# Patient Record
Sex: FEMALE | Race: WHITE | NOT HISPANIC OR LATINO | Employment: FULL TIME | URBAN - METROPOLITAN AREA
[De-identification: names, ages, dates, MRNs, and addresses within clinical notes are randomized per-mention and may not be internally consistent; named-entity substitution may affect disease eponyms.]

---

## 2018-06-29 ENCOUNTER — OFFICE VISIT (OUTPATIENT)
Dept: OBGYN CLINIC | Facility: MEDICAL CENTER | Age: 43
End: 2018-06-29
Payer: COMMERCIAL

## 2018-06-29 VITALS
WEIGHT: 193.4 LBS | SYSTOLIC BLOOD PRESSURE: 104 MMHG | DIASTOLIC BLOOD PRESSURE: 76 MMHG | BODY MASS INDEX: 33.02 KG/M2 | HEIGHT: 64 IN

## 2018-06-29 DIAGNOSIS — Z01.419 ENCNTR FOR GYN EXAM (GENERAL) (ROUTINE) W/O ABN FINDINGS: Primary | ICD-10-CM

## 2018-06-29 DIAGNOSIS — N93.9 ABNORMAL UTERINE BLEEDING (AUB): ICD-10-CM

## 2018-06-29 DIAGNOSIS — Z12.31 ENCOUNTER FOR SCREENING MAMMOGRAM FOR MALIGNANT NEOPLASM OF BREAST: ICD-10-CM

## 2018-06-29 DIAGNOSIS — Z12.4 ENCOUNTER FOR PAP SMEAR OF CERVIX WITH HPV DNA COTESTING: ICD-10-CM

## 2018-06-29 PROBLEM — M06.4 INFLAMMATORY POLYARTHROPATHY (HCC): Status: ACTIVE | Noted: 2018-06-29

## 2018-06-29 PROBLEM — I83.90 VARICOSE VEINS: Status: ACTIVE | Noted: 2018-06-29

## 2018-06-29 PROBLEM — Z79.899 ENCOUNTER FOR MONITORING OF METHOTREXATE THERAPY: Status: ACTIVE | Noted: 2017-05-04

## 2018-06-29 PROBLEM — R68.84 JAW PAIN: Status: ACTIVE | Noted: 2018-06-29

## 2018-06-29 PROBLEM — Z51.81 ENCOUNTER FOR MONITORING OF METHOTREXATE THERAPY: Status: ACTIVE | Noted: 2017-05-04

## 2018-06-29 PROBLEM — R53.81 MALAISE: Status: ACTIVE | Noted: 2018-06-29

## 2018-06-29 PROBLEM — R45.89 DEPRESSED MOOD: Status: ACTIVE | Noted: 2017-02-14

## 2018-06-29 PROBLEM — E27.49 SECONDARY ADRENAL INSUFFICIENCY (HCC): Status: ACTIVE | Noted: 2017-02-14

## 2018-06-29 PROBLEM — Z79.631 ENCOUNTER FOR MONITORING OF METHOTREXATE THERAPY: Status: ACTIVE | Noted: 2017-05-04

## 2018-06-29 PROBLEM — F41.9 ANXIETY: Status: ACTIVE | Noted: 2018-06-29

## 2018-06-29 PROBLEM — J30.9 ALLERGIC RHINITIS: Status: ACTIVE | Noted: 2018-06-29

## 2018-06-29 PROBLEM — M06.9 RA (RHEUMATOID ARTHRITIS) (HCC): Status: ACTIVE | Noted: 2018-06-29

## 2018-06-29 PROBLEM — N39.0 URINARY TRACT INFECTIOUS DISEASE: Status: ACTIVE | Noted: 2018-06-29

## 2018-06-29 PROBLEM — E04.1 THYROID NODULE, UNINODULAR: Status: ACTIVE | Noted: 2017-02-14

## 2018-06-29 PROCEDURE — 87624 HPV HI-RISK TYP POOLED RSLT: CPT | Performed by: OBSTETRICS & GYNECOLOGY

## 2018-06-29 PROCEDURE — G0145 SCR C/V CYTO,THINLAYER,RESCR: HCPCS | Performed by: OBSTETRICS & GYNECOLOGY

## 2018-06-29 PROCEDURE — S0610 ANNUAL GYNECOLOGICAL EXAMINA: HCPCS | Performed by: OBSTETRICS & GYNECOLOGY

## 2018-06-29 RX ORDER — CITALOPRAM 40 MG/1
TABLET ORAL
COMMUNITY
End: 2018-10-17

## 2018-06-29 NOTE — PROGRESS NOTES
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 [employer]; states menses occurs monthly, but very heavy for the first 4 days; she is interested in obtaining a Mirena IUD since she has difficulty remembering to take any pills  She has not had a mammogram and no recent pelvic u s  She mentioned that her Mother has history of uterine fibroids  Matt  , her history includes arthritic conditions  I reviewed potential gyn emergencies Rx given for pelvic u s  And our office to precert for the IUD, advising her to have it inserted during her menses  Will heed results of pap and HPV, mammogram, and pelvic u s    Routine well woman exam done today  2  Pap and HPV:  The patient's pap is not up to date  Pap and cotesting was done today  Current ASCCP Guidelines reviewed  3   Mammogram was ordered  4  The following were reviewed in today's visit: breast self exam   5  F/u 1 year      CC:  Annual Gynecologic Examination    HPI: Laura Ronquillo is a 41 y o  [id number] who presents for annual gynecologic examination  She has the following concerns:  Heavy menses    Health Maintenance:    Patient describes her health as good  Patient does not have weight concerns  She exercises 7 days per week with work  She does wear her seatbelt routinely  She does not perform regular monthly self breast exams  She does feel safe at home  Patients does not follow a  diet        Her pap: many years ago  Last mammogram: never    Past Medical History:   Diagnosis Date    Rheumatoid arthritis ([facility] [address] )     Sjoegren syndrome ([facility] [address] )        Past Surgical History:   Procedure Laterality Date    CHOLECYSTECTOMY      VENOUS ABLATION         Past OB/Gyn History:  Pt has menstrual issues, see above  History of sexually transmitted infection: No   History of abnormal pap smears: No    Patient is currently sexually active    The current method of family planning is none  Family History   Problem Relation Age of Onset    No Known Problems Mother     Esophageal cancer Father        Social History:  Social History     Social History    Marital status: /Civil Union     Spouse name: N/A    Number of children: N/A    Years of education: N/A     Occupational History    Not on file  Social History Main Topics    Smoking status: Former Smoker    Smokeless tobacco: Never Used    Alcohol use No    Drug use: No    Sexual activity: Yes     Partners: Male     Birth control/ protection: None     Other Topics Concern    Not on file     Social History Narrative    No narrative on file     Presently lives with family  Patient is currently employed     Allergies   Allergen Reactions    Shellfish-Derived Products          Current Outpatient Prescriptions:     citalopram (CeleXA) 40 mg tablet, TAKE ONE TABLET BY MOUTH EVERY DAY, Disp: , Rfl:       Review of Systems  Constitutional :no fever, feels well, no tiredness, no recent weight gain or loss  ENT: no ear ache, no loss of hearing, no nosebleeds or nasal discharge, no sore throat or hoarseness  Cardiovascular: no complaints of slow or fast heart beat, no chest pain, no palpitations, no leg claudication or lower extremity edema    Respiratory: no complaints of shortness of shortness of breath, no IZAGUIRRE  Breasts:no complaints of breast pain, breast lump, or nipple discharge  Gastrointestinal: no complaints of abdominal pain, constipation, nausea, vomiting, or diarrhea or bloody stools  Genitourinary : no complaints of dysuria, incontinence, pelvic pain, no dysmenorrhea, vaginal discharge or abnormal vaginal bleeding and as noted in HPI  Musculoskeletal: no complaints of arthralgia, no myalgia, no joint swelling or stiffness, no limb pain or swelling  Integumentary: no complaints of skin rash or lesion, itching or dry skin  Neurological: no complaints of headache, no confusion, no numbness or tingling, no dizziness or fainting    Physical Exam:   /76   Ht 5' 4" (1 626 m)   Wt 87 7 kg (193 lb 6 4 oz)   LMP 06/19/2018 (Exact Date)   Breastfeeding? No   BMI 33 20 kg/m²     General:   appears stated age, cooperative, alert normal mood and affect   Psychiatric oriented to person, place and time  Mood and affect normal   Neck: normal, supple,trachea midline, no masses  Thyroid: normal, no thyromegaly   Heart: regular rate and rhythm, S1, S2 normal, no murmur, click, rub or gallop   Lungs: clear to auscultation bilaterally, no increased work of breathing or signs of respiratory distress   Breasts: normal, no dimpling or skin changes noted   Abdomen: soft, non-tender, without masses or organomegaly   Vulva: normal , no lesions   Vagina: normal , no lesions or dryness   Urethra: normal   Urethal meatus normal   Bladder Normal, soft, non-tender and no prolapse or masses appreciated   Cervix: normal, no palpable masses ; ec and c pap and HPV culture done   Uterus: normal , non-tender, not enlarged, no palpable masses   Adnexa: normal, non-tender without fullness or masses; hemoccult neg  Lymphatic Palpation of lymph nodes in neck, axilla, groin and/or other locations: no lymphadenopathy or masses noted   Skin Normal skin turgor and no rashes    Palpation of skin and subcutaneous tissue normal

## 2018-07-02 ENCOUNTER — TELEPHONE (OUTPATIENT)
Dept: OBGYN CLINIC | Facility: MEDICAL CENTER | Age: 43
End: 2018-07-02

## 2018-07-02 NOTE — TELEPHONE ENCOUNTER
----- Message from Antonio Hutchins sent at 7/2/2018  9:40 AM EDT -----  Regarding: RE: Himanshu Hutchins  Benefits submitted  I will contact pt once I receive them   ----- Message -----  From: Meir Holloway  Sent: 6/29/2018  11:42 AM  To: Antonio Hutchins  Subject: Precert Mirena                                   Please precck Mirena for pt  Pt is seeing Dr Eugene Arroyo but needs to have ultrasound prior to placement    Pt aware and will schedule ultrasound

## 2018-07-03 LAB — HPV RRNA GENITAL QL NAA+PROBE: NORMAL

## 2018-07-06 LAB
LAB AP GYN PRIMARY INTERPRETATION: NORMAL
Lab: NORMAL
PATH INTERP SPEC-IMP: NORMAL

## 2018-07-16 ENCOUNTER — HOSPITAL ENCOUNTER (OUTPATIENT)
Dept: ULTRASOUND IMAGING | Facility: HOSPITAL | Age: 43
Discharge: HOME | End: 2018-07-16
Payer: COMMERCIAL

## 2018-07-16 DIAGNOSIS — N93.9 ABNORMAL UTERINE BLEEDING (AUB): ICD-10-CM

## 2018-07-16 PROCEDURE — 76856 US EXAM PELVIC COMPLETE: CPT

## 2018-07-16 PROCEDURE — 76830 TRANSVAGINAL US NON-OB: CPT

## 2018-07-20 NOTE — PROGRESS NOTES
Ultrasound reviewed  Small uterine fibroid measuring 1 1 cm  She does have bilateral ovarian follicles which appear benign    The largest is 2 0 cm

## 2018-07-21 ENCOUNTER — TRANSCRIBE ORDERS (OUTPATIENT)
Dept: ADMINISTRATIVE | Facility: HOSPITAL | Age: 43
End: 2018-07-21

## 2018-07-21 ENCOUNTER — APPOINTMENT (OUTPATIENT)
Dept: LAB | Facility: HOSPITAL | Age: 43
End: 2018-07-21

## 2018-07-21 DIAGNOSIS — Z00.8 HEALTH EXAMINATION IN POPULATION SURVEY: Primary | ICD-10-CM

## 2018-07-21 DIAGNOSIS — Z00.8 HEALTH EXAMINATION IN POPULATION SURVEY: ICD-10-CM

## 2018-07-21 LAB
CHOLEST SERPL-MCNC: 184 MG/DL (ref 0–200)
EST. AVERAGE GLUCOSE BLD GHB EST-MCNC: 108 MG/DL
HBA1C MFR BLD: 5.4 % (ref 4.2–6.3)
HDLC SERPL-MCNC: 44 MG/DL (ref 40–60)
LDLC SERPL CALC-MCNC: 122 MG/DL (ref 75–193)
NONHDLC SERPL-MCNC: 140 MG/DL
TRIGL SERPL-MCNC: 89 MG/DL (ref 44–166)

## 2018-07-21 PROCEDURE — 36415 COLL VENOUS BLD VENIPUNCTURE: CPT

## 2018-07-21 PROCEDURE — 80061 LIPID PANEL: CPT

## 2018-07-21 PROCEDURE — 83036 HEMOGLOBIN GLYCOSYLATED A1C: CPT

## 2018-07-25 ENCOUNTER — TELEPHONE (OUTPATIENT)
Dept: OBGYN CLINIC | Facility: MEDICAL CENTER | Age: 43
End: 2018-07-25

## 2018-08-01 ENCOUNTER — TELEPHONE (OUTPATIENT)
Dept: OBGYN CLINIC | Facility: MEDICAL CENTER | Age: 43
End: 2018-08-01

## 2018-08-01 NOTE — TELEPHONE ENCOUNTER
Benefits submitted for wrong insurance   Benefits for iud resubmitted with [facility]

## 2018-08-10 ENCOUNTER — PROCEDURE VISIT (OUTPATIENT)
Dept: OBGYN CLINIC | Facility: MEDICAL CENTER | Age: 43
End: 2018-08-10
Payer: COMMERCIAL

## 2018-08-10 VITALS
WEIGHT: 191.9 LBS | DIASTOLIC BLOOD PRESSURE: 72 MMHG | SYSTOLIC BLOOD PRESSURE: 112 MMHG | BODY MASS INDEX: 31.97 KG/M2 | HEIGHT: 65 IN

## 2018-08-10 DIAGNOSIS — Z32.00 ENCOUNTER FOR PREGNANCY TEST, RESULT UNKNOWN: ICD-10-CM

## 2018-08-10 DIAGNOSIS — Z30.430 ENCOUNTER FOR INSERTION OF MIRENA IUD: Primary | ICD-10-CM

## 2018-08-10 LAB — SL AMB POCT URINE HCG: NEGATIVE

## 2018-08-10 PROCEDURE — 81025 URINE PREGNANCY TEST: CPT | Performed by: NURSE PRACTITIONER

## 2018-08-10 PROCEDURE — 58300 INSERT INTRAUTERINE DEVICE: CPT | Performed by: NURSE PRACTITIONER

## 2018-08-10 NOTE — PROGRESS NOTES
Iud insertions  Date/Time: 8/10/2018 9:00 AM  Performed by: Albert Foster by: Kulwinder Jim     Consent:     Consent obtained:  Verbal and written    Consent given by:  Patient    Procedure risks and benefits discussed: yes      Patient questions answered: yes      Patient agrees, verbalizes understanding, and wants to proceed: yes      Educational handouts given: yes      Instructions and paperwork completed: yes    Procedure:     Pelvic exam performed: yes      Negative urine pregnancy test: yes      Cervix cleaned and prepped: yes      Speculum placed in vagina: yes      Tenaculum applied to cervix: yes      Uterus sounded: yes      IUD inserted with no complications: yes      IUD type:  Mirena    Strings trimmed: yes      Uterus sound depth (cm):  9  Post-procedure:     Patient tolerated procedure well: yes      Patient will follow up after next period: yes

## 2018-09-07 ENCOUNTER — OFFICE VISIT (OUTPATIENT)
Dept: OBGYN CLINIC | Facility: MEDICAL CENTER | Age: 43
End: 2018-09-07
Payer: COMMERCIAL

## 2018-09-07 VITALS — DIASTOLIC BLOOD PRESSURE: 80 MMHG | BODY MASS INDEX: 32.62 KG/M2 | WEIGHT: 193 LBS | SYSTOLIC BLOOD PRESSURE: 100 MMHG

## 2018-09-07 DIAGNOSIS — Z30.431 IUD CHECK UP: Primary | ICD-10-CM

## 2018-09-07 PROCEDURE — 99214 OFFICE O/P EST MOD 30 MIN: CPT | Performed by: NURSE PRACTITIONER

## 2018-09-07 NOTE — PROGRESS NOTES
Assessment Diagnoses and all orders for this visit:    IUD check up        Plan:  Normal iud check  String at appropriate length  Pt  Prefers not to check the string  Informed irregular and/or amennorrhea can occur with this method  rto for annual in June, call sooner w any concerns  41 y o  female continuing IUD, no contraindications  Subjective      Faisal Gastelum is a 41 y o  female who presents for contraception counseling  The patient does not have complaints today  The patient is sexually active  Pertinent past medical history: none  States no c/o since iud inserted  First menses after insert was a lot lighter than usual for her  Has been having slight spotting for a few days and cramping, menses due now  Has been sexually active w/o complaints  Doesn't check the string  Patient Active Problem List   Diagnosis    Acute adrenal insufficiency (HCC)    Allergic rhinitis    Anemia    Anxiety    Depressed mood    Encounter for monitoring of methotrexate therapy    Hypokalemia    Inflammatory polyarthropathy (HCC)    Jaw pain    Leucocytosis    Malaise    RA (rheumatoid arthritis) ([address] )    Secondary adrenal insufficiency (HCC)    Sjogrens syndrome (HCC)    Thyroid nodule, uninodular    Urinary tract infectious disease    Varicose vein    Varicose veins of lower extremities with other complications    Varicose veins with pain, left    Varicose veins       Past Medical History:   Diagnosis Date    Rheumatoid arthritis ([address]     Sjoegren syndrome ([address] )        Past Surgical History:   Procedure Laterality Date    CHOLECYSTECTOMY      VENOUS ABLATION         Family History   Problem Relation Age of Onset    No Known Problems Mother     Esophageal cancer Father        Social History     Social History    Marital status: /Civil Union     Spouse name: N/A    Number of children: N/A    Years of education: N/A     Occupational History    Not on file       Social History Main Topics    Smoking status: Former Smoker    Smokeless tobacco: Never Used    Alcohol use No    Drug use: No    Sexual activity: Yes     Partners: Male     Birth control/ protection: None, IUD     Other Topics Concern    Not on file     Social History Narrative    No narrative on file          Current Outpatient Prescriptions:     citalopram (CeleXA) 40 mg tablet, TAKE ONE TABLET BY MOUTH EVERY DAY, Disp: , Rfl:     Allergies   Allergen Reactions    Shellfish-Derived Products        Review of Systems  Constitutional :no fever, feels well, no tiredness, no recent weight gain or loss  ENT: no ear ache, no loss of hearing, no nosebleeds or nasal discharge, no sore throat or hoarseness  Cardiovascular: no complaints of slow or fast heart beat, no chest pain, no palpitations, no leg claudication or lower extremity edema  Respiratory: no complaints of shortness of shortness of breath, no IZAGUIRRE  Breasts:no complaints of breast pain, breast lump, or nipple discharge  Gastrointestinal: no complaints of abdominal pain, constipation, nausea, vomiting, or diarrhea or bloody stools  Genitourinary : no complaints of dysuria, incontinence, pelvic pain, no dysmenorrhea, vaginal discharge or abnormal vaginal bleeding and as noted in HPI  Musculoskeletal: no complaints of arthralgia, no myalgia, no joint swelling or stiffness, no limb pain or swelling  Integumentary: no complaints of skin rash or lesion, itching or dry skin  Neurological: no complaints of headache, no confusion, no numbness or tingling, no dizziness or fainting    Objective     /80   Wt 87 5 kg (193 lb)   LMP 08/10/2018 (Exact Date)   BMI 32 62 kg/m²     General:   appears stated age, cooperative, alert normal mood and affect   Vulva: normal female genitalia   Vagina: normal vagina   Urethra: normal   Cervix: Normal, no discharge  iud string visible at correct length     Uterus: normal size, contour, position, consistency, mobility, non-tender Adnexa: normal adnexa   Lymphatic palpation of lymph nodes in neck, axilla, groin and/or other locations: no lymphadenopathy or masses noted   Skin normal skin turgor and no rashes     Psychiatric orientation to person, place, and time: normal  mood and affect: normal

## 2018-10-17 ENCOUNTER — OFFICE VISIT (OUTPATIENT)
Dept: INTERNAL MEDICINE CLINIC | Facility: CLINIC | Age: 43
End: 2018-10-17
Payer: COMMERCIAL

## 2018-10-17 VITALS
WEIGHT: 189.8 LBS | OXYGEN SATURATION: 98 % | SYSTOLIC BLOOD PRESSURE: 104 MMHG | TEMPERATURE: 98.9 F | HEART RATE: 78 BPM | HEIGHT: 64 IN | BODY MASS INDEX: 32.4 KG/M2 | DIASTOLIC BLOOD PRESSURE: 60 MMHG

## 2018-10-17 DIAGNOSIS — M06.9 RHEUMATOID ARTHRITIS, INVOLVING UNSPECIFIED SITE, UNSPECIFIED RHEUMATOID FACTOR PRESENCE: Primary | ICD-10-CM

## 2018-10-17 DIAGNOSIS — E27.49 SECONDARY ADRENAL INSUFFICIENCY (HCC): ICD-10-CM

## 2018-10-17 DIAGNOSIS — M35.00 SJOGREN'S SYNDROME, WITH UNSPECIFIED ORGAN INVOLVEMENT (HCC): ICD-10-CM

## 2018-10-17 DIAGNOSIS — Z23 NEED FOR INFLUENZA VACCINATION: ICD-10-CM

## 2018-10-17 DIAGNOSIS — Z12.39 SCREENING FOR BREAST CANCER: ICD-10-CM

## 2018-10-17 DIAGNOSIS — Z13.6 SCREENING FOR CARDIOVASCULAR CONDITION: ICD-10-CM

## 2018-10-17 DIAGNOSIS — D84.9 IMMUNOSUPPRESSED STATUS (HCC): ICD-10-CM

## 2018-10-17 DIAGNOSIS — F41.9 ANXIETY: ICD-10-CM

## 2018-10-17 PROBLEM — R68.84 JAW PAIN: Status: RESOLVED | Noted: 2018-06-29 | Resolved: 2018-10-17

## 2018-10-17 PROBLEM — Z79.631 ENCOUNTER FOR MONITORING OF METHOTREXATE THERAPY: Status: RESOLVED | Noted: 2017-05-04 | Resolved: 2018-10-17

## 2018-10-17 PROBLEM — R53.81 MALAISE: Status: RESOLVED | Noted: 2018-06-29 | Resolved: 2018-10-17

## 2018-10-17 PROBLEM — Z51.81 ENCOUNTER FOR MONITORING OF METHOTREXATE THERAPY: Status: RESOLVED | Noted: 2017-05-04 | Resolved: 2018-10-17

## 2018-10-17 PROBLEM — N39.0 URINARY TRACT INFECTIOUS DISEASE: Status: RESOLVED | Noted: 2018-06-29 | Resolved: 2018-10-17

## 2018-10-17 PROBLEM — Z79.899 ENCOUNTER FOR MONITORING OF METHOTREXATE THERAPY: Status: RESOLVED | Noted: 2017-05-04 | Resolved: 2018-10-17

## 2018-10-17 PROCEDURE — 90682 RIV4 VACC RECOMBINANT DNA IM: CPT | Performed by: NURSE PRACTITIONER

## 2018-10-17 PROCEDURE — 1036F TOBACCO NON-USER: CPT | Performed by: NURSE PRACTITIONER

## 2018-10-17 PROCEDURE — 90471 IMMUNIZATION ADMIN: CPT | Performed by: NURSE PRACTITIONER

## 2018-10-17 PROCEDURE — 3008F BODY MASS INDEX DOCD: CPT | Performed by: NURSE PRACTITIONER

## 2018-10-17 PROCEDURE — 99204 OFFICE O/P NEW MOD 45 MIN: CPT | Performed by: NURSE PRACTITIONER

## 2018-10-17 RX ORDER — LORAZEPAM 0.5 MG/1
0.5 TABLET ORAL DAILY
COMMUNITY
Start: 2018-10-08 | End: 2018-12-10 | Stop reason: ALTCHOICE

## 2018-10-17 RX ORDER — CITALOPRAM 40 MG/1
40 TABLET ORAL DAILY
Qty: 30 TABLET | Refills: 3 | Status: SHIPPED | OUTPATIENT
Start: 2018-10-17 | End: 2018-12-10 | Stop reason: ALTCHOICE

## 2018-10-17 RX ORDER — CITALOPRAM 40 MG/1
TABLET ORAL
COMMUNITY
End: 2018-10-17 | Stop reason: SDUPTHER

## 2018-10-18 ENCOUNTER — TELEPHONE (OUTPATIENT)
Dept: INTERNAL MEDICINE CLINIC | Facility: CLINIC | Age: 43
End: 2018-10-18

## 2018-10-18 NOTE — TELEPHONE ENCOUNTER
[address] [place] [address] to obtain the thyroid US results, and they stated all records are now at [place]  Sent a fax to [phone] in [place] for her US that was done of the thyroid

## 2018-12-10 ENCOUNTER — OFFICE VISIT (OUTPATIENT)
Dept: INTERNAL MEDICINE CLINIC | Facility: CLINIC | Age: 43
End: 2018-12-10
Payer: COMMERCIAL

## 2018-12-10 VITALS
HEIGHT: 64 IN | OXYGEN SATURATION: 96 % | BODY MASS INDEX: 31.65 KG/M2 | TEMPERATURE: 98.4 F | HEART RATE: 80 BPM | WEIGHT: 185.4 LBS | SYSTOLIC BLOOD PRESSURE: 118 MMHG | DIASTOLIC BLOOD PRESSURE: 78 MMHG

## 2018-12-10 DIAGNOSIS — Z00.00 ROUTINE HEALTH MAINTENANCE: Primary | ICD-10-CM

## 2018-12-10 DIAGNOSIS — Z01.84 IMMUNITY STATUS TESTING: ICD-10-CM

## 2018-12-10 PROBLEM — Z12.39 SCREENING FOR BREAST CANCER: Status: RESOLVED | Noted: 2018-10-17 | Resolved: 2018-12-10

## 2018-12-10 PROBLEM — Z13.6 SCREENING FOR CARDIOVASCULAR CONDITION: Status: RESOLVED | Noted: 2018-10-17 | Resolved: 2018-12-10

## 2018-12-10 PROBLEM — I83.90 VARICOSE VEINS: Status: RESOLVED | Noted: 2018-06-29 | Resolved: 2018-12-10

## 2018-12-10 PROCEDURE — 99396 PREV VISIT EST AGE 40-64: CPT | Performed by: NURSE PRACTITIONER

## 2018-12-10 RX ORDER — LANOLIN ALCOHOL/MO/W.PET/CERES
3 CREAM (GRAM) TOPICAL
COMMUNITY
End: 2019-10-20

## 2018-12-10 NOTE — PATIENT INSTRUCTIONS
Wellness Visit for Adults   WHAT YOU NEED TO KNOW:   What is a wellness visit? A wellness visit is when you see your healthcare provider to get screened for health problems  You can also get advice on how to stay healthy  Write down your questions so you remember to ask them  Ask your healthcare provider how often you should have a wellness visit  What happens at a wellness visit? Your healthcare provider will ask about your health, and your family history of health problems  This includes high blood pressure, heart disease, and cancer  He or she will ask if you have symptoms that concern you, if you smoke, and about your mood  You may also be asked about your intake of medicines, supplements, food, and alcohol  Any of the following may be done:  · Your weight  will be checked  Your height may also be checked so your body mass index (BMI) can be calculated  Your BMI shows if you are at a healthy weight  · Your blood pressure  and heart rate will be checked  Your temperature may also be checked  · Blood and urine tests  may be done  Blood tests may be done to check your cholesterol levels  Abnormal cholesterol levels increase your risk for heart disease and stroke  You may also need a blood or urine test to check for diabetes if you are at increased risk  Urine tests may be done to look for signs of an infection or kidney disease  · A physical exam  includes checking your heartbeat and lungs with a stethoscope  Your healthcare provider may also check your skin to look for sun damage  · Screening tests  may be recommended  A screening test is done to check for diseases that may not cause symptoms  The screening tests you may need depend on your age, gender, family history, and lifestyle habits  For example, colorectal screening may be recommended if you are 48years old or older  What screening tests do I need if I am a woman? · A Pap smear  is used to screen for cervical cancer   Pap smears are usually done every 3 to 5 years depending on your age  You may need them more often if you have had abnormal Pap smear test results in the past  Ask your healthcare provider how often you should have a Pap smear  · A mammogram  is an x-ray of your breasts to screen for breast cancer  Experts recommend mammograms every 2 years starting at age 48 years  You may need a mammogram at age 52 years or younger if you have an increased risk for breast cancer  Talk to your healthcare provider about when you should start having mammograms and how often you need them  What vaccines might I need? · Get an influenza vaccine  every year  The influenza vaccine protects you from the flu  Several types of viruses cause the flu  The viruses change over time, so new vaccines are made each year  · Get a tetanus-diphtheria (Td) booster vaccine  every 10 years  This vaccine protects you against tetanus and diphtheria  Tetanus is a severe infection that may cause painful muscle spasms and lockjaw  Diphtheria is a severe bacterial infection that causes a thick covering in the back of your mouth and throat  · Get a human papillomavirus (HPV) vaccine  if you are female and aged 23 to 32 or male 23 to 24 and never received it  This vaccine protects you from HPV infection  HPV is the most common infection spread by sexual contact  HPV may also cause vaginal, penile, and anal cancers  · Get a pneumococcal vaccine  if you are aged 72 years or older  The pneumococcal vaccine is an injection given to protect you from pneumococcal disease  Pneumococcal disease is an infection caused by pneumococcal bacteria  The infection may cause pneumonia, meningitis, or an ear infection  · Get a shingles vaccine  if you are aged 61 or older, even if you have had shingles before  The shingles vaccine is an injection to protect you from the varicella-zoster virus  This is the same virus that causes chickenpox   Shingles is a painful rash that develops in people who had chickenpox or have been exposed to the virus  How can I eat healthy? My Plate is a model for planning healthy meals  It shows the types and amounts of foods that should go on your plate  Fruits and vegetables make up about half of your plate, and grains and protein make up the other half  A serving of dairy is included on the side of your plate  The amount of calories and serving sizes you need depends on your age, gender, weight, and height  Examples of healthy foods are listed below:  · Eat a variety of vegetables  such as dark green, red, and orange vegetables  You can also include canned vegetables low in sodium (salt) and frozen vegetables without added butter or sauces  · Eat a variety of fresh fruits , canned fruit in 100% juice, frozen fruit, and dried fruit  · Include whole grains  At least half of the grains you eat should be whole grains  Examples include whole-wheat bread, wheat pasta, brown rice, and whole-grain cereals such as oatmeal     · Eat a variety of protein foods such as seafood (fish and shellfish), lean meat, and poultry without skin (turkey and chicken)  Examples of lean meats include pork leg, shoulder, or tenderloin, and beef round, sirloin, tenderloin, and extra lean ground beef  Other protein foods include eggs and egg substitutes, beans, peas, soy products, nuts, and seeds  · Choose low-fat dairy products such as skim or 1% milk or low-fat yogurt, cheese, and cottage cheese  · Limit unhealthy fats  such as butter, hard margarine, and shortening  How much exercise do I need? Exercise at least 30 minutes per day on most days of the week  Some examples of exercise include walking, biking, dancing, and swimming  You can also fit in more physical activity by taking the stairs instead of the elevator or parking farther away from stores  Include muscle strengthening activities 2 days each week  Regular exercise provides many health benefits  It helps you manage your weight, and decreases your risk for type 2 diabetes, heart disease, stroke, and high blood pressure  Exercise can also help improve your mood  Ask your healthcare provider about the best exercise plan for you  What are some general health and safety guidelines I should follow? · Do not smoke  Nicotine and other chemicals in cigarettes and cigars can cause lung damage  Ask your healthcare provider for information if you currently smoke and need help to quit  E-cigarettes or smokeless tobacco still contain nicotine  Talk to your healthcare provider before you use these products  · Limit alcohol  A drink of alcohol is 12 ounces of beer, 5 ounces of wine, or 1½ ounces of liquor  · Lose weight, if needed  Being overweight increases your risk of certain health conditions  These include heart disease, high blood pressure, type 2 diabetes, and certain types of cancer  · Protect your skin  Do not sunbathe or use tanning beds  Use sunscreen with a SPF 15 or higher  Apply sunscreen at least 15 minutes before you go outside  Reapply sunscreen every 2 hours  Wear protective clothing, hats, and sunglasses when you are outside  · Drive safely  Always wear your seatbelt  Make sure everyone in your car wears a seatbelt  A seatbelt can save your life if you are in an accident  Do not use your cell phone when you are driving  This could distract you and cause an accident  Pull over if you need to make a call or send a text message  · Practice safe sex  Use latex condoms if are sexually active and have more than one partner  Your healthcare provider may recommend screening tests for sexually transmitted infections (STIs)  · Wear helmets, lifejackets, and protective gear  Always wear a helmet when you ride a bike or motorcycle, go skiing, or play sports that could cause a head injury  Wear protective equipment when you play sports   Wear a lifejacket when you are on a boat or doing water sports  CARE AGREEMENT:   You have the right to help plan your care  Learn about your health condition and how it may be treated  Discuss treatment options with your caregivers to decide what care you want to receive  You always have the right to refuse treatment  The above information is an  only  It is not intended as medical advice for individual conditions or treatments  Talk to your doctor, nurse or pharmacist before following any medical regimen to see if it is safe and effective for you  © 2017 [address] Information is for End User's use only and may not be sold, redistributed or otherwise used for commercial purposes  All illustrations and images included in CareNotes® are the copyrighted property of [employer]  or [employer]

## 2018-12-10 NOTE — PROGRESS NOTES
Assessment/Plan: Will order titers for patient to have done  She will also get her other fasting labs done  She will go for mammogram after the Holidays  She will come back on Friday to have PPD done and will let us know whether or not she does need a one or two step  She is up to date on her Tdap and Flu vaccines  Will follow up in one year or sooner if need be  No problem-specific Assessment & Plan notes found for this encounter  Problem List Items Addressed This Visit     Routine health maintenance - Primary    Immunity status testing    Relevant Orders    Measles Antibodies (IgG,IgM)    Mumps antibody, IgG    Rubella antibody, IgG    Varicella zoster antibody, IgG    Hepatitis B surface antibody    Tetanus toxoid, IgG    Diptheria Antitoxoid antibodies    Bordetella pertussis antibody    Rubeola antibody IgG            Subjective:      Patient ID: Jolene Rawls is a 41 y o  female  Dipesh Barros is here today for a routine health exam   She is here today for a school physical  She will need titers done and needs a PPD done  She denies any chest pain, SOB, or palpitations  She states her depression and anxiety is maintained  She has not yet had her labs done and will do her mammogram soon  She denies any drug, alcohol, or tobacco use  Denies any constipation or diarrhea  She does follow up for routine eye exams every 2 years and does see a dentist every 6 months  She offers no other issues  The following portions of the patient's history were reviewed and updated as appropriate:   She  has a past medical history of Anxiety; Depression; Rheumatoid arthritis ([facility] [address] ); and Sjoegren syndrome ([facility] [address] )    She   Patient Active Problem List    Diagnosis Date Noted    Routine health maintenance 12/10/2018    Immunity status testing 12/10/2018    Allergic rhinitis 06/29/2018    Anxiety 06/29/2018    Inflammatory polyarthropathy ([facility] [address] ) 06/29/2018    RA (rheumatoid arthritis) ([facility] [address] ) 06/29/2018    Depressed mood 02/14/2017    Secondary adrenal insufficiency ([facility] ) 02/14/2017    Thyroid nodule, uninodular 02/14/2017    Sjogren's syndrome ([facility] ) 12/31/2016    Acute adrenal insufficiency ([facility] ) 12/30/2016    Anemia 12/19/2016    Leucocytosis 12/17/2016    Varicose vein 03/10/2015     She  has a past surgical history that includes Cholecystectomy and Venous ablation  Her family history includes Esophageal cancer in her father; No Known Problems in her mother  She  reports that she has quit smoking  She has never used smokeless tobacco  She reports that she does not drink alcohol or use drugs  Current Outpatient Prescriptions   Medication Sig Dispense Refill    melatonin 3 mg Take 3 mg by mouth daily at bedtime      sertraline (ZOLOFT) 50 mg tablet Take 50 mg by mouth daily       No current facility-administered medications for this visit  Current Outpatient Prescriptions on File Prior to Visit   Medication Sig    [DISCONTINUED] citalopram (CeleXA) 40 mg tablet Take 1 tablet (40 mg total) by mouth daily    [DISCONTINUED] LORazepam (ATIVAN) 0 5 mg tablet Take 0 5 mg by mouth daily      No current facility-administered medications on file prior to visit  She is allergic to shellfish-derived products       Review of Systems   All other systems reviewed and are negative  Objective:      /78 (BP Location: Right arm, Patient Position: Sitting, Cuff Size: Standard)   Pulse 80   Temp 98 4 °F (36 9 °C) (Temporal)   Ht 5' 4" (1 626 m)   Wt 84 1 kg (185 lb 6 4 oz)   SpO2 96%   BMI 31 82 kg/m²          Physical Exam   Constitutional: She is oriented to person, place, and time  She appears well-developed and well-nourished  HENT:   Head: Normocephalic and atraumatic  Right Ear: External ear normal    Left Ear: External ear normal    Nose: Nose normal    Mouth/Throat: Oropharynx is clear and moist    Eyes: Pupils are equal, round, and reactive to light   Conjunctivae and EOM are normal  Neck: Normal range of motion  Neck supple  Cardiovascular: Normal rate, regular rhythm, normal heart sounds and intact distal pulses  Pulmonary/Chest: Effort normal and breath sounds normal    Abdominal: Soft  Bowel sounds are normal    Musculoskeletal: Normal range of motion  Neurological: She is alert and oriented to person, place, and time  She has normal reflexes  Skin: Skin is warm and dry  Psychiatric: She has a normal mood and affect  Her behavior is normal  Judgment and thought content normal    Vitals reviewed

## 2018-12-14 ENCOUNTER — CLINICAL SUPPORT (OUTPATIENT)
Dept: INTERNAL MEDICINE CLINIC | Facility: CLINIC | Age: 43
End: 2018-12-14
Payer: COMMERCIAL

## 2018-12-14 ENCOUNTER — TRANSCRIBE ORDERS (OUTPATIENT)
Dept: LAB | Facility: CLINIC | Age: 43
End: 2018-12-14

## 2018-12-14 ENCOUNTER — LAB (OUTPATIENT)
Dept: LAB | Facility: CLINIC | Age: 43
End: 2018-12-14
Payer: COMMERCIAL

## 2018-12-14 DIAGNOSIS — Z01.84 IMMUNITY STATUS TESTING: ICD-10-CM

## 2018-12-14 DIAGNOSIS — M35.00 SJOGREN'S SYNDROME, WITH UNSPECIFIED ORGAN INVOLVEMENT (HCC): ICD-10-CM

## 2018-12-14 DIAGNOSIS — Z11.1 ENCOUNTER FOR PPD TEST: Primary | ICD-10-CM

## 2018-12-14 DIAGNOSIS — F41.9 ANXIETY: ICD-10-CM

## 2018-12-14 DIAGNOSIS — E27.49 SECONDARY ADRENAL INSUFFICIENCY (HCC): ICD-10-CM

## 2018-12-14 DIAGNOSIS — M06.9 RHEUMATOID ARTHRITIS, INVOLVING UNSPECIFIED SITE, UNSPECIFIED RHEUMATOID FACTOR PRESENCE: ICD-10-CM

## 2018-12-14 LAB
ALBUMIN SERPL BCP-MCNC: 3.8 G/DL (ref 3.5–5)
ALP SERPL-CCNC: 74 U/L (ref 46–116)
ALT SERPL W P-5'-P-CCNC: 19 U/L (ref 12–78)
ANION GAP SERPL CALCULATED.3IONS-SCNC: 9 MMOL/L (ref 4–13)
AST SERPL W P-5'-P-CCNC: 10 U/L (ref 5–45)
BASOPHILS # BLD AUTO: 0.08 THOUSANDS/ΜL (ref 0–0.1)
BASOPHILS NFR BLD AUTO: 1 % (ref 0–1)
BILIRUB SERPL-MCNC: 0.48 MG/DL (ref 0.2–1)
BUN SERPL-MCNC: 13 MG/DL (ref 5–25)
CALCIUM SERPL-MCNC: 8.6 MG/DL (ref 8.3–10.1)
CHLORIDE SERPL-SCNC: 107 MMOL/L (ref 100–108)
CO2 SERPL-SCNC: 25 MMOL/L (ref 21–32)
CREAT SERPL-MCNC: 0.73 MG/DL (ref 0.6–1.3)
EOSINOPHIL # BLD AUTO: 0.49 THOUSAND/ΜL (ref 0–0.61)
EOSINOPHIL NFR BLD AUTO: 6 % (ref 0–6)
ERYTHROCYTE [DISTWIDTH] IN BLOOD BY AUTOMATED COUNT: 14.6 % (ref 11.6–15.1)
GFR SERPL CREATININE-BSD FRML MDRD: 103 ML/MIN/1.73SQ M
GLUCOSE SERPL-MCNC: 88 MG/DL (ref 65–140)
HCT VFR BLD AUTO: 39.7 % (ref 34.8–46.1)
HGB BLD-MCNC: 11.9 G/DL (ref 11.5–15.4)
IMM GRANULOCYTES # BLD AUTO: 0.03 THOUSAND/UL (ref 0–0.2)
IMM GRANULOCYTES NFR BLD AUTO: 0 % (ref 0–2)
LYMPHOCYTES # BLD AUTO: 2.15 THOUSANDS/ΜL (ref 0.6–4.47)
LYMPHOCYTES NFR BLD AUTO: 25 % (ref 14–44)
MCH RBC QN AUTO: 27.5 PG (ref 26.8–34.3)
MCHC RBC AUTO-ENTMCNC: 30 G/DL (ref 31.4–37.4)
MCV RBC AUTO: 92 FL (ref 82–98)
MONOCYTES # BLD AUTO: 0.72 THOUSAND/ΜL (ref 0.17–1.22)
MONOCYTES NFR BLD AUTO: 8 % (ref 4–12)
NEUTROPHILS # BLD AUTO: 5.08 THOUSANDS/ΜL (ref 1.85–7.62)
NEUTS SEG NFR BLD AUTO: 60 % (ref 43–75)
NRBC BLD AUTO-RTO: 0 /100 WBCS
PLATELET # BLD AUTO: 299 THOUSANDS/UL (ref 149–390)
PMV BLD AUTO: 12 FL (ref 8.9–12.7)
POTASSIUM SERPL-SCNC: 3.9 MMOL/L (ref 3.5–5.3)
PROT SERPL-MCNC: 7.8 G/DL (ref 6.4–8.2)
RBC # BLD AUTO: 4.33 MILLION/UL (ref 3.81–5.12)
RUBV IGG SERPL IA-ACNC: 57 IU/ML
SODIUM SERPL-SCNC: 141 MMOL/L (ref 136–145)
TSH SERPL DL<=0.05 MIU/L-ACNC: 2.41 UIU/ML (ref 0.36–3.74)
WBC # BLD AUTO: 8.55 THOUSAND/UL (ref 4.31–10.16)

## 2018-12-14 PROCEDURE — 86735 MUMPS ANTIBODY: CPT

## 2018-12-14 PROCEDURE — 84443 ASSAY THYROID STIM HORMONE: CPT

## 2018-12-14 PROCEDURE — 86787 VARICELLA-ZOSTER ANTIBODY: CPT

## 2018-12-14 PROCEDURE — 36415 COLL VENOUS BLD VENIPUNCTURE: CPT

## 2018-12-14 PROCEDURE — 86706 HEP B SURFACE ANTIBODY: CPT

## 2018-12-14 PROCEDURE — 85025 COMPLETE CBC W/AUTO DIFF WBC: CPT

## 2018-12-14 PROCEDURE — 86317 IMMUNOASSAY INFECTIOUS AGENT: CPT

## 2018-12-14 PROCEDURE — 86762 RUBELLA ANTIBODY: CPT

## 2018-12-14 PROCEDURE — 86615 BORDETELLA ANTIBODY: CPT

## 2018-12-14 PROCEDURE — 86580 TB INTRADERMAL TEST: CPT | Performed by: NURSE PRACTITIONER

## 2018-12-14 PROCEDURE — 80053 COMPREHEN METABOLIC PANEL: CPT

## 2018-12-14 PROCEDURE — 86765 RUBEOLA ANTIBODY: CPT

## 2018-12-15 LAB — HBV SURFACE AB SER-ACNC: 29.76 MIU/ML

## 2018-12-17 LAB
B PERT IGA SER QL IA: <1 INDEX (ref 0–0.9)
B PERT IGG SER-ACNC: <0.95 INDEX (ref 0–0.94)
B PERT IGM SER QL IA: 5 INDEX (ref 0–0.9)
VZV IGG SER IA-ACNC: NORMAL

## 2018-12-18 DIAGNOSIS — Z20.818 PERTUSSIS EXPOSURE: Primary | ICD-10-CM

## 2018-12-18 LAB
C DIPHTHERIAE AB SER IA-ACNC: 0.32 IU/ML
C TETANI IGG SER IA-ACNC: 1.84 IU/ML
INDURATION: 0 MM
MEV IGG SER QL: ABNORMAL
MUV IGG SER QL: ABNORMAL
TB SKIN TEST: NEGATIVE

## 2018-12-18 RX ORDER — AZITHROMYCIN 250 MG/1
TABLET, FILM COATED ORAL
Qty: 6 TABLET | Refills: 0 | Status: SHIPPED | OUTPATIENT
Start: 2018-12-18 | End: 2018-12-22

## 2018-12-18 NOTE — PROGRESS NOTES
Can you let Maria Teresa Berger know some of her titers are coming back but one I am concerned about is the pertussis one  It is showing that she is positive for this  Did she get any other vaccines or having any other symptoms? ?

## 2018-12-19 ENCOUNTER — TELEPHONE (OUTPATIENT)
Dept: INTERNAL MEDICINE CLINIC | Facility: CLINIC | Age: 43
End: 2018-12-19

## 2018-12-19 NOTE — TELEPHONE ENCOUNTER
Did speak with patient regarding her recent titers  Her IgM did come back positive for pertussis  I did receive a call as well from the state regarding this  I did call a Z Pack in for the patient and did advised her grandson's pediatrician be notified as well  Did speak with her again today and she states an ER doctor and ID at [facility] are not concerned even after advising her that the IgM is the acute phase of the disease  She did not pick the Z Pack up and states she feels she does not need it  I did tell her the risks of this and she states she will be getting another TDaP on Friday even though she did have one in 2010  I did inform her again her antibody titer was negative and did explain this again to her to let ID know this

## 2018-12-28 DIAGNOSIS — Z01.84 IMMUNITY STATUS TESTING: Primary | ICD-10-CM

## 2019-01-04 ENCOUNTER — TELEPHONE (OUTPATIENT)
Dept: INTERNAL MEDICINE CLINIC | Facility: CLINIC | Age: 44
End: 2019-01-04

## 2019-01-04 DIAGNOSIS — R35.0 FREQUENCY OF URINATION: Primary | ICD-10-CM

## 2019-01-04 DIAGNOSIS — R39.15 URGENCY OF URINATION: ICD-10-CM

## 2019-01-04 DIAGNOSIS — R39.9 UTI SYMPTOMS: Primary | ICD-10-CM

## 2019-01-04 RX ORDER — NITROFURANTOIN 25; 75 MG/1; MG/1
100 CAPSULE ORAL 2 TIMES DAILY
Qty: 20 CAPSULE | Refills: 0 | Status: SHIPPED | OUTPATIENT
Start: 2019-01-04 | End: 2019-01-14

## 2019-01-04 NOTE — TELEPHONE ENCOUNTER
Pt called stating she feels she has a UTI  Stated has urgency and frequency, etc   As per our conversation, I informed her you would start her on an Abx, but she needs to get a urine culture  She agreed  I put the order in  Please send Abx to [facility] in [facility]  Stated no allergy to Abxs

## 2019-01-25 ENCOUNTER — LAB (OUTPATIENT)
Dept: LAB | Facility: HOSPITAL | Age: 44
End: 2019-01-25
Payer: COMMERCIAL

## 2019-01-25 ENCOUNTER — TELEPHONE (OUTPATIENT)
Dept: INTERNAL MEDICINE CLINIC | Facility: CLINIC | Age: 44
End: 2019-01-25

## 2019-01-25 DIAGNOSIS — R35.0 FREQUENCY OF URINATION: ICD-10-CM

## 2019-01-25 DIAGNOSIS — Z01.84 IMMUNITY STATUS TESTING: Primary | ICD-10-CM

## 2019-01-25 DIAGNOSIS — Z01.84 IMMUNITY STATUS TESTING: ICD-10-CM

## 2019-01-25 DIAGNOSIS — Z13.6 SCREENING FOR CARDIOVASCULAR CONDITION: ICD-10-CM

## 2019-01-25 DIAGNOSIS — R39.15 URGENCY OF URINATION: ICD-10-CM

## 2019-01-25 LAB — RUBV IGG SERPL IA-ACNC: 53.9 IU/ML

## 2019-01-25 PROCEDURE — 86615 BORDETELLA ANTIBODY: CPT

## 2019-01-25 PROCEDURE — 86735 MUMPS ANTIBODY: CPT

## 2019-01-25 PROCEDURE — 86765 RUBEOLA ANTIBODY: CPT

## 2019-01-25 PROCEDURE — 36415 COLL VENOUS BLD VENIPUNCTURE: CPT

## 2019-01-25 PROCEDURE — 86762 RUBELLA ANTIBODY: CPT

## 2019-01-25 NOTE — TELEPHONE ENCOUNTER
Received a call from Claudia Coffman stating that she needs a few titers redone  The other labs are in, except for pertussis  Please advise on adding that lab for Claudia Coffman

## 2019-01-29 ENCOUNTER — TELEPHONE (OUTPATIENT)
Dept: INTERNAL MEDICINE CLINIC | Facility: CLINIC | Age: 44
End: 2019-01-29

## 2019-01-29 LAB
MEV IGG SER QL: NORMAL
MUV IGG SER QL: ABNORMAL

## 2019-01-29 NOTE — TELEPHONE ENCOUNTER
Priscila from the [employer] called regarding Beryl's Pertussis IGM  She said it was high again, but that the titer blood test is not a reliable test for pertussis  She suggested if we suspect an infection we should be doing a nasal swab  I explained that the patient was going back to school and needed the titers drawn for her school  That when the initial flag came back she was prescribed a z-mary and this was a repeat test  She said "if the patient is not symptomatic, not showing any signs, that i'm not a doctor, i'm just the nurse- but to me with the [facility] it's nothing and I can close the case"  She said if we are concerned that the patient has pertussis a nasal swab should be ordered, otherwise its just a flagged titer

## 2019-01-30 NOTE — PROGRESS NOTES
Can you let Armen Hawkins know her titers did come back showing her pertussis level is still up at 5 4  Did she get the TDaP vaccine with with??? Also her mumps titers is showing equivocal suggesting repeating it again  Do to her grandson being under a year this is a live vaccine I would have to find out if she does get the vaccine how long until it is safe to be around her Grandson

## 2019-04-08 LAB
B PERT IGA SER QL IA: <1 INDEX (ref 0–0.9)
B PERT IGG SER-ACNC: <0.95 INDEX (ref 0–0.94)
B PERT IGM SER QL IA: 5.4 INDEX (ref 0–0.9)

## 2019-04-19 ENCOUNTER — TELEPHONE (OUTPATIENT)
Dept: INTERNAL MEDICINE CLINIC | Facility: CLINIC | Age: 44
End: 2019-04-19

## 2019-04-19 DIAGNOSIS — J01.10 ACUTE NON-RECURRENT FRONTAL SINUSITIS: Primary | ICD-10-CM

## 2019-04-19 RX ORDER — AMOXICILLIN AND CLAVULANATE POTASSIUM 875; 125 MG/1; MG/1
1 TABLET, FILM COATED ORAL 2 TIMES DAILY
Qty: 20 TABLET | Refills: 0 | Status: SHIPPED | OUTPATIENT
Start: 2019-04-19 | End: 2019-04-29

## 2019-06-25 ENCOUNTER — OFFICE VISIT (OUTPATIENT)
Dept: INTERNAL MEDICINE CLINIC | Facility: CLINIC | Age: 44
End: 2019-06-25
Payer: COMMERCIAL

## 2019-06-25 ENCOUNTER — APPOINTMENT (OUTPATIENT)
Dept: LAB | Facility: CLINIC | Age: 44
End: 2019-06-25
Payer: COMMERCIAL

## 2019-06-25 VITALS
TEMPERATURE: 98 F | OXYGEN SATURATION: 97 % | SYSTOLIC BLOOD PRESSURE: 102 MMHG | WEIGHT: 187.8 LBS | DIASTOLIC BLOOD PRESSURE: 80 MMHG | HEIGHT: 64 IN | HEART RATE: 96 BPM | BODY MASS INDEX: 32.06 KG/M2

## 2019-06-25 DIAGNOSIS — M77.9 TENDONITIS: ICD-10-CM

## 2019-06-25 DIAGNOSIS — M06.9 RHEUMATOID ARTHRITIS, INVOLVING UNSPECIFIED SITE, UNSPECIFIED RHEUMATOID FACTOR PRESENCE: Primary | ICD-10-CM

## 2019-06-25 DIAGNOSIS — J30.1 SEASONAL ALLERGIC RHINITIS DUE TO POLLEN: ICD-10-CM

## 2019-06-25 PROBLEM — Z00.00 ROUTINE HEALTH MAINTENANCE: Status: RESOLVED | Noted: 2018-12-10 | Resolved: 2019-06-25

## 2019-06-25 PROBLEM — Z01.84 IMMUNITY STATUS TESTING: Status: RESOLVED | Noted: 2018-12-10 | Resolved: 2019-06-25

## 2019-06-25 PROCEDURE — 86003 ALLG SPEC IGE CRUDE XTRC EA: CPT

## 2019-06-25 PROCEDURE — 1036F TOBACCO NON-USER: CPT | Performed by: NURSE PRACTITIONER

## 2019-06-25 PROCEDURE — 99213 OFFICE O/P EST LOW 20 MIN: CPT | Performed by: NURSE PRACTITIONER

## 2019-06-25 PROCEDURE — 3008F BODY MASS INDEX DOCD: CPT | Performed by: NURSE PRACTITIONER

## 2019-06-25 PROCEDURE — 82785 ASSAY OF IGE: CPT

## 2019-06-25 PROCEDURE — 36415 COLL VENOUS BLD VENIPUNCTURE: CPT

## 2019-06-25 RX ORDER — PREDNISONE 10 MG/1
TABLET ORAL
Qty: 18 TABLET | Refills: 0 | Status: SHIPPED | OUTPATIENT
Start: 2019-06-25 | End: 2019-06-25 | Stop reason: DRUGHIGH

## 2019-06-25 RX ORDER — LORAZEPAM 0.5 MG/1
0.5 TABLET ORAL EVERY 8 HOURS PRN
COMMUNITY
End: 2019-10-20

## 2019-06-25 RX ORDER — FEXOFENADINE HCL 180 MG/1
180 TABLET ORAL DAILY
COMMUNITY
End: 2019-10-20

## 2019-06-25 RX ORDER — MONTELUKAST SODIUM 10 MG/1
10 TABLET ORAL
Qty: 90 TABLET | Refills: 3 | Status: SHIPPED | OUTPATIENT
Start: 2019-06-25 | End: 2019-10-20

## 2019-06-26 LAB
A ALTERNATA IGE QN: <0.1 KUA/I
A FUMIGATUS IGE QN: <0.1 KUA/I
ALLERGEN COMMENT: ABNORMAL
BERMUDA GRASS IGE QN: <0.1 KUA/I
BOXELDER IGE QN: <0.1 KUA/I
C HERBARUM IGE QN: <0.1 KUA/I
CAT DANDER IGE QN: <0.1 KUA/I
CMN PIGWEED IGE QN: <0.1 KUA/I
COMMON RAGWEED IGE QN: 3.24 KUA/I
COTTONWOOD IGE QN: <0.1 KUA/I
D FARINAE IGE QN: 0.21 KUA/I
D PTERONYSS IGE QN: 0.18 KUA/I
DOG DANDER IGE QN: <0.1 KUA/I
LONDON PLANE IGE QN: <0.1 KUA/I
MOUSE URINE PROT IGE QN: <0.1 KUA/I
MT JUNIPER IGE QN: <0.1 KUA/I
MUGWORT IGE QN: <0.1 KUA/I
P NOTATUM IGE QN: <0.1 KUA/I
ROACH IGE QN: 0.59 KUA/I
SHEEP SORREL IGE QN: <0.1 KUA/I
SILVER BIRCH IGE QN: <0.1 KUA/I
TIMOTHY IGE QN: <0.1 KUA/I
TOTAL IGE SMQN RAST: 344 KU/L (ref 0–113)
WALNUT IGE QN: <0.1 KUA/I
WHITE ASH IGE QN: <0.1 KUA/I
WHITE ELM IGE QN: <0.1 KUA/I
WHITE MULBERRY IGE QN: <0.1 KUA/I
WHITE OAK IGE QN: <0.1 KUA/I

## 2019-07-05 ENCOUNTER — HOSPITAL ENCOUNTER (OUTPATIENT)
Dept: RADIOLOGY | Facility: HOSPITAL | Age: 44
Discharge: HOME | End: 2019-07-05
Payer: COMMERCIAL

## 2019-07-05 DIAGNOSIS — M25.572 ACUTE LEFT ANKLE PAIN: ICD-10-CM

## 2019-07-05 DIAGNOSIS — M25.572 ACUTE LEFT ANKLE PAIN: Primary | ICD-10-CM

## 2019-07-05 PROCEDURE — 73600 X-RAY EXAM OF ANKLE: CPT

## 2019-07-05 PROCEDURE — 73630 X-RAY EXAM OF FOOT: CPT

## 2019-07-08 DIAGNOSIS — M79.672 ACUTE FOOT PAIN, LEFT: Primary | ICD-10-CM

## 2019-07-29 ENCOUNTER — HOSPITAL ENCOUNTER (OUTPATIENT)
Dept: MRI IMAGING | Facility: HOSPITAL | Age: 44
Discharge: HOME | End: 2019-07-29
Payer: COMMERCIAL

## 2019-07-29 DIAGNOSIS — M79.672 ACUTE FOOT PAIN, LEFT: ICD-10-CM

## 2019-07-29 PROCEDURE — 73718 MRI LOWER EXTREMITY W/O DYE: CPT

## 2019-07-29 PROCEDURE — 73721 MRI JNT OF LWR EXTRE W/O DYE: CPT

## 2019-08-05 PROCEDURE — 87147 CULTURE TYPE IMMUNOLOGIC: CPT | Performed by: OTOLARYNGOLOGY

## 2019-08-05 PROCEDURE — 87205 SMEAR GRAM STAIN: CPT | Performed by: OTOLARYNGOLOGY

## 2019-08-05 PROCEDURE — 87186 SC STD MICRODIL/AGAR DIL: CPT | Performed by: OTOLARYNGOLOGY

## 2019-08-05 PROCEDURE — 87070 CULTURE OTHR SPECIMN AEROBIC: CPT | Performed by: OTOLARYNGOLOGY

## 2019-10-19 ENCOUNTER — HOSPITAL ENCOUNTER (EMERGENCY)
Facility: HOSPITAL | Age: 44
Discharge: HOME | End: 2019-10-19
Attending: EMERGENCY MEDICINE | Admitting: EMERGENCY MEDICINE
Payer: OTHER MISCELLANEOUS

## 2019-10-19 VITALS
HEART RATE: 88 BPM | SYSTOLIC BLOOD PRESSURE: 117 MMHG | BODY MASS INDEX: 32.44 KG/M2 | DIASTOLIC BLOOD PRESSURE: 70 MMHG | HEIGHT: 64 IN | RESPIRATION RATE: 16 BRPM | TEMPERATURE: 98 F | OXYGEN SATURATION: 96 % | WEIGHT: 190 LBS

## 2019-10-19 DIAGNOSIS — Z77.21 EXPOSURE TO BLOOD OR BODY FLUID: Primary | ICD-10-CM
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
## 2019-10-19 LAB
ALT SERPL W P-5'-P-CCNC: 11 U/L (ref 7–52)
HBV SURFACE AB SER-ACNC: 20.07 MIU/ML
HBV SURFACE AG SER QL: NORMAL
HCV AB SER QL: NORMAL

## 2019-10-19 PROCEDURE — 99283 EMERGENCY DEPT VISIT LOW MDM: CPT

## 2019-10-19 PROCEDURE — 86803 HEPATITIS C AB TEST: CPT | Performed by: EMERGENCY MEDICINE

## 2019-10-19 PROCEDURE — 84460 ALANINE AMINO (ALT) (SGPT): CPT | Performed by: EMERGENCY MEDICINE

## 2019-10-19 PROCEDURE — 86706 HEP B SURFACE ANTIBODY: CPT | Performed by: EMERGENCY MEDICINE

## 2019-10-19 PROCEDURE — 36415 COLL VENOUS BLD VENIPUNCTURE: CPT | Performed by: EMERGENCY MEDICINE

## 2019-10-19 PROCEDURE — 87340 HEPATITIS B SURFACE AG IA: CPT | Performed by: EMERGENCY MEDICINE

## 2019-10-19 PROCEDURE — 87389 HIV-1 AG W/HIV-1&-2 AB AG IA: CPT | Performed by: EMERGENCY MEDICINE

## 2019-10-19 RX ORDER — EMTRICITABINE AND TENOFOVIR DISOPROXIL FUMARATE 200; 300 MG/1; MG/1
1 TABLET, FILM COATED ORAL DAILY
Qty: 28 TABLET | Refills: 0 | Status: SHIPPED | OUTPATIENT
Start: 2019-10-19 | End: 2020-02-13

## 2019-10-19 RX ADMIN — Medication: at 12:28

## 2019-10-19 RX ADMIN — RALTEGRAVIR 400 MG: 400 TABLET, FILM COATED ORAL at 12:28

## 2019-10-19 NOTE — ED NOTES
Pt states there was a used IV catheter needle (with safety device activated) laying on bedside table from IV being inserted  "I went to clean the table off and the needle went deep into the side of my finger (left 4th digit outer area)        Nasim Swift RN  10/19/19 2972

## 2019-10-19 NOTE — ED PROVIDER NOTES
History  Chief Complaint   Patient presents with    Body Fluid Exposure     left 4th digit     Patient is a 73-year-old female with history of rheumatoid arthritis Sjogren syndrome who presents after sticking herself with a needle from a patient with a history of IV drug abuse  Source Patient says that she is HIV negative  It was a hollow bore needle  The patient tells me that she media bleed immediately  Cannot display prior to admission medications because the patient has not been admitted in this contact  Past Medical History:   Diagnosis Date    Anxiety     Depression     Rheumatoid arthritis ([address] )     Sjoegren syndrome        Past Surgical History:   Procedure Laterality Date    CHOLECYSTECTOMY      VENOUS ABLATION Left     lower extremity       Family History   Problem Relation Age of Onset    Breast cancer Mother     Rheum arthritis Mother     Sjogren's syndrome Mother     Esophageal cancer Father     No Known Problems Brother     No Known Problems Brother     No Known Problems Daughter     No Known Problems Son     No Known Problems Son      I have reviewed and agree with the history as documented  Social History     Tobacco Use    Smoking status: Former Smoker    Smokeless tobacco: Never Used   Substance Use Topics    Alcohol use: Yes     Comment: occasional    Drug use: No        Review of Systems   Constitutional: Negative for chills, fatigue, fever and unexpected weight change  HENT: Negative for congestion and nosebleeds  Eyes: Negative for visual disturbance  Respiratory: Negative for chest tightness and shortness of breath  Cardiovascular: Negative for chest pain, palpitations and leg swelling  Gastrointestinal: Negative for abdominal pain, blood in stool, diarrhea, nausea and vomiting  Endocrine: Negative for cold intolerance and heat intolerance  Genitourinary: Negative for difficulty urinating     Musculoskeletal: Negative for arthralgias, back pain, gait problem, joint swelling and myalgias  Skin: Negative for rash  Neurological: Negative for dizziness, speech difficulty, weakness and headaches  Psychiatric/Behavioral: Negative for behavioral problems, confusion, self-injury and suicidal ideas  All other systems reviewed and are negative  Physical Exam  Physical Exam   Constitutional: She is oriented to person, place, and time  She appears well-developed and well-nourished  HENT:   Head: Normocephalic and atraumatic  Nose: Nose normal    Eyes: Pupils are equal, round, and reactive to light  EOM are normal    Neck: Normal range of motion  Neck supple  Cardiovascular: Normal rate, regular rhythm and normal heart sounds  Exam reveals no gallop and no friction rub  No murmur heard  Pulmonary/Chest: Effort normal and breath sounds normal  No respiratory distress  She has no wheezes  She has no rales  Abdominal: Soft  She exhibits no distension  There is no tenderness  There is no rebound and no guarding  Musculoskeletal: Normal range of motion  She exhibits no edema  Neurological: She is alert and oriented to person, place, and time  Skin: Skin is warm and dry  Needle stick to the right index finger   Psychiatric: She has a normal mood and affect  Her behavior is normal  Judgment and thought content normal    Nursing note and vitals reviewed        Vital Signs  ED Triage Vitals [10/19/19 1209]   Temperature Pulse Respirations Blood Pressure SpO2   98 °F (36 7 °C) 88 16 117/70 96 %      Temp Source Heart Rate Source Patient Position - Orthostatic VS BP Location FiO2 (%)   Temporal Monitor Sitting Left arm --      Pain Score       No Pain           Vitals:    10/19/19 1209   BP: 117/70   Pulse: 88   Patient Position - Orthostatic VS: Sitting         Visual Acuity      ED Medications  Medications   emtricitabine-tenofovir (TRUVADA 200-300) combo dose (has no administration in time range)   raltegravir (ISENTRESS) tablet 400 mg (has no administration in time range)       Diagnostic Studies  Results Reviewed     None                 No orders to display              Procedures  Procedures       ED Course  ED Course as of Oct 19 1642   Sat Oct 19, 2019   1554 Patient has opted to take the 28 a day course of prophylactic antivirals for fear that the source patient is high risk for HIV infection                                  MDM    Disposition  Final diagnoses:   None     ED Disposition     None      Follow-up Information    None         Patient's Medications   Discharge Prescriptions    No medications on file     No discharge procedures on file      ED Provider  Electronically Signed by           Cathy Bell MD  10/19/19 4655

## 2019-10-20 LAB — HIV 1+2 AB+HIV1 P24 AG SERPL QL IA: NORMAL

## 2019-11-18 ENCOUNTER — OFFICE VISIT (OUTPATIENT)
Dept: URGENT CARE | Facility: HOSPITAL | Age: 44
End: 2019-11-18
Payer: COMMERCIAL

## 2019-11-18 VITALS
HEART RATE: 102 BPM | TEMPERATURE: 97.8 F | SYSTOLIC BLOOD PRESSURE: 110 MMHG | RESPIRATION RATE: 18 BRPM | OXYGEN SATURATION: 96 % | HEIGHT: 64 IN | BODY MASS INDEX: 32.44 KG/M2 | DIASTOLIC BLOOD PRESSURE: 62 MMHG | WEIGHT: 190 LBS

## 2019-11-18 DIAGNOSIS — J01.91 ACUTE RECURRENT SINUSITIS, UNSPECIFIED LOCATION: Primary | ICD-10-CM

## 2019-11-18 PROCEDURE — S9088 SERVICES PROVIDED IN URGENT: HCPCS | Performed by: NURSE PRACTITIONER

## 2019-11-18 PROCEDURE — 99213 OFFICE O/P EST LOW 20 MIN: CPT | Performed by: NURSE PRACTITIONER

## 2019-11-18 RX ORDER — AMOXICILLIN AND CLAVULANATE POTASSIUM 875; 125 MG/1; MG/1
1 TABLET, FILM COATED ORAL EVERY 12 HOURS SCHEDULED
Qty: 20 TABLET | Refills: 0 | Status: SHIPPED | OUTPATIENT
Start: 2019-11-18 | End: 2019-11-28

## 2019-11-18 NOTE — PROGRESS NOTES
[facility]        NAME: Bobby Ferrari is a 45 y o  female  : 1975    MRN: [id number]  DATE: 2019  TIME: 2:43 PM    Assessment and Plan   Acute recurrent sinusitis, unspecified location [J01 91]  1  Acute recurrent sinusitis, unspecified location  amoxicillin-clavulanate (AUGMENTIN) 875-125 mg per tablet         Patient Instructions     Patient Instructions   Rest and drink extra fluids  Start antibiotic  Take probiotic  You can use over-the-counter Flonase an allergy medication  Neti pot or nasal saline flushes can be helpful  Follow up with PCP if no improvement  Go to the ER with any worsening symptoms  Chief Complaint     Chief Complaint   Patient presents with    Cough         History of Present Illness   Bobby Ferrari presents to the clinic c/o    This is a 33-year-old female here today with complaints of sinus pressure, congestion postnasal drip and cough  She states symptoms started about a week and half ago  She states she feels as though cough is caused by postnasal drip  She denies any fevers bodies or chills  She is feeling more run down  She does note that she has a history of having sinus infection  She was seen by ENT this summer and treated for a sinus infection with 3 weeks of Bactrim  She is coughing up yellowish green mucus  Review of Systems   Review of Systems   Constitutional: Positive for activity change and fatigue  Negative for fever  HENT: Positive for congestion, rhinorrhea, sinus pressure and sinus pain  Respiratory: Positive for cough  Negative for chest tightness and shortness of breath            Current Medications     Long-Term Medications   Medication Sig Dispense Refill    emtricitabine-tenofovir (TRUVADA) 200-300 mg per tablet Take 1 tablet by mouth daily (Patient not taking: Reported on 2019) 28 tablet 0    raltegravir (ISENTRESS) 400 mg tablet Take 1 tablet (400 mg total) by mouth every 12 (twelve) hours (Patient not taking: Reported on 11/18/2019) 56 tablet 0       Current Allergies     Allergies as of 11/18/2019    (No Known Allergies)            The following portions of the patient's history were reviewed and updated as appropriate: allergies, current medications, past family history, past medical history, past social history, past surgical history and problem list     Objective   /62   Pulse 102   Temp 97 8 °F (36 6 °C)   Resp 18   Ht 5' 4" (1 626 m)   Wt 86 2 kg (190 lb)   SpO2 96%   BMI 32 61 kg/m²        Physical Exam     Physical Exam   Constitutional: She is oriented to person, place, and time  She appears well-developed and well-nourished  HENT:   Mouth/Throat: Oropharynx is clear and moist    Neck: Normal range of motion  Neck supple  Cardiovascular: Normal rate and regular rhythm  Pulmonary/Chest: Effort normal and breath sounds normal    Neurological: She is alert and oriented to person, place, and time  Psychiatric: She has a normal mood and affect  Her behavior is normal    Nursing note and vitals reviewed

## 2019-11-18 NOTE — PATIENT INSTRUCTIONS
Rest and drink extra fluids  Start antibiotic  Take probiotic  You can use over-the-counter Flonase an allergy medication  Neti pot or nasal saline flushes can be helpful  Follow up with PCP if no improvement  Go to the ER with any worsening symptoms

## 2019-12-24 ENCOUNTER — HOSPITAL ENCOUNTER (OUTPATIENT)
Dept: CT IMAGING | Facility: HOSPITAL | Age: 44
Discharge: HOME | End: 2019-12-24
Attending: OTOLARYNGOLOGY

## 2019-12-24 DIAGNOSIS — J34.3 HYPERTROPHY OF BOTH INFERIOR NASAL TURBINATES: ICD-10-CM

## 2019-12-24 DIAGNOSIS — J34.2 DEVIATED NASAL SEPTUM: ICD-10-CM

## 2019-12-24 DIAGNOSIS — J32.4 CHRONIC PANSINUSITIS: ICD-10-CM

## 2019-12-24 DIAGNOSIS — R09.81 NASAL CONGESTION: ICD-10-CM

## 2019-12-31 ENCOUNTER — HOSPITAL ENCOUNTER (OUTPATIENT)
Dept: CT IMAGING | Facility: HOSPITAL | Age: 44
Discharge: HOME | End: 2019-12-31
Attending: OTOLARYNGOLOGY
Payer: COMMERCIAL

## 2019-12-31 PROCEDURE — 70486 CT MAXILLOFACIAL W/O DYE: CPT

## 2020-01-15 PROBLEM — J34.3 HYPERTROPHY OF BOTH INFERIOR NASAL TURBINATES: Status: ACTIVE | Noted: 2020-01-15

## 2020-01-15 PROBLEM — R09.81 NASAL CONGESTION: Status: ACTIVE | Noted: 2020-01-15

## 2020-01-15 PROBLEM — J32.4 CHRONIC PANSINUSITIS: Status: ACTIVE | Noted: 2020-01-15

## 2020-01-15 PROBLEM — J34.2 DEVIATED NASAL SEPTUM: Status: ACTIVE | Noted: 2020-01-15

## 2020-02-13 ENCOUNTER — HOSPITAL ENCOUNTER (OUTPATIENT)
Dept: CT IMAGING | Facility: HOSPITAL | Age: 45
Discharge: HOME | End: 2020-02-13
Payer: COMMERCIAL

## 2020-02-13 ENCOUNTER — OFFICE VISIT (OUTPATIENT)
Dept: INTERNAL MEDICINE CLINIC | Facility: CLINIC | Age: 45
End: 2020-02-13
Payer: COMMERCIAL

## 2020-02-13 ENCOUNTER — TELEPHONE (OUTPATIENT)
Dept: INTERNAL MEDICINE CLINIC | Facility: CLINIC | Age: 45
End: 2020-02-13

## 2020-02-13 DIAGNOSIS — R51.9 SEVERE FRONTAL HEADACHES: Primary | ICD-10-CM

## 2020-02-13 DIAGNOSIS — R51.9 SEVERE FRONTAL HEADACHES: ICD-10-CM

## 2020-02-13 DIAGNOSIS — J32.4 CHRONIC PANSINUSITIS: Primary | ICD-10-CM

## 2020-02-13 PROCEDURE — 70450 CT HEAD/BRAIN W/O DYE: CPT

## 2020-02-13 PROCEDURE — 99213 OFFICE O/P EST LOW 20 MIN: CPT | Performed by: FAMILY MEDICINE

## 2020-02-13 PROCEDURE — 1036F TOBACCO NON-USER: CPT | Performed by: FAMILY MEDICINE

## 2020-02-13 RX ORDER — FLUCONAZOLE 150 MG/1
150 TABLET ORAL ONCE
Qty: 1 TABLET | Refills: 3 | Status: SHIPPED | OUTPATIENT
Start: 2020-02-13 | End: 2020-02-13

## 2020-02-13 RX ORDER — PREDNISONE 10 MG/1
TABLET ORAL
Qty: 15 TABLET | Refills: 0 | Status: SHIPPED | OUTPATIENT
Start: 2020-02-13 | End: 2020-04-27

## 2020-02-13 RX ORDER — AMOXICILLIN AND CLAVULANATE POTASSIUM 875; 125 MG/1; MG/1
1 TABLET, FILM COATED ORAL 2 TIMES DAILY
Qty: 20 TABLET | Refills: 1 | Status: SHIPPED | OUTPATIENT
Start: 2020-02-13 | End: 2020-02-23

## 2020-02-13 NOTE — TELEPHONE ENCOUNTER
I spoke with Roshan Davis today from central scheduling about Stat CT of head  Patient was instructed to go directly over to [facility]

## 2020-02-13 NOTE — PROGRESS NOTES
Assessment/Plan:    No problem-specific Assessment & Plan notes found for this encounter  Diagnoses and all orders for this visit:    Chronic pansinusitis  -     amoxicillin-clavulanate (AUGMENTIN) 875-125 mg per tablet; Take 1 tablet by mouth 2 (two) times a day for 10 days    Severe frontal headaches  -     CT head wo contrast; Future    Other orders  -     Cancel: Mammo screening bilateral w 3d & cad; Future        Orders recommendations as noted above  Her current issues with headaches are different than her chronic sinus issues per her report  Discussed with her that the chronic sinus issues may be irritating some surrounding nerves in may be causing some of her current symptoms  Because of the severity of the headaches as well as her describing them as thunderclap further investigation is needed  Will get a CT today to rule out any acute issues with bleeding  Discussed with her that this does not always see all the information that is needed  Discussed with her the possibility of having an MRI in the future for further investigation  Her sinuses are definitely infected at present  Discussed with her that she has copious, purulent material in her nose right greater than left; will start her on the Augmentin  Diflucan send because she has a history of yeast infections with this  Continue with nasal irrigation daily  Continue follow-up with ENT for likely sinus surgery over the next few months  Discussed with her possibly using a short course of steroids to help with the inflammation and help with drainage of the sinuses  Will hold off on this until after she gets the CT done  Discussed with her a short tapering dose of the prednisone especially with her history of the adrenal insufficiency in the past   Watch for any worsening of the headaches  Discussed with her going to the emergency room if the headaches worsen or persist     Subjective:      Patient ID: Irene Bah is a 39 y o  female  She presents for acute visit  She has had worsening headaches over the last few weeks  A few weeks ago had sudden onset of a right-sided headache  This had resolved but now has developed similar symptoms on the left side  She states that these will come on suddenly and feel like she was hit on the side of the head with a sledgehammer    Pain will be severe and she will have to lay down  These episodes will last about 5-10 minutes  Still has some residual dull pain after that but this severe pain will then have resolved  Denies any vision changes with this  Denies any photophobia  Denies any nausea or vomiting  Denies any weakness, numbness, or tingling in her arms or legs  She was wondering whether some of this could be related to stress  She does have chronic sinus headaches but her current issues are completely different in quality  Denies any head trauma  Does have a family history of migraines in her mother and daughter but her symptoms seem completely different to her  Her sinuses remain chronic issue  Has chronic congestion and blockage to her sinuses  Currently right-sided greater than left  Some purulent nasal discharge  Attempts nasal irrigation but this is often difficult because her nose feels so swollen  Does follow-up with ENT and will be likely going for sinus surgery in a few months  Chronic postnasal drip  Denies any fevers or chills  The following portions of the patient's history were reviewed and updated as appropriate:   She  has a past medical history of Anxiety, Depression, Rheumatoid arthritis ([address] ), and Sjoegren syndrome    She   Patient Active Problem List    Diagnosis Date Noted    Severe frontal headaches 02/13/2020    Chronic pansinusitis 01/15/2020    Nasal congestion 01/15/2020    Deviated nasal septum 01/15/2020    Hypertrophy of both inferior nasal turbinates 01/15/2020    Tendonitis 06/25/2019    Allergic rhinitis 06/29/2018    Anxiety 06/29/2018    Inflammatory polyarthropathy ([facility] ) 06/29/2018    RA (rheumatoid arthritis) ([place] [address] ) 06/29/2018    Depressed mood 02/14/2017    Secondary adrenal insufficiency ([facility] ) 02/14/2017    Thyroid nodule, uninodular 02/14/2017    Sjogren's syndrome ([facility] ) 12/31/2016    Acute adrenal insufficiency ([place] [address] ) 12/30/2016    Anemia 12/19/2016    Leucocytosis 12/17/2016    Varicose vein 03/10/2015     She  has a past surgical history that includes Cholecystectomy and Venous ablation (Left)  Her family history includes Breast cancer in her mother; Esophageal cancer in her father; No Known Problems in her brother, brother, daughter, son, and son; Rheum arthritis in her mother; Sjogren's syndrome in her mother  She  reports that she has quit smoking  She has never used smokeless tobacco  She reports that she drinks alcohol  She reports that she does not use drugs  Current Outpatient Medications   Medication Sig Dispense Refill    amoxicillin-clavulanate (AUGMENTIN) 875-125 mg per tablet Take 1 tablet by mouth 2 (two) times a day for 10 days 20 tablet 1    predniSONE 10 mg tablet Five pills for 1 day, 4 pills for 1 day, 3 pills for 1 day, 2 pills for 1 day, 1 pill for 1 day 15 tablet 0     No current facility-administered medications for this visit  No current outpatient medications on file prior to visit  No current facility-administered medications on file prior to visit  She has No Known Allergies       Review of Systems   Constitutional: Positive for fatigue  Negative for activity change, appetite change, chills and fever  HENT: Positive for congestion, postnasal drip, sinus pressure and sinus pain  Negative for rhinorrhea and trouble swallowing  Eyes: Negative for photophobia, pain and visual disturbance  Respiratory: Negative for chest tightness and shortness of breath  Cardiovascular: Negative for chest pain and palpitations     Gastrointestinal: Negative for abdominal pain, blood in stool, diarrhea, nausea and vomiting  Musculoskeletal: Negative for gait problem  Skin: Negative for color change  Neurological: Positive for headaches  Negative for dizziness, syncope, speech difficulty, weakness, light-headedness and numbness  Hematological: Does not bruise/bleed easily  Psychiatric/Behavioral: Negative for confusion and sleep disturbance  The patient is not nervous/anxious  Objective: There were no vitals taken for this visit  Physical Exam   Constitutional: She is oriented to person, place, and time  She is cooperative  No distress  HENT:   Head: Normocephalic and atraumatic  Mouth/Throat: Oropharynx is clear and moist    Moist mucous membranes; copious amounts of purulence, green, thick nasal discharge noted right nostril much greater than left; maxillary sinus tenderness right greater than left; slight posterior pharyngeal erythema; no tenderness over the mastoid areas of either side   Eyes: Pupils are equal, round, and reactive to light  Conjunctivae, EOM and lids are normal  Right eye exhibits no discharge  Left eye exhibits no discharge  Neck: Neck supple  Cardiovascular: Normal rate, regular rhythm and normal heart sounds  Exam reveals no gallop and no friction rub  No murmur heard  Pulmonary/Chest: No respiratory distress  She has no wheezes  She has no rales  Abdominal: Bowel sounds are normal  She exhibits no distension  There is no tenderness  Musculoskeletal: She exhibits no edema  Lymphadenopathy:     She has no cervical adenopathy  Neurological: She is alert and oriented to person, place, and time  She has normal strength  No cranial nerve deficit or sensory deficit  Coordination and gait normal    Skin: Skin is warm and dry  Psychiatric: She has a normal mood and affect  Her behavior is normal        BMI Counseling: There is no height or weight on file to calculate BMI   The BMI is above normal  Nutrition recommendations include encouraging healthy choices of fruits and vegetables, limiting drinks that contain sugar, moderation in carbohydrate intake and reducing intake of cholesterol  Exercise recommendations include exercising 3-5 times per week

## 2020-03-04 ENCOUNTER — HOSPITAL ENCOUNTER (OUTPATIENT)
Dept: MRI IMAGING | Facility: HOSPITAL | Age: 45
Discharge: HOME | End: 2020-03-04
Payer: COMMERCIAL

## 2020-03-04 DIAGNOSIS — R51.9 SEVERE FRONTAL HEADACHES: ICD-10-CM

## 2020-03-04 PROCEDURE — 70553 MRI BRAIN STEM W/O & W/DYE: CPT

## 2020-03-04 PROCEDURE — A9585 GADOBUTROL INJECTION: HCPCS | Performed by: FAMILY MEDICINE

## 2020-03-04 RX ADMIN — GADOBUTROL 9 ML: 604.72 INJECTION INTRAVENOUS at 20:02

## 2020-04-27 ENCOUNTER — TELEMEDICINE (OUTPATIENT)
Dept: INTERNAL MEDICINE CLINIC | Facility: CLINIC | Age: 45
End: 2020-04-27
Payer: COMMERCIAL

## 2020-04-27 VITALS — TEMPERATURE: 98.4 F | OXYGEN SATURATION: 94 % | HEART RATE: 82 BPM

## 2020-04-27 DIAGNOSIS — Z12.39 SCREENING FOR BREAST CANCER: ICD-10-CM

## 2020-04-27 DIAGNOSIS — B00.1 HERPES LABIALIS: ICD-10-CM

## 2020-04-27 DIAGNOSIS — Z20.828 EXPOSURE TO SARS-ASSOCIATED CORONAVIRUS: Primary | ICD-10-CM

## 2020-04-27 DIAGNOSIS — R05.9 COUGH: ICD-10-CM

## 2020-04-27 PROBLEM — Z20.822 EXPOSURE TO COVID-19 VIRUS: Status: ACTIVE | Noted: 2020-04-27

## 2020-04-27 PROCEDURE — 99213 OFFICE O/P EST LOW 20 MIN: CPT | Performed by: NURSE PRACTITIONER

## 2020-04-27 RX ORDER — BENZONATATE 200 MG/1
200 CAPSULE ORAL 3 TIMES DAILY PRN
Qty: 20 CAPSULE | Refills: 0 | Status: ON HOLD | OUTPATIENT
Start: 2020-04-27 | End: 2020-06-30 | Stop reason: ALTCHOICE

## 2020-04-27 RX ORDER — VALACYCLOVIR HYDROCHLORIDE 1 G/1
TABLET, FILM COATED ORAL
Qty: 4 TABLET | Refills: 0 | Status: SHIPPED | OUTPATIENT
Start: 2020-04-27 | End: 2020-05-01

## 2020-04-28 ENCOUNTER — TELEPHONE (OUTPATIENT)
Dept: INTERNAL MEDICINE CLINIC | Facility: CLINIC | Age: 45
End: 2020-04-28

## 2020-04-28 DIAGNOSIS — R39.9 UTI SYMPTOMS: Primary | ICD-10-CM

## 2020-04-28 RX ORDER — NITROFURANTOIN 25; 75 MG/1; MG/1
100 CAPSULE ORAL 2 TIMES DAILY
Qty: 20 CAPSULE | Refills: 0 | Status: SHIPPED | OUTPATIENT
Start: 2020-04-28 | End: 2020-05-08

## 2020-04-29 ENCOUNTER — TELEMEDICINE (OUTPATIENT)
Dept: INTERNAL MEDICINE CLINIC | Facility: CLINIC | Age: 45
End: 2020-04-29
Payer: COMMERCIAL

## 2020-04-29 VITALS — OXYGEN SATURATION: 96 % | TEMPERATURE: 97.2 F

## 2020-04-29 DIAGNOSIS — R05.9 COUGH: ICD-10-CM

## 2020-04-29 DIAGNOSIS — Z20.828 EXPOSURE TO SARS-ASSOCIATED CORONAVIRUS: Primary | ICD-10-CM

## 2020-04-29 LAB — SARS-COV-2 RNA RESP QL NAA+PROBE: NEGATIVE

## 2020-04-29 PROCEDURE — U0003 INFECTIOUS AGENT DETECTION BY NUCLEIC ACID (DNA OR RNA); SEVERE ACUTE RESPIRATORY SYNDROME CORONAVIRUS 2 (SARS-COV-2) (CORONAVIRUS DISEASE [COVID-19]), AMPLIFIED PROBE TECHNIQUE, MAKING USE OF HIGH THROUGHPUT TECHNOLOGIES AS DESCRIBED BY CMS-2020-01-R: HCPCS | Performed by: NURSE PRACTITIONER

## 2020-04-29 PROCEDURE — 99442 PR PHYS/QHP TELEPHONE EVALUATION 11-20 MIN: CPT | Performed by: NURSE PRACTITIONER

## 2020-05-01 ENCOUNTER — TELEMEDICINE (OUTPATIENT)
Dept: INTERNAL MEDICINE CLINIC | Facility: CLINIC | Age: 45
End: 2020-05-01
Payer: COMMERCIAL

## 2020-05-01 VITALS — HEIGHT: 64 IN | BODY MASS INDEX: 33.8 KG/M2 | WEIGHT: 198 LBS

## 2020-05-01 DIAGNOSIS — Z20.828 EXPOSURE TO SARS-ASSOCIATED CORONAVIRUS: Primary | ICD-10-CM

## 2020-05-01 PROCEDURE — 99213 OFFICE O/P EST LOW 20 MIN: CPT | Performed by: NURSE PRACTITIONER

## 2020-05-05 ENCOUNTER — TELEMEDICINE (OUTPATIENT)
Dept: INTERNAL MEDICINE CLINIC | Facility: CLINIC | Age: 45
End: 2020-05-05
Payer: COMMERCIAL

## 2020-05-05 VITALS — BODY MASS INDEX: 33.8 KG/M2 | WEIGHT: 198 LBS | HEIGHT: 64 IN

## 2020-05-05 DIAGNOSIS — Z20.828 EXPOSURE TO SARS-ASSOCIATED CORONAVIRUS: Primary | ICD-10-CM

## 2020-05-05 DIAGNOSIS — R05.9 COUGH: ICD-10-CM

## 2020-05-05 PROCEDURE — 99213 OFFICE O/P EST LOW 20 MIN: CPT | Performed by: NURSE PRACTITIONER

## 2020-06-09 DIAGNOSIS — B00.1 HERPES LABIALIS: Primary | ICD-10-CM

## 2020-06-09 RX ORDER — VALACYCLOVIR HYDROCHLORIDE 1 G/1
TABLET, FILM COATED ORAL
Qty: 4 TABLET | Refills: 0 | Status: ON HOLD | OUTPATIENT
Start: 2020-06-09 | End: 2020-06-30 | Stop reason: ALTCHOICE

## 2020-06-10 NOTE — H&P (VIEW-ONLY)
Assessment/Plan:    Based upon the history given and the current physical findings, I have recommended that the patient undergo septoplasty  All risks, benefits, alternatives, and complications have been reviewed in detail  The risks of the surgery include but are not limited to:  bleeding, infection, need for further surgery, continued septal deformity, nasal congestion, septal hematoma (collection of blood between septal flaps), and septal perforation (may cause whistling and bleeding)  The patient / parent understands and accepts all risks of the surgery  Pre-operative labs have been ordered  Medical clearance is not necessary  Post-operative medications have been prescribed and the patient / parent has been instructed to fill the medication in preparation for the surgery  Post operative instructions have been reviewed and a copy has been given to the patient / parent  A post operative appointment has been made for the patient  If any questions should arise prior to or after the surgery, the patient / parent should call my office and I will be glad to discuss any questions or concerns with them  Based upon the history given and the current physical findings I have recommended that the patient undergo bilateral image-guided functional endoscopic sinus surgery (FESS)  All risks, benefits, alternatives and complications of the procedure have been reviewed in detail  A risk sheet was signed with the patient    The risks of the surgery include but are not limited to:  Bleeding, infection, need for further surgery or treatment, recurrence of disease, inability to complete surgery (residual disease), injury to the olfactory nerve (inability to smell, phantom smell), injury to the orbit and muscles around the eye (double vision, decreased vision, blindness, orbital abscess), injury to the intracranial contents (CSF leak, brain injury, meningitis, encephalitis, brain abscess), cheek pain or numbness, scar formation  The patient / parent understands and accepts the risks of the surgical procedure  Pre-operative testing has been ordered  Medical clearance is not required  Post operative medication and instructions have been given and the medication may be filled prior to the procedure  A post operative appointment has been scheduled  If the patient / parent has any questions prior to the date of the procedure they may call the office and I will be glad to discuss any questions or concerns with them  She was made aware that due to the longevity of her significant infection it is very likely that she will require postoperative antibiotic rinses following her surgery  Encounter Diagnosis     ICD-10-CM    1  Chronic pansinusitis J32 4 oxyCODONE-acetaminophen (PERCOCET) 5-325 mg per tablet     cephalexin (KEFLEX) 500 mg capsule   2  Nasal congestion R09 81    3  Deviated nasal septum J34 2    4  Hypertrophy of both inferior nasal turbinates J34 3               Patient ID: Nikkie Fernandes is a 39 y o  female  Wendy Garvey has chronic sinusitis which has been resistant to maximal medical therapy  Symptoms have continued  Her CT of the sinuses demonstrated: deviated septum to the left side with spur formation and obstruction, chronic disease of the frontals, ethmoids and maxillary sinuses  She was last seen in mid January at which point her surgery was tentatively scheduled for May  Unfortunately, due to the [place] pandemic it required cancellation at that time and now she is back on the schedule  Since she has not had any relief of symptoms she is here to have her preoperative history and physical   She is still having yellow discharge from the nose when she rinses with a nasal rinse        The following portions of the patient's history were reviewed and updated as appropriate: allergies, current medications, past family history, past medical history, past social history, past surgical history and problem list       Past Medical History:   Diagnosis Date    Anxiety     Depression     [facility] )     Sjoegren syndrome        Past Surgical History:   Procedure Laterality Date    CHOLECYSTECTOMY      VENOUS ABLATION Left     lower extremity       Social History     Tobacco Use    Smoking status: Former Smoker    Smokeless tobacco: Never Used   Substance Use Topics    Alcohol use: Yes     Comment: occasional    Drug use: No       Current Outpatient Medications on File Prior to Visit   Medication Sig Dispense Refill    benzonatate (TESSALON) 200 MG capsule Take 1 capsule (200 mg total) by mouth 3 (three) times a day as needed for cough (Patient not taking: Reported on 6/15/2020) 20 capsule 0    Methenamine-Sodium Salicylate (AZO URINARY TRACT DEFENSE PO) Take by mouth      valACYclovir (VALTREX) 1,000 mg tablet Take two tablets by mouth now and may repeat in 12 hours (Patient not taking: Reported on 6/15/2020) 4 tablet 0     No current facility-administered medications on file prior to visit  No Known Allergies        Review of Systems   Constitutional: Negative for activity change, appetite change, fatigue, fever and unexpected weight change  HENT: Positive for congestion and postnasal drip  Negative for ear discharge, ear pain, hearing loss, nosebleeds, rhinorrhea, sinus pressure, sinus pain, sneezing, sore throat, tinnitus, trouble swallowing and voice change  Eyes: Negative  Negative for photophobia, pain, itching and visual disturbance  Respiratory: Negative  Negative for cough (very slight cough from the drainage), chest tightness and shortness of breath  Cardiovascular: Negative  Negative for chest pain  Gastrointestinal: Negative  Negative for abdominal pain  Endocrine: Negative  Musculoskeletal: Negative  Negative for gait problem, neck pain and neck stiffness  Skin: Negative  Allergic/Immunologic: Negative  Negative for environmental allergies  Neurological: Negative  Negative for dizziness, speech difficulty, light-headedness and headaches  Hematological: Negative  Negative for adenopathy  Psychiatric/Behavioral: Negative  Negative for sleep disturbance  The patient is not nervous/anxious  /60   Pulse 88   Temp 97 7 °F (36 5 °C)   Ht 5' 4" (1 626 m)   Wt 88 5 kg (195 lb)   BMI 33 47 kg/m²       PHYSICAL  EXAMINATION    CONSTITUTION:    Appears appropriate for age  No evidence of any acute distress  Communicates normally  Voice quality is clear  Alert and oriented  HEAD/FACE:    Atraumatic, normocephalic on inspection  No scars present  Salivary glands are normal in texture and size without any asymmetry  Facial nerve function is symmetric and normal     EYES:    Extraocular muscles intact in both eyes, normal gaze bilaterally and no evidence of nystagmus  Pupils equal, round, and accommodate to light bilaterally  EARS:    External ears normal     External canals are clear and dry  Tympanic membranes intact with normal mobility, no effusion, no retraction, no perforation  Post auricular area is normal    NOSE:    External nose without deformity  Internal mucosa pink and moist but she has thick yellow pus filling the nose right side greater than the left side  Septum slightly bowed to the left side  Inferior nasal turbinates normal in color but hypertrophic    ORAL CAVITY:    Lips normal and healthy in appearance  Dentition normal     Gums healthy, pink and moist     Tongue appears pink and moist with no lesions  Floor of mouth pink, moist, and smooth  Submandibular ducts patent with clear saliva  Parotid ducts patent with clear saliva  Oral mucosa pink and moist     Hard palate normal in appearance without any lesions  OROPHARYNX:    Soft palate pink and moist without any lesions  Uvula midline without any lesions  Tonsils grade 2 bilaterally      Posterior pharynx pink and moist with thick yellow pus draining from above  NECK:    Supple and symmetric  No masses noted  Trachea midline  No thyromegaly or nodules noted  LYMPH:    No palpable adenopathy in left or right neck    SKIN:    No rashes  No lesions noted       HEART:   Regular rate and rhythm    LUNGS:  Clear to auscultation bilaterally

## 2020-06-24 DIAGNOSIS — J32.4 CHRONIC PANSINUSITIS: ICD-10-CM

## 2020-06-24 PROCEDURE — U0003 INFECTIOUS AGENT DETECTION BY NUCLEIC ACID (DNA OR RNA); SEVERE ACUTE RESPIRATORY SYNDROME CORONAVIRUS 2 (SARS-COV-2) (CORONAVIRUS DISEASE [COVID-19]), AMPLIFIED PROBE TECHNIQUE, MAKING USE OF HIGH THROUGHPUT TECHNOLOGIES AS DESCRIBED BY CMS-2020-01-R: HCPCS

## 2020-06-25 LAB
INPATIENT: NORMAL
SARS-COV-2 RNA SPEC QL NAA+PROBE: NOT DETECTED

## 2020-06-25 NOTE — PRE-PROCEDURE INSTRUCTIONS
No outpatient medications have been marked as taking for the 6/30/20 encounter [facility])  Patient reports no current meds  Pre-op and bathing instructions reviewed

## 2020-06-26 ENCOUNTER — APPOINTMENT (OUTPATIENT)
Dept: LAB | Facility: HOSPITAL | Age: 45
End: 2020-06-26
Attending: OTOLARYNGOLOGY
Payer: COMMERCIAL

## 2020-06-26 DIAGNOSIS — J32.4 CHRONIC PANSINUSITIS: ICD-10-CM

## 2020-06-26 DIAGNOSIS — J34.2 DEVIATED NASAL SEPTUM: ICD-10-CM

## 2020-06-26 DIAGNOSIS — J34.3 HYPERTROPHY OF BOTH INFERIOR NASAL TURBINATES: ICD-10-CM

## 2020-06-26 DIAGNOSIS — R09.81 NASAL CONGESTION: ICD-10-CM

## 2020-06-26 DIAGNOSIS — Z01.812 ENCOUNTER FOR PREPROCEDURAL LABORATORY EXAMINATION: ICD-10-CM

## 2020-06-26 LAB
APTT PPP: 32 SECONDS (ref 23–37)
BASOPHILS # BLD AUTO: 0.1 THOUSANDS/ΜL (ref 0–0.1)
BASOPHILS NFR BLD AUTO: 1 % (ref 0–2)
EOSINOPHIL # BLD AUTO: 0.5 THOUSAND/ΜL (ref 0–0.61)
EOSINOPHIL NFR BLD AUTO: 6 % (ref 0–5)
ERYTHROCYTE [DISTWIDTH] IN BLOOD BY AUTOMATED COUNT: 12.8 % (ref 11.5–14.5)
HCT VFR BLD AUTO: 41 % (ref 42–47)
HGB BLD-MCNC: 13.5 G/DL (ref 12–16)
INR PPP: 1.12 (ref 0.84–1.19)
LYMPHOCYTES # BLD AUTO: 2.1 THOUSANDS/ΜL (ref 0.6–4.47)
LYMPHOCYTES NFR BLD AUTO: 24 % (ref 21–51)
MCH RBC QN AUTO: 30.8 PG (ref 26–34)
MCHC RBC AUTO-ENTMCNC: 32.8 G/DL (ref 31–37)
MCV RBC AUTO: 94 FL (ref 81–99)
MONOCYTES # BLD AUTO: 0.7 THOUSAND/ΜL (ref 0.17–1.22)
MONOCYTES NFR BLD AUTO: 8 % (ref 2–12)
NEUTROPHILS # BLD AUTO: 5.3 THOUSANDS/ΜL (ref 1.4–6.5)
NEUTS SEG NFR BLD AUTO: 62 % (ref 42–75)
PLATELET # BLD AUTO: 272 THOUSANDS/UL (ref 149–390)
PMV BLD AUTO: 9.7 FL (ref 8.6–11.7)
PROTHROMBIN TIME: 13.9 SECONDS (ref 11.6–14.5)
RBC # BLD AUTO: 4.37 MILLION/UL (ref 3.9–5.2)
WBC # BLD AUTO: 8.7 THOUSAND/UL (ref 4.8–10.8)

## 2020-06-26 PROCEDURE — 85025 COMPLETE CBC W/AUTO DIFF WBC: CPT

## 2020-06-26 PROCEDURE — 85730 THROMBOPLASTIN TIME PARTIAL: CPT

## 2020-06-26 PROCEDURE — 85610 PROTHROMBIN TIME: CPT

## 2020-06-26 PROCEDURE — 36415 COLL VENOUS BLD VENIPUNCTURE: CPT

## 2020-06-29 ENCOUNTER — ANESTHESIA EVENT (OUTPATIENT)
Dept: PERIOP | Facility: HOSPITAL | Age: 45
End: 2020-06-29
Payer: COMMERCIAL

## 2020-06-30 ENCOUNTER — HOSPITAL ENCOUNTER (OUTPATIENT)
Facility: HOSPITAL | Age: 45
Setting detail: OUTPATIENT SURGERY
Discharge: HOME | End: 2020-06-30
Attending: OTOLARYNGOLOGY | Admitting: OTOLARYNGOLOGY
Payer: COMMERCIAL

## 2020-06-30 ENCOUNTER — ANESTHESIA (OUTPATIENT)
Dept: PERIOP | Facility: HOSPITAL | Age: 45
End: 2020-06-30
Payer: COMMERCIAL

## 2020-06-30 VITALS
WEIGHT: 189 LBS | BODY MASS INDEX: 31.49 KG/M2 | HEART RATE: 95 BPM | SYSTOLIC BLOOD PRESSURE: 123 MMHG | TEMPERATURE: 97.9 F | RESPIRATION RATE: 16 BRPM | HEIGHT: 65 IN | OXYGEN SATURATION: 95 % | DIASTOLIC BLOOD PRESSURE: 61 MMHG

## 2020-06-30 DIAGNOSIS — J34.2 DEVIATED NASAL SEPTUM: ICD-10-CM

## 2020-06-30 DIAGNOSIS — R09.81 NASAL CONGESTION: ICD-10-CM

## 2020-06-30 DIAGNOSIS — J32.4 CHRONIC PANSINUSITIS: ICD-10-CM

## 2020-06-30 DIAGNOSIS — J34.3 HYPERTROPHY OF BOTH INFERIOR NASAL TURBINATES: ICD-10-CM

## 2020-06-30 LAB
EXT PREGNANCY TEST URINE: NEGATIVE
EXT. CONTROL: NORMAL

## 2020-06-30 PROCEDURE — 31256 EXPLORATION MAXILLARY SINUS: CPT | Performed by: OTOLARYNGOLOGY

## 2020-06-30 PROCEDURE — 31253 NSL/SINS NDSC TOTAL: CPT | Performed by: OTOLARYNGOLOGY

## 2020-06-30 PROCEDURE — 88304 TISSUE EXAM BY PATHOLOGIST: CPT | Performed by: PATHOLOGY

## 2020-06-30 PROCEDURE — 31288 NASAL/SINUS ENDOSCOPY SURG: CPT | Performed by: OTOLARYNGOLOGY

## 2020-06-30 PROCEDURE — C2625 STENT, NON-COR, TEM W/DEL SY: HCPCS | Performed by: OTOLARYNGOLOGY

## 2020-06-30 PROCEDURE — 30520 REPAIR OF NASAL SEPTUM: CPT | Performed by: OTOLARYNGOLOGY

## 2020-06-30 PROCEDURE — 61782 SCAN PROC CRANIAL EXTRA: CPT | Performed by: OTOLARYNGOLOGY

## 2020-06-30 PROCEDURE — 81025 URINE PREGNANCY TEST: CPT | Performed by: ANESTHESIOLOGY

## 2020-06-30 DEVICE — PROPEL SINUS IMPLANT
Type: IMPLANTABLE DEVICE | Site: NOSE | Status: FUNCTIONAL
Brand: PROPEL

## 2020-06-30 RX ORDER — SODIUM CHLORIDE 9 MG/ML
125 INJECTION, SOLUTION INTRAVENOUS CONTINUOUS
Status: DISCONTINUED | OUTPATIENT
Start: 2020-06-30 | End: 2020-06-30 | Stop reason: HOSPADM

## 2020-06-30 RX ORDER — LORAZEPAM 2 MG/ML
1 INJECTION INTRAMUSCULAR ONCE
Status: DISCONTINUED | OUTPATIENT
Start: 2020-06-30 | End: 2020-06-30 | Stop reason: HOSPADM

## 2020-06-30 RX ORDER — NEOSTIGMINE METHYLSULFATE 1 MG/ML
INJECTION INTRAVENOUS AS NEEDED
Status: DISCONTINUED | OUTPATIENT
Start: 2020-06-30 | End: 2020-06-30 | Stop reason: SURG

## 2020-06-30 RX ORDER — LIDOCAINE HYDROCHLORIDE 20 MG/ML
INJECTION, SOLUTION INFILTRATION; PERINEURAL AS NEEDED
Status: DISCONTINUED | OUTPATIENT
Start: 2020-06-30 | End: 2020-06-30 | Stop reason: SURG

## 2020-06-30 RX ORDER — ACETAMINOPHEN 325 MG/1
650 TABLET ORAL EVERY 4 HOURS PRN
Status: DISCONTINUED | OUTPATIENT
Start: 2020-06-30 | End: 2020-06-30 | Stop reason: HOSPADM

## 2020-06-30 RX ORDER — DEXAMETHASONE SODIUM PHOSPHATE 4 MG/ML
INJECTION, SOLUTION INTRA-ARTICULAR; INTRALESIONAL; INTRAMUSCULAR; INTRAVENOUS; SOFT TISSUE AS NEEDED
Status: DISCONTINUED | OUTPATIENT
Start: 2020-06-30 | End: 2020-06-30 | Stop reason: SURG

## 2020-06-30 RX ORDER — MIDAZOLAM HYDROCHLORIDE 2 MG/2ML
INJECTION, SOLUTION INTRAMUSCULAR; INTRAVENOUS AS NEEDED
Status: DISCONTINUED | OUTPATIENT
Start: 2020-06-30 | End: 2020-06-30 | Stop reason: SURG

## 2020-06-30 RX ORDER — LIDOCAINE HYDROCHLORIDE AND EPINEPHRINE 10; 10 MG/ML; UG/ML
INJECTION, SOLUTION INFILTRATION; PERINEURAL AS NEEDED
Status: DISCONTINUED | OUTPATIENT
Start: 2020-06-30 | End: 2020-06-30 | Stop reason: HOSPADM

## 2020-06-30 RX ORDER — EPHEDRINE SULFATE 50 MG/ML
INJECTION INTRAVENOUS AS NEEDED
Status: DISCONTINUED | OUTPATIENT
Start: 2020-06-30 | End: 2020-06-30 | Stop reason: SURG

## 2020-06-30 RX ORDER — ONDANSETRON 2 MG/ML
INJECTION INTRAMUSCULAR; INTRAVENOUS AS NEEDED
Status: DISCONTINUED | OUTPATIENT
Start: 2020-06-30 | End: 2020-06-30 | Stop reason: SURG

## 2020-06-30 RX ORDER — MAGNESIUM HYDROXIDE 1200 MG/15ML
LIQUID ORAL AS NEEDED
Status: DISCONTINUED | OUTPATIENT
Start: 2020-06-30 | End: 2020-06-30 | Stop reason: HOSPADM

## 2020-06-30 RX ORDER — SCOLOPAMINE TRANSDERMAL SYSTEM 1 MG/1
1 PATCH, EXTENDED RELEASE TRANSDERMAL ONCE
Status: DISCONTINUED | OUTPATIENT
Start: 2020-06-30 | End: 2020-06-30 | Stop reason: HOSPADM

## 2020-06-30 RX ORDER — ROCURONIUM BROMIDE 10 MG/ML
INJECTION, SOLUTION INTRAVENOUS AS NEEDED
Status: DISCONTINUED | OUTPATIENT
Start: 2020-06-30 | End: 2020-06-30 | Stop reason: SURG

## 2020-06-30 RX ORDER — PROMETHAZINE HYDROCHLORIDE 25 MG/ML
12.5 INJECTION, SOLUTION INTRAMUSCULAR; INTRAVENOUS EVERY 4 HOURS PRN
Status: DISCONTINUED | OUTPATIENT
Start: 2020-06-30 | End: 2020-06-30 | Stop reason: HOSPADM

## 2020-06-30 RX ORDER — FENTANYL CITRATE 50 UG/ML
50 INJECTION, SOLUTION INTRAMUSCULAR; INTRAVENOUS
Status: DISCONTINUED | OUTPATIENT
Start: 2020-06-30 | End: 2020-06-30 | Stop reason: HOSPADM

## 2020-06-30 RX ORDER — ONDANSETRON 2 MG/ML
4 INJECTION INTRAMUSCULAR; INTRAVENOUS EVERY 6 HOURS PRN
Status: DISCONTINUED | OUTPATIENT
Start: 2020-06-30 | End: 2020-06-30 | Stop reason: HOSPADM

## 2020-06-30 RX ORDER — PROPOFOL 10 MG/ML
INJECTION, EMULSION INTRAVENOUS CONTINUOUS PRN
Status: DISCONTINUED | OUTPATIENT
Start: 2020-06-30 | End: 2020-06-30 | Stop reason: SURG

## 2020-06-30 RX ORDER — FENTANYL CITRATE 50 UG/ML
INJECTION, SOLUTION INTRAMUSCULAR; INTRAVENOUS AS NEEDED
Status: DISCONTINUED | OUTPATIENT
Start: 2020-06-30 | End: 2020-06-30 | Stop reason: SURG

## 2020-06-30 RX ORDER — PROPOFOL 10 MG/ML
INJECTION, EMULSION INTRAVENOUS AS NEEDED
Status: DISCONTINUED | OUTPATIENT
Start: 2020-06-30 | End: 2020-06-30 | Stop reason: SURG

## 2020-06-30 RX ORDER — OXYCODONE HYDROCHLORIDE AND ACETAMINOPHEN 5; 325 MG/1; MG/1
2 TABLET ORAL EVERY 4 HOURS PRN
Status: DISCONTINUED | OUTPATIENT
Start: 2020-06-30 | End: 2020-06-30 | Stop reason: HOSPADM

## 2020-06-30 RX ORDER — COCAINE HYDROCHLORIDE 40 MG/ML
SOLUTION NASAL AS NEEDED
Status: DISCONTINUED | OUTPATIENT
Start: 2020-06-30 | End: 2020-06-30 | Stop reason: HOSPADM

## 2020-06-30 RX ORDER — GLYCOPYRROLATE 0.2 MG/ML
INJECTION INTRAMUSCULAR; INTRAVENOUS AS NEEDED
Status: DISCONTINUED | OUTPATIENT
Start: 2020-06-30 | End: 2020-06-30 | Stop reason: SURG

## 2020-06-30 RX ORDER — GINSENG 100 MG
CAPSULE ORAL AS NEEDED
Status: DISCONTINUED | OUTPATIENT
Start: 2020-06-30 | End: 2020-06-30 | Stop reason: HOSPADM

## 2020-06-30 RX ADMIN — ONDANSETRON 4 MG: 2 INJECTION INTRAMUSCULAR; INTRAVENOUS at 08:40

## 2020-06-30 RX ADMIN — TRIMETHOBENZAMIDE HYDROCHLORIDE 200 MG: 100 INJECTION INTRAMUSCULAR at 11:03

## 2020-06-30 RX ADMIN — EPHEDRINE SULFATE 5 MG: 50 INJECTION, SOLUTION INTRAVENOUS at 09:44

## 2020-06-30 RX ADMIN — PROMETHAZINE HYDROCHLORIDE 12.5 MG: 25 INJECTION INTRAMUSCULAR; INTRAVENOUS at 10:38

## 2020-06-30 RX ADMIN — MIDAZOLAM 2 MG: 1 INJECTION INTRAMUSCULAR; INTRAVENOUS at 08:13

## 2020-06-30 RX ADMIN — SODIUM CHLORIDE: 0.9 INJECTION, SOLUTION INTRAVENOUS at 10:13

## 2020-06-30 RX ADMIN — GLYCOPYRROLATE 0.4 MG: 0.2 INJECTION, SOLUTION INTRAMUSCULAR; INTRAVENOUS at 10:10

## 2020-06-30 RX ADMIN — SODIUM CHLORIDE 125 ML/HR: 0.9 INJECTION, SOLUTION INTRAVENOUS at 07:59

## 2020-06-30 RX ADMIN — SODIUM CHLORIDE 125 ML/HR: 0.9 INJECTION, SOLUTION INTRAVENOUS at 11:41

## 2020-06-30 RX ADMIN — FENTANYL CITRATE 100 MCG: 50 INJECTION, SOLUTION INTRAMUSCULAR; INTRAVENOUS at 08:40

## 2020-06-30 RX ADMIN — ONDANSETRON 4 MG: 2 INJECTION INTRAMUSCULAR; INTRAVENOUS at 10:29

## 2020-06-30 RX ADMIN — REMIFENTANIL HYDROCHLORIDE 0.1 MCG/KG/MIN: 1 INJECTION, POWDER, LYOPHILIZED, FOR SOLUTION INTRAVENOUS at 08:40

## 2020-06-30 RX ADMIN — OXYCODONE HYDROCHLORIDE AND ACETAMINOPHEN 2 TABLET: 5; 325 TABLET ORAL at 12:25

## 2020-06-30 RX ADMIN — LIDOCAINE HYDROCHLORIDE 5 ML: 20 INJECTION, SOLUTION INFILTRATION; PERINEURAL at 08:40

## 2020-06-30 RX ADMIN — ROCURONIUM BROMIDE 50 MG: 10 INJECTION, SOLUTION INTRAVENOUS at 08:40

## 2020-06-30 RX ADMIN — NEOSTIGMINE METHYLSULFATE 3 MG: 1 INJECTION, SOLUTION INTRAVENOUS at 10:10

## 2020-06-30 RX ADMIN — PROPOFOL 200 MG: 10 INJECTION, EMULSION INTRAVENOUS at 08:40

## 2020-06-30 RX ADMIN — SCOPALAMINE 1 PATCH: 1 PATCH, EXTENDED RELEASE TRANSDERMAL at 11:06

## 2020-06-30 RX ADMIN — DEXAMETHASONE SODIUM PHOSPHATE 8 MG: 4 INJECTION, SOLUTION INTRAMUSCULAR; INTRAVENOUS at 08:40

## 2020-06-30 RX ADMIN — EPHEDRINE SULFATE 5 MG: 50 INJECTION, SOLUTION INTRAVENOUS at 09:53

## 2020-06-30 RX ADMIN — EPHEDRINE SULFATE 5 MG: 50 INJECTION, SOLUTION INTRAVENOUS at 08:51

## 2020-06-30 RX ADMIN — PROPOFOL 100 MCG/KG/MIN: 10 INJECTION, EMULSION INTRAVENOUS at 08:40

## 2020-06-30 NOTE — INTERVAL H&P NOTE
H&P reviewed  After examining the patient I find no changes in the patients condition since the H&P had been written      Vitals:    06/30/20 0731   BP: 118/77   Pulse: 101   Resp: 16   Temp: 97 6 °F (36 4 °C)   SpO2: 96%

## 2020-06-30 NOTE — DISCHARGE INSTRUCTIONS
[facility]    POST OPERATIVE SEPTOPLASTY/TURBINATE REDUCTION SURGERY INTRUCTIONS      1  Change drip pad under the nose as needed for bleeding  2  Keep head of bed elevated  Sleep on at least a few pillows at night  3  Expect and do not become concerned about not being able to breathe through your nose  You will be a mouth-breather for approximately one week  4  You may cleanse crusting from the anterior portion of your nose with hydrogen peroxide and Q-tips  You may use Ocean nasal spray throughout the day to prevent crusting inside nasal cavity and splints  5  Begin using sinus rinse two times daily  If you have nasal splints in place you may start after the nasal splints are removed  6  Do not blow your nose until exactly two weeks after surgery  7  If you must sneeze, sneeze with mouth open  8  Avoid any strenuous activity, exercise, bending, or lifting, until approved by your surgeon  9  If there is significant bleeding, you may use over-the-counter Afrin  Place 2 sprays into the bleeding nostril every 5 minutes up to 3 consecutive times  If bleeding does not stop, call our office at [phone] or [phone] or go directly to the emergency room  10  If you have severe pain which is uncontrolled by medication or a fever greater than 101°F, notify our office immediately at [phone] or [phone]  11   If you have a prescription for pain medication follow appropriate directions on label  If no prescription was given you may take over the counter pain medication as needed  12   If you have a prescription for steroids (Prednisone, Prednisolone) you may begin taking the medication the day following the surgical procedure  13  No smoking  Avoid second hand smoke exposure  [facility]    POST OPERATIVE SINUS SURGERY INSTRUCTIONS      1  Change drip pad under the nose as needed for bleeding  2  Keep head of bed elevated   Sleep on at least a few pillows at night     3  Expect and do not become concerned about not being able to breathe through your nose  You will be a mouth-breather for approximately one week  4  You may cleanse crusting from the anterior portion of your nose with hydrogen peroxide and Q-tips  You may use Ocean nasal spray throughout the day to prevent crusting inside nasal cavity and splints  5  Begin using sinus rinse two times daily  If you have nasal splints in place you may start after the nasal splints are removed  6  Do not blow your nose until exactly 1 week after surgery  7  If you must sneeze, sneeze with mouth open  8  Avoid any strenuous activity, exercise, bending, or lifting, until approved by your surgeon  9  If there is significant bleeding, you may use over-the-counter Afrin  Place 2 sprays into the bleeding nostril every 5 minutes up to 3 consecutive times  If bleeding does not stop, call our office at [phone] or [phone] or go directly to the emergency room  10  If you have severe pain which is uncontrolled by medication, significant bleeding or a fever greater than 101°F, notify our office immediately at [phone] or [phone]  11   If you have a prescription for pain medication follow appropriate directions on label  If no prescription was given you may take over the counter pain medication as needed  12   If you have a prescription for steroids (Prednisone, Prednisolone) you may begin taking this medication the day following the surgical procedure  13   If you were instruction on using medicated rinses you may start those rinses the day following the surgical procedure  14   No smoking  Avoid second hand smoke exposure

## 2020-06-30 NOTE — OP NOTE
OPERATIVE REPORT  PATIENT NAME: Danuta Lanza    :  1975  MRN: [id number]  Pt Location: [facility]    SURGERY DATE: 2020    Surgeon(s) and Role:     * Butch Holt DO - Primary    Preop Diagnosis:  Chronic pansinusitis [J32 4]  Nasal congestion [R09 81]  Deviated nasal septum [J34 2]  Hypertrophy of both inferior nasal turbinates [J34 3]    Post-Op Diagnosis Codes:     * Chronic pansinusitis [J32 4]     * Nasal congestion [R09 81]     * Deviated nasal septum [J34 2]     * Hypertrophy of both inferior nasal turbinates [J34 3]    Procedure(s) (LRB):  SEPTOPLASTY (N/A)  IMAGED GUIDED F E S S  (N/A) - bilateral maxillary antrostomy without tissue removal, bilateral total ethmoidectomy, bilateral frontal sinusotomy, right sphenoidotomy    Specimen(s):  ID Type Source Tests Collected by Time Destination   1 : Septal Cartilage and Bone Tissue Nasal Septum TISSUE EXAM Butch Holt DO 2020 0908    2 : left sinus contents Tissue Nasal/Sinus Polyps TISSUE EXAM Butch Holt DO 2020 0908    3 : right Sinus contents Tissue Nasal/Sinus Polyps TISSUE EXAM Butch Holt DO 2020 0908        Estimated Blood Loss:   10 mL    Drains:  * No LDAs found *    Anesthesia Type:   General    Operative Indications:  Chronic pansinusitis [J32 4]  Nasal congestion [R09 81]  Deviated nasal septum [J34 2]  Hypertrophy of both inferior nasal turbinates [J34 3]      Operative Findings:  Initially there was some mucopus the intranasal area this was unable to be tracked back into any particular sinus  Micaela bullosa on the right  Significant polyposis in the frontal, ethmoid, and right sphenoid region  Deviated nasal septum with spur formation left  Complications:   None    Procedure and Technique:  Patient was identified in the holding area  She was taken to the operating room placed on the OR table in supine position    She was placed under total IV anesthesia with endotracheal intubation without difficulty  She was turned 120° and set up in the usual fashion for image guided sinus surgery  Formal time-out was obtained and all information was noted to be correct  Cocaine soaked cotton pledgets x2 were placed in each nasal cavity and left in place for approximately 5 minutes  They were removed using 1% xylocaine with 1 100,000 epinephrine I injected approximately 7 mL into the mucoperichondrial flaps, tips of the middle turbinate on the right, uncinate on the right, and uncinate on the left  Cocaine pledgets were then reintroduced for another 3 minutes  Started the procedure than by removing the pledgets and making incision in the left mucoperichondrial membrane with a 15 blade  Mucoperichondrial flap was elevated above the cartilage and bone  The large septal spur was in the bony portion of the septum  At the bony cartilaginous junction incision was made and the flap was elevated on both sides of the bone  I then used a pituitary forcep to remove the very large spur that was impinging into the left nasal cavity  After this was removed it was evident that there was a cartilaginous portion high up that was also causing some obstruction and this was removed with a double-action forceps  The incision site was then closed with interrupted 4 0 chromic suture  Septum was now midline  I then started my sinus surgery by injecting the uncinate and middle turbinate on the left with the same local infiltration as before  Began by medialized thing the middle turbinate on the left  This was extremely floppy and using a shaver I removed the most anterior portion of this so that it would not obstruct the maxillary and ethmoid sinuses at a later time  Using a sickle knife I took down the uncinate  This was further removed with an up-biting forceps as well as the shaver and backbiting forceps  I worked with the shaver into the maxillary sinus and made a very large maxillary antrostomy    There was some mucus suctioned out of the sinus but there was no pus or polyp within the sinus  I then opened up the ethmoid bulla at its inferior medial location more straight back to face of the sphenoid removing all significant polyp disease  I cleaned out all the ethmoids along the lamina papyracea and then worked from the skull base anteriorly towards the frontals  I worked as far as I could with this shaver and then had to switch to a 30 degree scope and manual forceps and frontal instruments  I removed all polyps from the area as well as all bone chips  All ethmoids and frontal sinuses were opened  Within the sinuses there was some thick inspissated mucus as well as polyps  At the completion of the case there was no leakage of spinal fluid, no significant bleeding, and no crack in the lamina papyracea  I then switched to the right side  On the right side the 1st maneuver was to shave the lateral portion of the minal bullosa  Once this was shaved allowed entry into the middle meatal area  In this case I used the shaver to take down the uncinate as well as up-biting and backbiting forceps  The maxillary sinus was widely opened using the shaver and backbiting forcep and mucus suctioned from within the sinus  I then entered the ethmoid bulla at its inferior medial location and used a shaver to remove significant polyp disease  In this case as a straight that I would went directly into the sphenoid because there was a tract of polyps and thick tenacious mucus that was removed from the area  I then worked from a posterior to anterior direction along the lamina and skull base to remove all polyps from those cells  Ultimately I switched to a 30 degree scope and manual instruments including the up-biting instrument and frontal sinus instruments to remove significant polyp disease in the area  At the completion all the sinuses were opened on this side    There was no bleeding, leakage of spinal fluid, or break in the lamina papyracea  I then placed a propel large implant in each of the ethmoid sinuses which medialized the middle turbinates  Antibiotic coated Ames splints were then placed on each side of the septum and sutured in place with a 2 0 nylon suture  She was cleaned with wet to dry  She was then awoken, extubated, and taken to recovery in stable condition     I was present for the entire procedure    Patient Disposition:  PACU     SIGNATURE: David Mccord DO  DATE: June 30, 2020  TIME: 10:17 AM

## 2020-08-02 ENCOUNTER — OFFICE VISIT (OUTPATIENT)
Dept: URGENT CARE | Facility: CLINIC | Age: 45
End: 2020-08-02
Payer: COMMERCIAL

## 2020-08-02 VITALS
RESPIRATION RATE: 14 BRPM | HEART RATE: 72 BPM | SYSTOLIC BLOOD PRESSURE: 128 MMHG | TEMPERATURE: 98.6 F | OXYGEN SATURATION: 99 % | DIASTOLIC BLOOD PRESSURE: 78 MMHG

## 2020-08-02 DIAGNOSIS — R35.0 URINARY FREQUENCY: ICD-10-CM

## 2020-08-02 DIAGNOSIS — N30.01 ACUTE CYSTITIS WITH HEMATURIA: Primary | ICD-10-CM

## 2020-08-02 LAB
SL AMB  POCT GLUCOSE, UA: NEGATIVE
SL AMB LEUKOCYTE ESTERASE,UA: ABNORMAL
SL AMB POCT BILIRUBIN,UA: NEGATIVE
SL AMB POCT BLOOD,UA: ABNORMAL
SL AMB POCT CLARITY,UA: ABNORMAL
SL AMB POCT COLOR,UA: ABNORMAL
SL AMB POCT KETONES,UA: NEGATIVE
SL AMB POCT NITRITE,UA: NEGATIVE
SL AMB POCT PH,UA: 5
SL AMB POCT SPECIFIC GRAVITY,UA: 1.03
SL AMB POCT URINE PROTEIN: NEGATIVE
SL AMB POCT UROBILINOGEN: NEGATIVE

## 2020-08-02 PROCEDURE — 87086 URINE CULTURE/COLONY COUNT: CPT | Performed by: FAMILY MEDICINE

## 2020-08-02 PROCEDURE — 81002 URINALYSIS NONAUTO W/O SCOPE: CPT | Performed by: FAMILY MEDICINE

## 2020-08-02 PROCEDURE — G0382 LEV 3 HOSP TYPE B ED VISIT: HCPCS | Performed by: FAMILY MEDICINE

## 2020-08-02 RX ORDER — PHENAZOPYRIDINE HYDROCHLORIDE 200 MG/1
200 TABLET, FILM COATED ORAL
Qty: 10 TABLET | Refills: 0 | Status: SHIPPED | OUTPATIENT
Start: 2020-08-02 | End: 2020-08-24

## 2020-08-02 RX ORDER — SULFAMETHOXAZOLE AND TRIMETHOPRIM 800; 160 MG/1; MG/1
1 TABLET ORAL EVERY 12 HOURS SCHEDULED
Qty: 10 TABLET | Refills: 0 | Status: SHIPPED | OUTPATIENT
Start: 2020-08-02 | End: 2020-08-07

## 2020-08-02 NOTE — PROGRESS NOTES
[id number]        NAME: Nasir Morris is a 39 y o  female  : 1975    MRN: [id number]  DATE: 2020  TIME: 5:13 PM    Assessment and Plan   Acute cystitis with hematuria [N30 01]  1  Acute cystitis with hematuria  sulfamethoxazole-trimethoprim (BACTRIM DS) 800-160 mg per tablet    phenazopyridine (PYRIDIUM) 200 mg tablet   2  Urinary frequency  POCT urine dip    Urine culture         Patient Instructions   Suspected urinary tract infection  Will treat with antibiotic Bactrim ds - 1 tablet by mouth twice daily for 3-5 days  Pyridium 200 mg-1 tablet by mouth up to 3 times a day as needed for burning with urination  Keep hydrated  Will send out a urine culture to confirm susceptibility  We will call you if change in antibiotics is needed  Follow-up if symptoms persist despite treatment  Follow up with PCP in 3-5 days  Proceed to  ER if symptoms worsen  Chief Complaint     Chief Complaint   Patient presents with    Possible UTI     Patient c/o urinary burning and frequency x 2 days  History of Present Illness       Two day history of dysuria, urinary frequency and urgency  No nausea or vomiting  No flank pain  Possibly chills but no fevers  No significant abdominal pain or suprapubic tenderness but mild cramping sensation  Patient took azo      Review of Systems   Review of Systems   Constitutional: Positive for chills  Negative for diaphoresis and fatigue  Genitourinary: Positive for dysuria, frequency and urgency  Negative for flank pain and hematuria           Current Medications       Current Outpatient Medications:     meclizine (ANTIVERT) 12 5 MG tablet, Take 1 tablet (12 5 mg total) by mouth 3 (three) times a day as needed for dizziness (Patient not taking: Reported on 2020), Disp: 30 tablet, Rfl: 0    oxyCODONE-acetaminophen (PERCOCET) 5-325 mg per tablet, Take 1 tablet by mouth every 6 (six) hours as needed for moderate painMax Daily Amount: 4 tablets (Patient not taking: Reported on 7/6/2020), Disp: 10 tablet, Rfl: 0    phenazopyridine (PYRIDIUM) 200 mg tablet, Take 1 tablet (200 mg total) by mouth 3 (three) times a day with meals, Disp: 10 tablet, Rfl: 0    sulfamethoxazole-trimethoprim (BACTRIM DS) 800-160 mg per tablet, Take 1 tablet by mouth every 12 (twelve) hours for 5 days, Disp: 10 tablet, Rfl: 0    Current Allergies     Allergies as of 08/02/2020    (No Known Allergies)            The following portions of the patient's history were reviewed and updated as appropriate: allergies, current medications, past family history, past medical history, past social history, past surgical history and problem list      Past Medical History:   Diagnosis Date    Anxiety     Depression     Rheumatoid arthritis ([facility] [address] )     Sjoegren syndrome     Thyroid nodule        Past Surgical History:   Procedure Laterality Date    CHOLECYSTECTOMY      NASAL/SINUS ENDOSCOPY N/A 6/30/2020    Procedure: IMAGED GUIDED F E S S ;  Surgeon: Cesilia Millan DO;  [facility];  Service: ENT    [place] REPAIR OF NASAL SEPTUM N/A 6/30/2020    Procedure: SEPTOPLASTY;  Surgeon: Cesilia Millan DO;  [facility];  Service: ENT    VENOUS ABLATION Left     lower extremity       Family History   Problem Relation Age of Onset    Breast cancer Mother     Rheum arthritis Mother     Sjogren's syndrome Mother     Esophageal cancer Father     No Known Problems Brother     No Known Problems Brother     No Known Problems Daughter     No Known Problems Son     No Known Problems Son          Medications have been verified  Objective   /78   Pulse 72   Temp 98 6 °F (37 °C)   Resp 14   SpO2 99%        Physical Exam     Physical Exam   Constitutional:  Non-toxic appearance  She does not appear ill  No distress  Cardiovascular: Normal rate and regular rhythm  Exam reveals no gallop and no friction rub  No murmur heard    Pulmonary/Chest: Effort normal  No stridor  No respiratory distress  She has no wheezes  She has no rhonchi  She has no rales  She exhibits no tenderness  Abdominal: There is no abdominal tenderness  Neurological: She is alert  Skin: Skin is warm and dry  No rash noted  She is not diaphoretic  No erythema         Urinalysis-positive leukocytes and microscopic blood

## 2020-08-02 NOTE — PATIENT INSTRUCTIONS
Suspected urinary tract infection  Will treat with antibiotic Bactrim ds - 1 tablet by mouth twice daily for 3-5 days  Pyridium 200 mg-1 tablet by mouth up to 3 times a day as needed for burning with urination  Keep hydrated  Will send out a urine culture to confirm susceptibility  We will call you if change in antibiotics is needed  Follow-up if symptoms persist despite treatment

## 2020-08-04 LAB — BACTERIA UR CULT: NORMAL

## 2020-08-17 PROCEDURE — 87205 SMEAR GRAM STAIN: CPT | Performed by: OTOLARYNGOLOGY

## 2020-08-17 PROCEDURE — 87186 SC STD MICRODIL/AGAR DIL: CPT | Performed by: OTOLARYNGOLOGY

## 2020-08-17 PROCEDURE — 87070 CULTURE OTHR SPECIMN AEROBIC: CPT | Performed by: OTOLARYNGOLOGY

## 2020-08-24 ENCOUNTER — APPOINTMENT (OUTPATIENT)
Dept: RADIOLOGY | Facility: CLINIC | Age: 45
End: 2020-08-24
Payer: COMMERCIAL

## 2020-08-24 ENCOUNTER — OFFICE VISIT (OUTPATIENT)
Dept: INTERNAL MEDICINE CLINIC | Facility: CLINIC | Age: 45
End: 2020-08-24
Payer: COMMERCIAL

## 2020-08-24 DIAGNOSIS — R05.9 COUGH: ICD-10-CM

## 2020-08-24 DIAGNOSIS — J34.3 HYPERTROPHY OF BOTH INFERIOR NASAL TURBINATES: Primary | ICD-10-CM

## 2020-08-24 PROBLEM — Z20.828 EXPOSURE TO SARS-ASSOCIATED CORONAVIRUS: Status: RESOLVED | Noted: 2020-04-27 | Resolved: 2020-08-24

## 2020-08-24 PROBLEM — Z12.39 SCREENING FOR BREAST CANCER: Status: RESOLVED | Noted: 2020-04-27 | Resolved: 2020-08-24

## 2020-08-24 PROBLEM — Z20.822 EXPOSURE TO COVID-19 VIRUS: Status: RESOLVED | Noted: 2020-04-27 | Resolved: 2020-08-24

## 2020-08-24 PROCEDURE — 99213 OFFICE O/P EST LOW 20 MIN: CPT | Performed by: NURSE PRACTITIONER

## 2020-08-24 PROCEDURE — 71046 X-RAY EXAM CHEST 2 VIEWS: CPT

## 2020-08-24 PROCEDURE — 1036F TOBACCO NON-USER: CPT | Performed by: NURSE PRACTITIONER

## 2020-08-24 RX ORDER — PROMETHAZINE HYDROCHLORIDE AND CODEINE PHOSPHATE 6.25; 1 MG/5ML; MG/5ML
5 SYRUP ORAL EVERY 4 HOURS PRN
Qty: 240 ML | Refills: 0 | Status: SHIPPED | OUTPATIENT
Start: 2020-08-24 | End: 2020-11-12 | Stop reason: SDUPTHER

## 2020-08-24 NOTE — PROGRESS NOTES
Virtual Regular Visit      Assessment/Plan: Will call in Phenergan with Codeine take as directed  Will call ENT regarding medication rinses and to see the status  I did put a CXR in to rule out anything acute  Will follow up with her as needed  Problem List Items Addressed This Visit        Respiratory    Hypertrophy of both inferior nasal turbinates - Primary       Other    Cough    Relevant Medications    promethazine-codeine (PHENERGAN WITH CODEINE) 6 25-10 mg/5 mL syrup    Other Relevant Orders    XR chest pa & lateral               Reason for visit is   Chief Complaint   Patient presents with    Virtual Regular Visit        Encounter provider [address], [facility]    Provider located at [address]  [address] [facility] [address] [id number]      Recent Visits  No visits were found meeting these conditions  Showing recent visits within past 7 days and meeting all other requirements     Today's Visits  Date Type Provider Dept   08/24/20 Office Visit [facility] [address], [address] [facility] [place]   Showing today's visits and meeting all other requirements     Future Appointments  No visits were found meeting these conditions  Showing future appointments within next 150 days and meeting all other requirements        The patient was identified by name and date of birth  Barbie Dunham was informed that this is a telemedicine visit and that the visit is being conducted through [employer]  My office door was closed  No one else was in the room  She acknowledged consent and understanding of privacy and security of the video platform  The patient has agreed to participate and understands they can discontinue the visit at any time  Patient is aware this is a billable service  Subjective  Barbie Dunham is a 39 y o  female patient   Azul Johnson is for an acute visit  She did have septoplasty on June 30th   She states she was having some issues after surgery and her right turbinate was completely cleared out  She did have a nasal swab which did show staph and is to start on medicated rinses she did not get yet  She states she Is having postnasal drip and spitting up yellow mucous  She has no fever but is getting SOB  She states she does not feel like it is in her chest but more in her throat  She is getting very frustrated due to coughing at work  She is taking OTC cough medication with no relief  Past Medical History:   Diagnosis Date    Anxiety     Depression     Rheumatoid arthritis ([facility] [address] [facility]     Sjoegren syndrome     Thyroid nodule        Past Surgical History:   Procedure Laterality Date    CHOLECYSTECTOMY      NASAL/SINUS ENDOSCOPY N/A 6/30/2020    Procedure: IMAGED GUIDED F E S S ;  Surgeon: Opal Boyer DO;  [facility];  Service: ENT    [place] REPAIR OF NASAL SEPTUM N/A 6/30/2020    Procedure: SEPTOPLASTY;  Surgeon: Opal Boyer DO;  [facility];  Service: ENT    VENOUS ABLATION Left     lower extremity       Current Outpatient Medications   Medication Sig Dispense Refill    Dextromethorphan-guaiFENesin (DELSYM COUGH/CHEST CONGEST DM PO) Take by mouth      Pseudoephedrine HCl (SUDAFED 12 HOUR PO) Take by mouth      promethazine-codeine (PHENERGAN WITH CODEINE) 6 25-10 mg/5 mL syrup Take 5 mL by mouth every 4 (four) hours as needed for cough 240 mL 0     No current facility-administered medications for this visit  No Known Allergies    Review of Systems   Constitutional: Negative  HENT: Positive for postnasal drip  Eyes: Negative  Respiratory: Positive for cough  Cardiovascular: Negative  Gastrointestinal: Negative  Endocrine: Negative  Genitourinary: Negative  Musculoskeletal: Negative  Skin: Negative  Allergic/Immunologic: Negative  Neurological: Negative  Hematological: Negative  Psychiatric/Behavioral: Negative          Video Exam    There were no vitals filed for this visit  Physical Exam  Constitutional:       Appearance: Normal appearance  She is normal weight  Neurological:      Mental Status: She is alert  Psychiatric:         Mood and Affect: Mood normal          Behavior: Behavior normal          Thought Content: Thought content normal          Judgment: Judgment normal           I spent 15 minutes directly with the patient during this visit      VIRTUAL VISIT DISCLAIMER    Ferny Gamble acknowledges that she has consented to an online visit or consultation  She understands that the online visit is based solely on information provided by her, and that, in the absence of a face-to-face physical evaluation by the physician, the diagnosis she receives is both limited and provisional in terms of accuracy and completeness  This is not intended to replace a full medical face-to-face evaluation by the physician  Ferny Gamble understands and accepts these terms

## 2020-08-24 NOTE — PROGRESS NOTES
Assessment/Plan:    No problem-specific Assessment & Plan notes found for this encounter  Problem List Items Addressed This Visit        Respiratory    Hypertrophy of both inferior nasal turbinates - Primary       Other    Cough    Relevant Orders    XR chest pa & lateral            Subjective:      Patient ID: Radha King is a 39 y o  female  HPI    The following portions of the patient's history were reviewed and updated as appropriate:   She  has a past medical history of Anxiety, Depression, Rheumatoid arthritis ([address] ), Sjoegren syndrome, and Thyroid nodule  She   Patient Active Problem List    Diagnosis Date Noted    Cough 08/24/2020    Herpes labialis 04/27/2020    Severe frontal headaches 02/13/2020    Chronic pansinusitis 01/15/2020    Nasal congestion 01/15/2020    Deviated nasal septum 01/15/2020    Hypertrophy of both inferior nasal turbinates 01/15/2020    Tendonitis 06/25/2019    Allergic rhinitis 06/29/2018    Anxiety 06/29/2018    Inflammatory polyarthropathy ([address] ) 06/29/2018    RA (rheumatoid arthritis) ([address] ) 06/29/2018    Depressed mood 02/14/2017    Secondary adrenal insufficiency ([address] ) 02/14/2017    Thyroid nodule, uninodular 02/14/2017    Sjogren's syndrome ([address] 12/31/2016    Acute adrenal insufficiency ([address] ) 12/30/2016    Anemia 12/19/2016    Leucocytosis 12/17/2016    Varicose vein 03/10/2015     She  has a past surgical history that includes Cholecystectomy; Venous ablation (Left); pr repair of nasal septum (N/A, 6/30/2020); and Nasal/sinus endoscopy (N/A, 6/30/2020)  Her family history includes Breast cancer in her mother; Esophageal cancer in her father; No Known Problems in her brother, brother, daughter, son, and son; Rheum arthritis in her mother; Sjogren's syndrome in her mother  She  reports that she quit smoking about 14 years ago  She has a 20 00 pack-year smoking history  She has never used smokeless tobacco  She reports current alcohol use   She reports that she does not use drugs  Current Outpatient Medications   Medication Sig Dispense Refill    Dextromethorphan-guaiFENesin (DELSYM COUGH/CHEST CONGEST DM PO) Take by mouth      Pseudoephedrine HCl (SUDAFED 12 HOUR PO) Take by mouth       No current facility-administered medications for this visit  Current Outpatient Medications on File Prior to Visit   Medication Sig    Dextromethorphan-guaiFENesin (DELSYM COUGH/CHEST CONGEST DM PO) Take by mouth    Pseudoephedrine HCl (SUDAFED 12 HOUR PO) Take by mouth    [DISCONTINUED] meclizine (ANTIVERT) 12 5 MG tablet Take 1 tablet (12 5 mg total) by mouth 3 (three) times a day as needed for dizziness (Patient not taking: Reported on 7/28/2020)    [DISCONTINUED] oxyCODONE-acetaminophen (PERCOCET) 5-325 mg per tablet Take 1 tablet by mouth every 6 (six) hours as needed for moderate painMax Daily Amount: 4 tablets (Patient not taking: Reported on 7/6/2020)    [DISCONTINUED] phenazopyridine (PYRIDIUM) 200 mg tablet Take 1 tablet (200 mg total) by mouth 3 (three) times a day with meals (Patient not taking: Reported on 8/24/2020)     No current facility-administered medications on file prior to visit  She has No Known Allergies       Review of Systems      Objective: There were no vitals taken for this visit           Physical Exam

## 2020-08-24 NOTE — PATIENT INSTRUCTIONS
Acute Cough   WHAT YOU NEED TO KNOW:   What is an acute cough? An acute cough can last up to 3 weeks  Common causes of an acute cough include a cold, allergies, or a lung infection  How is the cause of an acute cough diagnosed? Your healthcare provider will examine you and listen to your lungs  Tell your healthcare provider if you cough up any mucus, or have a fever or shortness of breath  Also tell your provider what makes the cough better or worse  Depending on your symptoms, you may need a chest x-ray  A sample of mucus may be collected and tested for infection  How is an acute cough treated? An acute cough usually goes away on its own  Ask your healthcare provider about medicines you can take to decrease your cough  You may need medicine to stop the cough, decrease swelling in your airways, or help open your airways  Medicine may also be given to help you cough up mucus  If you have an infection caused by bacteria, you may need antibiotics  What can I do to manage my cough? · Do not smoke and stay away from others who smoke  Nicotine and other chemicals in cigarettes and cigars can cause lung damage and make your cough worse  Ask your healthcare provider for information if you currently smoke and need help to quit  E-cigarettes or smokeless tobacco still contain nicotine  Talk to your healthcare provider before you use these products  · Drink extra liquids as directed  Liquids will help thin and loosen mucus so you can cough it up  Liquids will also help prevent dehydration  Examples of good liquids to drink include water, fruit juice, and broth  Do not drink liquids that contain caffeine  Caffeine can increase your risk for dehydration  Ask your healthcare provider how much liquid to drink each day  · Rest as directed  Do not do activities that make your cough worse, such as exercise  · Use a humidifier or vaporizer    Use a cool mist humidifier or a vaporizer to increase air moisture in your home  This may make it easier for you to breathe and help decrease your cough  · Eat 2 to 5 mL of honey 2 times each day  Honey can help thin mucus and decrease your cough  · Use cough drops or lozenges  These can help decrease throat irritation and your cough  When should I seek immediate care? · You have trouble breathing or feel short of breath  · You cough up blood, or you see blood in your mucus  · You faint or feel weak or dizzy  · You have chest pain when you cough or take a deep breath  · You have new wheezing  When should I contact my healthcare provider? · You have a fever  · Your cough lasts longer than 4 weeks  · Your symptoms do not improve with treatment  · You have questions or concerns about your condition or care  CARE AGREEMENT:   You have the right to help plan your care  Learn about your health condition and how it may be treated  Discuss treatment options with your caregivers to decide what care you want to receive  You always have the right to refuse treatment  The above information is an  only  It is not intended as medical advice for individual conditions or treatments  Talk to your doctor, nurse or pharmacist before following any medical regimen to see if it is safe and effective for you  © 2017 [employer]  Information is for End User's use only and may not be sold, redistributed or otherwise used for commercial purposes  All illustrations and images included in CareNotes® are the copyrighted property of [employer] M [employer]  or Cristian Jack

## 2020-11-06 DIAGNOSIS — B00.1 HERPES LABIALIS: Primary | ICD-10-CM

## 2020-11-06 RX ORDER — VALACYCLOVIR HYDROCHLORIDE 1 G/1
TABLET, FILM COATED ORAL
Qty: 4 TABLET | Refills: 5 | Status: SHIPPED | OUTPATIENT
Start: 2020-11-06 | End: 2021-11-08 | Stop reason: SDUPTHER

## 2020-11-12 ENCOUNTER — TELEMEDICINE (OUTPATIENT)
Dept: INTERNAL MEDICINE CLINIC | Facility: CLINIC | Age: 45
End: 2020-11-12
Payer: COMMERCIAL

## 2020-11-12 ENCOUNTER — HOSPITAL ENCOUNTER (OUTPATIENT)
Dept: RADIOLOGY | Facility: HOSPITAL | Age: 45
Discharge: HOME | End: 2020-11-12
Payer: COMMERCIAL

## 2020-11-12 ENCOUNTER — APPOINTMENT (OUTPATIENT)
Dept: LAB | Facility: HOSPITAL | Age: 45
End: 2020-11-12
Payer: COMMERCIAL

## 2020-11-12 ENCOUNTER — TRANSCRIBE ORDERS (OUTPATIENT)
Dept: LAB | Facility: HOSPITAL | Age: 45
End: 2020-11-12

## 2020-11-12 DIAGNOSIS — Z20.822 EXPOSURE TO COVID-19 VIRUS: ICD-10-CM

## 2020-11-12 DIAGNOSIS — B34.9 VIRAL INFECTION, UNSPECIFIED: ICD-10-CM

## 2020-11-12 DIAGNOSIS — J32.4 CHRONIC PANSINUSITIS: Primary | ICD-10-CM

## 2020-11-12 DIAGNOSIS — R05.9 COUGH: ICD-10-CM

## 2020-11-12 DIAGNOSIS — J32.4 CHRONIC PANSINUSITIS: ICD-10-CM

## 2020-11-12 LAB
ALBUMIN SERPL BCP-MCNC: 4.7 G/DL (ref 3.5–5.7)
ALP SERPL-CCNC: 60 U/L (ref 40–150)
ALT SERPL W P-5'-P-CCNC: 11 U/L (ref 7–52)
ANION GAP SERPL CALCULATED.3IONS-SCNC: 5 MMOL/L (ref 4–13)
AST SERPL W P-5'-P-CCNC: 12 U/L (ref 13–39)
BASOPHILS # BLD AUTO: 0.1 THOUSANDS/ΜL (ref 0–0.1)
BASOPHILS NFR BLD AUTO: 1 % (ref 0–2)
BILIRUB SERPL-MCNC: 0.6 MG/DL (ref 0.2–1)
BUN SERPL-MCNC: 12 MG/DL (ref 7–25)
CALCIUM SERPL-MCNC: 9.7 MG/DL (ref 8.6–10.5)
CHLORIDE SERPL-SCNC: 102 MMOL/L (ref 98–107)
CO2 SERPL-SCNC: 30 MMOL/L (ref 21–31)
CREAT SERPL-MCNC: 0.71 MG/DL (ref 0.6–1.2)
EOSINOPHIL # BLD AUTO: 0.7 THOUSAND/ΜL (ref 0–0.61)
EOSINOPHIL NFR BLD AUTO: 6 % (ref 0–5)
ERYTHROCYTE [DISTWIDTH] IN BLOOD BY AUTOMATED COUNT: 12.5 % (ref 11.5–14.5)
GFR SERPL CREATININE-BSD FRML MDRD: 105 ML/MIN/1.73SQ M
GLUCOSE P FAST SERPL-MCNC: 102 MG/DL (ref 65–99)
HCT VFR BLD AUTO: 42.9 % (ref 42–47)
HGB BLD-MCNC: 13.9 G/DL (ref 12–16)
LYMPHOCYTES # BLD AUTO: 2.2 THOUSANDS/ΜL (ref 0.6–4.47)
LYMPHOCYTES NFR BLD AUTO: 20 % (ref 21–51)
MCH RBC QN AUTO: 30.3 PG (ref 26–34)
MCHC RBC AUTO-ENTMCNC: 32.4 G/DL (ref 31–37)
MCV RBC AUTO: 94 FL (ref 81–99)
MONOCYTES # BLD AUTO: 0.9 THOUSAND/ΜL (ref 0.17–1.22)
MONOCYTES NFR BLD AUTO: 8 % (ref 2–12)
NEUTROPHILS # BLD AUTO: 7.6 THOUSANDS/ΜL (ref 1.4–6.5)
NEUTS SEG NFR BLD AUTO: 66 % (ref 42–75)
PLATELET # BLD AUTO: 364 THOUSANDS/UL (ref 149–390)
PMV BLD AUTO: 9.1 FL (ref 8.6–11.7)
POTASSIUM SERPL-SCNC: 3.8 MMOL/L (ref 3.5–5.5)
PROT SERPL-MCNC: 7.9 G/DL (ref 6.4–8.9)
RBC # BLD AUTO: 4.58 MILLION/UL (ref 3.9–5.2)
SODIUM SERPL-SCNC: 137 MMOL/L (ref 134–143)
WBC # BLD AUTO: 11.4 THOUSAND/UL (ref 4.8–10.8)

## 2020-11-12 PROCEDURE — U0003 INFECTIOUS AGENT DETECTION BY NUCLEIC ACID (DNA OR RNA); SEVERE ACUTE RESPIRATORY SYNDROME CORONAVIRUS 2 (SARS-COV-2) (CORONAVIRUS DISEASE [COVID-19]), AMPLIFIED PROBE TECHNIQUE, MAKING USE OF HIGH THROUGHPUT TECHNOLOGIES AS DESCRIBED BY CMS-2020-01-R: HCPCS | Performed by: FAMILY MEDICINE

## 2020-11-12 PROCEDURE — 80053 COMPREHEN METABOLIC PANEL: CPT

## 2020-11-12 PROCEDURE — 36415 COLL VENOUS BLD VENIPUNCTURE: CPT

## 2020-11-12 PROCEDURE — 71046 X-RAY EXAM CHEST 2 VIEWS: CPT

## 2020-11-12 PROCEDURE — 85025 COMPLETE CBC W/AUTO DIFF WBC: CPT

## 2020-11-12 PROCEDURE — 99442 PR PHYS/QHP TELEPHONE EVALUATION 11-20 MIN: CPT | Performed by: FAMILY MEDICINE

## 2020-11-12 RX ORDER — PROMETHAZINE HYDROCHLORIDE AND CODEINE PHOSPHATE 6.25; 1 MG/5ML; MG/5ML
5 SYRUP ORAL EVERY 4 HOURS PRN
Qty: 240 ML | Refills: 0 | Status: SHIPPED | OUTPATIENT
Start: 2020-11-12 | End: 2021-01-15 | Stop reason: SDUPTHER

## 2020-11-12 RX ORDER — DOXYCYCLINE 100 MG/1
100 CAPSULE ORAL 2 TIMES DAILY
Qty: 14 CAPSULE | Refills: 0 | Status: SHIPPED | OUTPATIENT
Start: 2020-11-12 | End: 2020-11-19

## 2020-11-14 LAB — SARS-COV-2 RNA SPEC QL NAA+PROBE: NOT DETECTED

## 2020-11-17 ENCOUNTER — TELEMEDICINE (OUTPATIENT)
Dept: INTERNAL MEDICINE CLINIC | Facility: CLINIC | Age: 45
End: 2020-11-17
Payer: COMMERCIAL

## 2020-11-17 DIAGNOSIS — R09.81 NASAL CONGESTION: ICD-10-CM

## 2020-11-17 DIAGNOSIS — R05.9 COUGH: ICD-10-CM

## 2020-11-17 DIAGNOSIS — J32.4 CHRONIC PANSINUSITIS: Primary | ICD-10-CM

## 2020-11-17 PROCEDURE — 99213 OFFICE O/P EST LOW 20 MIN: CPT | Performed by: NURSE PRACTITIONER

## 2020-11-17 RX ORDER — METHYLPREDNISOLONE 4 MG/1
TABLET ORAL
Qty: 21 EACH | Refills: 0 | Status: SHIPPED | OUTPATIENT
Start: 2020-11-17 | End: 2021-01-15

## 2020-11-25 ENCOUNTER — TELEPHONE (OUTPATIENT)
Dept: INTERNAL MEDICINE CLINIC | Facility: CLINIC | Age: 45
End: 2020-11-25

## 2020-12-19 ENCOUNTER — IMMUNIZATIONS (OUTPATIENT)
Dept: FAMILY MEDICINE CLINIC | Facility: HOSPITAL | Age: 45
End: 2020-12-19
Payer: COMMERCIAL

## 2020-12-19 DIAGNOSIS — Z23 ENCOUNTER FOR IMMUNIZATION: ICD-10-CM

## 2020-12-19 PROCEDURE — 0001A SARS-COV-2 / COVID-19 MRNA VACCINE (PFIZER-BIONTECH) 30 MCG: CPT

## 2020-12-19 PROCEDURE — 91300 SARS-COV-2 / COVID-19 MRNA VACCINE (PFIZER-BIONTECH) 30 MCG: CPT

## 2021-01-11 ENCOUNTER — IMMUNIZATIONS (OUTPATIENT)
Dept: FAMILY MEDICINE CLINIC | Facility: HOSPITAL | Age: 46
End: 2021-01-11

## 2021-01-11 DIAGNOSIS — Z23 ENCOUNTER FOR IMMUNIZATION: ICD-10-CM

## 2021-01-11 PROCEDURE — 91300 SARS-COV-2 / COVID-19 MRNA VACCINE (PFIZER-BIONTECH) 30 MCG: CPT

## 2021-01-11 PROCEDURE — 0002A SARS-COV-2 / COVID-19 MRNA VACCINE (PFIZER-BIONTECH) 30 MCG: CPT

## 2021-01-15 ENCOUNTER — OFFICE VISIT (OUTPATIENT)
Dept: INTERNAL MEDICINE CLINIC | Facility: CLINIC | Age: 46
End: 2021-01-15
Payer: COMMERCIAL

## 2021-01-15 DIAGNOSIS — J32.4 CHRONIC PANSINUSITIS: Primary | ICD-10-CM

## 2021-01-15 DIAGNOSIS — R05.9 COUGH: ICD-10-CM

## 2021-01-15 PROCEDURE — 99213 OFFICE O/P EST LOW 20 MIN: CPT | Performed by: NURSE PRACTITIONER

## 2021-01-15 RX ORDER — PROMETHAZINE HYDROCHLORIDE AND CODEINE PHOSPHATE 6.25; 1 MG/5ML; MG/5ML
5 SYRUP ORAL EVERY 4 HOURS PRN
Qty: 240 ML | Refills: 0 | Status: SHIPPED | OUTPATIENT
Start: 2021-01-15 | End: 2021-12-16

## 2021-01-15 RX ORDER — METHYLPREDNISOLONE 4 MG/1
TABLET ORAL
Qty: 21 EACH | Refills: 0 | Status: SHIPPED | OUTPATIENT
Start: 2021-01-15 | End: 2021-09-10

## 2021-01-15 RX ORDER — AMOXICILLIN AND CLAVULANATE POTASSIUM 875; 125 MG/1; MG/1
1 TABLET, FILM COATED ORAL 2 TIMES DAILY
Qty: 20 TABLET | Refills: 0 | Status: SHIPPED | OUTPATIENT
Start: 2021-01-15 | End: 2021-01-25

## 2021-01-15 NOTE — PROGRESS NOTES
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  No problem-specific Assessment & Plan notes found for this encounter  Problem List Items Addressed This Visit        Respiratory    Chronic pansinusitis - Primary    Relevant Medications    amoxicillin-clavulanate (AUGMENTIN) 875-125 mg per tablet    methylPREDNISolone 4 MG tablet therapy pack    Other Relevant Orders    Ambulatory Referral to Otolaryngology       Other    Cough    Relevant Medications    promethazine-codeine (PHENERGAN WITH CODEINE) 6 25-10 mg/5 mL syrup            Subjective:      Patient ID: Ben De La Torre is a 42 y o  female  [facility]  She states she has been battling chronic sinus infections for several months now  She states she has seen ENT and they do want her to do washes but she can not do this with working 12 hour shifts  She states she is having green discharge and is having sinus pressure  She has received her two doses of COVID vaccine  She states she is not having any SOB but some chest tightness  She states she does not feel it is COVID  She offers no other issue  The following portions of the patient's history were reviewed and updated as appropriate:   She  has a past medical history of Anxiety, Depression, Rheumatoid arthritis ([address] ), Sjoegren syndrome, and Thyroid nodule    She   Patient Active Problem List    Diagnosis Date Noted    Cough 08/24/2020    Herpes labialis 04/27/2020    Severe frontal headaches 02/13/2020    Chronic pansinusitis 01/15/2020    Nasal congestion 01/15/2020    Deviated nasal septum 01/15/2020    Hypertrophy of both inferior nasal turbinates 01/15/2020    Tendonitis 06/25/2019    Allergic rhinitis 06/29/2018  Anxiety 06/29/2018    Inflammatory polyarthropathy ([facility] ) 06/29/2018    RA (rheumatoid arthritis) ([place] [address] ) 06/29/2018    Depressed mood 02/14/2017    Secondary adrenal insufficiency ([facility] ) 02/14/2017    Thyroid nodule, uninodular 02/14/2017    Sjogren's syndrome ([facility] ) 12/31/2016    Acute adrenal insufficiency ([place] [address] ) 12/30/2016    Anemia 12/19/2016    Leucocytosis 12/17/2016    Varicose vein 03/10/2015     She  has a past surgical history that includes Cholecystectomy; Venous ablation (Left); pr repair of nasal septum (N/A, 6/30/2020); and Nasal/sinus endoscopy (N/A, 6/30/2020)  Her family history includes Breast cancer in her mother; Esophageal cancer in her father; No Known Problems in her brother, brother, daughter, son, and son; Rheum arthritis in her mother; Sjogren's syndrome in her mother  She  reports that she quit smoking about 15 years ago  She has a 20 00 pack-year smoking history  She has never used smokeless tobacco  She reports current alcohol use  She reports that she does not use drugs  Current Outpatient Medications   Medication Sig Dispense Refill    amoxicillin-clavulanate (AUGMENTIN) 875-125 mg per tablet Take 1 tablet by mouth 2 (two) times a day for 10 days 20 tablet 0    methylPREDNISolone 4 MG tablet therapy pack Use as directed on package 21 each 0    promethazine-codeine (PHENERGAN WITH CODEINE) 6 25-10 mg/5 mL syrup Take 5 mL by mouth every 4 (four) hours as needed for cough 240 mL 0    Pseudoephedrine HCl (SUDAFED 12 HOUR PO) Take by mouth      valACYclovir (VALTREX) 1,000 mg tablet Take 2 tablets by mouth every 12 hours x 1 days (Patient not taking: Reported on 12/28/2020) 4 tablet 5     No current facility-administered medications for this visit        Current Outpatient Medications on File Prior to Visit   Medication Sig    Pseudoephedrine HCl (SUDAFED 12 HOUR PO) Take by mouth    valACYclovir (VALTREX) 1,000 mg tablet Take 2 tablets by mouth every 12 hours x 1 days (Patient not taking: Reported on 12/28/2020)    [DISCONTINUED] methylPREDNISolone 4 MG tablet therapy pack Use as directed on package (Patient not taking: Reported on 12/28/2020)    [DISCONTINUED] promethazine-codeine (PHENERGAN WITH CODEINE) 6 25-10 mg/5 mL syrup Take 5 mL by mouth every 4 (four) hours as needed for cough (Patient not taking: Reported on 12/28/2020)     No current facility-administered medications on file prior to visit  She has No Known Allergies       Review of Systems   Constitutional: Negative  HENT: Positive for congestion, postnasal drip, sinus pressure and sinus pain  Eyes: Negative  Respiratory: Negative  Cardiovascular: Negative  Gastrointestinal: Negative  Endocrine: Negative  Genitourinary: Negative  Musculoskeletal: Negative  Skin: Negative  Allergic/Immunologic: Negative  Neurological: Negative  Hematological: Negative  Psychiatric/Behavioral: Negative  Objective: There were no vitals taken for this visit           Physical Exam

## 2021-01-20 ENCOUNTER — TELEPHONE (OUTPATIENT)
Dept: OTOLARYNGOLOGY | Facility: CLINIC | Age: 46
End: 2021-01-20

## 2021-01-20 NOTE — TELEPHONE ENCOUNTER
JARROD to call [phone] to schedule a ENT consult  The order was placed by Latoya bustamante PCP

## 2021-02-04 ENCOUNTER — TELEPHONE (OUTPATIENT)
Dept: SLEEP CENTER | Facility: CLINIC | Age: 46
End: 2021-02-04

## 2021-02-04 NOTE — TELEPHONE ENCOUNTER
Patient had called the office and left message on sleep/ent line  I called the patient back but had to leave a message for her to return my call

## 2021-02-12 ENCOUNTER — COSMETIC (OUTPATIENT)
Dept: DERMATOLOGY | Facility: CLINIC | Age: 46
End: 2021-02-12
Payer: COMMERCIAL

## 2021-02-12 VITALS — TEMPERATURE: 98.8 F

## 2021-02-12 DIAGNOSIS — L98.8 RHYTIDES: Primary | ICD-10-CM

## 2021-02-12 PROCEDURE — BOTOX1U PR BOTOX BY THE UNIT: Performed by: DERMATOLOGY

## 2021-02-12 NOTE — PROGRESS NOTES
Botox Procedure Note    Screening Questions   Are you pregnant or breastfeeding? No  Do you take aspirin, fish oil or any other blood thinning medicines? No  Have you had an allergic reaction to any injectables before? No  Have you had any surgeries on your face? No  Have you been diagnosed with a muscle or nerve condition like myasthenia gravis, ALS or Lambert-Eaton syndrome? No      Diagnosis: rhytides    Location: Glabella, Forehead, Crow's feet    Informed consent: Discussed risks (infection, pain, bleeding, bruising, swelling, allergic reaction, paralysis of nearby muscles, eyelid droop, double vision, neck weakness, difficulty breathing, headache, undesirable cosmetic result, need for additional treatment, and death) and benefits of the procedure, as well as the alternatives  Informed consent was obtained  Photographs taken in chart after verbal consent     Preparation: The area was cleansed with alcohol  Procedure Details: Botox was injected into the dermis with a 30-gauge needle  Pressure applied to any bleeding  Lot Number: [id number]  Expiration: 08/23  Total Units Injected: 14U glabella, 14U forehead, 16U crow's feet    Plan: Patient instructed to remain upright for 6 hours  Tylenol may be used for headache  Allow 2 weeks before returning to clinic for additional dosing as needed  Call for any problems  Written instructions provided  THIS IS A COSMETIC PATIENT WHO PAID OUT OF POCKET AT TIME OF VISIT  DO NOT BILL           Scribe Attestation    I,:  Madhu Schultz MA am acting as a scribe while in the presence of the attending physician :       I,:  Jamal Levy MD personally performed the services described in this documentation    as scribed in my presence :

## 2021-02-12 NOTE — PATIENT INSTRUCTIONS
Botulinum Toxin (Botox): PRE & POST-TREATMENT PATIENT INSTRUCTIONS    Pre-Treatment Instructions    Stop 3 DAYS BEFORE treatment to prevent bruising: AVOID blood thinning over-the-counter medications and herbal supplements such as Aspirin, Motrin, Ibuprofen, Aleve, Garlic, Vitamin E, Ginkgo Biloba, St  Jps Wort, Fish Oil, Omega-3 capsules  Please note: If you are taking aspirin, warfarin, Plavix, or any other blood thinner under the guidance of a physician for heart disease, you should continue taking the medication as prescribed   Do not drink alcoholic beverages 24 hours before (or after) your treatment to avoid extra bruising   Do not use botulinum toxin if you are pregnant or breastfeeding, are allergic to any of its ingredients, or suffer from any neurological disorders  Please inform your provider if you have any questions about this prior to the treatment  Day of Treatment    Arrive to the office with a Martha Butt  Please do not wear makeup  You may bring your own makeup to apply after your treatment   You may experience a mild amount of tenderness or a stinging sensation following injection  The injection sites will resemble bee stings but this should settle down within 20 minutes   You may experience some tenderness at the treatment site(s) that can last for a few hours or a few days  You may have bruises in the areas treated  Immediately After Treatment    It is best to try to exercise your treated muscles for 1-2 hours after treatment (e g  practice frowning, raising your eyebrows, and squinting)  This helps to work BOTOX® into your muscles   Stay in a vertical position for 6 hours following injection  DO NOT rest your head or lie down; sit upright   You may apply an ice or cold gel pack to the area(s) treated (avoiding pressure) as this helps reduce swelling and the potential for bruising      AVOID placing excessive pressure on the treated area(s) for the first few days; when cleansing your face or applying makeup, be very gentle   Do not work out for 24 hours   Any bruising can be covered up with make-up  You can also get topical Arnica (over the counter) to help th bruising resolve faster     Botox takes 7-10 days to kick in and lasts 3-4 months  If you want more Botox injected to the areas treated today, wait 2 weeks before coming back to the office

## 2021-02-25 ENCOUNTER — OFFICE VISIT (OUTPATIENT)
Dept: OBGYN CLINIC | Facility: MEDICAL CENTER | Age: 46
End: 2021-02-25
Payer: COMMERCIAL

## 2021-02-25 VITALS
WEIGHT: 198 LBS | SYSTOLIC BLOOD PRESSURE: 110 MMHG | BODY MASS INDEX: 32.99 KG/M2 | HEIGHT: 65 IN | DIASTOLIC BLOOD PRESSURE: 60 MMHG

## 2021-02-25 DIAGNOSIS — R10.2 PELVIC PAIN: Primary | ICD-10-CM

## 2021-02-25 DIAGNOSIS — Z01.419 ENCNTR FOR GYN EXAM (GENERAL) (ROUTINE) W/O ABN FINDINGS: ICD-10-CM

## 2021-02-25 DIAGNOSIS — Z12.31 ENCOUNTER FOR SCREENING MAMMOGRAM FOR MALIGNANT NEOPLASM OF BREAST: ICD-10-CM

## 2021-02-25 PROCEDURE — 99396 PREV VISIT EST AGE 40-64: CPT | Performed by: NURSE PRACTITIONER

## 2021-02-25 NOTE — PROGRESS NOTES
ASSESSMENT & PLAN: Porfirio Weldon is a 42 y o  [id number] with normal gynecologic exam     1   Routine well woman exam done today  2  Pap and HPV:  The patient's last pap and hpv was 2018  It was normal     Pap and cotesting was not done today  Current ASCCP Guidelines reviewed  3   Mammogram ordered  4  The following were reviewed in today's visit: breast self exam, mammography screening ordered, adequate intake of calcium and vitamin D, exercise and healthy diet  5  rto yearly exam  6  Sent for pelvic us  CC:  Annual Gynecologic Examination    HPI: Porfirio Weldon is a 42 y o  [id number] who presents for annual gynecologic examination  She has the following concerns:   Right lower quandrant pain, and sometimes pain with sex  Health Maintenance:    She wears her seatbelt routinely  She does perform regular monthly self breast exams  She feels safe at home       Patient Active Problem List   Diagnosis    Acute adrenal insufficiency (HCC)    Allergic rhinitis    Anemia    Anxiety    Depressed mood    Inflammatory polyarthropathy (HCC)    Leucocytosis    RA (rheumatoid arthritis) ([address] )    Secondary adrenal insufficiency (HCC)    Thyroid nodule, uninodular    Varicose vein    Sjogren's syndrome (HCC)    Tendonitis    Chronic pansinusitis    Nasal congestion    Deviated nasal septum    Hypertrophy of both inferior nasal turbinates    Severe frontal headaches    Herpes labialis    Cough       Past Medical History:   Diagnosis Date    Anxiety     Depression     [facility] )     Sjoegren syndrome     Thyroid nodule        Past Surgical History:   Procedure Laterality Date    CHOLECYSTECTOMY      NASAL/SINUS ENDOSCOPY N/A 6/30/2020    Procedure: IMAGED GUIDED F E S S ;  Surgeon: Maria G Melgoza DO;  Location: [facility];  Service: ENT    CT REPAIR OF NASAL SEPTUM N/A 6/30/2020    Procedure: SEPTOPLASTY;  Surgeon: Maria G Melgoza DO;  [facility];  Service: ENT    VENOUS ABLATION Left     lower extremity       Past OB/Gyn History:  OB History        3    Para   3    Term   3            AB        Living   3       SAB        TAB        Ectopic        Multiple        Live Births   3                  Pt does not have menstrual issues  History of sexually transmitted infection: Yes  History of abnormal pap smears: No      Patient is currently sexually active  heterosexual  The current method of family planning is IUD  Family History   Problem Relation Age of Onset   Grzegorz Valero Breast cancer Mother     Rheum arthritis Mother     Sjogren's syndrome Mother     Liver cancer Mother     Esophageal cancer Father     No Known Problems Brother     No Known Problems Brother     No Known Problems Daughter     No Known Problems Son     No Known Problems Son        Social History:  Social History     Socioeconomic History    Marital status: /Civil Union     Spouse name: Not on file    Number of children: Not on file    Years of education: Not on file    Highest education level: Not on file   Occupational History    Not on file   Social Needs    Financial resource strain: Not on file    Food insecurity     Worry: Not on file     Inability: Not on file   [employer] needs     Medical: Not on file     Non-medical: Not on file   Tobacco Use    Smoking status: Former Smoker     Packs/day: 1 00     Years: 20 00     Pack years: 20 00     Quit date:      Years since quitting: 15 1    Smokeless tobacco: Never Used   Substance and Sexual Activity    Alcohol use: Not Currently     Frequency: Monthly or less     Drinks per session: 1 or 2     Binge frequency: Never     Comment: occasional    Drug use: No    Sexual activity: Yes     Partners: Male     Birth control/protection: None, I U D     Lifestyle    Physical activity     Days per week: Not on file     Minutes per session: Not on file    Stress: Not on file   Relationships    Social connections Talks on phone: Not on file     Gets together: Not on file     Attends Muslim service: Not on file     Active member of club or organization: Not on file     Attends meetings of clubs or organizations: Not on file     Relationship status: Not on file    Intimate partner violence     Fear of current or ex partner: Not on file     Emotionally abused: Not on file     Physically abused: Not on file     Forced sexual activity: Not on file   Other Topics Concern    Not on file   Social History Narrative    Not on file     Presently lives with spouse  Patient is   Patient is currently employed   No Known Allergies    Current Outpatient Medications:     levonorgestrel (MIRENA) 20 MCG/24HR IUD, 1 each by Intrauterine route once, Disp: , Rfl:     Pseudoephedrine HCl (SUDAFED 12 HOUR PO), Take by mouth, Disp: , Rfl:     methylPREDNISolone 4 MG tablet therapy pack, Use as directed on package, Disp: 21 each, Rfl: 0    promethazine-codeine (PHENERGAN WITH CODEINE) 6 25-10 mg/5 mL syrup, Take 5 mL by mouth every 4 (four) hours as needed for cough, Disp: 240 mL, Rfl: 0    valACYclovir (VALTREX) 1,000 mg tablet, Take 2 tablets by mouth every 12 hours x 1 days (Patient not taking: Reported on 12/28/2020), Disp: 4 tablet, Rfl: 5    Review of Systems:    Review of Systems  Constitutional :no fever, feels well, no tiredness, no recent weight gain or loss  ENT: no ear ache, no loss of hearing, no nosebleeds or nasal discharge, no sore throat or hoarseness  Cardiovascular: no complaints of slow or fast heart beat, no chest pain, no palpitations, no leg claudication or lower extremity edema    Respiratory: no complaints of shortness of shortness of breath, no IZAGUIRRE  Breasts:no complaints of breast pain, breast lump, or nipple discharge  Gastrointestinal: no complaints of abdominal pain, constipation, nausea, vomiting, or diarrhea or bloody stools  Genitourinary : no complaints of dysuria, incontinence, pelvic pain, no dysmenorrhea, vaginal discharge or abnormal vaginal bleeding and as noted in HPI  Musculoskeletal: no complaints of arthralgia, no myalgia, no joint swelling or stiffness, no limb pain or swelling  Integumentary: no complaints of skin rash or lesion, itching or dry skin  Neurological: no complaints of headache, no confusion, no numbness or tingling, no dizziness or fainting    Objective      /60   Ht 5' 4 5" (1 638 m)   Wt 89 8 kg (198 lb)   BMI 33 46 kg/m²     General:   appears stated age, cooperative, alert normal mood and affect   Neck: normal, supple,trachea midline, no masses   Heart: regular rate and rhythm, S1, S2 normal, no murmur, click, rub or gallop   Lungs: clear to auscultation bilaterally   Breasts: normal appearance, no masses or tenderness   Abdomen: soft, non-tender, without masses or organomegaly   Vulva: normal   Vagina: normal vagina   Urethra: normal   Cervix: Normal, no discharge  Uterus: normal size, contour, position, consistency, mobility, non-tender   Adnexa: no mass, fullness, tenderness   Lymphatic palpation of lymph nodes in neck, axilla, groin and/or other locations: no lymphadenopathy or masses noted   Skin normal skin turgor and no rashes     Psychiatric orientation to person, place, and time: normal  mood and affect: normal

## 2021-03-04 ENCOUNTER — HOSPITAL ENCOUNTER (OUTPATIENT)
Dept: ULTRASOUND IMAGING | Facility: HOSPITAL | Age: 46
Discharge: HOME | End: 2021-03-04
Payer: COMMERCIAL

## 2021-03-04 ENCOUNTER — HOSPITAL ENCOUNTER (OUTPATIENT)
Dept: MAMMOGRAPHY | Facility: HOSPITAL | Age: 46
Discharge: HOME | End: 2021-03-04
Payer: COMMERCIAL

## 2021-03-04 VITALS — HEIGHT: 65 IN | BODY MASS INDEX: 32.99 KG/M2 | WEIGHT: 198 LBS

## 2021-03-04 DIAGNOSIS — Z12.31 ENCOUNTER FOR SCREENING MAMMOGRAM FOR MALIGNANT NEOPLASM OF BREAST: ICD-10-CM

## 2021-03-04 DIAGNOSIS — R10.2 PELVIC PAIN: ICD-10-CM

## 2021-03-04 PROCEDURE — 77067 SCR MAMMO BI INCL CAD: CPT

## 2021-03-04 PROCEDURE — 76856 US EXAM PELVIC COMPLETE: CPT

## 2021-03-04 PROCEDURE — 77063 BREAST TOMOSYNTHESIS BI: CPT

## 2021-03-04 PROCEDURE — 76830 TRANSVAGINAL US NON-OB: CPT

## 2021-03-08 ENCOUNTER — TELEPHONE (OUTPATIENT)
Dept: MAMMOGRAPHY | Facility: CLINIC | Age: 46
End: 2021-03-08

## 2021-03-08 NOTE — TELEPHONE ENCOUNTER
Email sent to patient at [email or web address]  [email or web address]    James Dexter,    I have tried to contact you in reference to a mammogram that you had done at [facility] on March 4th at our [facility]  I have attempted to call you many times with no answer and your voicemail box is full and I am unable to leave a message  If you could please contact me in reference to this screening I would appreciate it  My contact number is   Thanks,  Daniel George, MSN, RN, [facility], CN-BN  Breast Nurse [address] [place] [address] [address] [facility]  Nic SANTIZO   [address]: [phone]  FX: [phone]  [email or web address]

## 2021-03-09 ENCOUNTER — TELEPHONE (OUTPATIENT)
Dept: OBGYN CLINIC | Facility: CLINIC | Age: 46
End: 2021-03-09

## 2021-03-16 ENCOUNTER — OFFICE VISIT (OUTPATIENT)
Dept: OTOLARYNGOLOGY | Facility: CLINIC | Age: 46
End: 2021-03-16
Payer: COMMERCIAL

## 2021-03-16 VITALS
WEIGHT: 201 LBS | DIASTOLIC BLOOD PRESSURE: 70 MMHG | OXYGEN SATURATION: 97 % | TEMPERATURE: 97.6 F | HEART RATE: 87 BPM | BODY MASS INDEX: 33.49 KG/M2 | HEIGHT: 65 IN | SYSTOLIC BLOOD PRESSURE: 100 MMHG

## 2021-03-16 DIAGNOSIS — J33.9 NASAL POLYPOSIS: ICD-10-CM

## 2021-03-16 DIAGNOSIS — K08.89: ICD-10-CM

## 2021-03-16 DIAGNOSIS — J32.4 CHRONIC PANSINUSITIS: Primary | ICD-10-CM

## 2021-03-16 PROCEDURE — 99204 OFFICE O/P NEW MOD 45 MIN: CPT | Performed by: OTOLARYNGOLOGY

## 2021-03-16 RX ORDER — IBUPROFEN 200 MG
800 TABLET ORAL EVERY 6 HOURS PRN
COMMUNITY

## 2021-03-16 RX ORDER — FLUTICASONE PROPIONATE 93 UG/1
2 SPRAY, METERED NASAL DAILY
Qty: 16 ML | Refills: 3 | Status: SHIPPED | OUTPATIENT
Start: 2021-03-16 | End: 2021-03-24 | Stop reason: SDUPTHER

## 2021-03-16 RX ORDER — ACETAMINOPHEN 500 MG
1000 TABLET ORAL EVERY 6 HOURS PRN
COMMUNITY

## 2021-03-16 RX ORDER — DOXYCYCLINE 100 MG/1
100 TABLET ORAL 2 TIMES DAILY
Qty: 28 TABLET | Refills: 0 | Status: SHIPPED | OUTPATIENT
Start: 2021-03-16 | End: 2021-03-30

## 2021-03-16 NOTE — PROGRESS NOTES
Review of Systems   Constitutional: Negative  HENT: Positive for congestion, postnasal drip, sinus pressure and sinus pain  Eyes: Negative  Respiratory: Negative  Cardiovascular: Negative  Gastrointestinal: Negative  Endocrine: Negative  Genitourinary: Negative  Musculoskeletal: Negative  Skin: Negative  Allergic/Immunologic: Negative  Neurological: Negative  Hematological: Negative  Psychiatric/Behavioral: Negative

## 2021-03-16 NOTE — PROGRESS NOTES
Consultation - Otolaryngology - Head and Neck Surgery  Facial Plastic and Reconstructive Surgery  Emily Martinez 42 y o  female MRN: [id number]  Encounter: [id number]        Assessment/Plan:  1  Chronic pansinusitis  Fluticasone Propionate (Xhance) 93 MCG/ACT EXHU    doxycycline (ADOXA) 100 MG tablet   2  Nasal polyposis  Fluticasone Propionate (Xhance) 93 MCG/ACT EXHU   3  Displacement of dental structure into maxillary sinus  Ambulatory referral to Oral Maxillofacial Surgery       Reviewed prior records - notes from Dr Madhu Haddad, operative notes, and personally reviewed images of CT sinus from December 2019  It appears that Halima Shore continues to struggle with chronic sinusitis  We discussed the pathophysiology of chronic sinusitis/nasal polyposis and contributing factors  While surgery can help to reduce the burden of disease we discussed the importance of ongoing treatment, primarily with nasal steroids  Her primary problem with mometasone rinses seems to be that her nose drips at work  We discussed alternative options for steroid delivery  In her case, I recommend starting Xhance in the morning and continuing mometasone rinses at night  For the acute on chronic infection will also treat with a 2 week course of Doxycycline  Upon reviewing the prior CT I noticed that it appears she has tooth roots into bilateral maxillary sinuses  Possibly a component of odontogenic sinusitis  For this I recommend evaluation with oral surgery - referral placed  Follow up in 1 month for re evaluation of symptoms  History of Present Illness   Physician Requesting Consult: Martha Egan  Reason for Consult / Principal Problem: Chronic sinusitis  HPI: Emily Martinez is a 42y o  year old female who presents with chronic sinusitis  She was previously following with Dr Madhu Haddad  She reports having a chronic sinus infection for over a year for which she had surgery by Dr Madhu Haddad in June 2020   Since that time she felt her symptoms had improved but 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) she would get drainage from her sinuses after using rinses  She then developed head fullness and acute on chronic infection  This was cultured and returned as Staph Aureus  She was started on Bactrim rinses but has not had much improvement  She is looking for alternative options at this point  She has a history of RA as well and feels her sinuses are causing flairs of her RA as well  Review of systems:  ROS was performed by the MA and documented in the attached note  This was reviewed personally      Historical Information   Past Medical History:   Diagnosis Date    Anxiety     Depression     Rheumatoid arthritis ([facility] [address] )     Sjoegren syndrome     Thyroid nodule      Past Surgical History:   Procedure Laterality Date    CHOLECYSTECTOMY      NASAL/SINUS ENDOSCOPY N/A 6/30/2020    Procedure: IMAGED GUIDED F E S S ;  Surgeon: Andrew Posey DO;  Location: [facility];  Service: ENT    SD REPAIR OF NASAL SEPTUM N/A 6/30/2020    Procedure: SEPTOPLASTY;  Surgeon: Andrew Posey DO;  [facility];  Service: ENT    VENOUS ABLATION Left     lower extremity     Social History   Social History     Substance and Sexual Activity   Alcohol Use Not Currently    Frequency: Monthly or less    Drinks per session: 1 or 2    Binge frequency: Never    Comment: occasional     Social History     Substance and Sexual Activity   Drug Use No     Social History     Tobacco Use   Smoking Status Former Smoker    Packs/day: 1 00    Years: 20 00    Pack years: 20 00    Quit date: 2006    Years since quitting: 15 2   Smokeless Tobacco Never Used     Family History:   Family History   Problem Relation Age of Onset    Breast cancer Mother 64    Rheum arthritis Mother     Sjogren's syndrome Mother     Liver cancer Mother     Esophageal cancer Father     No Known Problems Brother     No Known Problems Brother     No Known Problems Daughter     No Known Problems Son     No Known Problems Son     No Known Problems Maternal Grandmother     No Known Problems Paternal Grandmother     No Known Problems Maternal Aunt     No Known Problems Maternal Aunt     No Known Problems Maternal Aunt        Current Outpatient Medications on File Prior to Visit   Medication Sig    acetaminophen (TYLENOL) 500 mg tablet Take 1,000 mg by mouth every 6 (six) hours as needed for mild pain    ibuprofen (MOTRIN) 200 mg tablet Take 800 mg by mouth every 6 (six) hours as needed for mild pain    levonorgestrel (MIRENA) 20 MCG/24HR IUD 1 each by Intrauterine route once    Pseudoephedrine HCl (SUDAFED 12 HOUR PO) Take by mouth    methylPREDNISolone 4 MG tablet therapy pack Use as directed on package (Patient not taking: Reported on 3/16/2021)    promethazine-codeine (PHENERGAN WITH CODEINE) 6 25-10 mg/5 mL syrup Take 5 mL by mouth every 4 (four) hours as needed for cough (Patient not taking: Reported on 3/16/2021)    valACYclovir (VALTREX) 1,000 mg tablet Take 2 tablets by mouth every 12 hours x 1 days (Patient not taking: Reported on 12/28/2020)     No current facility-administered medications on file prior to visit  No Known Allergies    Vitals:    03/16/21 1426   BP: 100/70   Pulse: 87   Temp: 97 6 °F (36 4 °C)   SpO2: 97%       Physical Exam   Constitutional: Oriented to person, place, and time  Well-developed and well-nourished, no apparent distress, non-toxic appearance  Cooperative, able to hear and answer questions without difficulty  Voice: Normal voice quality  Head: Normocephalic, atraumatic  No scars, masses or lesions  Face: Symmetric, no edema, no sinus tenderness  Eyes: Vision grossly intact, extra-ocular movement intact  Nose: Septum intact, nares clear  Mucosa moist, turbinates well appearing  Green crusting/mucopus in bilateral nasal cavity  Oral cavity: Partial upper dentures  Mucosa moist, lips without lesions or masses  Tongue mobile, floor of mouth soft and flat  Hard palate intact  No masses or lesions  Oropharynx: Uvula is midline, soft palate intact without lesion or mass  Oropharyngeal inlet without obstruction  Tonsils unremarkable  Posterior pharyngeal wall clear  No masses or lesions  Salivary glands:  Parotid glands and submandibular glands symmetric, no enlargement or tenderness  Neck: Normal laryngeal elevation with swallow  Trachea midline  No masses or lesions  No palpable adenopathy  Thyroid: Without tenderness or palpable nodules  Pulmonary/Chest: Normal effort and rate  No respiratory distress  No stertor or stridor  Musculoskeletal: Normal range of motion  Neurological: Cranial nerves 2-12 intact  Skin: Skin is warm and dry  Psychiatric: Normal mood and affect  Imaging Studies: I have personally reviewed pertinent reports  Lab Results: I have personally reviewed pertinent lab results

## 2021-03-24 ENCOUNTER — OFFICE VISIT (OUTPATIENT)
Dept: INTERNAL MEDICINE CLINIC | Facility: CLINIC | Age: 46
End: 2021-03-24
Payer: COMMERCIAL

## 2021-03-24 VITALS — HEART RATE: 120 BPM | OXYGEN SATURATION: 96 %

## 2021-03-24 DIAGNOSIS — J33.9 NASAL POLYPOSIS: ICD-10-CM

## 2021-03-24 DIAGNOSIS — J32.1 CHRONIC FRONTAL SINUSITIS: Primary | ICD-10-CM

## 2021-03-24 DIAGNOSIS — R05.9 COUGH: ICD-10-CM

## 2021-03-24 DIAGNOSIS — J32.4 CHRONIC PANSINUSITIS: ICD-10-CM

## 2021-03-24 PROCEDURE — 99213 OFFICE O/P EST LOW 20 MIN: CPT | Performed by: NURSE PRACTITIONER

## 2021-03-24 RX ORDER — ALBUTEROL SULFATE 90 UG/1
2 AEROSOL, METERED RESPIRATORY (INHALATION) EVERY 6 HOURS PRN
Qty: 1 INHALER | Refills: 0 | Status: SHIPPED | OUTPATIENT
Start: 2021-03-24 | End: 2021-04-23

## 2021-03-24 RX ORDER — PREDNISONE 10 MG/1
TABLET ORAL
Qty: 45 TABLET | Refills: 0 | Status: SHIPPED | OUTPATIENT
Start: 2021-03-24 | End: 2021-09-10

## 2021-03-24 NOTE — PROGRESS NOTES
Assessment/Plan: Will start on a Prednisone tapered dose  Will give albuterol inhaler to use as needed  Did advise if symptoms worsen to call office  No problem-specific Assessment & Plan notes found for this encounter  Problem List Items Addressed This Visit        Respiratory    Chronic frontal sinusitis - Primary    Relevant Medications    predniSONE 10 mg tablet       Other    Cough    Relevant Medications    predniSONE 10 mg tablet    albuterol (PROVENTIL HFA,VENTOLIN HFA) 90 mcg/act inhaler            Subjective:      Patient ID: Aleshia Cervantes is a 42 y o  female  Jennifersusan Merlin is for an acute visit  She states she has been on Doxy for two weeks for her continued sinus issues  She does see ENT she is now having cough and feels like it is moving in her chest  She denies any fever  She did have the covid vaccine  She states she needs steroids  She feels that will help her breathing and symptoms  She offers no other issues  Cough  This is a chronic problem  The current episode started 1 to 4 weeks ago  The problem has been rapidly worsening  The problem occurs constantly  The cough is productive of purulent sputum  Associated symptoms include postnasal drip and wheezing  Nothing aggravates the symptoms  The following portions of the patient's history were reviewed and updated as appropriate: She  has a past medical history of Anxiety, Depression, Rheumatoid arthritis ([address] ), Sjoegren syndrome, and Thyroid nodule    She   Patient Active Problem List    Diagnosis Date Noted    Chronic frontal sinusitis 03/24/2021    Cough 08/24/2020    Herpes labialis 04/27/2020    Severe frontal headaches 02/13/2020    Chronic pansinusitis 01/15/2020    Nasal congestion 01/15/2020    Deviated nasal septum 01/15/2020    Hypertrophy of both inferior nasal turbinates 01/15/2020    Tendonitis 06/25/2019    Allergic rhinitis 06/29/2018    Anxiety 06/29/2018    Inflammatory polyarthropathy ([address] ) 06/29/2018  RA (rheumatoid arthritis) ([place] [address] ) 06/29/2018    Depressed mood 02/14/2017    Secondary adrenal insufficiency ([place] [address] ) 02/14/2017    Thyroid nodule, uninodular 02/14/2017    Sjogren's syndrome ([place] [address] ) 12/31/2016    Acute adrenal insufficiency ([place] [address] ) 12/30/2016    Anemia 12/19/2016    Leucocytosis 12/17/2016    Varicose vein 03/10/2015     She  has a past surgical history that includes Cholecystectomy; Venous ablation (Left); pr repair of nasal septum (N/A, 6/30/2020); and Nasal/sinus endoscopy (N/A, 6/30/2020)  Her family history includes Breast cancer (age of onset: 64) in her mother; Esophageal cancer in her father; Liver cancer in her mother; No Known Problems in her brother, brother, daughter, maternal aunt, maternal aunt, maternal aunt, maternal grandmother, paternal grandmother, son, and son; Rheum arthritis in her mother; Sjogren's syndrome in her mother  She  reports that she quit smoking about 15 years ago  She has a 20 00 pack-year smoking history  She has never used smokeless tobacco  She reports previous alcohol use  She reports that she does not use drugs    Current Outpatient Medications   Medication Sig Dispense Refill    acetaminophen (TYLENOL) 500 mg tablet Take 1,000 mg by mouth every 6 (six) hours as needed for mild pain      albuterol (PROVENTIL HFA,VENTOLIN HFA) 90 mcg/act inhaler Inhale 2 puffs every 6 (six) hours as needed for wheezing 1 Inhaler 0    doxycycline (ADOXA) 100 MG tablet Take 1 tablet (100 mg total) by mouth 2 (two) times a day for 14 days 28 tablet 0    Fluticasone Propionate (Xhance) 93 MCG/ACT EXHU 2 sprays into each nostril daily 16 mL 3    ibuprofen (MOTRIN) 200 mg tablet Take 800 mg by mouth every 6 (six) hours as needed for mild pain      levonorgestrel (MIRENA) 20 MCG/24HR IUD 1 each by Intrauterine route once      methylPREDNISolone 4 MG tablet therapy pack Use as directed on package (Patient not taking: Reported on 3/16/2021) 21 each 0    predniSONE 10 mg tablet 50 mg by mouth daily for 3 days, then 40 mg by mouth daily for 3 days, then 30 mg by mouth daily for 3 days, then 20 mg by mouth daily for 3 days then 10 mg by mouth daily for 3 days 45 tablet 0    promethazine-codeine (PHENERGAN WITH CODEINE) 6 25-10 mg/5 mL syrup Take 5 mL by mouth every 4 (four) hours as needed for cough (Patient not taking: Reported on 3/16/2021) 240 mL 0    Pseudoephedrine HCl (SUDAFED 12 HOUR PO) Take by mouth      valACYclovir (VALTREX) 1,000 mg tablet Take 2 tablets by mouth every 12 hours x 1 days (Patient not taking: Reported on 12/28/2020) 4 tablet 5     No current facility-administered medications for this visit  Current Outpatient Medications on File Prior to Visit   Medication Sig    acetaminophen (TYLENOL) 500 mg tablet Take 1,000 mg by mouth every 6 (six) hours as needed for mild pain    doxycycline (ADOXA) 100 MG tablet Take 1 tablet (100 mg total) by mouth 2 (two) times a day for 14 days    Fluticasone Propionate (Xhance) 93 MCG/ACT EXHU 2 sprays into each nostril daily    ibuprofen (MOTRIN) 200 mg tablet Take 800 mg by mouth every 6 (six) hours as needed for mild pain    levonorgestrel (MIRENA) 20 MCG/24HR IUD 1 each by Intrauterine route once    methylPREDNISolone 4 MG tablet therapy pack Use as directed on package (Patient not taking: Reported on 3/16/2021)    promethazine-codeine (PHENERGAN WITH CODEINE) 6 25-10 mg/5 mL syrup Take 5 mL by mouth every 4 (four) hours as needed for cough (Patient not taking: Reported on 3/16/2021)    Pseudoephedrine HCl (SUDAFED 12 HOUR PO) Take by mouth    valACYclovir (VALTREX) 1,000 mg tablet Take 2 tablets by mouth every 12 hours x 1 days (Patient not taking: Reported on 12/28/2020)     No current facility-administered medications on file prior to visit  She has No Known Allergies       Review of Systems   HENT: Positive for postnasal drip  Respiratory: Positive for cough and wheezing  Objective:      Pulse (!) 120   SpO2 96%          Physical Exam  Constitutional:       Appearance: Normal appearance  Cardiovascular:      Rate and Rhythm: Regular rhythm  Tachycardia present  Pulses: Normal pulses  Heart sounds: Normal heart sounds  Pulmonary:      Effort: Pulmonary effort is normal       Breath sounds: Normal breath sounds  Skin:     General: Skin is warm and dry  Capillary Refill: Capillary refill takes less than 2 seconds  Neurological:      General: No focal deficit present  Mental Status: She is alert and oriented to person, place, and time  Mental status is at baseline  Psychiatric:         Mood and Affect: Mood normal          Behavior: Behavior normal          Thought Content:  Thought content normal          Judgment: Judgment normal

## 2021-03-26 ENCOUNTER — HOSPITAL ENCOUNTER (OUTPATIENT)
Dept: ULTRASOUND IMAGING | Facility: HOSPITAL | Age: 46
Discharge: HOME | End: 2021-03-26
Payer: COMMERCIAL

## 2021-03-26 DIAGNOSIS — R92.8 ABNORMAL MAMMOGRAM: ICD-10-CM

## 2021-03-26 PROCEDURE — 76642 ULTRASOUND BREAST LIMITED: CPT

## 2021-03-30 RX ORDER — FLUTICASONE PROPIONATE 93 UG/1
2 SPRAY, METERED NASAL DAILY
Qty: 16 ML | Refills: 3 | Status: SHIPPED | OUTPATIENT
Start: 2021-03-30 | End: 2022-06-13 | Stop reason: SDUPTHER

## 2021-04-13 ENCOUNTER — OFFICE VISIT (OUTPATIENT)
Dept: OTOLARYNGOLOGY | Facility: CLINIC | Age: 46
End: 2021-04-13
Payer: COMMERCIAL

## 2021-04-13 VITALS
HEIGHT: 65 IN | DIASTOLIC BLOOD PRESSURE: 70 MMHG | BODY MASS INDEX: 33.32 KG/M2 | HEART RATE: 93 BPM | TEMPERATURE: 97.6 F | WEIGHT: 200 LBS | OXYGEN SATURATION: 98 % | SYSTOLIC BLOOD PRESSURE: 122 MMHG

## 2021-04-13 DIAGNOSIS — J32.4 CHRONIC PANSINUSITIS: Primary | ICD-10-CM

## 2021-04-13 DIAGNOSIS — J33.9 NASAL POLYPOSIS: ICD-10-CM

## 2021-04-13 DIAGNOSIS — K08.89: ICD-10-CM

## 2021-04-13 PROCEDURE — 99214 OFFICE O/P EST MOD 30 MIN: CPT | Performed by: OTOLARYNGOLOGY

## 2021-04-13 NOTE — PROGRESS NOTES
Judi Riley is a 42 y  o female who presents for re-evaluation of chronic sinusitis nasal polyps  She was last seen about 4 weeks ago and was started on a 2 week course of doxycycline and Xhance  She started to use the nasal spray though feels that this has a tendency to make level dizzy  She has also continued to rinse with mometasone and has been tolerating this well  After prior visit she started to developed a cough and was seen by her PCP  She was prescribed a course of prednisone and noted significant improvement with this  At this point she feels that her sinuses are well controlled  She has not been able to see Oral surgery or her dentist     Past Medical History:   Diagnosis Date    Anxiety     Depression     Rheumatoid arthritis ([address] )     Sjoegren syndrome     Thyroid nodule        /70 (BP Location: Left arm, Cuff Size: Large)   Pulse 93   Temp 97 6 °F (36 4 °C) (Temporal)   Ht 5' 4 5" (1 638 m)   Wt 90 7 kg (200 lb)   SpO2 98%   BMI 33 80 kg/m²       Physical Exam   Constitutional: Oriented to person, place, and time  Well-developed and well-nourished, no apparent distress, non-toxic appearance  Cooperative, able to hear and answer questions without difficulty  Voice: Normal voice quality  Head: Normocephalic, atraumatic  No scars, masses or lesions  Face: Symmetric, no edema, no sinus tenderness  Eyes: Vision grossly intact, extra-ocular movement intact  Ears: External ear normal   Bilateral tympanic membranes are intact with intact normal landmarks  No post-auricular erythema or tenderness  Nose: Septum midline, nares clear  Mucosa moist, turbinates well appearing  Discolored mucus right nasal cavity  Oral cavity: Dentition intact  Mucosa moist, lips normal   Tongue mobile, floor of mouth normal   Hard palate unremarkable  No masses or lesions  Oropharynx: Uvula is midline, soft palate normal   Unremarkable oropharyngeal inlet  Tonsils unremarkable  Posterior pharyngeal wall clear  No masses or lesions  Salivary glands:  Parotid glands and submandibular glands symmetric, no enlargement or tenderness  Neck: Normal laryngeal elevation with swallow  Trachea midline  No masses or lesions  No palpable adenopathy  Thyroid: normal in size, unremarkable without tenderness or palpable nodules  Pulmonary/Chest: Normal effort and rate  No respiratory distress  Musculoskeletal: Normal range of motion  Neurological: Cranial nerves 2-12 intact  Skin: Skin is warm and dry  Psychiatric: Normal mood and affect  A/P:  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  We discussed her chronic sinusitis and the nature of this  Fortunately she did notice improvement in symptoms with the oral prednisone but we discussed the risks of long-term steroid use  For now I recommend that she continue with mometasone rinses and Xhance nasal spray  Her last set of imaging was done nearly 2 years ago  I recommend that she repeat a CT sinus to re-stage her process  We discussed the upcoming allergy season in appropriate preventative measures  She will follow-up in about 2 months for re-evaluation though she develops symptoms sooner she can send me a message on my chart or follow-up in person

## 2021-04-16 ENCOUNTER — HOSPITAL ENCOUNTER (OUTPATIENT)
Dept: CT IMAGING | Facility: HOSPITAL | Age: 46
Discharge: HOME | End: 2021-04-16
Payer: COMMERCIAL

## 2021-04-16 DIAGNOSIS — K08.89: ICD-10-CM

## 2021-04-16 DIAGNOSIS — J32.4 CHRONIC PANSINUSITIS: ICD-10-CM

## 2021-04-16 DIAGNOSIS — J33.9 NASAL POLYPOSIS: ICD-10-CM

## 2021-04-16 PROCEDURE — G1004 CDSM NDSC: HCPCS

## 2021-04-16 PROCEDURE — 70486 CT MAXILLOFACIAL W/O DYE: CPT

## 2021-05-20 ENCOUNTER — OFFICE VISIT (OUTPATIENT)
Dept: BARIATRICS | Facility: CLINIC | Age: 46
End: 2021-05-20
Payer: COMMERCIAL

## 2021-05-20 VITALS
HEIGHT: 65 IN | SYSTOLIC BLOOD PRESSURE: 108 MMHG | BODY MASS INDEX: 32.82 KG/M2 | WEIGHT: 197 LBS | DIASTOLIC BLOOD PRESSURE: 68 MMHG | RESPIRATION RATE: 20 BRPM | HEART RATE: 79 BPM | TEMPERATURE: 98.5 F

## 2021-05-20 DIAGNOSIS — R53.83 OTHER FATIGUE: ICD-10-CM

## 2021-05-20 DIAGNOSIS — R63.5 ABNORMAL WEIGHT GAIN: Primary | ICD-10-CM

## 2021-05-20 DIAGNOSIS — L68.0 HIRSUTISM: ICD-10-CM

## 2021-05-20 DIAGNOSIS — R73.01 IFG (IMPAIRED FASTING GLUCOSE): ICD-10-CM

## 2021-05-20 DIAGNOSIS — M06.9 RHEUMATOID ARTHRITIS, INVOLVING UNSPECIFIED SITE, UNSPECIFIED WHETHER RHEUMATOID FACTOR PRESENT (HCC): ICD-10-CM

## 2021-05-20 PROCEDURE — 99244 OFF/OP CNSLTJ NEW/EST MOD 40: CPT | Performed by: PHYSICIAN ASSISTANT

## 2021-05-20 NOTE — PROGRESS NOTES
Assessment/Plan:  Class 1 Obesity/BMI 33 29  -Discussed options of HealthyCORE-Intensive Lifestyle Intervention Program, Very Low Calorie Diet-VLCD and Conservative Program and the role of weight loss medications   -Initial weight loss goal of 5-10% weight loss for improved health  -Screening labs: Check CMP, A1c, insulin , TSH, and LP  -Patient is interested in pursuing [facility]-Intensive Lifestyle Intervention Program  -Trying to follow more Mediterranean to help with inflammation and improve over all health  Recommended watching cals from cooking oils and creamers  -She is interested in AOM  Will start with lifestyle changes and check labs  Can consider in f/u  No problem-specific Assessment & Plan notes found for this encounter  Follow up [facility]  and in approximately 2 months with Non-Surgical Physician/Advanced Practitioner  Goals:  Food log (ie ) www myfitnesspal com,sparkpeople  com,loseit com,calorieking  com,etc  baritastic  No sugary beverages  At least 64oz of water daily  Increase physical activity by 10 minutes daily  Gradually increase physical activity to a goal of 5 days per week for 30 minutes of MODERATE intensity PLUS 2 days per week of FULL BODY resistance training  5-10 servings of fruits and vegetables per day and 25-35 grams of dietary fiber per day, gradually increasing  Recommend checking lab coverage before having labs drawn  900-1350 calories   If choosing starch pick whole grain/complex carbs and keep 1/2 cup: brown rice, quinoa, whole wheat pasta, sweet potato, chickpea noodles, red lentil noodles    Diagnoses and all orders for this visit:    Abnormal weight gain  -     Insulin, fasting; Future  -     Hemoglobin A1C; Future  -     Lipid panel; Future  -     Comprehensive metabolic panel; Future  -     TSH, 3rd generation with Free T4 reflex; Future    IFG (impaired fasting glucose)  -     Insulin, fasting; Future  -     Hemoglobin A1C; Future  -     Lipid panel;  Future  - Comprehensive metabolic panel; Future  -     TSH, 3rd generation with Free T4 reflex; Future    Body mass index 33 0-33 9, adult  -     Insulin, fasting; Future  -     Hemoglobin A1C; Future  -     Lipid panel; Future  -     Comprehensive metabolic panel; Future  -     TSH, 3rd generation with Free T4 reflex; Future    Rheumatoid arthritis, involving unspecified site, unspecified whether rheumatoid factor present (HCC)  -     Insulin, fasting; Future  -     Hemoglobin A1C; Future  -     Lipid panel; Future  -     Comprehensive metabolic panel; Future  -     TSH, 3rd generation with Free T4 reflex; Future    Other fatigue  -     Insulin, fasting; Future  -     Hemoglobin A1C; Future  -     Lipid panel; Future  -     Comprehensive metabolic panel; Future  -     TSH, 3rd generation with Free T4 reflex; Future    Hirsutism  -     Insulin, fasting; Future  -     Hemoglobin A1C; Future  -     Lipid panel; Future  -     Comprehensive metabolic panel; Future  -     TSH, 3rd generation with Free T4 reflex; Future        Subjective:   Chief Complaint   Patient presents with   [address] [place] [address] Patient - MWM consult     Patient ID: Danuta Lanza  is a 42 y o  female with excess weight/obesity here to pursue weight management    Past Medical History:   Diagnosis Date    Anxiety     Depression     [facility] [address] [address] )     Sjoegren syndrome     Thyroid nodule      HPI:  Obesity/Excess Weight:  Severity: Mild  Onset:  Gradual since age 31, worsened in 2008 and in the last year    Modifiers: Diet and Exercise and low carb/counting calories, Mediterranean diet   Contributing factors: Poor Food Choices, Stress/Emotional Eating, Medications and Insufficient time to make appropriate lifestyle changes   RA- reviewed the potential health benefits of a plant based diet  Associated symptoms: fatigue, increased joint pain, increased shortness of breath, clothes do not fit and feels anxious    Goals: 150-160 calories  Hydration: minimal water at work (busy); 3 cups of coffee with creamer, lately avoiding sugary beverages  Alcohol: rare  Exercise: on days off  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    The following portions of the patient's history were reviewed and updated as appropriate: allergies, current medications, past family history, past medical history, past social history, past surgical history and problem list     Review of Systems   Constitutional: Negative for chills and fever  HENT: Negative for sore throat  Respiratory: Positive for cough (attributed to sinuses- see ENT)  Negative for shortness of breath  Cardiovascular: Negative for chest pain and palpitations  Gastrointestinal: Negative for constipation and diarrhea  Genitourinary: Negative for dysuria  Musculoskeletal: Positive for arthralgias  Skin: Negative for rash  Abnormal hair growth on neck/chin   Neurological: Negative for headaches  Psychiatric/Behavioral: Negative for suicidal ideas (Denies HI)  The patient is nervous/anxious (encouraged counseling/meditation/deep breath and f/u with PCP)  Objective:    /68   Pulse 79   Temp 98 5 °F (36 9 °C)   Resp 20   Ht 5' 4 5" (1 638 m)   Wt 89 4 kg (197 lb)   BMI 33 29 kg/m²     Physical Exam  Vitals signs and nursing note reviewed  Constitutional   General appearance: Abnormal   well developed and obese  Eyes No conjunctival pallor  Ears, Nose, Mouth, and Throat External ears without erythema, edema, or drainage   Pulmonary   Respiratory effort: No increased work of breathing or signs of respiratory distress  Auscultation of lungs: Clear to auscultation, equal breath sounds bilaterally, no wheezes, no rales, no rhonci  Cardiovascular   Auscultation of heart: Normal rate and rhythm, normal S1 and S2, without murmurs  Examination of extremities for edema and/or varicosities: Normal   no edema     Abdomen   Abdomen: Abnormal   The abdomen was obese  Bowel sounds were normal  The abdomen was soft and nontender     Musculoskeletal   Gait and station: Normal     Psychiatric   Orientation to person, place and time: Normal     Affect: appropriate

## 2021-05-20 NOTE — PATIENT INSTRUCTIONS
Goals: Food log (ie ) www myfitnesspal com,sparkpeople  com,loseit com,calorieking  com,etc  baritastic  No sugary beverages  At least 64oz of water daily  Increase physical activity by 10 minutes daily   Gradually increase physical activity to a goal of 5 days per week for 30 minutes of MODERATE intensity PLUS 2 days per week of FULL BODY resistance training  5-10 servings of fruits and vegetables per day and 25-35 grams of dietary fiber per day, gradually increasing  Recommend checking lab coverage before having labs drawn  900-1350 calories   If choosing starch pick whole grain/complex carbs and keep 1/2 cup: brown rice, quinoa, whole wheat pasta, sweet potato, chickpea noodles, red lentil noodles

## 2021-05-21 ENCOUNTER — APPOINTMENT (OUTPATIENT)
Dept: LAB | Facility: CLINIC | Age: 46
End: 2021-05-21

## 2021-05-21 ENCOUNTER — TRANSCRIBE ORDERS (OUTPATIENT)
Dept: URGENT CARE | Facility: CLINIC | Age: 46
End: 2021-05-21

## 2021-05-21 DIAGNOSIS — R63.5 ABNORMAL WEIGHT GAIN: ICD-10-CM

## 2021-05-21 DIAGNOSIS — R73.01 IFG (IMPAIRED FASTING GLUCOSE): ICD-10-CM

## 2021-05-21 DIAGNOSIS — R53.83 OTHER FATIGUE: ICD-10-CM

## 2021-05-21 DIAGNOSIS — L68.0 HIRSUTISM: ICD-10-CM

## 2021-05-21 DIAGNOSIS — Z00.8 HEALTH EXAMINATION IN POPULATION SURVEY: ICD-10-CM

## 2021-05-21 DIAGNOSIS — M06.9 RHEUMATOID ARTHRITIS, INVOLVING UNSPECIFIED SITE, UNSPECIFIED WHETHER RHEUMATOID FACTOR PRESENT (HCC): ICD-10-CM

## 2021-05-21 DIAGNOSIS — Z00.8 HEALTH EXAMINATION IN POPULATION SURVEY: Primary | ICD-10-CM

## 2021-05-21 LAB
ALBUMIN SERPL BCP-MCNC: 3.6 G/DL (ref 3.5–5)
ALP SERPL-CCNC: 55 U/L (ref 46–116)
ALT SERPL W P-5'-P-CCNC: 17 U/L (ref 12–78)
ANION GAP SERPL CALCULATED.3IONS-SCNC: 5 MMOL/L (ref 4–13)
AST SERPL W P-5'-P-CCNC: 7 U/L (ref 5–45)
BILIRUB SERPL-MCNC: 0.54 MG/DL (ref 0.2–1)
BUN SERPL-MCNC: 16 MG/DL (ref 5–25)
CALCIUM SERPL-MCNC: 8.8 MG/DL (ref 8.3–10.1)
CHLORIDE SERPL-SCNC: 107 MMOL/L (ref 100–108)
CHOLEST SERPL-MCNC: 161 MG/DL (ref 50–200)
CO2 SERPL-SCNC: 28 MMOL/L (ref 21–32)
CREAT SERPL-MCNC: 0.64 MG/DL (ref 0.6–1.3)
EST. AVERAGE GLUCOSE BLD GHB EST-MCNC: 100 MG/DL
GFR SERPL CREATININE-BSD FRML MDRD: 109 ML/MIN/1.73SQ M
GLUCOSE P FAST SERPL-MCNC: 100 MG/DL (ref 65–99)
HBA1C MFR BLD: 5.1 %
HDLC SERPL-MCNC: 38 MG/DL
INSULIN SERPL-ACNC: 10.1 MU/L (ref 3–25)
LDLC SERPL CALC-MCNC: 110 MG/DL (ref 0–100)
NONHDLC SERPL-MCNC: 123 MG/DL
POTASSIUM SERPL-SCNC: 4.3 MMOL/L (ref 3.5–5.3)
PROT SERPL-MCNC: 7.1 G/DL (ref 6.4–8.2)
SODIUM SERPL-SCNC: 140 MMOL/L (ref 136–145)
TRIGL SERPL-MCNC: 63 MG/DL
TSH SERPL DL<=0.05 MIU/L-ACNC: 1.96 UIU/ML (ref 0.36–3.74)

## 2021-05-21 PROCEDURE — 83525 ASSAY OF INSULIN: CPT

## 2021-05-21 PROCEDURE — 36415 COLL VENOUS BLD VENIPUNCTURE: CPT

## 2021-05-21 PROCEDURE — 84443 ASSAY THYROID STIM HORMONE: CPT

## 2021-05-21 PROCEDURE — 83036 HEMOGLOBIN GLYCOSYLATED A1C: CPT

## 2021-05-21 PROCEDURE — 80053 COMPREHEN METABOLIC PANEL: CPT

## 2021-05-21 PROCEDURE — 80061 LIPID PANEL: CPT

## 2021-06-02 NOTE — PROGRESS NOTES
Weight Management Medical Nutrition Assessment  Bernardo Beaulieu was here today as a new start in the [facility]   Today she weighs 194 6 lbs and has a goal weight of 150 - 160 lbs  She reports that she has a history of RA and has been changing her diet to a more "anti-inflammatory diet "  She is eating gluten free and limiting most daily  Since she has started doing that, she reports that her joints feel much better and she has less pain  She has is not currently food logging, but has used Carb Manager in the past and will start doing that this week  Reviewed portion sizes, calorie/carb/protein needs  Please note: She was scheduled for a 30 min appointment today instead of 60 minutes  Also she will not be starting classes until July so I will see her for her follow-up in 2 weeks, and will also see her for another 30 minute visit at the end of June  Patient seen by Medical Provider in past 6 months:  Yes  (ALFONZO Romero)  Requested to schedule appointment with Medical Provider: No      Anthropometric Measurements  Start Weight (#): 194 6  Current Weight (#): 194 6  W % Change from start weight:0%  Ideal Body Weight (#):122 5  Goal Weight (#):150 - 160    Weight Loss History  Previous weight loss attempts: Self Created Diets (Portion Control, Healthy Food Choices, etc )    Food and Nutrition Related History  Wake up: 6 am  Bed Time: 10:30 pm    Food Recall  Breakfast: skips (2 cups coffee) if working   If home will have eggs or oatmeal  Snack: none  Lunch: salad with protein  Snack:3pm  Chips, candy, grazing  Dinner: lean meat, brown rice, vegetables  Snack:not usually      Beverages: water and coffee/tea   Volume of beverage intake: 60 oz    Weekends: Same  Cravings: sweets  Trouble area of day: afternoon     Frequency of Eating out: every 3 days  Food restrictions:gluten and dairy  Cooking: self   Food Shopping: self    Physical Activity Intake  Activity:Walking  Frequency:frequently  Physical limitations/barriers to exercise: none    Estimated Needs  Energy    [employer] Energy Needs: BMR : 5550  1-2# loss weekly sedentary: 844 - 1344        1-2# loss weekly lightly active: 1113 - 1613  Maintenance calories for sedentary activity level: 1844  Protein:67 - 84     (1 2-1 5g/kg IBW)  Fluid: 66    (35mL/kg IBW)    Nutrition Diagnosis  Yes; Overweight/obesity  related to Excess energy intake as evidenced by  BMI more than normative standard for age and sex (obesity-grade I 26-30  9)       Nutrition Intervention    Nutrition Prescription  Calories: 1100  Protein: 79 - 80  Fluid:66      Nutrition Education:    [facility] Manual  Calorie controlled menu  Lean protein food choices  Healthy snack options  Food journaling tips      Nutrition Counseling:  Strategies: meal planning, portion sizes, healthy snack choices, hydration, fiber intake, protein intake, exercise, food journal      Monitoring and Evaluation:  Evaluation criteria:  Energy Intake  Meet protein needs  Maintain adequate hydration  Monitor weekly weight  Meal planning/preparation  Food journal   Decreased portions at mealtimes and snacks  Physical activity     Barriers to learning:none  Readiness to change: Action:  (Changing behavior)  Comprehension: good  Expected Compliance: good

## 2021-06-03 ENCOUNTER — OFFICE VISIT (OUTPATIENT)
Dept: BARIATRICS | Facility: CLINIC | Age: 46
End: 2021-06-03

## 2021-06-03 DIAGNOSIS — R63.5 ABNORMAL WEIGHT GAIN: ICD-10-CM

## 2021-06-03 PROCEDURE — RECHECK

## 2021-06-03 PROCEDURE — WMPFE WEIGHT MANAGEMENT PRO FEE EMPLOYEE

## 2021-06-22 ENCOUNTER — OFFICE VISIT (OUTPATIENT)
Dept: OTOLARYNGOLOGY | Facility: CLINIC | Age: 46
End: 2021-06-22
Payer: COMMERCIAL

## 2021-06-22 VITALS
BODY MASS INDEX: 32.15 KG/M2 | HEIGHT: 65 IN | OXYGEN SATURATION: 95 % | SYSTOLIC BLOOD PRESSURE: 100 MMHG | WEIGHT: 193 LBS | DIASTOLIC BLOOD PRESSURE: 62 MMHG | TEMPERATURE: 97.6 F | HEART RATE: 88 BPM

## 2021-06-22 DIAGNOSIS — J32.4 CHRONIC PANSINUSITIS: Primary | ICD-10-CM

## 2021-06-22 PROCEDURE — 87186 SC STD MICRODIL/AGAR DIL: CPT | Performed by: OTOLARYNGOLOGY

## 2021-06-22 PROCEDURE — 99214 OFFICE O/P EST MOD 30 MIN: CPT | Performed by: OTOLARYNGOLOGY

## 2021-06-22 PROCEDURE — 87205 SMEAR GRAM STAIN: CPT | Performed by: OTOLARYNGOLOGY

## 2021-06-22 PROCEDURE — 87070 CULTURE OTHR SPECIMN AEROBIC: CPT | Performed by: OTOLARYNGOLOGY

## 2021-06-22 NOTE — PROGRESS NOTES
Mónica Borrero is a 42 y  o female who presents for re-evaluation of chronic sinusitis nasal polyps  She was last seen about 4 weeks ago and was started on a 2 week course of doxycycline and Xhance  She started to use the nasal spray though feels that this has a tendency to make level dizzy  She has also continued to rinse with mometasone and has been tolerating this well  After prior visit she started to developed a cough and was seen by her PCP  She was prescribed a course of prednisone and noted significant improvement with this  At this point she feels that her sinuses are well controlled  She has not been able to see Oral surgery or her dentist     6/22 - Presents for re evaluation  Had been doing well with Xhance and nasal rinses but recently has developed right sided facial congestion and disomfort  Has been blowing discolored mucus from right side  Admits she has been using mometasone rinses less frequently  Had CT sinus (April) which demonstrated mild sinusitis  Past Medical History:   Diagnosis Date    Anxiety     Depression     Rheumatoid arthritis (HCC)     Sjoegren syndrome     Thyroid nodule        /62 (BP Location: Left arm, Cuff Size: Large)   Pulse 88   Temp 97 6 °F (36 4 °C) (Temporal)   Ht 5' 4 5" (1 638 m)   Wt 87 5 kg (193 lb)   SpO2 95%   BMI 32 62 kg/m²       Physical Exam   Constitutional: Oriented to person, place, and time  Well-developed and well-nourished, no apparent distress, non-toxic appearance  Cooperative, able to hear and answer questions without difficulty  Voice: Normal voice quality  Head: Normocephalic, atraumatic  No scars, masses or lesions  Face: Symmetric, no edema, no sinus tenderness  Eyes: Vision grossly intact, extra-ocular movement intact  Ears: External ear normal   Bilateral tympanic membranes are intact with intact normal landmarks  No post-auricular erythema or tenderness  Nose: Septum midline, nares clear    Mucosa moist, turbinates well appearing  Discolored mucus right nasal cavity  Oral cavity: Dentition intact  Mucosa moist, lips normal   Tongue mobile, floor of mouth normal   Hard palate unremarkable  No masses or lesions  Oropharynx: Uvula is midline, soft palate normal   Unremarkable oropharyngeal inlet  Tonsils unremarkable  Posterior pharyngeal wall clear  No masses or lesions  Salivary glands:  Parotid glands and submandibular glands symmetric, no enlargement or tenderness  Neck: Normal laryngeal elevation with swallow  Trachea midline  No masses or lesions  No palpable adenopathy  Thyroid: normal in size, unremarkable without tenderness or palpable nodules  Pulmonary/Chest: Normal effort and rate  No respiratory distress  Musculoskeletal: Normal range of motion  Neurological: Cranial nerves 2-12 intact  Skin: Skin is warm and dry  Psychiatric: Normal mood and affect  A/P:  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

## 2021-06-22 NOTE — PROGRESS NOTES
Review of Systems   Constitutional: Negative  HENT: Positive for congestion, postnasal drip and sinus pressure  Eyes: Negative  Respiratory: Positive for cough  Cardiovascular: Negative  Gastrointestinal: Negative  Endocrine: Negative  Genitourinary: Negative  Musculoskeletal: Negative  Skin: Negative  Allergic/Immunologic: Negative  Neurological: Positive for headaches  Hematological: Negative  Psychiatric/Behavioral: Negative

## 2021-06-24 ENCOUNTER — TELEPHONE (OUTPATIENT)
Dept: OTOLARYNGOLOGY | Facility: CLINIC | Age: 46
End: 2021-06-24

## 2021-06-24 DIAGNOSIS — J32.4 CHRONIC PANSINUSITIS: Primary | ICD-10-CM

## 2021-06-24 LAB
BACTERIA WND AEROBE CULT: ABNORMAL
GRAM STN SPEC: ABNORMAL
GRAM STN SPEC: ABNORMAL

## 2021-06-24 RX ORDER — SULFAMETHOXAZOLE AND TRIMETHOPRIM 800; 160 MG/1; MG/1
1 TABLET ORAL EVERY 12 HOURS SCHEDULED
Qty: 28 TABLET | Refills: 0 | Status: SHIPPED | OUTPATIENT
Start: 2021-06-24 | End: 2021-07-08

## 2021-06-24 NOTE — TELEPHONE ENCOUNTER
Shannon Sarkar with culture results - grew MRSA  Will call in 14 days of Bactrim - no known allergies  Continue rinses as tolerated and Xhance  Follow up in 2-3 months

## 2021-07-22 ENCOUNTER — OFFICE VISIT (OUTPATIENT)
Dept: BARIATRICS | Facility: CLINIC | Age: 46
End: 2021-07-22

## 2021-07-22 ENCOUNTER — CLINICAL SUPPORT (OUTPATIENT)
Dept: BARIATRICS | Facility: CLINIC | Age: 46
End: 2021-07-22

## 2021-07-22 VITALS — BODY MASS INDEX: 32.46 KG/M2 | HEIGHT: 65 IN | WEIGHT: 194.8 LBS

## 2021-07-22 DIAGNOSIS — R63.5 ABNORMAL WEIGHT GAIN: Primary | ICD-10-CM

## 2021-07-22 PROCEDURE — RECHECK

## 2021-07-22 NOTE — PROGRESS NOTES
Weight Management Medical Nutrition Assessment  Curtis Brito was here today for a follow-up visit in the [facility]   Today she weighs 194 6 lbs which is exactly the same meaning she has maintained her weigh since her first visit  She admits that she is struggling with meal planning/prepping and "what to eat"  She is not food logging consistently at this point  She has not been exercising either but admits that she will start to make time to do that moving forward,             Patient seen by Medical Provider in past 6 months:  Yes  (ALFONZO Romero)  Requested to schedule appointment with Medical Provider: No        Anthropometric Measurements  Start Weight (#): 194 6  Current Weight (#): 194 6  W % Change from start weight:0%  Ideal Body Weight (#):122 5  Goal Weight (#):150 - 160     Weight Loss History  Previous weight loss attempts: Self Created Diets (Portion Control, Healthy Food Choices, etc )     Food and Nutrition Related History  Wake up: 6 am  Bed Time: 10:30 pm     Food Recall  Breakfast: protein bar  Snack: none  Lunch: salad with protein  Snack:3pm  Cheetos (but only has a few)  Dinner: salad with protein  Snack:not usually        Beverages: water and coffee/tea   Volume of beverage intake: 60 oz     Weekends: Same  Cravings: sweets  Trouble area of day: afternoon      Frequency of Eating out: every 3 days  Food restrictions:gluten and dairy  Cooking: self   Food Shopping: self     Physical Activity Intake  Activity:Walking  Frequency:frequently  Physical limitations/barriers to exercise: none     Estimated Needs  Energy     [employer] Energy Needs: BMR : 1165  1-2# loss weekly sedentary: 844 - 1344        1-2# loss weekly lightly active: 1113 - 1613  Maintenance calories for sedentary activity level: 1844  Protein:67 - 84     (1 2-1 5g/kg IBW)  Fluid: 66    (35mL/kg IBW)     Nutrition Diagnosis  Yes;     Overweight/obesity  related to Excess energy intake as evidenced by  BMI more than normative standard for age and sex (obesity-grade I 26-30  9)     Nutrition Intervention     Nutrition Prescription  Calories: 1100  Protein: 79 - 80  Fluid:66        Nutrition Education:    [facility] Manual  Calorie controlled menu  Lean protein food choices  Healthy snack options  Food journaling tips        Nutrition Counseling:  Strategies: meal planning, portion sizes, healthy snack choices, hydration, fiber intake, protein intake, exercise, food journal        Monitoring and Evaluation:  Evaluation criteria:  Energy Intake  Meet protein needs  Maintain adequate hydration  Monitor weekly weight  Meal planning/preparation  Food journal   Decreased portions at mealtimes and snacks  Physical activity      Barriers to learning:none  Readiness to change: Action:  (Changing behavior)  Comprehension: good  Expected Compliance: good

## 2021-07-29 ENCOUNTER — OFFICE VISIT (OUTPATIENT)
Dept: BARIATRICS | Facility: CLINIC | Age: 46
End: 2021-07-29

## 2021-07-29 ENCOUNTER — CLINICAL SUPPORT (OUTPATIENT)
Dept: BARIATRICS | Facility: CLINIC | Age: 46
End: 2021-07-29

## 2021-07-29 VITALS
DIASTOLIC BLOOD PRESSURE: 72 MMHG | HEART RATE: 83 BPM | SYSTOLIC BLOOD PRESSURE: 112 MMHG | HEIGHT: 65 IN | RESPIRATION RATE: 20 BRPM | TEMPERATURE: 98.1 F | BODY MASS INDEX: 32.49 KG/M2 | WEIGHT: 195 LBS

## 2021-07-29 VITALS — HEIGHT: 65 IN | WEIGHT: 194.2 LBS | BODY MASS INDEX: 32.36 KG/M2

## 2021-07-29 DIAGNOSIS — E66.9 CLASS 1 OBESITY: ICD-10-CM

## 2021-07-29 DIAGNOSIS — R63.5 ABNORMAL WEIGHT GAIN: Primary | ICD-10-CM

## 2021-07-29 PROBLEM — E66.811 CLASS 1 OBESITY: Status: ACTIVE | Noted: 2021-07-29

## 2021-07-29 PROCEDURE — RECHECK

## 2021-07-29 PROCEDURE — 99214 OFFICE O/P EST MOD 30 MIN: CPT | Performed by: PHYSICIAN ASSISTANT

## 2021-07-29 RX ORDER — NALTREXONE HYDROCHLORIDE AND BUPROPION HYDROCHLORIDE 8; 90 MG/1; MG/1
TABLET, EXTENDED RELEASE ORAL
Qty: 120 TABLET | Refills: 1 | Status: SHIPPED | OUTPATIENT
Start: 2021-07-29 | End: 2021-10-14 | Stop reason: SINTOL

## 2021-07-29 NOTE — PROGRESS NOTES
Assessment/Plan:    Class 1 obesity  -Patient is pursuing [facility]  -Initial weight loss goal of 5-10% weight loss for improved health  -Screening labs: up to date  -has attended one class so far of [employer], plans to continue with program  -reviewed importance of logging consistently, weighing/measuring portions, fiber, protein goals   -She would like to trial AOM  Will start with Contrave  Denies hx seizures, glaucoma  Reviewed the potential side effects of Contrave, which include: abdominal upset, headache, dizziness, trouble sleeping, increased blood pressure, depression/anxiety, and fatigue  Patient should call/return if he/she develops symptoms of depression/aniety  Patient should have ER evaluation if thoughts of harming self/others occur  Initial: 197 lbs  Current: 195 lbs  Change: -2 lbs  Goal: 150-160 lbs    Goals:    Food log (ie ) www myfitnesspal com,sparkpeople  com,loseit com,calorieking  com,etc    No sugary beverages  At least 64oz of water daily  Increase physical activity by 10 minutes daily  Gradually increase physical activity to a goal of 5 days per week for 30 minutes of MODERATE intensity PLUS 2 days per week of FULL BODY resistance training  70-80 grams of protein per day  20-25 grams of fiber per day    Follow up in approximately 2 months with Non-Surgical Physician/Advanced Practitioner       Diagnoses and all orders for this visit:    Abnormal weight gain  Comments:  see plan under class 1 obesity  Orders:  -     Naltrexone-buPROPion HCl ER (Contrave) 8-90 MG TB12; Take 1 tab in AM x 2 weeks, 1 tab po BID x 2 weeks, 2 tabs in AM and 1 in PM x 2 weeks, then 2 tabs po BID    Class 1 obesity  -     Naltrexone-buPROPion HCl ER (Contrave) 8-90 MG TB12; Take 1 tab in AM x 2 weeks, 1 tab po BID x 2 weeks, 2 tabs in AM and 1 in PM x 2 weeks, then 2 tabs po BID          Subjective:   Chief Complaint   Patient presents with    Follow-up     [facility] follow up        Patient ID: Cholo Stinson  is a 42 y o  female with excess weight/obesity here to pursue weight managment  Patient is pursuing [facility]      HPI Patient presents for [facility] follow up  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 and therefore not putting as much effort into losing weight as she would like  Practicing interval eating and with snacking in between meals  States she feels  Very hungry on calorie range she should be at for weight loss    Food logging: logging on [employer] for past week ~1200, hitting protein goals of 80 grams  Increased appetite/cravings: + hunger, sometimes struggles with salty cravings with stress  Exercise: denies, also having some flare ups from her RA  Hydration: 1 cup of coffee, ~48oz of water per day    TRies to avoid gluten due to bloating and RA      Colonoscopy: N/A    The following portions of the patient's history were reviewed and updated as appropriate: allergies, current medications, past family history, past medical history, past social history, past surgical history and problem list     Review of Systems   Respiratory: Negative  Cardiovascular: Negative  Musculoskeletal: Positive for arthralgias  Psychiatric/Behavioral:        -reports mood stable       Objective:    /72   Pulse 83   Temp 98 1 °F (36 7 °C)   Resp 20   Ht 5' 4 5" (1 638 m)   Wt 88 5 kg (195 lb)   BMI 32 95 kg/m²      Physical Exam  Vitals and nursing note reviewed  Constitutional:       General: She is not in acute distress  Appearance: She is not toxic-appearing  HENT:      Head: Normocephalic and atraumatic  Eyes:      General: No scleral icterus  Pulmonary:      Effort: Pulmonary effort is normal  No respiratory distress  Musculoskeletal:         General: Normal range of motion  Neurological:      General: No focal deficit present        Mental Status: She is alert and oriented to person, place, and time  Psychiatric:         Mood and Affect: Mood normal          Thought Content:  Thought content normal          Judgment: Judgment normal

## 2021-07-29 NOTE — ASSESSMENT & PLAN NOTE
-Patient is pursuing [facility]  -Initial weight loss goal of 5-10% weight loss for improved health  -Screening labs: up to date  -has attended one class so far of [employer], plans to continue with program  -reviewed importance of logging consistently, weighing/measuring portions, fiber, protein goals   -She would like to trial AOM  Will start with Contrave  Denies hx seizures, glaucoma  Reviewed the potential side effects of Contrave, which include: abdominal upset, headache, dizziness, trouble sleeping, increased blood pressure, depression/anxiety, and fatigue  Patient should call/return if he/she develops symptoms of depression/aniety  Patient should have ER evaluation if thoughts of harming self/others occur       Initial: 197 lbs  Current: 195 lbs  Change: -2 lbs  Goal: 150-160 lbs

## 2021-08-02 ENCOUNTER — TELEPHONE (OUTPATIENT)
Dept: BARIATRICS | Facility: CLINIC | Age: 46
End: 2021-08-02

## 2021-08-02 NOTE — TELEPHONE ENCOUNTER
Left message for patient to call back and schedule follow up appointment with [facility] provider in September

## 2021-08-05 ENCOUNTER — CLINICAL SUPPORT (OUTPATIENT)
Dept: BARIATRICS | Facility: CLINIC | Age: 46
End: 2021-08-05

## 2021-08-05 DIAGNOSIS — R63.5 ABNORMAL WEIGHT GAIN: Primary | ICD-10-CM

## 2021-08-05 PROCEDURE — RECHECK

## 2021-08-06 VITALS — WEIGHT: 192.2 LBS | HEIGHT: 65 IN | BODY MASS INDEX: 32.02 KG/M2

## 2021-08-09 ENCOUNTER — TELEPHONE (OUTPATIENT)
Dept: BARIATRICS | Facility: CLINIC | Age: 46
End: 2021-08-09

## 2021-08-09 NOTE — TELEPHONE ENCOUNTER
Spoke with pt to advise her of the PA approval for Contrave and that pharmacy was called to fill  I also informed pt that insurance has only approved contrave for 3 months and within that time frame insurance wants to see a 5% total body weight loss before they can renew the PA

## 2021-09-10 ENCOUNTER — OFFICE VISIT (OUTPATIENT)
Dept: URGENT CARE | Facility: CLINIC | Age: 46
End: 2021-09-10
Payer: COMMERCIAL

## 2021-09-10 VITALS
OXYGEN SATURATION: 97 % | SYSTOLIC BLOOD PRESSURE: 118 MMHG | DIASTOLIC BLOOD PRESSURE: 58 MMHG | TEMPERATURE: 97.2 F | HEART RATE: 89 BPM

## 2021-09-10 DIAGNOSIS — J01.11 ACUTE RECURRENT FRONTAL SINUSITIS: Primary | ICD-10-CM

## 2021-09-10 PROCEDURE — G0382 LEV 3 HOSP TYPE B ED VISIT: HCPCS | Performed by: NURSE PRACTITIONER

## 2021-09-10 RX ORDER — DOXYCYCLINE 100 MG/1
100 TABLET ORAL 2 TIMES DAILY
Qty: 14 TABLET | Refills: 0 | Status: SHIPPED | OUTPATIENT
Start: 2021-09-10 | End: 2021-09-17

## 2021-09-10 RX ORDER — METHYLPREDNISOLONE 4 MG/1
TABLET ORAL
Qty: 21 TABLET | Refills: 0 | Status: SHIPPED | OUTPATIENT
Start: 2021-09-10 | End: 2021-12-16

## 2021-09-10 NOTE — PROGRESS NOTES
[facility]        NAME: Cholo Stinson is a 42 y o  female  : 1975    MRN: [id number]  DATE: September 10, 2021  TIME: 6:43 PM    Assessment and Plan   Acute recurrent frontal sinusitis [J01 11]  1  Acute recurrent frontal sinusitis  doxycycline (ADOXA) 100 MG tablet    methylPREDNISolone 4 MG tablet therapy pack         Patient Instructions     Patient Instructions     Take medication as directed  Rest and drink plenty of fluids  A cool mist humidifier and saline rinse can be helpful  If you develop a worsening cough, chest pain, shortness of breath, palpitations, coughing up blood, prolonged high fever, severe headache, dizziness, any new or concerning symptoms please return or proceed ER  Recommend following up with PCP in 3-5 days      Sinusitis   WHAT YOU NEED TO KNOW:   Sinusitis is inflammation or infection of your sinuses  Sinusitis is most often caused by a virus  Acute sinusitis may last up to 12 weeks  Chronic sinusitis lasts longer than 12 weeks  Recurrent sinusitis means you have 4 or more infections in 1 year  DISCHARGE INSTRUCTIONS:   Return to the emergency department if:   · You have trouble breathing or wheezing that is getting worse  · You have a stiff neck, a fever, or a bad headache  · You cannot open your eye  · Your eyeball bulges out or you cannot move your eye  · You are more sleepy than normal, or you notice changes in your ability to think, move, or talk  · You have swelling of your forehead or scalp  Call your doctor if:   · You have vision changes, such as double vision  · Your eye and eyelid are red, swollen, and painful  · Your symptoms do not improve or go away after 10 days  · You have nausea and are vomiting  · Your nose is bleeding  · You have questions or concerns about your condition or care  Medicines: Your symptoms may go away on their own   Your healthcare provider may recommend watchful waiting for up to 10 days before starting antibiotics  You may need any of the following:  · Acetaminophen  decreases pain and fever  It is available without a doctor's order  Ask how much to take and how often to take it  Follow directions  Read the labels of all other medicines you are using to see if they also contain acetaminophen, or ask your doctor or pharmacist  Acetaminophen can cause liver damage if not taken correctly  Do not use more than 4 grams (4,000 milligrams) total of acetaminophen in one day  · NSAIDs , such as ibuprofen, help decrease swelling, pain, and fever  This medicine is available with or without a doctor's order  NSAIDs can cause stomach bleeding or kidney problems in certain people  If you take blood thinner medicine, always ask your healthcare provider if NSAIDs are safe for you  Always read the medicine label and follow directions  · Nasal steroid sprays  may help decrease inflammation in your nose and sinuses  · Decongestants  help reduce swelling and drain mucus in the nose and sinuses  They may help you breathe easier  · Antihistamines  help dry mucus in the nose and relieve sneezing  · Antibiotics  help treat or prevent a bacterial infection  · Take your medicine as directed  Contact your healthcare provider if you think your medicine is not helping or if you have side effects  Tell him or her if you are allergic to any medicine  Keep a list of the medicines, vitamins, and herbs you take  Include the amounts, and when and why you take them  Bring the list or the pill bottles to follow-up visits  Carry your medicine list with you in case of an emergency  Self-care:   · Rinse your sinuses as directed  Use a sinus rinse device to rinse your nasal passages with a saline (salt water) solution or distilled water  Do not use tap water  This will help thin the mucus in your nose and rinse away pollen and dirt  It will also help reduce swelling so you can breathe normally      · Use a humidifier  to increase air moisture in your home  This may make it easier for you to breathe and help decrease your cough  · Sleep with your head elevated  Place an extra pillow under your head before you go to sleep to help your sinuses drain  · Drink liquids as directed  Ask your healthcare provider how much liquid to drink each day and which liquids are best for you  Liquids will thin the mucus in your nose and help it drain  Avoid drinks that contain alcohol or caffeine  · Do not smoke, and avoid secondhand smoke  Nicotine and other chemicals in cigarettes and cigars can make your symptoms worse  Ask your healthcare provider for information if you currently smoke and need help to quit  E-cigarettes or smokeless tobacco still contain nicotine  Talk to your healthcare provider before you use these products  Prevent the spread of germs:   · Wash your hands often with soap and water  Wash your hands after you use the bathroom, change a child's diaper, or sneeze  Wash your hands before you prepare or eat food  · Stay away from people who are sick  Some germs spread easily and quickly through contact  Follow up with your doctor as directed: You may be referred to an ear, nose, and throat specialist  Write down your questions so you remember to ask them during your visits  © Copyright [employer] 2021 Information is for End User's use only and may not be sold, redistributed or otherwise used for commercial purposes  All illustrations and images included in CareNotes® are the copyrighted property of [employer]  or [employer]  The above information is an  only  It is not intended as medical advice for individual conditions or treatments  Talk to your doctor, nurse or pharmacist before following any medical regimen to see if it is safe and effective for you  Follow up with PCP in 3-5 days  Proceed to  ER if symptoms worsen      Chief Complaint Chief Complaint   Patient presents with    Headache    Cold Like Symptoms    Cough    Sore Throat         History of Present Illness         Patient is a 79-year-old female presents with a 2 week history of headache, cough,  Sinus pain and pressure, postnasal drip, and congestion  Patient does note history of chronic sinus infections  States she has been doing sinus rinse and nasal spray with mild relief  Patient does note frontal headache  Denies feeling dizzy or lightheaded  Denies any fever, chills, or body aches  Denies any earache or sore throat  Denies any chest pain, shortness breath, palpitations  Denies any recent sick contacts or known exposure to COVID-19  Patient is fully vaccinated against COVID-19  Review of Systems   Review of Systems   Constitutional: Negative for chills, diaphoresis, fatigue and fever  HENT: Positive for congestion, postnasal drip, rhinorrhea, sinus pressure and sinus pain  Negative for ear pain, facial swelling, sore throat, tinnitus and trouble swallowing  Eyes: Negative  Respiratory: Negative for cough, chest tightness, shortness of breath, wheezing and stridor  Cardiovascular: Negative for chest pain and palpitations  Gastrointestinal: Negative  Musculoskeletal: Negative for arthralgias, back pain, joint swelling, myalgias, neck pain and neck stiffness  Neurological: Positive for headaches  Negative for dizziness, facial asymmetry, weakness, light-headedness and numbness           Current Medications       Current Outpatient Medications:     acetaminophen (TYLENOL) 500 mg tablet, Take 1,000 mg by mouth every 6 (six) hours as needed for mild pain, Disp: , Rfl:     ibuprofen (MOTRIN) 200 mg tablet, Take 800 mg by mouth every 6 (six) hours as needed for mild pain, Disp: , Rfl:     Naltrexone-buPROPion HCl ER (Contrave) 8-90 MG TB12, Take 1 tab in AM x 2 weeks, 1 tab po BID x 2 weeks, 2 tabs in AM and 1 in PM x 2 weeks, then 2 tabs po BID, Disp: 120 tablet, Rfl: 1    doxycycline (ADOXA) 100 MG tablet, Take 1 tablet (100 mg total) by mouth 2 (two) times a day for 7 days, Disp: 14 tablet, Rfl: 0    Fluticasone Propionate (Xhance) 93 MCG/ACT EXHU, 2 sprays into each nostril daily (Patient not taking: Reported on 9/10/2021), Disp: 16 mL, Rfl: 3    levonorgestrel (MIRENA) 20 MCG/24HR IUD, 1 each by Intrauterine route once, Disp: , Rfl:     methylPREDNISolone 4 MG tablet therapy pack, Use as directed on package, Disp: 21 tablet, Rfl: 0    promethazine-codeine (PHENERGAN WITH CODEINE) 6 25-10 mg/5 mL syrup, Take 5 mL by mouth every 4 (four) hours as needed for cough (Patient not taking: Reported on 6/22/2021), Disp: 240 mL, Rfl: 0    Pseudoephedrine HCl (SUDAFED 12 HOUR PO), Take by mouth (Patient not taking: Reported on 6/22/2021), Disp: , Rfl:     valACYclovir (VALTREX) 1,000 mg tablet, Take 2 tablets by mouth every 12 hours x 1 days (Patient not taking: Reported on 12/28/2020), Disp: 4 tablet, Rfl: 5    Current Allergies     Allergies as of 09/10/2021    (No Known Allergies)            The following portions of the patient's history were reviewed and updated as appropriate: allergies, current medications, past family history, past medical history, past social history, past surgical history and problem list      Past Medical History:   Diagnosis Date    Anxiety     Depression     Rheumatoid arthritis ([facility] [address] )     Sjoegren syndrome     Thyroid nodule        Past Surgical History:   Procedure Laterality Date    CHOLECYSTECTOMY      NASAL/SINUS ENDOSCOPY N/A 6/30/2020    Procedure: IMAGED GUIDED F E S S ;  Surgeon: Ebony Love DO;  Location: [facility];  Service: ENT    [place] REPAIR OF NASAL SEPTUM N/A 6/30/2020    Procedure: SEPTOPLASTY;  Surgeon: Ebony Love DO;  Location: [facility];  Service: ENT    VENOUS ABLATION Left     lower extremity       Family History   Problem Relation Age of Onset    Breast cancer Mother 64    Rheum arthritis Mother     Sjogren's syndrome Mother     Liver cancer Mother     Esophageal cancer Father     Diabetes Father     No Known Problems Brother     No Known Problems Brother     No Known Problems Daughter     No Known Problems Son     No Known Problems Son     No Known Problems Maternal Grandmother     No Known Problems Paternal Grandmother     No Known Problems Maternal Aunt     No Known Problems Maternal Aunt     No Known Problems Maternal Aunt     Stroke Other     Heart disease Neg Hx          Medications have been verified  Objective   /58 (BP Location: Left arm, Patient Position: Sitting, Cuff Size: Large)   Pulse 89   Temp (!) 97 2 °F (36 2 °C) (Temporal)   SpO2 97%   No LMP recorded  (Menstrual status: Birth Control)  Physical Exam     Physical Exam  Constitutional:       General: She is not in acute distress  Appearance: She is well-developed  She is not diaphoretic  HENT:      Head: Normocephalic and atraumatic  Right Ear: Hearing, tympanic membrane, ear canal and external ear normal       Left Ear: Hearing, tympanic membrane, ear canal and external ear normal       Nose: Mucosal edema and rhinorrhea present  Right Sinus: Frontal sinus tenderness present  No maxillary sinus tenderness  Left Sinus: Frontal sinus tenderness present  No maxillary sinus tenderness  Mouth/Throat:      Mouth: Mucous membranes are moist       Pharynx: Oropharynx is clear  Uvula midline  Cardiovascular:      Rate and Rhythm: Normal rate and regular rhythm  Heart sounds: Normal heart sounds, S1 normal and S2 normal    Pulmonary:      Effort: Pulmonary effort is normal       Breath sounds: Normal breath sounds and air entry  Lymphadenopathy:      Cervical: No cervical adenopathy  Skin:     General: Skin is warm and dry  Capillary Refill: Capillary refill takes less than 2 seconds     Neurological:      Mental Status: She is alert and oriented to person, place, and time

## 2021-09-10 NOTE — PATIENT INSTRUCTIONS
Take medication as directed  Rest and drink plenty of fluids  A cool mist humidifier and saline rinse can be helpful  If you develop a worsening cough, chest pain, shortness of breath, palpitations, coughing up blood, prolonged high fever, severe headache, dizziness, any new or concerning symptoms please return or proceed ER  Recommend following up with PCP in 3-5 days      Sinusitis   WHAT YOU NEED TO KNOW:   Sinusitis is inflammation or infection of your sinuses  Sinusitis is most often caused by a virus  Acute sinusitis may last up to 12 weeks  Chronic sinusitis lasts longer than 12 weeks  Recurrent sinusitis means you have 4 or more infections in 1 year  DISCHARGE INSTRUCTIONS:   Return to the emergency department if:   · You have trouble breathing or wheezing that is getting worse  · You have a stiff neck, a fever, or a bad headache  · You cannot open your eye  · Your eyeball bulges out or you cannot move your eye  · You are more sleepy than normal, or you notice changes in your ability to think, move, or talk  · You have swelling of your forehead or scalp  Call your doctor if:   · You have vision changes, such as double vision  · Your eye and eyelid are red, swollen, and painful  · Your symptoms do not improve or go away after 10 days  · You have nausea and are vomiting  · Your nose is bleeding  · You have questions or concerns about your condition or care  Medicines: Your symptoms may go away on their own  Your healthcare provider may recommend watchful waiting for up to 10 days before starting antibiotics  You may need any of the following:  · Acetaminophen  decreases pain and fever  It is available without a doctor's order  Ask how much to take and how often to take it  Follow directions   Read the labels of all other medicines you are using to see if they also contain acetaminophen, or ask your doctor or pharmacist  Acetaminophen can cause liver damage if not taken correctly  Do not use more than 4 grams (4,000 milligrams) total of acetaminophen in one day  · NSAIDs , such as ibuprofen, help decrease swelling, pain, and fever  This medicine is available with or without a doctor's order  NSAIDs can cause stomach bleeding or kidney problems in certain people  If you take blood thinner medicine, always ask your healthcare provider if NSAIDs are safe for you  Always read the medicine label and follow directions  · Nasal steroid sprays  may help decrease inflammation in your nose and sinuses  · Decongestants  help reduce swelling and drain mucus in the nose and sinuses  They may help you breathe easier  · Antihistamines  help dry mucus in the nose and relieve sneezing  · Antibiotics  help treat or prevent a bacterial infection  · Take your medicine as directed  Contact your healthcare provider if you think your medicine is not helping or if you have side effects  Tell him or her if you are allergic to any medicine  Keep a list of the medicines, vitamins, and herbs you take  Include the amounts, and when and why you take them  Bring the list or the pill bottles to follow-up visits  Carry your medicine list with you in case of an emergency  Self-care:   · Rinse your sinuses as directed  Use a sinus rinse device to rinse your nasal passages with a saline (salt water) solution or distilled water  Do not use tap water  This will help thin the mucus in your nose and rinse away pollen and dirt  It will also help reduce swelling so you can breathe normally  · Use a humidifier  to increase air moisture in your home  This may make it easier for you to breathe and help decrease your cough  · Sleep with your head elevated  Place an extra pillow under your head before you go to sleep to help your sinuses drain  · Drink liquids as directed  Ask your healthcare provider how much liquid to drink each day and which liquids are best for you   Liquids will thin the mucus in your nose and help it drain  Avoid drinks that contain alcohol or caffeine  · Do not smoke, and avoid secondhand smoke  Nicotine and other chemicals in cigarettes and cigars can make your symptoms worse  Ask your healthcare provider for information if you currently smoke and need help to quit  E-cigarettes or smokeless tobacco still contain nicotine  Talk to your healthcare provider before you use these products  Prevent the spread of germs:   · Wash your hands often with soap and water  Wash your hands after you use the bathroom, change a child's diaper, or sneeze  Wash your hands before you prepare or eat food  · Stay away from people who are sick  Some germs spread easily and quickly through contact  Follow up with your doctor as directed: You may be referred to an ear, nose, and throat specialist  Write down your questions so you remember to ask them during your visits  © Copyright [employer] 2021 Information is for End User's use only and may not be sold, redistributed or otherwise used for commercial purposes  All illustrations and images included in CareNotes® are the copyrighted property of [employer]  or [facility] Rashad Keith   The above information is an  only  It is not intended as medical advice for individual conditions or treatments  Talk to your doctor, nurse or pharmacist before following any medical regimen to see if it is safe and effective for you

## 2021-10-14 ENCOUNTER — OFFICE VISIT (OUTPATIENT)
Dept: BARIATRICS | Facility: CLINIC | Age: 46
End: 2021-10-14
Payer: COMMERCIAL

## 2021-10-14 VITALS
RESPIRATION RATE: 20 BRPM | HEIGHT: 65 IN | BODY MASS INDEX: 33.15 KG/M2 | SYSTOLIC BLOOD PRESSURE: 110 MMHG | WEIGHT: 199 LBS | DIASTOLIC BLOOD PRESSURE: 74 MMHG | HEART RATE: 97 BPM | TEMPERATURE: 97.7 F

## 2021-10-14 DIAGNOSIS — F41.9 ANXIETY: ICD-10-CM

## 2021-10-14 DIAGNOSIS — E66.9 CLASS 1 OBESITY: ICD-10-CM

## 2021-10-14 DIAGNOSIS — R63.5 ABNORMAL WEIGHT GAIN: Primary | ICD-10-CM

## 2021-10-14 PROCEDURE — 99214 OFFICE O/P EST MOD 30 MIN: CPT | Performed by: PHYSICIAN ASSISTANT

## 2021-10-18 ENCOUNTER — TELEPHONE (OUTPATIENT)
Dept: BARIATRICS | Facility: CLINIC | Age: 46
End: 2021-10-18

## 2021-10-22 ENCOUNTER — OFFICE VISIT (OUTPATIENT)
Dept: RHEUMATOLOGY | Facility: CLINIC | Age: 46
End: 2021-10-22
Payer: COMMERCIAL

## 2021-10-22 VITALS
WEIGHT: 199 LBS | SYSTOLIC BLOOD PRESSURE: 115 MMHG | BODY MASS INDEX: 33.15 KG/M2 | HEART RATE: 91 BPM | HEIGHT: 65 IN | DIASTOLIC BLOOD PRESSURE: 78 MMHG

## 2021-10-22 DIAGNOSIS — M05.79 RHEUMATOID ARTHRITIS INVOLVING MULTIPLE SITES WITH POSITIVE RHEUMATOID FACTOR (HCC): Primary | ICD-10-CM

## 2021-10-22 DIAGNOSIS — Z79.899 HIGH RISK MEDICATION USE: ICD-10-CM

## 2021-10-22 PROCEDURE — 99244 OFF/OP CNSLTJ NEW/EST MOD 40: CPT | Performed by: INTERNAL MEDICINE

## 2021-10-22 RX ORDER — HYDROXYCHLOROQUINE SULFATE 200 MG/1
200 TABLET, FILM COATED ORAL 2 TIMES DAILY
Qty: 60 TABLET | Refills: 3 | Status: SHIPPED | OUTPATIENT
Start: 2021-10-22 | End: 2022-02-22 | Stop reason: SDUPTHER

## 2021-10-27 ENCOUNTER — IMMUNIZATIONS (OUTPATIENT)
Dept: FAMILY MEDICINE CLINIC | Facility: HOSPITAL | Age: 46
End: 2021-10-27

## 2021-10-27 DIAGNOSIS — Z23 ENCOUNTER FOR IMMUNIZATION: Primary | ICD-10-CM

## 2021-10-27 PROCEDURE — 91300 COVID-19 PFIZER VACC 0.3 ML: CPT

## 2021-10-27 PROCEDURE — 0001A COVID-19 PFIZER VACC 0.3 ML: CPT

## 2021-11-08 ENCOUNTER — APPOINTMENT (OUTPATIENT)
Dept: LAB | Facility: CLINIC | Age: 46
End: 2021-11-08
Payer: COMMERCIAL

## 2021-11-08 ENCOUNTER — APPOINTMENT (OUTPATIENT)
Dept: RADIOLOGY | Facility: CLINIC | Age: 46
End: 2021-11-08
Payer: COMMERCIAL

## 2021-11-08 DIAGNOSIS — B00.1 HERPES LABIALIS: ICD-10-CM

## 2021-11-08 DIAGNOSIS — M05.79 RHEUMATOID ARTHRITIS INVOLVING MULTIPLE SITES WITH POSITIVE RHEUMATOID FACTOR (HCC): ICD-10-CM

## 2021-11-08 LAB
ALBUMIN SERPL BCP-MCNC: 4 G/DL (ref 3.5–5)
ALP SERPL-CCNC: 68 U/L (ref 46–116)
ALT SERPL W P-5'-P-CCNC: 17 U/L (ref 12–78)
ANION GAP SERPL CALCULATED.3IONS-SCNC: 8 MMOL/L (ref 4–13)
AST SERPL W P-5'-P-CCNC: 10 U/L (ref 5–45)
BASOPHILS # BLD AUTO: 0.07 THOUSANDS/ΜL (ref 0–0.1)
BASOPHILS NFR BLD AUTO: 1 % (ref 0–1)
BILIRUB SERPL-MCNC: 0.54 MG/DL (ref 0.2–1)
BUN SERPL-MCNC: 11 MG/DL (ref 5–25)
CALCIUM SERPL-MCNC: 9 MG/DL (ref 8.3–10.1)
CHLORIDE SERPL-SCNC: 104 MMOL/L (ref 100–108)
CO2 SERPL-SCNC: 24 MMOL/L (ref 21–32)
CREAT SERPL-MCNC: 0.78 MG/DL (ref 0.6–1.3)
CRP SERPL QL: <3 MG/L
EOSINOPHIL # BLD AUTO: 0.76 THOUSAND/ΜL (ref 0–0.61)
EOSINOPHIL NFR BLD AUTO: 5 % (ref 0–6)
ERYTHROCYTE [DISTWIDTH] IN BLOOD BY AUTOMATED COUNT: 12.1 % (ref 11.6–15.1)
ERYTHROCYTE [SEDIMENTATION RATE] IN BLOOD: 27 MM/HOUR (ref 0–19)
GFR SERPL CREATININE-BSD FRML MDRD: 93 ML/MIN/1.73SQ M
GLUCOSE SERPL-MCNC: 89 MG/DL (ref 65–140)
HBV CORE AB SER QL: NORMAL
HBV CORE IGM SER QL: NORMAL
HBV SURFACE AG SER QL: NORMAL
HCT VFR BLD AUTO: 41 % (ref 34.8–46.1)
HCV AB SER QL: NORMAL
HGB BLD-MCNC: 13.4 G/DL (ref 11.5–15.4)
IMM GRANULOCYTES # BLD AUTO: 0.1 THOUSAND/UL (ref 0–0.2)
IMM GRANULOCYTES NFR BLD AUTO: 1 % (ref 0–2)
LYMPHOCYTES # BLD AUTO: 2.41 THOUSANDS/ΜL (ref 0.6–4.47)
LYMPHOCYTES NFR BLD AUTO: 16 % (ref 14–44)
MCH RBC QN AUTO: 30.5 PG (ref 26.8–34.3)
MCHC RBC AUTO-ENTMCNC: 32.7 G/DL (ref 31.4–37.4)
MCV RBC AUTO: 93 FL (ref 82–98)
MONOCYTES # BLD AUTO: 1.01 THOUSAND/ΜL (ref 0.17–1.22)
MONOCYTES NFR BLD AUTO: 7 % (ref 4–12)
NEUTROPHILS # BLD AUTO: 10.36 THOUSANDS/ΜL (ref 1.85–7.62)
NEUTS SEG NFR BLD AUTO: 70 % (ref 43–75)
NRBC BLD AUTO-RTO: 0 /100 WBCS
PLATELET # BLD AUTO: 383 THOUSANDS/UL (ref 149–390)
PMV BLD AUTO: 10.8 FL (ref 8.9–12.7)
POTASSIUM SERPL-SCNC: 3.7 MMOL/L (ref 3.5–5.3)
PROT SERPL-MCNC: 7.8 G/DL (ref 6.4–8.2)
RBC # BLD AUTO: 4.39 MILLION/UL (ref 3.81–5.12)
SODIUM SERPL-SCNC: 136 MMOL/L (ref 136–145)
WBC # BLD AUTO: 14.71 THOUSAND/UL (ref 4.31–10.16)

## 2021-11-08 PROCEDURE — 86431 RHEUMATOID FACTOR QUANT: CPT | Performed by: INTERNAL MEDICINE

## 2021-11-08 PROCEDURE — 86200 CCP ANTIBODY: CPT | Performed by: INTERNAL MEDICINE

## 2021-11-08 PROCEDURE — 36415 COLL VENOUS BLD VENIPUNCTURE: CPT | Performed by: INTERNAL MEDICINE

## 2021-11-08 PROCEDURE — 86430 RHEUMATOID FACTOR TEST QUAL: CPT | Performed by: INTERNAL MEDICINE

## 2021-11-08 PROCEDURE — 86705 HEP B CORE ANTIBODY IGM: CPT | Performed by: INTERNAL MEDICINE

## 2021-11-08 PROCEDURE — 87340 HEPATITIS B SURFACE AG IA: CPT | Performed by: INTERNAL MEDICINE

## 2021-11-08 PROCEDURE — 80053 COMPREHEN METABOLIC PANEL: CPT | Performed by: INTERNAL MEDICINE

## 2021-11-08 PROCEDURE — 86803 HEPATITIS C AB TEST: CPT | Performed by: INTERNAL MEDICINE

## 2021-11-08 PROCEDURE — 86038 ANTINUCLEAR ANTIBODIES: CPT | Performed by: INTERNAL MEDICINE

## 2021-11-08 PROCEDURE — 86704 HEP B CORE ANTIBODY TOTAL: CPT | Performed by: INTERNAL MEDICINE

## 2021-11-08 PROCEDURE — 85652 RBC SED RATE AUTOMATED: CPT | Performed by: INTERNAL MEDICINE

## 2021-11-08 PROCEDURE — 85025 COMPLETE CBC W/AUTO DIFF WBC: CPT | Performed by: INTERNAL MEDICINE

## 2021-11-08 PROCEDURE — 86140 C-REACTIVE PROTEIN: CPT | Performed by: INTERNAL MEDICINE

## 2021-11-08 PROCEDURE — 73130 X-RAY EXAM OF HAND: CPT

## 2021-11-08 RX ORDER — VALACYCLOVIR HYDROCHLORIDE 1 G/1
TABLET, FILM COATED ORAL
Qty: 4 TABLET | Refills: 0 | Status: SHIPPED | OUTPATIENT
Start: 2021-11-08 | End: 2022-01-09 | Stop reason: SDUPTHER

## 2021-11-09 LAB
CCP AB SER IA-ACNC: >340
CRYOGLOB RF SER-ACNC: ABNORMAL [IU]/ML
RHEUMATOID FACT SER QL LA: POSITIVE

## 2021-11-10 LAB — RYE IGE QN: NEGATIVE

## 2021-12-14 DIAGNOSIS — R39.9 UTI SYMPTOMS: Primary | ICD-10-CM

## 2021-12-16 ENCOUNTER — APPOINTMENT (OUTPATIENT)
Dept: LAB | Facility: HOSPITAL | Age: 46
End: 2021-12-16
Payer: COMMERCIAL

## 2021-12-16 ENCOUNTER — TELEMEDICINE (OUTPATIENT)
Dept: INTERNAL MEDICINE CLINIC | Facility: CLINIC | Age: 46
End: 2021-12-16
Payer: COMMERCIAL

## 2021-12-16 DIAGNOSIS — F32.A ANXIETY AND DEPRESSION: Primary | ICD-10-CM

## 2021-12-16 DIAGNOSIS — F41.9 ANXIETY AND DEPRESSION: Primary | ICD-10-CM

## 2021-12-16 DIAGNOSIS — R39.9 UTI SYMPTOMS: ICD-10-CM

## 2021-12-16 PROBLEM — R09.81 NASAL CONGESTION: Status: RESOLVED | Noted: 2020-01-15 | Resolved: 2021-12-16

## 2021-12-16 PROBLEM — R05.9 COUGH: Status: RESOLVED | Noted: 2020-08-24 | Resolved: 2021-12-16

## 2021-12-16 LAB
BILIRUB UR QL STRIP: NEGATIVE
CLARITY UR: CLEAR
COLOR UR: YELLOW
GLUCOSE UR STRIP-MCNC: NEGATIVE MG/DL
HGB UR QL STRIP.AUTO: NEGATIVE
KETONES UR STRIP-MCNC: NEGATIVE MG/DL
LEUKOCYTE ESTERASE UR QL STRIP: NEGATIVE
NITRITE UR QL STRIP: NEGATIVE
PH UR STRIP.AUTO: 7 [PH]
PROT UR STRIP-MCNC: NEGATIVE MG/DL
SP GR UR STRIP.AUTO: 1.02 (ref 1–1.03)
UROBILINOGEN UR QL STRIP.AUTO: 0.2 E.U./DL

## 2021-12-16 PROCEDURE — 99441 PR PHYS/QHP TELEPHONE EVALUATION 5-10 MIN: CPT | Performed by: NURSE PRACTITIONER

## 2021-12-16 PROCEDURE — 87086 URINE CULTURE/COLONY COUNT: CPT

## 2021-12-16 PROCEDURE — 81003 URINALYSIS AUTO W/O SCOPE: CPT

## 2021-12-16 RX ORDER — ALPRAZOLAM 0.25 MG/1
0.25 TABLET ORAL
Qty: 15 TABLET | Refills: 0 | Status: SHIPPED | OUTPATIENT
Start: 2021-12-16

## 2021-12-16 RX ORDER — CITALOPRAM 10 MG/1
10 TABLET ORAL DAILY
Qty: 90 TABLET | Refills: 3 | Status: SHIPPED | OUTPATIENT
Start: 2021-12-16

## 2021-12-17 LAB — BACTERIA UR CULT: NORMAL

## 2021-12-28 ENCOUNTER — TELEPHONE (OUTPATIENT)
Dept: INTERNAL MEDICINE CLINIC | Facility: CLINIC | Age: 46
End: 2021-12-28

## 2021-12-28 DIAGNOSIS — Z20.822 EXPOSURE TO COVID-19 VIRUS: Primary | ICD-10-CM

## 2021-12-28 NOTE — TELEPHONE ENCOUNTER
Patient is asking if you can place a covid test order for her to get done as an out patient  She would like to see her  esther but has had many covid patients on a daily basis recently  She would like to make sure she is negative prior to visiting  No symptoms

## 2021-12-30 PROCEDURE — U0005 INFEC AGEN DETEC AMPLI PROBE: HCPCS | Performed by: NURSE PRACTITIONER

## 2021-12-30 PROCEDURE — U0003 INFECTIOUS AGENT DETECTION BY NUCLEIC ACID (DNA OR RNA); SEVERE ACUTE RESPIRATORY SYNDROME CORONAVIRUS 2 (SARS-COV-2) (CORONAVIRUS DISEASE [COVID-19]), AMPLIFIED PROBE TECHNIQUE, MAKING USE OF HIGH THROUGHPUT TECHNOLOGIES AS DESCRIBED BY CMS-2020-01-R: HCPCS | Performed by: NURSE PRACTITIONER

## 2022-01-09 DIAGNOSIS — B00.1 HERPES LABIALIS: ICD-10-CM

## 2022-01-11 RX ORDER — VALACYCLOVIR HYDROCHLORIDE 1 G/1
TABLET, FILM COATED ORAL
Qty: 4 TABLET | Refills: 0 | Status: SHIPPED | OUTPATIENT
Start: 2022-01-11 | End: 2022-02-02 | Stop reason: SDUPTHER

## 2022-02-02 DIAGNOSIS — B00.1 HERPES LABIALIS: ICD-10-CM

## 2022-02-02 RX ORDER — VALACYCLOVIR HYDROCHLORIDE 1 G/1
TABLET, FILM COATED ORAL
Qty: 4 TABLET | Refills: 0 | Status: SHIPPED | OUTPATIENT
Start: 2022-02-02 | End: 2022-02-02

## 2022-02-22 ENCOUNTER — OFFICE VISIT (OUTPATIENT)
Dept: RHEUMATOLOGY | Facility: CLINIC | Age: 47
End: 2022-02-22
Payer: COMMERCIAL

## 2022-02-22 VITALS
DIASTOLIC BLOOD PRESSURE: 72 MMHG | WEIGHT: 195 LBS | HEART RATE: 91 BPM | HEIGHT: 64 IN | SYSTOLIC BLOOD PRESSURE: 115 MMHG | BODY MASS INDEX: 33.29 KG/M2

## 2022-02-22 DIAGNOSIS — Z79.899 HIGH RISK MEDICATION USE: ICD-10-CM

## 2022-02-22 DIAGNOSIS — M05.79 RHEUMATOID ARTHRITIS INVOLVING MULTIPLE SITES WITH POSITIVE RHEUMATOID FACTOR (HCC): Primary | ICD-10-CM

## 2022-02-22 PROCEDURE — 99214 OFFICE O/P EST MOD 30 MIN: CPT | Performed by: INTERNAL MEDICINE

## 2022-02-22 RX ORDER — HYDROXYCHLOROQUINE SULFATE 200 MG/1
200 TABLET, FILM COATED ORAL 2 TIMES DAILY
Qty: 180 TABLET | Refills: 1 | Status: SHIPPED | OUTPATIENT
Start: 2022-02-22 | End: 2022-08-21

## 2022-02-22 NOTE — PROGRESS NOTES
Assessment and Plan:   Micky Calvin is a 41 y o   female who presents for follow up of her seropositive Rheumatoid arthritis  Patient feels much better after patient started on Plaquenil  Patient is not on tylenol and ibuprofen  Continue hydroxychloroquine twice a day  Get regular eye exams    Plan:  Diagnoses and all orders for this visit:    Rheumatoid arthritis involving multiple sites with positive rheumatoid factor (HCC)  -     hydroxychloroquine (PLAQUENIL) 200 mg tablet; Take 1 tablet (200 mg total) by mouth 2 (two) times a day    High risk medication use    High risk medication use - Benefits and risks of hydroxychloroquine, including but not limited to retinal toxicity, corneal deposits, gastrointestinal side effects, and headaches were discussed with the patient  The need for a regular eye exam to monitor for ocular toxicity while on this medication was also explained to the patient  Follow-up plan: Return to clinic in 6 months  Rheumatic Disease Summary  Initial visit 10/22/21: Micky Calvin is a 41 y o   female who presented as a Rheumatology consult referred by LAURA Waterman to establish care for her seropositive Rheumatoid arthritis  Admitted to a few hours of morning stiffness  Was currently not on any DMARDs or steroids  Went into adrenal crisis when trying to get off steroids too fast in the past; will therefore not put her back on prednisone unless absolutely needed to  Was taking 1,000mg of Tylenol and 800mg of ibuprofen a day  Inflammatory arthritis labs ordered to confirm diagnosis; patient's anti CCP and rheumatoid factor ultimately both returned significantly positive  Continued Tylenol and ibuprofen as needed  Started hydroxychloroquine 200mg twice a day; aware to get regular eye exams while on this medication    HPI  Micky Calvin is a 41 y o   female who presents for follow up  Last clinic visit was 10/22/21 which was her initial visit       Patient feels much better after patient started on Plaquenil  Patient just has mild lower back pain  Patient is on taking tylenol and ibuprofen  Patient will eye exam in April  Patient denies morning stiffness  Patient was seen by Dr Shawn Good in past  Patient was on Plaquenil or MTX at one point  Patient has chronic sinusitis  Patient had recent solu-medrol dose pack in January, 2022  The following portions of the patient's history were reviewed and updated as appropriate: allergies, current medications, past family history, past medical history, past social history, past surgical history and problem list     Review of Systems:   Review of Systems   Constitutional: Negative for chills and fever  HENT: Negative for congestion, rhinorrhea, sneezing and sore throat  Eyes: Negative for pain and discharge  Respiratory: Negative for cough, chest tightness, shortness of breath and wheezing  Cardiovascular: Negative for chest pain and leg swelling  Gastrointestinal: Negative for abdominal pain, nausea and vomiting  Endocrine: Negative for polydipsia, polyphagia and polyuria  Genitourinary: Negative for flank pain, frequency and urgency  Musculoskeletal: Positive for back pain  Negative for arthralgias and joint swelling  Skin: Negative for color change and pallor  Neurological: Negative for dizziness, weakness, light-headedness and headaches  Psychiatric/Behavioral: Negative for agitation and confusion         Home Medications:    Current Outpatient Medications:     ALPRAZolam (XANAX) 0 25 mg tablet, Take 1 tablet (0 25 mg total) by mouth daily at bedtime as needed for anxiety, Disp: 15 tablet, Rfl: 0    citalopram (CeleXA) 10 mg tablet, Take 1 tablet (10 mg total) by mouth daily, Disp: 90 tablet, Rfl: 3    levonorgestrel (MIRENA) 20 MCG/24HR IUD, 1 each by Intrauterine route once, Disp: , Rfl:     acetaminophen (TYLENOL) 500 mg tablet, Take 1,000 mg by mouth every 6 (six) hours as needed for mild pain (Patient not taking: Reported on 2/22/2022 ), Disp: , Rfl:     Fluticasone Propionate (Xhance) 93 MCG/ACT EXHU, 2 sprays into each nostril daily (Patient not taking: Reported on 9/10/2021), Disp: 16 mL, Rfl: 3    hydroxychloroquine (PLAQUENIL) 200 mg tablet, Take 1 tablet (200 mg total) by mouth 2 (two) times a day, Disp: 180 tablet, Rfl: 1    ibuprofen (MOTRIN) 200 mg tablet, Take 800 mg by mouth every 6 (six) hours as needed for mild pain (Patient not taking: Reported on 2/22/2022 ), Disp: , Rfl:     Insulin Pen Needle 32G X 5 MM MISC, Use daily as directed (Patient not taking: Reported on 2/22/2022 ), Disp: 90 each, Rfl: 0    liraglutide (SAXENDA) injection, Inject 0 6 mg subcutaneously once per day for the first week  Increase in weekly intervals by 0 6 mg until a dose of 3 mg is reached (Patient not taking: Reported on 2/22/2022 ), Disp: 15 mL, Rfl: 1    valACYclovir (VALTREX) 1,000 mg tablet, Take 2 tablets by mouth every 12 hours x 1 days, Disp: 4 tablet, Rfl: 0    Objective:    Vitals:    02/22/22 1601   BP: 115/72   Pulse: 91   Weight: 88 5 kg (195 lb)   Height: 5' 4" (1 626 m)       Physical Exam  Constitutional:       General: She is not in acute distress  Appearance: She is well-developed  She is obese  She is not diaphoretic  HENT:      Head: Normocephalic  Mouth/Throat:      Pharynx: No oropharyngeal exudate  Eyes:      Pupils: Pupils are equal, round, and reactive to light  Cardiovascular:      Rate and Rhythm: Normal rate and regular rhythm  Heart sounds: No murmur heard  Pulmonary:      Effort: Pulmonary effort is normal  No respiratory distress  Breath sounds: No wheezing or rales  Abdominal:      General: Bowel sounds are normal       Palpations: Abdomen is soft  Tenderness: There is no abdominal tenderness  Musculoskeletal:         General: Tenderness (right 2nd to 4th PIPs; left 1st and 2nd PIPs  lower back ) present   Normal range of motion  Cervical back: Normal range of motion  Skin:     General: Skin is warm  Neurological:      Mental Status: She is alert and oriented to person, place, and time  Reviewed labs and imaging  Imaging:   Bilateral hand XR 11/8/21  There is no acute fracture or dislocation  Degenerative changes in the distal interphalangeal joint of the fingers   No aggressive erosive changes to suggest active inflammatory arthropathy     Left ankle/heel MRI 07/29/2019  IMPRESSION:  1  Mild posterior tibialis insertional tendinosis and reactive marrow edema at the insertion on the medial navicular  No evidence of tear or PTT dysfunction  2  Mild common peroneal tenosynovitis without tendinosis or tear  3  Intact plantar fascia with a small plantar calcaneal spur noted  4  Mild talonavicular degenerative change as above      Labs:   Orders Only on 12/30/2021   Component Date Value Ref Range Status    SARS-CoV-2 12/30/2021 Negative  Negative Final   Appointment on 12/16/2021   Component Date Value Ref Range Status    Urine Culture 12/16/2021 <10,000 cfu/ml    Final    Mixed Contaminants X2   Orders Only on 12/14/2021   Component Date Value Ref Range Status    Color, UA 12/16/2021 Yellow  Yellow, Straw Final    Clarity, UA 12/16/2021 Clear  Hazy, Clear Final    Specific [place], UA 12/16/2021 1 025  >1 005-<1 030 Final    pH, UA 12/16/2021 7 0  5 0, 5 5, 6 0, 6 5, 7 0, 7 5 Final    Leukocytes, UA 12/16/2021 Negative  Negative Final    Nitrite, UA 12/16/2021 Negative  Negative Final    Protein, UA 12/16/2021 Negative  Negative, Interference- unable to analyze mg/dl Final    Glucose, UA 12/16/2021 Negative  Negative mg/dl Final    Ketones, UA 12/16/2021 Negative  Negative mg/dl Final    Urobilinogen, UA 12/16/2021 0 2  0 2, 1 0 E U /dl E U /dl Final    Bilirubin, UA 12/16/2021 Negative  Negative Final    Blood, UA 12/16/2021 Negative  Negative Final   Office Visit on 10/22/2021   Component Date Value Ref Range Status    Sed Rate 11/08/2021 27* 0 - 19 mm/hour Final    CRP 11/08/2021 <3 0  <3 0 mg/L Final    WBC 11/08/2021 14 71* 4 31 - 10 16 Thousand/uL Final    RBC 11/08/2021 4 39  3 81 - 5 12 Million/uL Final    Hemoglobin 11/08/2021 13 4  11 5 - 15 4 g/dL Final    Hematocrit 11/08/2021 41 0  34 8 - 46 1 % Final    MCV 11/08/2021 93  82 - 98 fL Final    MCH 11/08/2021 30 5  26 8 - 34 3 pg Final    MCHC 11/08/2021 32 7  31 4 - 37 4 g/dL Final    RDW 11/08/2021 12 1  11 6 - 15 1 % Final    MPV 11/08/2021 10 8  8 9 - 12 7 fL Final    Platelets 75/82/6272 383  149 - 390 Thousands/uL Final    nRBC 11/08/2021 0  /100 WBCs Final    Neutrophils Relative 11/08/2021 70  43 - 75 % Final    Immat GRANS % 11/08/2021 1  0 - 2 % Final    Lymphocytes Relative 11/08/2021 16  14 - 44 % Final    Monocytes Relative 11/08/2021 7  4 - 12 % Final    Eosinophils Relative 11/08/2021 5  0 - 6 % Final    Basophils Relative 11/08/2021 1  0 - 1 % Final    Neutrophils Absolute 11/08/2021 10 36* 1 85 - 7 62 Thousands/µL Final    Immature Grans Absolute 11/08/2021 0 10  0 00 - 0 20 Thousand/uL Final    Lymphocytes Absolute 11/08/2021 2 41  0 60 - 4 47 Thousands/µL Final    Monocytes Absolute 11/08/2021 1 01  0 17 - 1 22 Thousand/µL Final    Eosinophils Absolute 11/08/2021 0 76* 0 00 - 0 61 Thousand/µL Final    Basophils Absolute 11/08/2021 0 07  0 00 - 0 10 Thousands/µL Final    Sodium 11/08/2021 136  136 - 145 mmol/L Final    Potassium 11/08/2021 3 7  3 5 - 5 3 mmol/L Final    Chloride 11/08/2021 104  100 - 108 mmol/L Final    CO2 11/08/2021 24  21 - 32 mmol/L Final    ANION GAP 11/08/2021 8  4 - 13 mmol/L Final    BUN 11/08/2021 11  5 - 25 mg/dL Final    Creatinine 11/08/2021 0 78  0 60 - 1 30 mg/dL Final    Standardized to IDMS reference method    Glucose 11/08/2021 89  65 - 140 mg/dL Final    If the patient is fasting, the ADA then defines impaired fasting glucose as > 100 mg/dL and diabetes as > or equal to 123 mg/dL  Specimen collection should occur prior to Sulfasalazine administration due to the potential for falsely depressed results  Specimen collection should occur prior to Sulfapyridine administration due to the potential for falsely elevated results   Calcium 11/08/2021 9 0  8 3 - 10 1 mg/dL Final    AST 11/08/2021 10  5 - 45 U/L Final    Specimen collection should occur prior to Sulfasalazine administration due to the potential for falsely depressed results   ALT 11/08/2021 17  12 - 78 U/L Final    Specimen collection should occur prior to Sulfasalazine and/or Sulfapyridine administration due to the potential for falsely depressed results   Alkaline Phosphatase 11/08/2021 68  46 - 116 U/L Final    Total Protein 11/08/2021 7 8  6 4 - 8 2 g/dL Final    Albumin 11/08/2021 4 0  3 5 - 5 0 g/dL Final    Total Bilirubin 11/08/2021 0 54  0 20 - 1 00 mg/dL Final    Use of this assay is not recommended for patients undergoing treatment with eltrombopag due to the potential for falsely elevated results      eGFR 11/08/2021 93  ml/min/1 73sq m Final    Hepatitis B Surface Ag 11/08/2021 Non-reactive  Non-reactive, NonReactive - Confirmed Final    Hepatitis C Ab 11/08/2021 Non-reactive  Non-reactive Final    Hep B C IgM 11/08/2021 Non-reactive  Non-reactive Final    Hep B Core Total Ab 11/08/2021 Non-reactive  Non-reactive Final    JERAMIE 11/08/2021 Negative  Negative Final    Rheumatoid Factor 11/08/2021 Positive* Negative Final    See RF Quantitation    Cyclic Citrullinated Peptide Ab  11/08/2021 >340 0  See comment Final    RF Quantitation 11/08/2021 80 IU/mL* (none) Final

## 2022-02-22 NOTE — PATIENT INSTRUCTIONS
Continue hydroxychloroquine twice a day  Get regular eye exams    Return to clinic in 6 months    Rheumatoid Arthritis   AMBULATORY CARE:   Rheumatoid arthritis  is a long-term autoimmune disease that causes inflammation and damage to your joints  RA causes your body's immune system to attack the synovial membrane (lining) in your joints  RA can also affect other organs, such as your eyes, heart, or lungs  It may also increase your risk of osteoporosis (weakened bones)  Common symptoms include the following:   · Joint pain and stiffness that lasts longer than 1 hour    · Swollen joints in the same joint on both sides of your body    · Loss of joint movement    · Firm, round nodules (growths) on your joints    · Fatigue or muscle weakness    · Loss of appetite or weight loss    Call your doctor or rheumatologist if:   · You have a fever  · You have increased joint swelling, pain, or redness  · Your skin is itchy, swollen, or has a rash  · Your symptoms are getting worse, even with treatment  · You have questions or concerns about your condition or care  Treatment:  The goal of treatment within the first year is remission (no pain or inflammation)  If full remission cannot be reached, the goal is as few arthritis flares as possible  Early treatment can also help prevent or slow joint damage  Treatment may change after the first year, depending on how your body responds  · Antirheumatics  help slow the progress of RA, and reduce pain, stiffness, and inflammation  · NSAIDs , such as ibuprofen, help decrease swelling, pain, and fever  This medicine is available with or without a doctor's order  NSAIDs can cause stomach bleeding or kidney problems in certain people  If you take blood thinner medicine, always ask your healthcare provider if NSAIDs are safe for you  Always read the medicine label and follow directions  · Steroids  help decrease inflammation      · Biologic therapy  helps decrease joint swelling, pain, and stiffness  These medicines increase the risk of serious infection and need careful monitoring  · Surgery  may be needed to remove all or part of your joint  An implant may be placed to help reduce pain and repair the joint  Manage your symptoms:   · Rest when needed  Rest is important if your joints are painful  Limit your activities until your symptoms improve  Gradually start your normal activities when you can do them without pain  Avoid motions and activities that cause strain on your joints, such as heavy exercise and lifting  · Use ice or heat  Both can help decrease swelling and pain  Ice may also help prevent tissue damage  Use an ice pack, or put crushed ice in a plastic bag  Cover it with a towel and place it on your joint for 15 to 20 minutes every hour or as directed  You can apply heat for 20 minutes every 2 hours  Heat treatment includes hot packs, heat lamps, warm baths, or showers  · Elevate your joint  Elevation helps reduce swelling and pain  Raise your joint above the level of your heart as often as you can  Prop your painful joint on pillows to keep it above your heart comfortably  Manage RA:   · Talk to your healthcare providers about your arthritis medicines  Some medicines may only be needed when you have arthritis pain  You may need to take other medicines every day to prevent arthritis from getting worse  Your healthcare providers will help you understand all your medicines and when to take them  It is important to take the medicines as directed, even if you start to feel better  You can continue to have joint damage and inflammation even if you do not feel it  · Eat a variety of healthy foods  Healthy foods include fruits, vegetables, whole-grain breads, low-fat dairy products, beans, lean meats, and fish  Ask if you need to be on a special diet  A diet rich in calcium and vitamin D may decrease your risk of osteoporosis   Foods high in calcium include milk, cheese, broccoli, and tofu  Vitamin D may be found in meat, fish, fortified milk, cereal and bread  Ask if you need calcium or vitamin D supplements  · Maintain a healthy weight  This may help decrease strain on joints in your back, knees, ankles, and feet  Ask your healthcare provider what a healthy weight is for you  Ask him or her to help you create a weight loss plan if you are overweight  Exercise can help you maintain a healthy weight  · Go to physical or occupational therapy as directed  Physical activity can help relieve pain and stiffness  A physical therapist can teach you exercises to improve flexibility and range of motion  You may also be shown non-weight-bearing exercises that are safe for your joints, such as swimming  An occupational therapist can help you learn to do your daily activities when your joints are stiff or sore  · Do not smoke  Nicotine and other chemicals in cigarettes and cigars can damage your bones and joints  Ask your healthcare provider for information if you currently smoke and need help to quit  E-cigarettes or smokeless tobacco still contain nicotine  Talk to your healthcare provider before you use these products  RA medicines and pregnancy:   · Men:  Talk to your doctor about your RA and the medicines you take  Some medicines may keep your partner from getting pregnant  Talk to your doctor if you and your partner are discussing pregnancy  · Women:  Talk to your gynecologist about contraceptives  Tell him or her about your RA and what medicines you take  He or she will tell you what the best contraceptive for will be  Not all contraceptives are effective if you have RA and are taking certain medicines  You may not be able to breastfeed if you are taking certain RA medicines  Support devices to help manage arthritis:       · Orthotic shoes or insoles  help support your feet when you walk      · Crutches, a cane, or a walker  may help decrease your risk for falling  They also decrease stress on affected joints  · Devices to prevent falls  include raised toilet seats and bathtub bars to help you get up from sitting  Handrails can be placed in areas where you need balance and support  · Devices to help with support and rest  include splints to wear on your hands and a firm pillow while you sleep  Use a pillow that is firm enough to support your neck and head  Ask your healthcare provider about vaccines:  RA or its treatment may increase your risk for infections  Vaccines can help protect you from infections caused by certain bacteria or viruses  Follow up with your doctor as directed:  Write down your questions so you remember to ask them during your visits  © Copyright [employer] 2021 Information is for End User's use only and may not be sold, redistributed or otherwise used for commercial purposes  All illustrations and images included in CareNotes® are the copyrighted property of [employer]  or Ada Keith   The above information is an  only  It is not intended as medical advice for individual conditions or treatments  Talk to your doctor, nurse or pharmacist before following any medical regimen to see if it is safe and effective for you

## 2022-04-26 ENCOUNTER — OFFICE VISIT (OUTPATIENT)
Dept: INTERNAL MEDICINE CLINIC | Facility: CLINIC | Age: 47
End: 2022-04-26
Payer: COMMERCIAL

## 2022-04-26 VITALS — TEMPERATURE: 97.1 F | OXYGEN SATURATION: 96 % | HEART RATE: 89 BPM

## 2022-04-26 DIAGNOSIS — J32.4 CHRONIC PANSINUSITIS: Primary | ICD-10-CM

## 2022-04-26 PROCEDURE — 99214 OFFICE O/P EST MOD 30 MIN: CPT | Performed by: NURSE PRACTITIONER

## 2022-04-26 RX ORDER — PREDNISONE 10 MG/1
TABLET ORAL
Qty: 25 TABLET | Refills: 0 | Status: SHIPPED | OUTPATIENT
Start: 2022-04-26 | End: 2022-06-06 | Stop reason: SDUPTHER

## 2022-04-26 RX ORDER — AMOXICILLIN AND CLAVULANATE POTASSIUM 875; 125 MG/1; MG/1
1 TABLET, FILM COATED ORAL 2 TIMES DAILY
Qty: 14 TABLET | Refills: 0 | Status: SHIPPED | OUTPATIENT
Start: 2022-04-26 | End: 2022-05-03

## 2022-04-26 NOTE — PROGRESS NOTES
Assessment/Plan: Will start on Augmentin and Prednisone for 5 days  Will refer back to ENT  Did advise if any worsening of symptoms to call the office  No problem-specific Assessment & Plan notes found for this encounter  Problem List Items Addressed This Visit        Respiratory    Chronic pansinusitis - Primary    Relevant Medications    amoxicillin-clavulanate (AUGMENTIN) 875-125 mg per tablet    predniSONE 10 mg tablet            Subjective:      Patient ID: Rola Bright is a 41 y o  female  Geena Patrick is for an acute visit  She states for the past month she is having her normal sinus infection  She states she is having congestion and postnasal drip  She denies any fever  She was seeing ENT and does need to go back  She denies any fever and is having a cough from the postnasal drip  She offers no other issues  The following portions of the patient's history were reviewed and updated as appropriate: She  has a past medical history of Anxiety, Depression, Rheumatoid arthritis ([address] ), Sjoegren syndrome, and Thyroid nodule    She   Patient Active Problem List    Diagnosis Date Noted    Anxiety and depression 12/16/2021    Class 1 obesity 07/29/2021    Chronic frontal sinusitis 03/24/2021    Herpes labialis 04/27/2020    Severe frontal headaches 02/13/2020    Chronic pansinusitis 01/15/2020    Deviated nasal septum 01/15/2020    Hypertrophy of both inferior nasal turbinates 01/15/2020    Tendonitis 06/25/2019    Allergic rhinitis 06/29/2018    Anxiety 06/29/2018    Inflammatory polyarthropathy ([address] ) 06/29/2018    RA (rheumatoid arthritis) ([facility] ) 06/29/2018    Depressed mood 02/14/2017    Secondary adrenal insufficiency ([address] ) 02/14/2017    Thyroid nodule, uninodular 02/14/2017    Sjogren's syndrome ([address] ) 12/31/2016    Acute adrenal insufficiency ([address] ) 12/30/2016    Anemia 12/19/2016    Leucocytosis 12/17/2016    Varicose vein 03/10/2015     She  has a past surgical history that includes Cholecystectomy; Venous ablation (Left); pr repair of nasal septum (N/A, 6/30/2020); and Nasal/sinus endoscopy (N/A, 6/30/2020)  Her family history includes Breast cancer (age of onset: 64) in her mother; Diabetes in her father; Esophageal cancer in her father; Liver cancer in her mother; No Known Problems in her brother, brother, daughter, maternal aunt, maternal aunt, maternal aunt, maternal grandmother, paternal grandmother, son, and son; Rheum arthritis in her mother; Sjogren's syndrome in her mother; Stroke in her other  She  reports that she quit smoking about 16 years ago  She has a 20 00 pack-year smoking history  She has never used smokeless tobacco  She reports previous alcohol use  She reports that she does not use drugs    Current Outpatient Medications   Medication Sig Dispense Refill    acetaminophen (TYLENOL) 500 mg tablet Take 1,000 mg by mouth every 6 (six) hours as needed for mild pain (Patient not taking: Reported on 2/22/2022 )      ALPRAZolam (XANAX) 0 25 mg tablet Take 1 tablet (0 25 mg total) by mouth daily at bedtime as needed for anxiety 15 tablet 0    amoxicillin-clavulanate (AUGMENTIN) 875-125 mg per tablet Take 1 tablet by mouth 2 (two) times a day for 7 days 14 tablet 0    citalopram (CeleXA) 10 mg tablet Take 1 tablet (10 mg total) by mouth daily 90 tablet 3    Fluticasone Propionate (Xhance) 93 MCG/ACT EXHU 2 sprays into each nostril daily (Patient not taking: Reported on 9/10/2021) 16 mL 3    hydroxychloroquine (PLAQUENIL) 200 mg tablet Take 1 tablet (200 mg total) by mouth 2 (two) times a day 180 tablet 1    ibuprofen (MOTRIN) 200 mg tablet Take 800 mg by mouth every 6 (six) hours as needed for mild pain (Patient not taking: Reported on 2/22/2022 )      Insulin Pen Needle 32G X 5 MM MISC Use daily as directed (Patient not taking: Reported on 2/22/2022 ) 90 each 0    levonorgestrel (MIRENA) 20 MCG/24HR IUD 1 each by Intrauterine route once      liraglutide (SAXENDA) injection Inject 0 6 mg subcutaneously once per day for the first week  Increase in weekly intervals by 0 6 mg until a dose of 3 mg is reached (Patient not taking: Reported on 2/22/2022 ) 15 mL 1    predniSONE 10 mg tablet Take 50 mg by mouth daily for 5 days 25 tablet 0    valACYclovir (VALTREX) 1,000 mg tablet Take 2 tablets by mouth every 12 hours x 1 days 4 tablet 0     No current facility-administered medications for this visit  Current Outpatient Medications on File Prior to Visit   Medication Sig    acetaminophen (TYLENOL) 500 mg tablet Take 1,000 mg by mouth every 6 (six) hours as needed for mild pain (Patient not taking: Reported on 2/22/2022 )    ALPRAZolam (XANAX) 0 25 mg tablet Take 1 tablet (0 25 mg total) by mouth daily at bedtime as needed for anxiety    citalopram (CeleXA) 10 mg tablet Take 1 tablet (10 mg total) by mouth daily    Fluticasone Propionate (Xhance) 93 MCG/ACT EXHU 2 sprays into each nostril daily (Patient not taking: Reported on 9/10/2021)    hydroxychloroquine (PLAQUENIL) 200 mg tablet Take 1 tablet (200 mg total) by mouth 2 (two) times a day    ibuprofen (MOTRIN) 200 mg tablet Take 800 mg by mouth every 6 (six) hours as needed for mild pain (Patient not taking: Reported on 2/22/2022 )    Insulin Pen Needle 32G X 5 MM MISC Use daily as directed (Patient not taking: Reported on 2/22/2022 )    levonorgestrel (MIRENA) 20 MCG/24HR IUD 1 each by Intrauterine route once    liraglutide (SAXENDA) injection Inject 0 6 mg subcutaneously once per day for the first week  Increase in weekly intervals by 0 6 mg until a dose of 3 mg is reached (Patient not taking: Reported on 2/22/2022 )    valACYclovir (VALTREX) 1,000 mg tablet Take 2 tablets by mouth every 12 hours x 1 days     No current facility-administered medications on file prior to visit  She has No Known Allergies       Review of Systems   HENT: Positive for congestion and postnasal drip      Respiratory: Positive for cough  All other systems reviewed and are negative  Objective:      Pulse 89   Temp (!) 97 1 °F (36 2 °C)   SpO2 96%          Physical Exam  Constitutional:       Appearance: Normal appearance  HENT:      Nose: Congestion present  Neurological:      Mental Status: She is alert  Psychiatric:         Mood and Affect: Mood normal          Behavior: Behavior normal          Thought Content:  Thought content normal          Judgment: Judgment normal

## 2022-06-06 ENCOUNTER — TELEMEDICINE (OUTPATIENT)
Dept: INTERNAL MEDICINE CLINIC | Facility: CLINIC | Age: 47
End: 2022-06-06
Payer: COMMERCIAL

## 2022-06-06 VITALS — HEART RATE: 105 BPM | BODY MASS INDEX: 34.66 KG/M2 | OXYGEN SATURATION: 96 % | WEIGHT: 203 LBS | HEIGHT: 64 IN

## 2022-06-06 DIAGNOSIS — J32.4 CHRONIC PANSINUSITIS: Primary | ICD-10-CM

## 2022-06-06 PROCEDURE — 99214 OFFICE O/P EST MOD 30 MIN: CPT | Performed by: NURSE PRACTITIONER

## 2022-06-06 RX ORDER — SULFAMETHOXAZOLE AND TRIMETHOPRIM 800; 160 MG/1; MG/1
1 TABLET ORAL 2 TIMES DAILY
Qty: 20 TABLET | Refills: 0 | Status: SHIPPED | OUTPATIENT
Start: 2022-06-06 | End: 2022-06-16

## 2022-06-06 RX ORDER — PREDNISONE 10 MG/1
TABLET ORAL
Qty: 25 TABLET | Refills: 0 | Status: SHIPPED | OUTPATIENT
Start: 2022-06-06 | End: 2022-06-30

## 2022-06-06 NOTE — PROGRESS NOTES
Virtual Regular Visit    Verification of patient location:    Patient is located in the following state in which I hold an active license [place]      Assessment/Plan: Will call in Bactrim and Prednisone take as directed  Did advise to follow up with ENT  Will follow up as needed  Problem List Items Addressed This Visit        Respiratory    Chronic pansinusitis - Primary    Relevant Medications    sulfamethoxazole-trimethoprim (BACTRIM DS) 800-160 mg per tablet    predniSONE 10 mg tablet               Reason for visit is   Chief Complaint   Patient presents with    Follow-up     Sinus congestion, the cough, sore throat    Virtual Brief Visit    Virtual Regular Visit        Encounter provider Rogers Dennis November    Provider located at [facility] [address]  [address] [id number]      Recent Visits  No visits were found meeting these conditions  Showing recent visits within past 7 days and meeting all other requirements  Today's Visits  Date Type Provider Dept   06/06/22 Telemedicine [facility]   Showing today's visits and meeting all other requirements  Future Appointments  No visits were found meeting these conditions  Showing future appointments within next 150 days and meeting all other requirements       The patient was identified by name and date of birth  Ishmael Cano was informed that this is a telemedicine visit and that the visit is being conducted through Telephone  My office door was closed  No one else was in the room  She acknowledged consent and understanding of privacy and security of the video platform  The patient has agreed to participate and understands they can discontinue the visit at any time  Patient is aware this is a billable service  Subjective  Ishmael Cano is a 41 y o  female patient   Gracia Fish is for an acute visit   She is having congestion, sore throat, and cough  She has chronic sinus issues and is seeing a new ENT on Monday  She states she is using her nasal rinses and is getting yellow out  She denies any fever  She states she knows her issues are related to MRSA  She offers no other issues         Past Medical History:   Diagnosis Date    Anxiety     Depression     Rheumatoid arthritis ([address] )     Sjoegren syndrome     Thyroid nodule        Past Surgical History:   Procedure Laterality Date    CHOLECYSTECTOMY      NASAL/SINUS ENDOSCOPY N/A 6/30/2020    Procedure: IMAGED GUIDED F E S S ;  Surgeon: Gordy Ambrosio DO;  Location: [facility];  Service: ENT    MI REPAIR OF NASAL SEPTUM N/A 6/30/2020    Procedure: SEPTOPLASTY;  Surgeon: Gordy Ambrosio DO;  Location: [facility];  Service: ENT    VENOUS ABLATION Left     lower extremity       Current Outpatient Medications   Medication Sig Dispense Refill    citalopram (CeleXA) 10 mg tablet Take 1 tablet (10 mg total) by mouth daily 90 tablet 3    hydroxychloroquine (PLAQUENIL) 200 mg tablet Take 1 tablet (200 mg total) by mouth 2 (two) times a day 180 tablet 1    levonorgestrel (MIRENA) 20 MCG/24HR IUD 1 each by Intrauterine route once      predniSONE 10 mg tablet Take 50 mg by mouth daily for 5 days 25 tablet 0    sulfamethoxazole-trimethoprim (BACTRIM DS) 800-160 mg per tablet Take 1 tablet by mouth 2 (two) times a day for 10 days 20 tablet 0    acetaminophen (TYLENOL) 500 mg tablet Take 1,000 mg by mouth every 6 (six) hours as needed for mild pain (Patient not taking: Reported on 2/22/2022 )      ALPRAZolam (XANAX) 0 25 mg tablet Take 1 tablet (0 25 mg total) by mouth daily at bedtime as needed for anxiety (Patient not taking: Reported on 6/6/2022) 15 tablet 0    Fluticasone Propionate (Xhance) 93 MCG/ACT EXHU 2 sprays into each nostril daily (Patient not taking: Reported on 9/10/2021) 16 mL 3    ibuprofen (MOTRIN) 200 mg tablet Take 800 mg by mouth every 6 (six) hours as needed for mild pain (Patient not taking: Reported on 2/22/2022 )      Insulin Pen Needle 32G X 5 MM MISC Use daily as directed (Patient not taking: Reported on 2/22/2022 ) 90 each 0    liraglutide (SAXENDA) injection Inject 0 6 mg subcutaneously once per day for the first week  Increase in weekly intervals by 0 6 mg until a dose of 3 mg is reached (Patient not taking: Reported on 2/22/2022 ) 15 mL 1    valACYclovir (VALTREX) 1,000 mg tablet Take 2 tablets by mouth every 12 hours x 1 days 4 tablet 0     No current facility-administered medications for this visit  Allergies   Allergen Reactions    Doxycycline Hives       Review of Systems   HENT: Positive for congestion, postnasal drip, sinus pressure, sinus pain and sore throat  Respiratory: Positive for cough  All other systems reviewed and are negative  Video Exam    Vitals:    06/06/22 1152   Pulse: 105   SpO2: 96%   Weight: 92 1 kg (203 lb)   Height: 5' 4" (1 626 m)       Physical Exam  Neurological:      Mental Status: She is alert  Psychiatric:         Mood and Affect: Mood normal          Behavior: Behavior normal          Thought Content: Thought content normal          Judgment: Judgment normal           I spent 15 minutes directly with the patient during this visit    VIRTUAL VISIT DISCLAIMER      Ishmael Green verbally agrees to participate in [employer]  Pt is aware that [employer] could be limited without vital signs or the ability to perform a full hands-on physical Elder Fernández understands she or the provider may request at any time to terminate the video visit and request the patient to seek care or treatment in person

## 2022-06-30 ENCOUNTER — OFFICE VISIT (OUTPATIENT)
Dept: INTERNAL MEDICINE CLINIC | Facility: CLINIC | Age: 47
End: 2022-06-30
Payer: COMMERCIAL

## 2022-06-30 VITALS
HEART RATE: 89 BPM | HEIGHT: 64 IN | WEIGHT: 208.7 LBS | BODY MASS INDEX: 35.63 KG/M2 | TEMPERATURE: 98 F | OXYGEN SATURATION: 97 %

## 2022-06-30 DIAGNOSIS — M54.31 RIGHT SIDED SCIATICA: Primary | ICD-10-CM

## 2022-06-30 PROCEDURE — 99214 OFFICE O/P EST MOD 30 MIN: CPT | Performed by: NURSE PRACTITIONER

## 2022-06-30 RX ORDER — PREDNISONE 10 MG/1
TABLET ORAL
Qty: 25 TABLET | Refills: 0 | Status: SHIPPED | OUTPATIENT
Start: 2022-06-30

## 2022-06-30 RX ORDER — CYCLOBENZAPRINE HCL 10 MG
TABLET ORAL
Qty: 60 TABLET | Refills: 0 | Status: SHIPPED | OUTPATIENT
Start: 2022-06-30

## 2022-06-30 NOTE — PROGRESS NOTES
Assessment/Plan: Will call in Flexeril and Prednisone dose for her  Did offer PT but would like to hold off right now  Will follow up with her for her routine wellness  No problem-specific Assessment & Plan notes found for this encounter  Problem List Items Addressed This Visit        Nervous and Auditory    Right sided sciatica - Primary    Relevant Medications    cyclobenzaprine (FLEXERIL) 10 mg tablet    predniSONE 10 mg tablet    Other Relevant Orders    Ambulatory Referral to Physical Therapy            Subjective:      Patient ID: Cassidy Garcia is a 41 y o  female  [facility]  She is having right sided sciatica pain with pain radiating down her leg into her knee  She states she is taking Tylenol and Motrin together  She is not applying heat or ice  She states she is havinisg worsening pain  She is trying to stretch  She offers no other issues  The following portions of the patient's history were reviewed and updated as appropriate: She  has a past medical history of Anxiety, Depression, Rheumatoid arthritis ([facility] [address] ), Sjoegren syndrome, and Thyroid nodule    She   Patient Active Problem List    Diagnosis Date Noted    Right sided sciatica 06/30/2022    Anxiety and depression 12/16/2021    Class 1 obesity 07/29/2021    Chronic frontal sinusitis 03/24/2021    Herpes labialis 04/27/2020    Severe frontal headaches 02/13/2020    Chronic pansinusitis 01/15/2020    Deviated nasal septum 01/15/2020    Hypertrophy of both inferior nasal turbinates 01/15/2020    Tendonitis 06/25/2019    Allergic rhinitis 06/29/2018    Anxiety 06/29/2018    Inflammatory polyarthropathy ([address] ) 06/29/2018    RA (rheumatoid arthritis) ([facility] ) 06/29/2018    Depressed mood 02/14/2017    Secondary adrenal insufficiency ([address] ) 02/14/2017    Thyroid nodule, uninodular 02/14/2017    Sjogren's syndrome ([address] ) 12/31/2016    Acute adrenal insufficiency ([address] ) 12/30/2016    Anemia 12/19/2016    Leucocytosis 12/17/2016    Varicose vein 03/10/2015     She  has a past surgical history that includes Cholecystectomy; Venous ablation (Left); pr repair of nasal septum (N/A, 6/30/2020); and Nasal/sinus endoscopy (N/A, 6/30/2020)  Her family history includes Breast cancer (age of onset: 64) in her mother; Diabetes in her father; Esophageal cancer in her father; Liver cancer in her mother; No Known Problems in her brother, brother, daughter, maternal aunt, maternal aunt, maternal aunt, maternal grandmother, paternal grandmother, son, and son; Rheum arthritis in her mother; Sjogren's syndrome in her mother; Stroke in her other  She  reports that she quit smoking about 16 years ago  She has a 20 00 pack-year smoking history  She has never used smokeless tobacco  She reports previous alcohol use  She reports that she does not use drugs    Current Outpatient Medications   Medication Sig Dispense Refill    citalopram (CeleXA) 10 mg tablet Take 1 tablet (10 mg total) by mouth daily 90 tablet 3    cyclobenzaprine (FLEXERIL) 10 mg tablet Take one tablet by mouth every 12 hours as needed 60 tablet 0    fluticasone (FLONASE) 50 mcg/act nasal spray 1 spray into each nostril 2 (two) times a day 16 g 6    hydroxychloroquine (PLAQUENIL) 200 mg tablet Take 1 tablet (200 mg total) by mouth 2 (two) times a day 180 tablet 1    levonorgestrel (MIRENA) 20 MCG/24HR IUD 1 each by Intrauterine route once      predniSONE 10 mg tablet Take 5 tablets by mouth daily for 5 days 25 tablet 0    acetaminophen (TYLENOL) 500 mg tablet Take 1,000 mg by mouth every 6 (six) hours as needed for mild pain (Patient not taking: Reported on 2/22/2022 )      ALPRAZolam (XANAX) 0 25 mg tablet Take 1 tablet (0 25 mg total) by mouth daily at bedtime as needed for anxiety (Patient not taking: Reported on 6/6/2022) 15 tablet 0    ibuprofen (MOTRIN) 200 mg tablet Take 800 mg by mouth every 6 (six) hours as needed for mild pain (Patient not taking: Reported on 2/22/2022 )      Insulin Pen Needle 32G X 5 MM MISC Use daily as directed (Patient not taking: Reported on 2/22/2022 ) 90 each 0    liraglutide (SAXENDA) injection Inject 0 6 mg subcutaneously once per day for the first week  Increase in weekly intervals by 0 6 mg until a dose of 3 mg is reached (Patient not taking: Reported on 2/22/2022 ) 15 mL 1    valACYclovir (VALTREX) 1,000 mg tablet Take 2 tablets by mouth every 12 hours x 1 days 4 tablet 0     No current facility-administered medications for this visit  Current Outpatient Medications on File Prior to Visit   Medication Sig    citalopram (CeleXA) 10 mg tablet Take 1 tablet (10 mg total) by mouth daily    fluticasone (FLONASE) 50 mcg/act nasal spray 1 spray into each nostril 2 (two) times a day    hydroxychloroquine (PLAQUENIL) 200 mg tablet Take 1 tablet (200 mg total) by mouth 2 (two) times a day    levonorgestrel (MIRENA) 20 MCG/24HR IUD 1 each by Intrauterine route once    acetaminophen (TYLENOL) 500 mg tablet Take 1,000 mg by mouth every 6 (six) hours as needed for mild pain (Patient not taking: Reported on 2/22/2022 )    ALPRAZolam (XANAX) 0 25 mg tablet Take 1 tablet (0 25 mg total) by mouth daily at bedtime as needed for anxiety (Patient not taking: Reported on 6/6/2022)    ibuprofen (MOTRIN) 200 mg tablet Take 800 mg by mouth every 6 (six) hours as needed for mild pain (Patient not taking: Reported on 2/22/2022 )    Insulin Pen Needle 32G X 5 MM MISC Use daily as directed (Patient not taking: Reported on 2/22/2022 )    liraglutide (SAXENDA) injection Inject 0 6 mg subcutaneously once per day for the first week   Increase in weekly intervals by 0 6 mg until a dose of 3 mg is reached (Patient not taking: Reported on 2/22/2022 )    valACYclovir (VALTREX) 1,000 mg tablet Take 2 tablets by mouth every 12 hours x 1 days    [DISCONTINUED] predniSONE 10 mg tablet Take 50 mg by mouth daily for 5 days (Patient not taking: Reported on 6/13/2022)     No current facility-administered medications on file prior to visit  She is allergic to doxycycline       Review of Systems   Musculoskeletal: Positive for back pain  All other systems reviewed and are negative  Objective:      Pulse 89   Temp 98 °F (36 7 °C)   Ht 5' 4" (1 626 m)   Wt 94 7 kg (208 lb 11 2 oz)   SpO2 97%   BMI 35 82 kg/m²          Physical Exam  Constitutional:       Appearance: Normal appearance  She is normal weight  Cardiovascular:      Rate and Rhythm: Normal rate and regular rhythm  Pulses: Normal pulses  Heart sounds: Normal heart sounds  Pulmonary:      Effort: Pulmonary effort is normal       Breath sounds: Normal breath sounds  Musculoskeletal:         General: Tenderness present  Normal range of motion  Skin:     General: Skin is warm and dry  Capillary Refill: Capillary refill takes less than 2 seconds  Neurological:      General: No focal deficit present  Mental Status: She is alert and oriented to person, place, and time  Mental status is at baseline  Psychiatric:         Mood and Affect: Mood normal          Behavior: Behavior normal          Thought Content:  Thought content normal          Judgment: Judgment normal

## 2022-07-13 ENCOUNTER — TELEPHONE (OUTPATIENT)
Dept: INTERNAL MEDICINE CLINIC | Facility: CLINIC | Age: 47
End: 2022-07-13

## 2022-07-13 DIAGNOSIS — U07.1 COVID-19: Primary | ICD-10-CM

## 2022-07-13 NOTE — TELEPHONE ENCOUNTER
Patient called today  She tested positive for covid  SOB, Body Aches, congestion x2 days  She is requesting paxlovid

## 2022-07-19 NOTE — PROGRESS NOTES
PT Evaluation     Today's date: 2022  Patient name: Dann Horvath  : 1975  MRN: [id number]  Referring provider: Ban Cortez*  Dx:   Encounter Diagnosis     ICD-10-CM    1  Right sided sciatica  M54 31                   Assessment  Assessment details: Dann Horvath is a 41 y o  female with a history of anxiety, depression, RA, Sjoegren syndrome, thyroid nodule that presents for a moderate complexity physical therapy initial evaluation  The patient demonstrates signs and symptoms consistent with R sciatica  During the examination the patient demonstrated decreased core strength, decreased lumbar ROM, activity intolerance, and L lateral hip pain  The patient's impairments are causing the following functional limitations: difficulty with prolonged standing, prolonged walking, difficulty walking on unlevel surfaces, and difficulty standing at work  The patient's clinical presentation is evolving due to a number of participation restrictions, significant medial history, and functional limitation (FOTO 58% function)  The patient will benefit from skilled PT services to address impairments, work towards goals, and restore PLOF  Impairments: abnormal or restricted ROM, activity intolerance, impaired balance, impaired physical strength, lacks appropriate home exercise program and pain with function  Functional limitations: difficulty with prolonged standing, prolonged walking, difficulty bending/stooping, difficulty lifting/carrying objects, walking on unlevel surfaces, difficulty squatting/kneeling, difficulty stair-climbing, and difficulty transferring from low surfaces  Symptom irritability: moderateUnderstanding of Dx/Px/POC: good   Prognosis: good    Goals  STG: Achieve in 4-6 weeks  1  Decrease L hip/LE pain at worst by 50% to improve activity tolerance for self/care and leisure activities    2   Improve core strength by 1/2 muscle grade or " good activation" to improve ability to stand without pain L LE   3   Improve lumbar flexion ROM to " [facility] to facilitate normal movement patterns for ADL's/work tasks  LTG: Achieve in 8-12 weeks  1  Patient's FOTO score improve to > 75% to indicate a return to normal functioning  2   Patient tolerate standing/walking without L hip pain > 2/10 to achieve patient specific goal   3   Patient achieve independence with performing home exercise plan  Plan  Plan details: RE-ASSESS 1X/MONTH  Patient would benefit from: skilled physical therapy  Planned modality interventions: cryotherapy, thermotherapy: hydrocollator packs, ultrasound and TENS  Other planned modality interventions: IAS  Planned therapy interventions: joint mobilization, manual therapy, massage, neuromuscular re-education, patient education, postural training, self care, strengthening, stretching, therapeutic activities, therapeutic exercise, home exercise program, abdominal trunk stabilization, activity modification, balance, body mechanics training and gait training  Frequency: 1-3x/wk  Duration in weeks: 12  Plan of Care beginning date: 7/21/2022  Plan of Care expiration date: 10/19/2022  Treatment plan discussed with: PTA and patient        Subjective Evaluation    History of Present Illness  Date of onset: 6/21/2022  Mechanism of injury: Anuradha Sage is a 41 y o  female that presents to outpatient physical therapy with complaints of L lateral hip pain which radiates as low as her L medial knee  The patient notes onset of pain 1 month ago without known cause  The patient saw her PCP and was prescribed flexeril which has helped but the patient takes infrequently due to her work schedule  The patient was also prescribed prednisone which did not change her symptoms  The patient has intermittent low back pain  The patient reports difficulty with walking long and short distances, prolonged standing    The patient denies difficulty with prolonged sitting, bending, lifting/carrying items  The patient's main goal for physical therapy is to be pain free and walk without discomfort       Pain  Current pain ratin  At best pain ratin  At worst pain ratin  Location: L lateral hip to L medial knee  Quality: dull ache  Aggravating factors: standing and walking  Progression: no change    Social Support  Steps to enter house: yes  Stairs in house: yes   Lives in: multiple-level home  Lives with: significant other    Employment status: working (ER nurse)  Hand dominance: right    Treatments  Previous treatment: medication  Current treatment: physical therapy  Patient Goals  Patient goals for therapy: decreased pain, increased motion, increased strength, independence with ADLs/IADLs and return to sport/leisure activities  Patient goal: decrease pain and walk normally        Objective     Concurrent Complaints  Negative for night pain, disturbed sleep, bladder dysfunction, bowel dysfunction, saddle (S4) numbness, history of cancer, history of trauma and infection    Postural Observations  Seated posture: fair  Standing posture: fair        Neurological Testing     Sensation     Lumbar   Left   Intact: light touch    Right   Intact: light touch    Reflexes   Left   Patellar (L4): normal (2+)  Achilles (S1): normal (2+)  Babinski sign: negative    Right   Patellar (L4): normal (2+)  Achilles (S1): normal (2+)  Babinski sign: negative    Active Range of Motion     Lumbar   Flexion:  Restriction level: moderate  Extension:  with pain Restriction level: minimal  Left lateral flexion:  with pain Restriction level: minimal  Right lateral flexion:  Restriction level: minimal  Mechanical Assessment    Cervical      Thoracic      Lumbar    Lying flexion: repeated movements  Pain intensity: better  Pain level: abolished  Sitting flexion: repeated movements  Pain location: no change  Pain intensity: better    Strength/Myotome Testing     Left Hip   Planes of Motion   Flexion: 4  Extension: 4  Abduction: 4  Adduction: 4    Right Hip   Planes of Motion   Flexion: 4  Extension: 4  Abduction: 4  Adduction: 4    Left Knee   Flexion: 4+  Extension: 4+    Right Knee   Flexion: 4+  Extension: 4+    Left Ankle/Foot   Dorsiflexion: 5  Plantar flexion: 5  Great toe extension: 5    Right Ankle/Foot   Dorsiflexion: 5  Plantar flexion: 5  Great toe extension: 5    Muscle Activation     Additional Muscle Activation Details  Decreased TrA, multifidus, gluteal activation with single leg activities / SLR      Tests     Left Hip   Negative FADIR  Right Hip   Negative FADIR       Additional Tests Details  FOTO: 58% ( predicted 74%)    SLB: mild difficulty and pain at L leg                 Precautions: RA  RE-EVAL:8/18  Access Code: [email or web address]     Specialty Daily Treatment Diary     Manual  7/21       STM lumbar paraspinals                Hamstring stretch B/L        Piriformis Stretch B/L        Prone Quad stretch            Therapeutic Exercise 7/21       Treadmill Ambulation        UBE Stand ALT FWD/BACK        Nu STep  *                              seated hamstring stretch B/L *       Supine D K->C x10       Seated repeated lumbar flexion x10               LTR cross legs *                       Lean on mat donkey kicks        Neuro Re-ed        Core brace knees bent *       kegels sit        PPT * stand/sit       Brace with knee fallouts  *      Hollow hold   *     Quad knee lifts        Clam shells  *      Dead bug  *      MTP/LTP/ Anti-rotations        Movement precautions DONE AD       Sit ball marches        SLB        Posture corrections seated        Gait Train        Sidestepping        Heel toe Amb                        Therapeutic Activity        Steps ups fwd/side        Crate carry        Crate lifts 3 levels        Lunges        Chair squats            Modalities        MHP/CP to L/B                                The patient was given a new home exercise plan with written handout, pictures, and verbal instruction  The patient accepts and understands the new home activities

## 2022-07-21 ENCOUNTER — EVALUATION (OUTPATIENT)
Dept: PHYSICAL THERAPY | Facility: CLINIC | Age: 47
End: 2022-07-21
Payer: COMMERCIAL

## 2022-07-21 DIAGNOSIS — M54.31 RIGHT SIDED SCIATICA: Primary | ICD-10-CM

## 2022-07-21 PROCEDURE — 97162 PT EVAL MOD COMPLEX 30 MIN: CPT | Performed by: PHYSICAL THERAPIST

## 2022-07-21 PROCEDURE — 97110 THERAPEUTIC EXERCISES: CPT | Performed by: PHYSICAL THERAPIST

## 2022-08-15 ENCOUNTER — OFFICE VISIT (OUTPATIENT)
Dept: INTERNAL MEDICINE CLINIC | Facility: CLINIC | Age: 47
End: 2022-08-15
Payer: COMMERCIAL

## 2022-08-15 VITALS
WEIGHT: 210.5 LBS | OXYGEN SATURATION: 96 % | BODY MASS INDEX: 35.94 KG/M2 | SYSTOLIC BLOOD PRESSURE: 122 MMHG | HEIGHT: 64 IN | HEART RATE: 89 BPM | DIASTOLIC BLOOD PRESSURE: 76 MMHG | TEMPERATURE: 97.9 F

## 2022-08-15 DIAGNOSIS — Z12.11 SCREENING FOR COLON CANCER: ICD-10-CM

## 2022-08-15 DIAGNOSIS — Z13.6 SCREENING FOR CARDIOVASCULAR CONDITION: ICD-10-CM

## 2022-08-15 DIAGNOSIS — J32.4 CHRONIC PANSINUSITIS: ICD-10-CM

## 2022-08-15 DIAGNOSIS — F41.9 ANXIETY AND DEPRESSION: ICD-10-CM

## 2022-08-15 DIAGNOSIS — F32.A ANXIETY AND DEPRESSION: ICD-10-CM

## 2022-08-15 DIAGNOSIS — Z13.1 SCREENING FOR DIABETES MELLITUS: ICD-10-CM

## 2022-08-15 DIAGNOSIS — M06.9 RHEUMATOID ARTHRITIS, INVOLVING UNSPECIFIED SITE, UNSPECIFIED WHETHER RHEUMATOID FACTOR PRESENT (HCC): ICD-10-CM

## 2022-08-15 DIAGNOSIS — Z12.4 SCREENING FOR CERVICAL CANCER: ICD-10-CM

## 2022-08-15 DIAGNOSIS — Z00.00 ROUTINE ADULT HEALTH MAINTENANCE: Primary | ICD-10-CM

## 2022-08-15 DIAGNOSIS — E04.1 THYROID NODULE, UNINODULAR: ICD-10-CM

## 2022-08-15 PROCEDURE — 99396 PREV VISIT EST AGE 40-64: CPT | Performed by: NURSE PRACTITIONER

## 2022-08-15 NOTE — PROGRESS NOTES
Assessment/Plan: Will repeat fasting labs  Will give script for GYN  Will do cologuard  Follows up with Rheumatology and ENT  Will notify once labs are back  Will follow up in one year or sooner if need be  No problem-specific Assessment & Plan notes found for this encounter  Problem List Items Addressed This Visit        Endocrine    Thyroid nodule, uninodular    Relevant Orders    Comprehensive metabolic panel    CBC and differential    TSH, 3rd generation with Free T4 reflex       Respiratory    Chronic pansinusitis    Relevant Orders    Comprehensive metabolic panel    CBC and differential    TSH, 3rd generation with Free T4 reflex       Musculoskeletal and Integument    RA (rheumatoid arthritis) (HCC)    Relevant Orders    Comprehensive metabolic panel    CBC and differential    TSH, 3rd generation with Free T4 reflex       Other    Anxiety and depression    Relevant Orders    Comprehensive metabolic panel    CBC and differential    TSH, 3rd generation with Free T4 reflex    Routine adult health maintenance - Primary    Relevant Orders    Comprehensive metabolic panel    CBC and differential    TSH, 3rd generation with Free T4 reflex    Screening for cervical cancer    Relevant Orders    Ambulatory Referral to Gynecology    Screening for diabetes mellitus    Relevant Orders    HEMOGLOBIN A1C W/ EAG ESTIMATION    Screening for cardiovascular condition    Relevant Orders    Lipid panel    Screening for colon cancer    Relevant Orders    Cologuard            Subjective:      Patient ID: Barbie Dunham is a 41 y o  female  Azul Johnson is for a routine physical  She is doing well and did get a job working from home  She is feeling less stressed and much better  She is following up with ENT and Rheumatology coming up  She does need a new referral to GYN  She denies any chest pain, SOB, or palpitations  She denies any depression and her anxiety is much better  She is willing to do a Cologuard   She offers no other issues  The following portions of the patient's history were reviewed and updated as appropriate: She  has a past medical history of Anxiety, Depression, Rheumatoid arthritis ([address] ), Sjoegren syndrome, and Thyroid nodule  She   Patient Active Problem List    Diagnosis Date Noted    Routine adult health maintenance 08/15/2022    Screening for cervical cancer 08/15/2022    Screening for diabetes mellitus 08/15/2022    Screening for cardiovascular condition 08/15/2022    Screening for colon cancer 08/15/2022    Right sided sciatica 06/30/2022    Anxiety and depression 12/16/2021    Class 1 obesity 07/29/2021    Chronic frontal sinusitis 03/24/2021    Herpes labialis 04/27/2020    Severe frontal headaches 02/13/2020    Chronic pansinusitis 01/15/2020    Deviated nasal septum 01/15/2020    Hypertrophy of both inferior nasal turbinates 01/15/2020    Tendonitis 06/25/2019    Allergic rhinitis 06/29/2018    Anxiety 06/29/2018    Inflammatory polyarthropathy ([address] ) 06/29/2018    RA (rheumatoid arthritis) ([address] ) 06/29/2018    Depressed mood 02/14/2017    Secondary adrenal insufficiency ([address] ) 02/14/2017    Thyroid nodule, uninodular 02/14/2017    Sjogren's syndrome ([address] ) 12/31/2016    Acute adrenal insufficiency ([address] ) 12/30/2016    Anemia 12/19/2016    Leucocytosis 12/17/2016    Varicose vein 03/10/2015     She  has a past surgical history that includes Cholecystectomy; Venous ablation (Left); pr repair of nasal septum (N/A, 6/30/2020); and Nasal/sinus endoscopy (N/A, 6/30/2020)  Her family history includes Breast cancer (age of onset: 64) in her mother; Diabetes in her father; Esophageal cancer in her father; Liver cancer in her mother; No Known Problems in her brother, brother, daughter, maternal aunt, maternal aunt, maternal aunt, maternal grandmother, paternal grandmother, son, and son; Rheum arthritis in her mother; Sjogren's syndrome in her mother; Stroke in her other    She  reports that she quit smoking about 16 years ago  She has a 20 00 pack-year smoking history  She has never used smokeless tobacco  She reports current alcohol use  She reports that she does not use drugs  Current Outpatient Medications   Medication Sig Dispense Refill    citalopram (CeleXA) 10 mg tablet Take 1 tablet (10 mg total) by mouth daily 90 tablet 3    cyclobenzaprine (FLEXERIL) 10 mg tablet Take one tablet by mouth every 12 hours as needed 60 tablet 0    fluticasone (FLONASE) 50 mcg/act nasal spray 1 spray into each nostril 2 (two) times a day 16 g 6    hydroxychloroquine (PLAQUENIL) 200 mg tablet Take 1 tablet (200 mg total) by mouth 2 (two) times a day 180 tablet 1    ibuprofen (MOTRIN) 200 mg tablet Take 800 mg by mouth every 6 (six) hours as needed for mild pain      levonorgestrel (MIRENA) 20 MCG/24HR IUD 1 each by Intrauterine route once      valACYclovir (VALTREX) 1,000 mg tablet Take 2 tablets by mouth every 12 hours x 1 days (Patient taking differently: if needed Take 2 tablets by mouth every 12 hours x 1 days) 4 tablet 0    acetaminophen (TYLENOL) 500 mg tablet Take 1,000 mg by mouth every 6 (six) hours as needed for mild pain (Patient not taking: No sig reported)      ALPRAZolam (XANAX) 0 25 mg tablet Take 1 tablet (0 25 mg total) by mouth daily at bedtime as needed for anxiety (Patient not taking: No sig reported) 15 tablet 0    Insulin Pen Needle 32G X 5 MM MISC Use daily as directed (Patient not taking: No sig reported) 90 each 0    liraglutide (SAXENDA) injection Inject 0 6 mg subcutaneously once per day for the first week  Increase in weekly intervals by 0 6 mg until a dose of 3 mg is reached (Patient not taking: No sig reported) 15 mL 1    predniSONE 10 mg tablet Take 5 tablets by mouth daily for 5 days (Patient not taking: Reported on 8/15/2022) 25 tablet 0     No current facility-administered medications for this visit       Current Outpatient Medications on File Prior to Visit Medication Sig    citalopram (CeleXA) 10 mg tablet Take 1 tablet (10 mg total) by mouth daily    cyclobenzaprine (FLEXERIL) 10 mg tablet Take one tablet by mouth every 12 hours as needed    fluticasone (FLONASE) 50 mcg/act nasal spray 1 spray into each nostril 2 (two) times a day    hydroxychloroquine (PLAQUENIL) 200 mg tablet Take 1 tablet (200 mg total) by mouth 2 (two) times a day    ibuprofen (MOTRIN) 200 mg tablet Take 800 mg by mouth every 6 (six) hours as needed for mild pain    levonorgestrel (MIRENA) 20 MCG/24HR IUD 1 each by Intrauterine route once    valACYclovir (VALTREX) 1,000 mg tablet Take 2 tablets by mouth every 12 hours x 1 days (Patient taking differently: if needed Take 2 tablets by mouth every 12 hours x 1 days)    acetaminophen (TYLENOL) 500 mg tablet Take 1,000 mg by mouth every 6 (six) hours as needed for mild pain (Patient not taking: No sig reported)    ALPRAZolam (XANAX) 0 25 mg tablet Take 1 tablet (0 25 mg total) by mouth daily at bedtime as needed for anxiety (Patient not taking: No sig reported)    Insulin Pen Needle 32G X 5 MM MISC Use daily as directed (Patient not taking: No sig reported)    liraglutide (SAXENDA) injection Inject 0 6 mg subcutaneously once per day for the first week  Increase in weekly intervals by 0 6 mg until a dose of 3 mg is reached (Patient not taking: No sig reported)    predniSONE 10 mg tablet Take 5 tablets by mouth daily for 5 days (Patient not taking: Reported on 8/15/2022)     No current facility-administered medications on file prior to visit  She is allergic to doxycycline       Review of Systems   All other systems reviewed and are negative  Objective:      /76 (BP Location: Left arm, Patient Position: Sitting, Cuff Size: Standard)   Pulse 89   Temp 97 9 °F (36 6 °C)   Ht 5' 4" (1 626 m)   Wt 95 5 kg (210 lb 8 oz)   SpO2 96%   BMI 36 13 kg/m²          Physical Exam  Vitals reviewed     Constitutional: Appearance: Normal appearance  She is normal weight  HENT:      Head: Normocephalic and atraumatic  Right Ear: Tympanic membrane, ear canal and external ear normal       Left Ear: Tympanic membrane, ear canal and external ear normal       Nose: Nose normal       Mouth/Throat:      Mouth: Mucous membranes are moist       Pharynx: Oropharynx is clear  Eyes:      Extraocular Movements: Extraocular movements intact  Conjunctiva/sclera: Conjunctivae normal       Pupils: Pupils are equal, round, and reactive to light  Cardiovascular:      Rate and Rhythm: Normal rate and regular rhythm  Pulses: Normal pulses  Heart sounds: Normal heart sounds  Pulmonary:      Effort: Pulmonary effort is normal       Breath sounds: Normal breath sounds  Abdominal:      General: Abdomen is flat  Bowel sounds are normal       Palpations: Abdomen is soft  Musculoskeletal:         General: Normal range of motion  Cervical back: Normal range of motion and neck supple  Skin:     General: Skin is warm and dry  Capillary Refill: Capillary refill takes less than 2 seconds  Neurological:      General: No focal deficit present  Mental Status: She is alert and oriented to person, place, and time  Mental status is at baseline  Psychiatric:         Mood and Affect: Mood normal          Behavior: Behavior normal          Thought Content:  Thought content normal          Judgment: Judgment normal

## 2022-08-27 ENCOUNTER — APPOINTMENT (OUTPATIENT)
Dept: LAB | Facility: HOSPITAL | Age: 47
End: 2022-08-27
Payer: COMMERCIAL

## 2022-08-27 DIAGNOSIS — Z00.8 HEALTH EXAMINATION IN POPULATION SURVEY: ICD-10-CM

## 2022-08-27 DIAGNOSIS — E04.1 THYROID NODULE, UNINODULAR: ICD-10-CM

## 2022-08-27 DIAGNOSIS — F32.A ANXIETY AND DEPRESSION: ICD-10-CM

## 2022-08-27 DIAGNOSIS — M06.9 RHEUMATOID ARTHRITIS, INVOLVING UNSPECIFIED SITE, UNSPECIFIED WHETHER RHEUMATOID FACTOR PRESENT (HCC): ICD-10-CM

## 2022-08-27 DIAGNOSIS — J32.4 CHRONIC PANSINUSITIS: ICD-10-CM

## 2022-08-27 DIAGNOSIS — Z00.00 ROUTINE ADULT HEALTH MAINTENANCE: ICD-10-CM

## 2022-08-27 DIAGNOSIS — Z13.1 SCREENING FOR DIABETES MELLITUS: ICD-10-CM

## 2022-08-27 DIAGNOSIS — Z13.6 SCREENING FOR CARDIOVASCULAR CONDITION: ICD-10-CM

## 2022-08-27 DIAGNOSIS — F41.9 ANXIETY AND DEPRESSION: ICD-10-CM

## 2022-08-27 LAB
ALBUMIN SERPL BCP-MCNC: 4.4 G/DL (ref 3.5–5)
ALP SERPL-CCNC: 56 U/L (ref 34–104)
ALT SERPL W P-5'-P-CCNC: 7 U/L (ref 7–52)
ANION GAP SERPL CALCULATED.3IONS-SCNC: 9 MMOL/L (ref 4–13)
AST SERPL W P-5'-P-CCNC: 8 U/L (ref 13–39)
BASOPHILS # BLD AUTO: 0.07 THOUSANDS/ΜL (ref 0–0.1)
BASOPHILS NFR BLD AUTO: 1 % (ref 0–1)
BILIRUB SERPL-MCNC: 0.49 MG/DL (ref 0.2–1)
BUN SERPL-MCNC: 9 MG/DL (ref 5–25)
CALCIUM SERPL-MCNC: 9.1 MG/DL (ref 8.4–10.2)
CHLORIDE SERPL-SCNC: 104 MMOL/L (ref 96–108)
CHOLEST SERPL-MCNC: 173 MG/DL
CO2 SERPL-SCNC: 24 MMOL/L (ref 21–32)
CREAT SERPL-MCNC: 0.74 MG/DL (ref 0.6–1.3)
EOSINOPHIL # BLD AUTO: 0.75 THOUSAND/ΜL (ref 0–0.61)
EOSINOPHIL NFR BLD AUTO: 7 % (ref 0–6)
ERYTHROCYTE [DISTWIDTH] IN BLOOD BY AUTOMATED COUNT: 11.9 % (ref 11.6–15.1)
EST. AVERAGE GLUCOSE BLD GHB EST-MCNC: 108 MG/DL
GFR SERPL CREATININE-BSD FRML MDRD: 98 ML/MIN/1.73SQ M
GLUCOSE SERPL-MCNC: 92 MG/DL (ref 65–140)
HBA1C MFR BLD: 5.4 %
HCT VFR BLD AUTO: 40 % (ref 34.8–46.1)
HDLC SERPL-MCNC: 42 MG/DL
HGB BLD-MCNC: 12.9 G/DL (ref 11.5–15.4)
IMM GRANULOCYTES # BLD AUTO: 0.04 THOUSAND/UL (ref 0–0.2)
IMM GRANULOCYTES NFR BLD AUTO: 0 % (ref 0–2)
LDLC SERPL CALC-MCNC: 115 MG/DL (ref 0–100)
LYMPHOCYTES # BLD AUTO: 2.33 THOUSANDS/ΜL (ref 0.6–4.47)
LYMPHOCYTES NFR BLD AUTO: 23 % (ref 14–44)
MCH RBC QN AUTO: 31.2 PG (ref 26.8–34.3)
MCHC RBC AUTO-ENTMCNC: 32.3 G/DL (ref 31.4–37.4)
MCV RBC AUTO: 97 FL (ref 82–98)
MONOCYTES # BLD AUTO: 0.78 THOUSAND/ΜL (ref 0.17–1.22)
MONOCYTES NFR BLD AUTO: 8 % (ref 4–12)
NEUTROPHILS # BLD AUTO: 6.2 THOUSANDS/ΜL (ref 1.85–7.62)
NEUTS SEG NFR BLD AUTO: 61 % (ref 43–75)
NONHDLC SERPL-MCNC: 131 MG/DL
NRBC BLD AUTO-RTO: 0 /100 WBCS
PLATELET # BLD AUTO: 303 THOUSANDS/UL (ref 149–390)
PMV BLD AUTO: 10.8 FL (ref 8.9–12.7)
POTASSIUM SERPL-SCNC: 3.8 MMOL/L (ref 3.5–5.3)
PROT SERPL-MCNC: 7.5 G/DL (ref 6.4–8.4)
RBC # BLD AUTO: 4.13 MILLION/UL (ref 3.81–5.12)
SODIUM SERPL-SCNC: 137 MMOL/L (ref 135–147)
TRIGL SERPL-MCNC: 79 MG/DL
TSH SERPL DL<=0.05 MIU/L-ACNC: 2.42 UIU/ML (ref 0.45–4.5)
WBC # BLD AUTO: 10.17 THOUSAND/UL (ref 4.31–10.16)

## 2022-08-27 PROCEDURE — 84443 ASSAY THYROID STIM HORMONE: CPT

## 2022-08-27 PROCEDURE — 36415 COLL VENOUS BLD VENIPUNCTURE: CPT

## 2022-08-27 PROCEDURE — 83036 HEMOGLOBIN GLYCOSYLATED A1C: CPT

## 2022-08-27 PROCEDURE — 80061 LIPID PANEL: CPT

## 2022-08-27 PROCEDURE — 80053 COMPREHEN METABOLIC PANEL: CPT

## 2022-08-27 PROCEDURE — 85025 COMPLETE CBC W/AUTO DIFF WBC: CPT

## 2022-09-03 ENCOUNTER — APPOINTMENT (EMERGENCY)
Dept: CT IMAGING | Facility: HOSPITAL | Age: 47
End: 2022-09-03
Payer: COMMERCIAL

## 2022-09-03 ENCOUNTER — HOSPITAL ENCOUNTER (EMERGENCY)
Facility: HOSPITAL | Age: 47
Discharge: HOME | End: 2022-09-03
Attending: EMERGENCY MEDICINE
Payer: COMMERCIAL

## 2022-09-03 VITALS
TEMPERATURE: 97.7 F | DIASTOLIC BLOOD PRESSURE: 74 MMHG | HEART RATE: 89 BPM | HEIGHT: 64 IN | OXYGEN SATURATION: 98 % | BODY MASS INDEX: 35.85 KG/M2 | WEIGHT: 210 LBS | SYSTOLIC BLOOD PRESSURE: 125 MMHG | RESPIRATION RATE: 18 BRPM

## 2022-09-03 DIAGNOSIS — R10.9 FLANK PAIN: ICD-10-CM

## 2022-09-03 DIAGNOSIS — N23 RENAL COLIC ON RIGHT SIDE: Primary | ICD-10-CM

## 2022-09-03 LAB
ALBUMIN SERPL BCP-MCNC: 4.5 G/DL (ref 3.5–5)
ALP SERPL-CCNC: 61 U/L (ref 34–104)
ALT SERPL W P-5'-P-CCNC: 12 U/L (ref 7–52)
ANION GAP SERPL CALCULATED.3IONS-SCNC: 11 MMOL/L (ref 4–13)
AST SERPL W P-5'-P-CCNC: 12 U/L (ref 13–39)
BASOPHILS # BLD AUTO: 0.09 THOUSANDS/ΜL (ref 0–0.1)
BASOPHILS NFR BLD AUTO: 1 % (ref 0–1)
BILIRUB SERPL-MCNC: 0.44 MG/DL (ref 0.2–1)
BILIRUB UR QL STRIP: NEGATIVE
BUN SERPL-MCNC: 18 MG/DL (ref 5–25)
CALCIUM SERPL-MCNC: 9.5 MG/DL (ref 8.4–10.2)
CHLORIDE SERPL-SCNC: 103 MMOL/L (ref 96–108)
CLARITY UR: ABNORMAL
CO2 SERPL-SCNC: 24 MMOL/L (ref 21–32)
COLOR UR: YELLOW
CREAT SERPL-MCNC: 0.93 MG/DL (ref 0.6–1.3)
EOSINOPHIL # BLD AUTO: 0.92 THOUSAND/ΜL (ref 0–0.61)
EOSINOPHIL NFR BLD AUTO: 7 % (ref 0–6)
ERYTHROCYTE [DISTWIDTH] IN BLOOD BY AUTOMATED COUNT: 11.9 % (ref 11.6–15.1)
EXT PREG TEST URINE: NEGATIVE
EXT. CONTROL ED NAV: NORMAL
GFR SERPL CREATININE-BSD FRML MDRD: 74 ML/MIN/1.73SQ M
GLUCOSE SERPL-MCNC: 79 MG/DL (ref 65–140)
GLUCOSE UR STRIP-MCNC: NEGATIVE MG/DL
HCT VFR BLD AUTO: 40.4 % (ref 34.8–46.1)
HGB BLD-MCNC: 12.9 G/DL (ref 11.5–15.4)
HGB UR QL STRIP.AUTO: NEGATIVE
IMM GRANULOCYTES # BLD AUTO: 0.05 THOUSAND/UL (ref 0–0.2)
IMM GRANULOCYTES NFR BLD AUTO: 0 % (ref 0–2)
KETONES UR STRIP-MCNC: NEGATIVE MG/DL
LEUKOCYTE ESTERASE UR QL STRIP: NEGATIVE
LYMPHOCYTES # BLD AUTO: 3.43 THOUSANDS/ΜL (ref 0.6–4.47)
LYMPHOCYTES NFR BLD AUTO: 26 % (ref 14–44)
MCH RBC QN AUTO: 31.2 PG (ref 26.8–34.3)
MCHC RBC AUTO-ENTMCNC: 31.9 G/DL (ref 31.4–37.4)
MCV RBC AUTO: 98 FL (ref 82–98)
MONOCYTES # BLD AUTO: 1.34 THOUSAND/ΜL (ref 0.17–1.22)
MONOCYTES NFR BLD AUTO: 10 % (ref 4–12)
NEUTROPHILS # BLD AUTO: 7.4 THOUSANDS/ΜL (ref 1.85–7.62)
NEUTS SEG NFR BLD AUTO: 56 % (ref 43–75)
NITRITE UR QL STRIP: NEGATIVE
NRBC BLD AUTO-RTO: 0 /100 WBCS
PH UR STRIP.AUTO: 6 [PH]
PLATELET # BLD AUTO: 339 THOUSANDS/UL (ref 149–390)
PMV BLD AUTO: 10.9 FL (ref 8.9–12.7)
POTASSIUM SERPL-SCNC: 4.2 MMOL/L (ref 3.5–5.3)
PROT SERPL-MCNC: 7.6 G/DL (ref 6.4–8.4)
PROT UR STRIP-MCNC: NEGATIVE MG/DL
RBC # BLD AUTO: 4.13 MILLION/UL (ref 3.81–5.12)
SODIUM SERPL-SCNC: 138 MMOL/L (ref 135–147)
SP GR UR STRIP.AUTO: >=1.03 (ref 1–1.03)
UROBILINOGEN UR QL STRIP.AUTO: 0.2 E.U./DL
WBC # BLD AUTO: 13.23 THOUSAND/UL (ref 4.31–10.16)

## 2022-09-03 PROCEDURE — 96361 HYDRATE IV INFUSION ADD-ON: CPT

## 2022-09-03 PROCEDURE — 36415 COLL VENOUS BLD VENIPUNCTURE: CPT | Performed by: EMERGENCY MEDICINE

## 2022-09-03 PROCEDURE — 81025 URINE PREGNANCY TEST: CPT | Performed by: EMERGENCY MEDICINE

## 2022-09-03 PROCEDURE — 80053 COMPREHEN METABOLIC PANEL: CPT | Performed by: EMERGENCY MEDICINE

## 2022-09-03 PROCEDURE — 96374 THER/PROPH/DIAG INJ IV PUSH: CPT

## 2022-09-03 PROCEDURE — 87086 URINE CULTURE/COLONY COUNT: CPT | Performed by: EMERGENCY MEDICINE

## 2022-09-03 PROCEDURE — 99284 EMERGENCY DEPT VISIT MOD MDM: CPT | Performed by: EMERGENCY MEDICINE

## 2022-09-03 PROCEDURE — 81003 URINALYSIS AUTO W/O SCOPE: CPT | Performed by: EMERGENCY MEDICINE

## 2022-09-03 PROCEDURE — 85025 COMPLETE CBC W/AUTO DIFF WBC: CPT | Performed by: EMERGENCY MEDICINE

## 2022-09-03 PROCEDURE — G1004 CDSM NDSC: HCPCS

## 2022-09-03 PROCEDURE — 74176 CT ABD & PELVIS W/O CONTRAST: CPT

## 2022-09-03 PROCEDURE — 99284 EMERGENCY DEPT VISIT MOD MDM: CPT

## 2022-09-03 RX ORDER — ACETAMINOPHEN 325 MG/1
975 TABLET ORAL ONCE
Status: COMPLETED | OUTPATIENT
Start: 2022-09-03 | End: 2022-09-03

## 2022-09-03 RX ORDER — KETOROLAC TROMETHAMINE 30 MG/ML
15 INJECTION, SOLUTION INTRAMUSCULAR; INTRAVENOUS ONCE
Status: COMPLETED | OUTPATIENT
Start: 2022-09-03 | End: 2022-09-03

## 2022-09-03 RX ORDER — CEPHALEXIN 500 MG/1
500 CAPSULE ORAL ONCE
Status: COMPLETED | OUTPATIENT
Start: 2022-09-03 | End: 2022-09-03

## 2022-09-03 RX ORDER — CEPHALEXIN 500 MG/1
500 CAPSULE ORAL EVERY 6 HOURS SCHEDULED
Qty: 20 CAPSULE | Refills: 0 | Status: SHIPPED | OUTPATIENT
Start: 2022-09-03 | End: 2022-09-03 | Stop reason: SDUPTHER

## 2022-09-03 RX ORDER — OXYBUTYNIN CHLORIDE 5 MG/1
5 TABLET ORAL 3 TIMES DAILY
Qty: 21 TABLET | Refills: 0 | Status: SHIPPED | OUTPATIENT
Start: 2022-09-03 | End: 2022-09-13 | Stop reason: CLARIF

## 2022-09-03 RX ORDER — CEPHALEXIN 500 MG/1
500 CAPSULE ORAL EVERY 6 HOURS SCHEDULED
Qty: 20 CAPSULE | Refills: 0 | Status: SHIPPED | OUTPATIENT
Start: 2022-09-03 | End: 2022-09-08

## 2022-09-03 RX ADMIN — SODIUM CHLORIDE 1000 ML: 0.9 INJECTION, SOLUTION INTRAVENOUS at 21:39

## 2022-09-03 RX ADMIN — KETOROLAC TROMETHAMINE 15 MG: 30 INJECTION, SOLUTION INTRAMUSCULAR at 21:42

## 2022-09-03 RX ADMIN — ACETAMINOPHEN 975 MG: 325 TABLET ORAL at 21:43

## 2022-09-03 RX ADMIN — CEPHALEXIN 500 MG: 500 CAPSULE ORAL at 22:52

## 2022-09-04 NOTE — ED PROVIDER NOTES
History  Chief Complaint   Patient presents with    Abdominal Pain     Pain started today, goes from right abd to right back  No n/v     Patient is a 51-year-old female who presents for evaluation of right flank pain  Patient says that for the last few weeks she has been having increased urinary frequency  She says that starting tonight she has started developed right flank pain and right-sided abdominal pain  She also admits to some burning with urination  She denies fevers, chills, nausea or vomiting  She denies chest pain, shortness of breath, lightheadedness, dizziness  Prior to Admission Medications   Prescriptions Last Dose Informant Patient Reported? Taking? ALPRAZolam (XANAX) 0 25 mg tablet   No No   Sig: Take 1 tablet (0 25 mg total) by mouth daily at bedtime as needed for anxiety   Patient not taking: No sig reported   Insulin Pen Needle 32G X 5 MM MISC   No No   Sig: Use daily as directed   Patient not taking: No sig reported   acetaminophen (TYLENOL) 500 mg tablet  Self Yes No   Sig: Take 1,000 mg by mouth every 6 (six) hours as needed for mild pain   Patient not taking: No sig reported   citalopram (CeleXA) 10 mg tablet   No No   Sig: Take 1 tablet (10 mg total) by mouth daily   cyclobenzaprine (FLEXERIL) 10 mg tablet   No No   Sig: Take one tablet by mouth every 12 hours as needed   fluticasone (FLONASE) 50 mcg/act nasal spray   No No   Si spray into each nostril 2 (two) times a day   hydroxychloroquine (PLAQUENIL) 200 mg tablet   No No   Sig: Take 1 tablet (200 mg total) by mouth 2 (two) times a day   ibuprofen (MOTRIN) 200 mg tablet  Self Yes No   Sig: Take 800 mg by mouth every 6 (six) hours as needed for mild pain   levonorgestrel (MIRENA) 20 MCG/24HR IUD  Self Yes No   Si each by Intrauterine route once   liraglutide (SAXENDA) injection   No No   Sig: Inject 0 6 mg subcutaneously once per day for the first week   Increase in weekly intervals by 0 6 mg until a dose of 3 mg is reached   Patient not taking: No sig reported   predniSONE 10 mg tablet   No No   Sig: Take 5 tablets by mouth daily for 5 days   Patient not taking: Reported on 8/15/2022   valACYclovir (VALTREX) 1,000 mg tablet   No No   Sig: Take 2 tablets by mouth every 12 hours x 1 days   Patient taking differently: if needed Take 2 tablets by mouth every 12 hours x 1 days      Facility-Administered Medications: None       Past Medical History:   Diagnosis Date    Anxiety     Depression     Rheumatoid arthritis ([facility] [address] )     Sjoegren syndrome     Thyroid nodule        Past Surgical History:   Procedure Laterality Date    CHOLECYSTECTOMY      NASAL/SINUS ENDOSCOPY N/A 6/30/2020    Procedure: IMAGED GUIDED F E S S ;  Surgeon: Augustine Piper DO;  Location: [facility];  Service: ENT    VA REPAIR OF NASAL SEPTUM N/A 6/30/2020    Procedure: SEPTOPLASTY;  Surgeon: Augustine Piper DO;  Location[facility];  Service: ENT    VENOUS ABLATION Left     lower extremity       Family History   Problem Relation Age of Onset    Breast cancer Mother 64    Rheum arthritis Mother     Sjogren's syndrome Mother     Liver cancer Mother     Esophageal cancer Father     Diabetes Father     No Known Problems Brother     No Known Problems Brother     No Known Problems Daughter     No Known Problems Son     No Known Problems Son     No Known Problems Maternal Grandmother     No Known Problems Paternal Grandmother     No Known Problems Maternal Aunt     No Known Problems Maternal Aunt     No Known Problems Maternal Aunt     Stroke Other     Heart disease Neg Hx      I have reviewed and agree with the history as documented      E-Cigarette/Vaping    E-Cigarette Use Never User      E-Cigarette/Vaping Substances    Nicotine No     THC No     CBD No     Flavoring No     Other No     Unknown No      Social History     Tobacco Use    Smoking status: Former Smoker     Packs/day: 1 00     Years: 20 00     Pack years: 20 00     Quit date:      Years since quittin 6    Smokeless tobacco: Never Used   Vaping Use    Vaping Use: Never used   Substance Use Topics    Alcohol use: Yes     Comment: occasional    Drug use: No       Review of Systems   Constitutional: Negative for chills, diaphoresis and fever  HENT: Negative for congestion, sinus pressure, sore throat and trouble swallowing  Eyes: Negative for pain, discharge and itching  Respiratory: Negative for cough, chest tightness, shortness of breath and wheezing  Cardiovascular: Negative for chest pain, palpitations and leg swelling  Gastrointestinal: Negative for abdominal distention, abdominal pain, blood in stool, diarrhea, nausea and vomiting  Endocrine: Negative for polyphagia and polyuria  Genitourinary: Positive for dysuria, flank pain and frequency  Negative for difficulty urinating, hematuria, pelvic pain and vaginal bleeding  Musculoskeletal: Negative for arthralgias and back pain  Skin: Negative for rash  Neurological: Negative for dizziness, syncope, weakness, light-headedness and headaches  Physical Exam  Physical Exam  Vitals and nursing note reviewed  Constitutional:       General: She is not in acute distress  Appearance: She is well-developed  HENT:      Head: Normocephalic and atraumatic  Right Ear: External ear normal       Left Ear: External ear normal       Nose: Nose normal       Mouth/Throat:      Mouth: Mucous membranes are moist       Pharynx: No oropharyngeal exudate  Eyes:      Conjunctiva/sclera: Conjunctivae normal       Pupils: Pupils are equal, round, and reactive to light  Cardiovascular:      Rate and Rhythm: Normal rate and regular rhythm  Heart sounds: Normal heart sounds  No murmur heard  No friction rub  No gallop  Pulmonary:      Effort: Pulmonary effort is normal  No respiratory distress  Breath sounds: Normal breath sounds  No wheezing or rales     Abdominal:      General: There is no distension  Palpations: Abdomen is soft  Tenderness: There is no abdominal tenderness  There is right CVA tenderness  There is no guarding or rebound  Negative signs include Rovsing's sign  Musculoskeletal:         General: No swelling, tenderness or deformity  Normal range of motion  Cervical back: Normal range of motion and neck supple  Lymphadenopathy:      Cervical: No cervical adenopathy  Skin:     General: Skin is warm and dry  Neurological:      General: No focal deficit present  Mental Status: She is alert and oriented to person, place, and time  Mental status is at baseline  Cranial Nerves: No cranial nerve deficit  Sensory: No sensory deficit  Motor: No weakness or abnormal muscle tone  Coordination: Coordination normal          Vital Signs  ED Triage Vitals [09/03/22 2127]   Temperature Pulse Respirations Blood Pressure SpO2   97 7 °F (36 5 °C) 89 18 125/74 98 %      Temp Source Heart Rate Source Patient Position - Orthostatic VS BP Location FiO2 (%)   Oral Monitor Lying Left arm --      Pain Score       5           Vitals:    09/03/22 2127   BP: 125/74   Pulse: 89   Patient Position - Orthostatic VS: Lying         Visual Acuity      ED Medications  Medications   sodium chloride 0 9 % bolus 1,000 mL (0 mL Intravenous Stopped 9/3/22 2251)   ketorolac (TORADOL) injection 15 mg (15 mg Intravenous Given 9/3/22 2142)   acetaminophen (TYLENOL) tablet 975 mg (975 mg Oral Given 9/3/22 2143)   cephalexin (KEFLEX) capsule 500 mg (500 mg Oral Given 9/3/22 2252)       Diagnostic Studies  Results Reviewed     Procedure Component Value Units Date/Time    Urine culture [[id number]] Collected: 09/03/22 2146    Lab Status:  In process Specimen: Urine, Clean Catch Updated: 09/03/22 2243    Comprehensive metabolic panel [[id number]]  (Abnormal) Collected: 09/03/22 2138    Lab Status: Final result Specimen: Blood from Arm, Left Updated: 09/03/22 2204     Sodium 138 mmol/L Potassium 4 2 mmol/L      Chloride 103 mmol/L      CO2 24 mmol/L      ANION GAP 11 mmol/L      BUN 18 mg/dL      Creatinine 0 93 mg/dL      Glucose 79 mg/dL      Calcium 9 5 mg/dL      AST 12 U/L      ALT 12 U/L      Alkaline Phosphatase 61 U/L      Total Protein 7 6 g/dL      Albumin 4 5 g/dL      Total Bilirubin 0 44 mg/dL      eGFR 74 ml/min/1 73sq m     Narrative:      [place] guidelines for Chronic Kidney Disease (CKD):     Stage 1 with normal or high GFR (GFR > 90 mL/min/1 73 square meters)    Stage 2 Mild CKD (GFR = 60-89 mL/min/1 73 square meters)    Stage 3A Moderate CKD (GFR = 45-59 mL/min/1 73 square meters)    Stage 3B Moderate CKD (GFR = 30-44 mL/min/1 73 square meters)    Stage 4 Severe CKD (GFR = 15-29 mL/min/1 73 square meters)    Stage 5 End Stage CKD (GFR <15 mL/min/1 73 square meters)  Note: GFR calculation is accurate only with a steady state creatinine    UA w Reflex to Microscopic w Reflex to Culture [[id number]]  (Abnormal) Collected: 09/03/22 2146    Lab Status: Final result Specimen: Urine, Clean Catch Updated: 09/03/22 2154     Color, UA Yellow     Clarity, UA Hazy     Specific Gravity, UA >=1 030     pH, UA 6 0     Leukocytes, UA Negative     Nitrite, UA Negative     Protein, UA Negative mg/dl      Glucose, UA Negative mg/dl      Ketones, UA Negative mg/dl      Urobilinogen, UA 0 2 E U /dl      Bilirubin, UA Negative     Occult Blood, UA Negative    POCT pregnancy, urine [[id number]]  (Normal) Resulted: 09/03/22 2152    Lab Status: Final result Updated: 09/03/22 2153     EXT PREG TEST UR (Ref: Negative) negative     Control valid lot: [id number] exp: 2024-02-29    CBC and differential [[id number]]  (Abnormal) Collected: 09/03/22 2138    Lab Status: Final result Specimen: Blood from Arm, Left Updated: 09/03/22 2143     WBC 13 23 Thousand/uL      RBC 4 13 Million/uL      Hemoglobin 12 9 g/dL      Hematocrit 40 4 %      MCV 98 fL      MCH 31 2 pg      MCHC 31 9 g/dL      RDW 11 9 %      MPV 10 9 fL      Platelets 681 Thousands/uL      nRBC 0 /100 WBCs      Neutrophils Relative 56 %      Immat GRANS % 0 %      Lymphocytes Relative 26 %      Monocytes Relative 10 %      Eosinophils Relative 7 %      Basophils Relative 1 %      Neutrophils Absolute 7 40 Thousands/µL      Immature Grans Absolute 0 05 Thousand/uL      Lymphocytes Absolute 3 43 Thousands/µL      Monocytes Absolute 1 34 Thousand/µL      Eosinophils Absolute 0 92 Thousand/µL      Basophils Absolute 0 09 Thousands/µL                  CT abdomen pelvis wo contrast   Final Result by Radha Lantigua DO (09/03 2233)      No acute inflammatory process identified  Moderate hiatal hernia  Workstation performed: [id number]                    Procedures  Procedures         ED Course                               SBIRT 22yo+    Flowsheet Row Most Recent Value   SBIRT (23 yo +)    In order to provide better care to our patients, we are screening all of our patients for alcohol and drug use  Would it be okay to ask you these screening questions? Unable to answer at this time Filed at: 09/03/2022 2146                    MDM  Number of Diagnoses or Management Options  Diagnosis management comments: 68-year-old female presenting for evaluation of right flank pain, right-sided abdominal pain  Has been having increased urinary frequency and dysuria over the last few days  No fevers, chills, nausea or vomiting    Does have CVA tenderness on exam   Will obtain basic lab work, CT without contrast   Will give Toradol, IV fluids      Disposition  Final diagnoses:   Renal colic on right side   Flank pain     Time reflects when diagnosis was documented in both MDM as applicable and the Disposition within this note     Time User Action Codes Description Comment    9/3/2022 10:45 PM Dinora Morejon [[id number]] Renal colic on right side     9/3/2022 10:46 PM Dinora Morejon [R10 [id number]] Flank pain       ED Disposition     ED Disposition Discharge    Condition   Stable    Date/Time   Sat Sep 3, 2022 10:45 PM    Comment   Ranronald Estella discharge to home/self care                 Follow-up Information     Follow up With Specialties Details [address], Nurse Practitioner Schedule an appointment as soon as possible for a visit  For follow up of symptoms [facility]  [address]   [phone]            Discharge Medication List as of 9/3/2022 10:48 PM      START taking these medications    Details   oxybutynin (DITROPAN) 5 mg tablet Take 1 tablet (5 mg total) by mouth 3 (three) times a day for 7 days, Starting Sat 9/3/2022, Until Sat 9/10/2022, Normal         CONTINUE these medications which have CHANGED    Details   cephalexin (KEFLEX) 500 mg capsule Take 1 capsule (500 mg total) by mouth every 6 (six) hours for 5 days, Starting Sat 9/3/2022, Until Thu 9/8/2022, Normal         CONTINUE these medications which have NOT CHANGED    Details   acetaminophen (TYLENOL) 500 mg tablet Take 1,000 mg by mouth every 6 (six) hours as needed for mild pain, Historical Med      ALPRAZolam (XANAX) 0 25 mg tablet Take 1 tablet (0 25 mg total) by mouth daily at bedtime as needed for anxiety, Starting Thu 12/16/2021, Normal      citalopram (CeleXA) 10 mg tablet Take 1 tablet (10 mg total) by mouth daily, Starting Thu 12/16/2021, Normal      cyclobenzaprine (FLEXERIL) 10 mg tablet Take one tablet by mouth every 12 hours as needed, Normal      fluticasone (FLONASE) 50 mcg/act nasal spray 1 spray into each nostril 2 (two) times a day, Starting Mon 6/13/2022, Normal      hydroxychloroquine (PLAQUENIL) 200 mg tablet Take 1 tablet (200 mg total) by mouth 2 (two) times a day, Starting Tue 2/22/2022, Until Sun 8/21/2022, Normal      ibuprofen (MOTRIN) 200 mg tablet Take 800 mg by mouth every 6 (six) hours as needed for mild pain, Historical Med      Insulin Pen Needle 32G X 5 MM MISC Use daily as directed, Normal      levonorgestrel (MIRENA) 20 MCG/24HR IUD 1 each by Intrauterine route once, Historical Med      liraglutide (SAXENDA) injection Inject 0 6 mg subcutaneously once per day for the first week  Increase in weekly intervals by 0 6 mg until a dose of 3 mg is reached, Normal      predniSONE 10 mg tablet Take 5 tablets by mouth daily for 5 days, Print      valACYclovir (VALTREX) 1,000 mg tablet Take 2 tablets by mouth every 12 hours x 1 days, Normal             No discharge procedures on file      PDMP Review       Value Time User    PDMP Reviewed  Yes 12/16/2021  3:15 PM Summer Parikh [place]          ED Provider  Electronically Signed by           Gricelda Lincoln DO  09/03/22 6266

## 2022-09-05 LAB — BACTERIA UR CULT: NORMAL

## 2022-09-13 ENCOUNTER — OFFICE VISIT (OUTPATIENT)
Dept: UROLOGY | Facility: CLINIC | Age: 47
End: 2022-09-13
Payer: COMMERCIAL

## 2022-09-13 VITALS
DIASTOLIC BLOOD PRESSURE: 74 MMHG | HEART RATE: 88 BPM | SYSTOLIC BLOOD PRESSURE: 125 MMHG | BODY MASS INDEX: 35.53 KG/M2 | WEIGHT: 207 LBS

## 2022-09-13 DIAGNOSIS — N32.81 OAB (OVERACTIVE BLADDER): Primary | ICD-10-CM

## 2022-09-13 LAB

## 2022-09-13 PROCEDURE — 99203 OFFICE O/P NEW LOW 30 MIN: CPT

## 2022-09-13 PROCEDURE — 81002 URINALYSIS NONAUTO W/O SCOPE: CPT

## 2022-09-13 PROCEDURE — 51798 US URINE CAPACITY MEASURE: CPT

## 2022-09-13 RX ORDER — OXYBUTYNIN CHLORIDE 10 MG/1
10 TABLET, EXTENDED RELEASE ORAL
Qty: 30 TABLET | Refills: 6 | Status: SHIPPED | OUTPATIENT
Start: 2022-09-13

## 2022-09-13 NOTE — PROGRESS NOTES
9/13/2022    Chief Complaint   Patient presents with    OAB       Assessment and Plan    41 y o  female new patient    1  OAB  · Urine dip in office negative for leukocytes, nitrates, or blood  · Recommend changing to Ditropan XL at 10 mg daily  · Avoid bladder irritants specifically coffee  · Recommend PFPT  · Follow-up 6 months      History of Present Illness  Anuradha Sage is a 41 y o  female new patient to office here for evaluation of concerns for possible OAB  She reports intermittent urgency, frequency, dysuria, and suprapubic pressure  This has been ongoing for several years and has attributed this to a UTI but has had negative urine testing  Over the past several months this have become more bothersome and frequent  Urine dip in office today was negative for leukocytes, nitrates, or blood  She also reports occasional MORE with coughing, sneezing, or laughing  About 2 weeks ago she also reports experiencing severe right flank pain with urinary frequency  She presented to ER on 9/03 and CT imaging was negative for any renal calculi and urine testing was negative for infection  She was started on Ditropan 5 mg TID by the ER for OAB  She reports her symptoms have improved since starting Ditropan  She does feel as though she is not emptying her bladder as well and has seen a decrease in her frequency  PVR in office today was 48 mL  She primarily drinks 30 oz of coffee per day, 6 glasses of water per day, occasional diet sprite, occasional wine cooler  GYN Hx: 3 vaginal deliveries            Review of Systems   Constitutional: Negative for chills and fever  HENT: Negative for ear pain and sore throat  Eyes: Negative for pain and visual disturbance  Respiratory: Negative for cough and shortness of breath  Cardiovascular: Negative for chest pain and palpitations  Gastrointestinal: Negative for abdominal pain, constipation, diarrhea, nausea and vomiting     Genitourinary: Positive for frequency, pelvic pain and urgency  Negative for difficulty urinating, dysuria, flank pain and hematuria  Musculoskeletal: Negative for arthralgias and back pain  Skin: Negative for color change and rash  Neurological: Negative for dizziness, seizures, syncope and weakness  All other systems reviewed and are negative  Urinary Incontinence Screening    Flowsheet Row Most Recent Value   Urinary Incontinence    Urinary Incontinence? Yes   Incomplete emptying? No   Urinary frequency? Yes   Urinary urgency? Yes   Urinary hesitancy? No   Dysuria (painful difficult urination)? No   Nocturia (waking up to use the bathroom)? Yes  [1x occ]   Straining (having to push to go)? No   Weak stream? No   Intermittent stream? Yes   Post void dribbling? Yes   Vaginal pressure? No   Vaginal dryness? No              Vitals  Vitals:    09/13/22 1523   BP: 125/74   Pulse: 88   Weight: 93 9 kg (207 lb)       Physical Exam  Vitals reviewed  Constitutional:       General: She is not in acute distress  Appearance: Normal appearance  She is normal weight  She is not ill-appearing or toxic-appearing  HENT:      Head: Normocephalic and atraumatic  Nose: Nose normal    Eyes:      General: No scleral icterus  Conjunctiva/sclera: Conjunctivae normal    Cardiovascular:      Rate and Rhythm: Normal rate  Pulses: Normal pulses  Pulmonary:      Effort: Pulmonary effort is normal  No respiratory distress  Abdominal:      General: Abdomen is flat  Palpations: Abdomen is soft  Tenderness: There is no abdominal tenderness  There is no right CVA tenderness or left CVA tenderness  Hernia: No hernia is present  Musculoskeletal:         General: Normal range of motion  Cervical back: Normal range of motion  Skin:     General: Skin is warm and dry  Neurological:      General: No focal deficit present  Mental Status: She is alert and oriented to person, place, and time   Mental status is at baseline  Psychiatric:         Mood and Affect: Mood normal          Behavior: Behavior normal          Thought Content: Thought content normal          Judgment: Judgment normal          Past History  Past Medical History:   Diagnosis Date    Anxiety     Depression     Rheumatoid arthritis ([address] )     Sjoegren syndrome     Thyroid nodule      Social History     Socioeconomic History    Marital status: Single     Spouse name: None    Number of children: None    Years of education: None    Highest education level: None   Occupational History    None   Tobacco Use    Smoking status: Former Smoker     Packs/day: 1 00     Years: 20 00     Pack years: 20 00     Quit date:      Years since quittin 7    Smokeless tobacco: Never Used   Vaping Use    Vaping Use: Never used   Substance and Sexual Activity    Alcohol use: Yes     Comment: occasional    Drug use: No    Sexual activity: Yes     Partners: Male     Birth control/protection: None, I U D     Other Topics Concern    None   Social History Narrative    None     Social Determinants of Health     Financial Resource Strain: Not on file   Food Insecurity: Not on file   Transportation Needs: Not on file   Physical Activity: Not on file   Stress: Not on file   Social Connections: Not on file   Intimate Partner Violence: Not on file   Housing Stability: Not on file     Social History     Tobacco Use   Smoking Status Former Smoker    Packs/day: 1 00    Years: 20 00    Pack years: 20 00    Quit date:     Years since quittin 7   Smokeless Tobacco Never Used     Family History   Problem Relation Age of Onset    Breast cancer Mother 64    Rheum arthritis Mother     Sjogren's syndrome Mother     Liver cancer Mother     Esophageal cancer Father     Diabetes Father     No Known Problems Brother     No Known Problems Brother     No Known Problems Daughter     No Known Problems Son     No Known Problems Son     No Known Problems Maternal Grandmother     No Known Problems Paternal Grandmother     No Known Problems Maternal Aunt     No Known Problems Maternal Aunt     No Known Problems Maternal Aunt     Stroke Other     Heart disease Neg Hx        The following portions of the patient's history were reviewed and updated as appropriate allergies, current medications, past medical history, past social history, past surgical history and problem list    [id number]  CT ABDOMEN AND PELVIS WITHOUT IV CONTRAST     INDICATION:   Flank pain, kidney stone suspected  R sided flank pain      COMPARISON:  Multiple priors most recently pelvic ultrasound 3/4/2021     TECHNIQUE:  CT examination of the abdomen and pelvis was performed without intravenous contrast  Axial, sagittal, and coronal 2D reformatted images were created from the source data and submitted for interpretation       Radiation dose length product (DLP) for this visit:  916 83 mGy-cm   This examination, like all CT scans performed in the [facility], was performed utilizing techniques to minimize radiation dose exposure, including the use of iterative   reconstruction and automated exposure control       Enteric contrast was not administered       FINDINGS:     ABDOMEN     LOWER CHEST:  Moderate hiatal hernia      LIVER/BILIARY TREE:  Unremarkable      GALLBLADDER:  Gallbladder is surgically absent      SPLEEN:  Unremarkable      PANCREAS:  Unremarkable      ADRENAL GLANDS:  Unremarkable      KIDNEYS/URETERS:  Unremarkable  No hydronephrosis      STOMACH AND BOWEL:  There is colonic diverticulosis without evidence of acute diverticulitis      APPENDIX:  A normal appendix was visualized      ABDOMINOPELVIC CAVITY:  There is a small volume of free pelvic fluid, likely physiologic given the patient's age and gender  No pneumoperitoneum    No lymphadenopathy      VESSELS:  Unremarkable for patient's age      PELVIS     REPRODUCTIVE ORGANS:  IUD in place      URINARY BLADDER:  Unremarkable      ABDOMINAL WALL/INGUINAL REGIONS:  Unremarkable      OSSEOUS STRUCTURES:  No acute fracture or destructive osseous lesion      IMPRESSION:     No acute inflammatory process identified      Moderate hiatal hernia  Results  Recent Results (from the past 1 hour(s))   POCT urine dip    Collection Time: 09/13/22  3:34 PM   Result Value Ref Range    LEUKOCYTE ESTERASE,UA -     NITRITE,UA -     SL AMB POCT UROBILINOGEN 0 2     POCT URINE PROTEIN -      PH,UA 5 0     BLOOD,UA -     SPECIFIC GRAVITY,UA 1 020     KETONES,UA -     BILIRUBIN,UA -     GLUCOSE, UA -      COLOR,UA yellow     CLARITY,UA clear    POCT Measure PVR    Collection Time: 09/13/22  3:34 PM   Result Value Ref Range    POST-VOID RESIDUAL VOLUME, ML POC 48 mL   ]  No results found for: PSA  Lab Results   Component Value Date    CALCIUM 9 5 09/03/2022    K 4 2 09/03/2022    CO2 24 09/03/2022     09/03/2022    BUN 18 09/03/2022    CREATININE 0 93 09/03/2022     Lab Results   Component Value Date    WBC 13 23 (H) 09/03/2022    HGB 12 9 09/03/2022    HCT 40 4 09/03/2022    MCV 98 09/03/2022     09/03/2022       Please Note:  Voice dictation software has been used to create this document  There may be inadvertent transcriptions errors       Luis M Murphy

## 2022-10-03 ENCOUNTER — ANNUAL EXAM (OUTPATIENT)
Dept: OBGYN CLINIC | Facility: CLINIC | Age: 47
End: 2022-10-03
Payer: COMMERCIAL

## 2022-10-03 VITALS
DIASTOLIC BLOOD PRESSURE: 62 MMHG | BODY MASS INDEX: 35.41 KG/M2 | HEIGHT: 64 IN | WEIGHT: 207.4 LBS | SYSTOLIC BLOOD PRESSURE: 112 MMHG

## 2022-10-03 DIAGNOSIS — Z01.419 ENCOUNTER FOR GYNECOLOGICAL EXAMINATION: Primary | ICD-10-CM

## 2022-10-03 DIAGNOSIS — Z12.4 SCREENING FOR CERVICAL CANCER: ICD-10-CM

## 2022-10-03 DIAGNOSIS — N95.1 MENOPAUSAL SYMPTOMS: ICD-10-CM

## 2022-10-03 PROCEDURE — S0610 ANNUAL GYNECOLOGICAL EXAMINA: HCPCS | Performed by: STUDENT IN AN ORGANIZED HEALTH CARE EDUCATION/TRAINING PROGRAM

## 2022-10-03 PROCEDURE — G0145 SCR C/V CYTO,THINLAYER,RESCR: HCPCS | Performed by: PATHOLOGY

## 2022-10-03 PROCEDURE — G0476 HPV COMBO ASSAY CA SCREEN: HCPCS | Performed by: STUDENT IN AN ORGANIZED HEALTH CARE EDUCATION/TRAINING PROGRAM

## 2022-10-03 NOTE — PROGRESS NOTES
[facility] [address] [place] [address] Juan Magaña    ASSESSMENT/PLAN: Jolene Rawls is a 41 y o  [id number] who presents for annual gynecologic exam     Encounter for routine gynecologic examination  - Routine well woman exam completed today  - Cervical Cancer Screening: Current ASCCP Guidelines reviewed  Last Pap: 06/29/2018  Co-testing done today  - HPV Vaccination status: Not immunized  - STI screening offered including HIV: Declines  - Contraceptive counseling discussed  Current contraception: Mirena IUD since 2018  - Breast Cancer Screening: Last Mammogram 03/04/2021, ordered    Additional problems addressed at this visit:  1  Menopausal symptoms  -     Follicle stimulating hormone; Future  -     Estradiol; Future  -     TSH, 3rd generation with Free T4 reflex; Future  -     Testosterone; Future          CC:  Annual Gynecologic Examination    HPI: Jolene Rawls is a 41 y o  [id number] who presents for annual gynecologic examination  She has had a Mirena IUD since 2018  She reports hot flashes, hair loss, and unexplained weight gain  She reports no breast concerns  She gets no periods on Mirena  She has no vaginal discharge, vulvar or vaginal lesions, pelvic pain, or abnormal bleeding  She has no sexual health concerns and is currently sexually active with one male partner  She has no symptoms of pelvic organ prolapse, urinary, or fecal incontinence  She denies intimate partner violence  The following portions of the patient's history were reviewed and updated as appropriate: allergies, current medications, past family history, past medical history, obstetric history, gynecologic history, past social history, past surgical history and problem list     Review of Systems   Constitutional: Negative for chills and fever  Respiratory: Negative for cough and shortness of breath  Cardiovascular: Negative for chest pain and leg swelling     Gastrointestinal: Negative for abdominal pain, nausea and vomiting  Genitourinary: Negative for dysuria, frequency and urgency  Neurological: Negative for dizziness, light-headedness and headaches  Objective:  /62   Ht 5' 4" (1 626 m)   Wt 94 1 kg (207 lb 6 4 oz)   BMI 35 60 kg/m²    Physical Exam  Exam conducted with a chaperone present  Constitutional:       Appearance: Normal appearance  HENT:      Head: Normocephalic and atraumatic  Cardiovascular:      Rate and Rhythm: Normal rate  Pulmonary:      Effort: Pulmonary effort is normal    Chest:   Breasts: Breasts are symmetrical       Right: Normal  No swelling, bleeding, inverted nipple, mass, nipple discharge, skin change, tenderness or axillary adenopathy  Left: Normal  No swelling, bleeding, inverted nipple, mass, nipple discharge, skin change, tenderness or axillary adenopathy  Abdominal:      General: There is no distension  Tenderness: There is no abdominal tenderness  There is no guarding  Genitourinary:     Exam position: Lithotomy position  Pubic Area: No rash  Labia:         Right: No rash, tenderness or lesion  Left: No rash, tenderness or lesion  Urethra: No prolapse, urethral swelling or urethral lesion  Vagina: Normal  No vaginal discharge, erythema, tenderness, bleeding or lesions  Cervix: No cervical motion tenderness, discharge, friability or erythema  Uterus: Not enlarged, not tender and no uterine prolapse  Adnexa:         Right: No mass, tenderness or fullness  Left: No mass, tenderness or fullness  Comments: IUD strings visualized  Lymphadenopathy:      Upper Body:      Right upper body: No axillary adenopathy  Left upper body: No axillary adenopathy  Lower Body: No right inguinal adenopathy  No left inguinal adenopathy  Neurological:      Mental Status: She is alert               [address] [place] [address]  10/03/22 3:46 PM

## 2022-10-05 LAB
HPV HR 12 DNA CVX QL NAA+PROBE: NEGATIVE
HPV16 DNA CVX QL NAA+PROBE: NEGATIVE
HPV18 DNA CVX QL NAA+PROBE: NEGATIVE

## 2022-10-11 LAB
LAB AP GYN PRIMARY INTERPRETATION: ABNORMAL
Lab: ABNORMAL
PATH INTERP SPEC-IMP: ABNORMAL

## 2022-10-14 PROBLEM — Z13.6 SCREENING FOR CARDIOVASCULAR CONDITION: Status: RESOLVED | Noted: 2022-08-15 | Resolved: 2022-10-14

## 2022-10-14 PROBLEM — Z12.11 SCREENING FOR COLON CANCER: Status: RESOLVED | Noted: 2022-08-15 | Resolved: 2022-10-14

## 2022-10-14 PROBLEM — Z00.00 ROUTINE ADULT HEALTH MAINTENANCE: Status: RESOLVED | Noted: 2022-08-15 | Resolved: 2022-10-14

## 2022-10-14 PROBLEM — Z13.1 SCREENING FOR DIABETES MELLITUS: Status: RESOLVED | Noted: 2022-08-15 | Resolved: 2022-10-14

## 2022-10-14 PROBLEM — Z12.4 SCREENING FOR CERVICAL CANCER: Status: RESOLVED | Noted: 2022-08-15 | Resolved: 2022-10-14

## 2022-11-03 ENCOUNTER — OFFICE VISIT (OUTPATIENT)
Dept: URGENT CARE | Facility: CLINIC | Age: 47
End: 2022-11-03

## 2022-11-03 VITALS
SYSTOLIC BLOOD PRESSURE: 114 MMHG | TEMPERATURE: 97.1 F | OXYGEN SATURATION: 99 % | WEIGHT: 210 LBS | BODY MASS INDEX: 35.85 KG/M2 | HEART RATE: 92 BPM | DIASTOLIC BLOOD PRESSURE: 74 MMHG | HEIGHT: 64 IN | RESPIRATION RATE: 18 BRPM

## 2022-11-03 DIAGNOSIS — R09.81 NASAL SINUS CONGESTION: Primary | ICD-10-CM

## 2022-11-03 RX ORDER — METHYLPREDNISOLONE 4 MG/1
TABLET ORAL
Qty: 21 TABLET | Refills: 0 | Status: SHIPPED | OUTPATIENT
Start: 2022-11-03

## 2022-11-03 NOTE — PROGRESS NOTES
[facility]        NAME: Carla Ayala is a 41 y o  female  : 1975    MRN: [id number]  DATE: November 3, 2022  TIME: 5:23 PM      Assessment and Plan     Nasal sinus congestion [R09 81]  1  Nasal sinus congestion  methylPREDNISolone 4 MG tablet therapy pack         Patient Instructions   Take steroids as directed  Recommend to take them in the morning and with food  Follow-up with your ENT  Continue with nasal flushes  Acetaminophen for pain  Avoid triggers  PCP follow-up in 3-5 days  Proceed to the ER if symptoms worsen  Chief Complaint     Chief Complaint   Patient presents with   • Cough     Last week sinus drip         History of Present Illness     Patient is a 82-year-old female who presents with sinus congestion for 1 week  States she has a history of chronic sinusitis  States that she was around her family members calf last week and it triggered in inflammatory response  Reports wheezing secondary to congestion  States steroids normally help  Denies fever chills  Denies cough secondary to congestion  Denies nausea, vomiting, or diarrhea  States she does see an ENT  States she has been using flushes at home daily for sinuses      Review of Systems     Review of Systems   Constitutional: Negative for chills and fever  HENT: Positive for congestion, sinus pressure and sinus pain  Negative for sore throat  Respiratory: Positive for cough  Gastrointestinal: Negative for diarrhea, nausea and vomiting  All other systems reviewed and are negative          Current Medications       Current Outpatient Medications:   •  methylPREDNISolone 4 MG tablet therapy pack, Use as directed on package, Disp: 21 tablet, Rfl: 0  •  acetaminophen (TYLENOL) 500 mg tablet, Take 1,000 mg by mouth every 6 (six) hours as needed for mild pain, Disp: , Rfl:   •  ALPRAZolam (XANAX) 0 25 mg tablet, Take 1 tablet (0 25 mg total) by mouth daily at bedtime as needed for anxiety (Patient not taking: No sig reported), Disp: 15 tablet, Rfl: 0  •  citalopram (CeleXA) 10 mg tablet, Take 1 tablet (10 mg total) by mouth daily, Disp: 90 tablet, Rfl: 3  •  cyclobenzaprine (FLEXERIL) 10 mg tablet, Take one tablet by mouth every 12 hours as needed (Patient not taking: Reported on 10/3/2022), Disp: 60 tablet, Rfl: 0  •  fluticasone (FLONASE) 50 mcg/act nasal spray, 1 spray into each nostril 2 (two) times a day (Patient not taking: Reported on 10/3/2022), Disp: 16 g, Rfl: 6  •  hydroxychloroquine (PLAQUENIL) 200 mg tablet, Take 1 tablet (200 mg total) by mouth 2 (two) times a day, Disp: 180 tablet, Rfl: 1  •  ibuprofen (MOTRIN) 200 mg tablet, Take 800 mg by mouth every 6 (six) hours as needed for mild pain, Disp: , Rfl:   •  Insulin Pen Needle 32G X 5 MM MISC, Use daily as directed (Patient not taking: No sig reported), Disp: 90 each, Rfl: 0  •  levonorgestrel (MIRENA) 20 MCG/24HR IUD, 1 each by Intrauterine route once, Disp: , Rfl:   •  liraglutide (SAXENDA) injection, Inject 0 6 mg subcutaneously once per day for the first week   Increase in weekly intervals by 0 6 mg until a dose of 3 mg is reached (Patient not taking: No sig reported), Disp: 15 mL, Rfl: 1  •  oxybutynin (DITROPAN-XL) 10 MG 24 hr tablet, Take 1 tablet (10 mg total) by mouth daily at bedtime, Disp: 30 tablet, Rfl: 6  •  valACYclovir (VALTREX) 1,000 mg tablet, Take 2 tablets by mouth every 12 hours x 1 days (Patient taking differently: if needed Take 2 tablets by mouth every 12 hours x 1 days), Disp: 4 tablet, Rfl: 0    Current Allergies     Allergies as of 11/03/2022 - Reviewed 11/03/2022   Allergen Reaction Noted   • Doxycycline Hives 06/06/2022              The following portions of the patient's history were reviewed and updated as appropriate: allergies, current medications, past family history, past medical history, past social history, past surgical history and problem list      Past Medical History:   Diagnosis Date   • Anxiety    • Depression    • Rheumatoid arthritis ([facility] [address] )    • Sjoegren syndrome    • Thyroid nodule        Past Surgical History:   Procedure Laterality Date   • CHOLECYSTECTOMY     • NASAL/SINUS ENDOSCOPY N/A 6/30/2020    Procedure: IMAGED GUIDED F E S S ;  Surgeon: Nellie Pineda DO;  Location: [facility];  Service: ENT   • WA REPAIR OF NASAL SEPTUM N/A 6/30/2020    Procedure: SEPTOPLASTY;  Surgeon: Nellie Pineda DO;  Location: [facility];  Service: ENT   • VENOUS ABLATION Left     lower extremity       Family History   Problem Relation Age of Onset   • Breast cancer Mother 64   • Rheum arthritis Mother    • Sjogren's syndrome Mother    • Liver cancer Mother    • Esophageal cancer Father    • Diabetes Father    • No Known Problems Brother    • No Known Problems Brother    • No Known Problems Daughter    • No Known Problems Son    • No Known Problems Son    • No Known Problems Maternal Grandmother    • No Known Problems Paternal Grandmother    • No Known Problems Maternal Aunt    • No Known Problems Maternal Aunt    • No Known Problems Maternal Aunt    • Stroke Other    • Heart disease Neg Hx          Medications have been verified  Objective     /74   Pulse 92   Temp (!) 97 1 °F (36 2 °C)   Resp 18   Ht 5' 4" (1 626 m)   Wt 95 3 kg (210 lb)   SpO2 99%   BMI 36 05 kg/m²   No LMP recorded  (Menstrual status: Birth Control)  Physical Exam     Physical Exam  Vitals and nursing note reviewed  Constitutional:       General: She is awake  She is not in acute distress  Appearance: Normal appearance  She is not ill-appearing, toxic-appearing or diaphoretic  HENT:      Right Ear: Tympanic membrane, ear canal and external ear normal       Left Ear: Tympanic membrane, ear canal and external ear normal       Nose: Mucosal edema and congestion present  Right Sinus: No maxillary sinus tenderness or frontal sinus tenderness  Left Sinus: No maxillary sinus tenderness or frontal sinus tenderness  Mouth/Throat:      Lips: Pink  Mouth: Mucous membranes are moist       Pharynx: Oropharynx is clear  Uvula midline  Cardiovascular:      Rate and Rhythm: Normal rate  Pulses: Normal pulses  Heart sounds: Normal heart sounds, S1 normal and S2 normal    Pulmonary:      Effort: Pulmonary effort is normal       Breath sounds: Normal breath sounds and air entry  Skin:     General: Skin is warm  Capillary Refill: Capillary refill takes less than 2 seconds  Neurological:      Mental Status: She is alert  Psychiatric:         Mood and Affect: Mood normal          Behavior: Behavior normal          Thought Content:  Thought content normal          Judgment: Judgment normal

## 2022-11-03 NOTE — PATIENT INSTRUCTIONS
Take steroids as directed  Recommend to take them in the morning and with food  Follow-up with your ENT  Continue with nasal flushes  Acetaminophen for pain  Avoid triggers  PCP follow-up in 3-5 days  Proceed to the ER if symptoms worsen

## 2022-11-22 ENCOUNTER — TELEPHONE (OUTPATIENT)
Dept: INTERNAL MEDICINE CLINIC | Facility: CLINIC | Age: 47
End: 2022-11-22

## 2022-11-22 DIAGNOSIS — J32.4 CHRONIC PANSINUSITIS: Primary | ICD-10-CM

## 2022-11-22 RX ORDER — PREDNISONE 10 MG/1
TABLET ORAL
Qty: 25 TABLET | Refills: 0 | Status: SHIPPED | OUTPATIENT
Start: 2022-11-22

## 2022-11-22 NOTE — TELEPHONE ENCOUNTER
Patient called requesting a script of pred to be sent into [facility]  She is asking for a high dose coarse- 50mg x 5 days for her sinuses  She said the medrol dose mary has done nothing for her  Please advise

## 2022-12-07 ENCOUNTER — OFFICE VISIT (OUTPATIENT)
Dept: INTERNAL MEDICINE CLINIC | Facility: CLINIC | Age: 47
End: 2022-12-07

## 2022-12-07 DIAGNOSIS — J32.4 CHRONIC PANSINUSITIS: Primary | ICD-10-CM

## 2022-12-07 RX ORDER — AMOXICILLIN 875 MG/1
875 TABLET, COATED ORAL 2 TIMES DAILY
Qty: 20 TABLET | Refills: 0 | Status: SHIPPED | OUTPATIENT
Start: 2022-12-07 | End: 2022-12-17

## 2022-12-07 RX ORDER — PREDNISONE 10 MG/1
TABLET ORAL
Qty: 30 TABLET | Refills: 0 | Status: SHIPPED | OUTPATIENT
Start: 2022-12-07

## 2022-12-08 NOTE — PROGRESS NOTES
Assessment/Plan:    No problem-specific Assessment & Plan notes found for this encounter  Diagnoses and all orders for this visit:    Chronic pansinusitis  -     amoxicillin (AMOXIL) 875 mg tablet; Take 1 tablet (875 mg total) by mouth 2 (two) times a day for 10 days  -     predniSONE 10 mg tablet; Five pills a day for 2 days, 4 pills a day for 2 days, 3 pills a day for 2 days, 2 pills a day for 2 days, 1 pill a day for 2 days   Orders and recommendations as noted above  Fluids  Rest   Discussed with her her recent sensitivity to certain smells and exposures  Consider following up with an allergist for further investigation  Continue to follow-up with ENT as scheduled  Call or follow-up if symptoms worsen or persist     Subjective:      Patient ID: Taylor Soria is a 41 y o  female  She presents for acute visit  Has noticed some sensitivity to certain smells recently  Has noticed some increase in congestion with this  Over the past week has noticed some increasing sinus pressure and pain  This has increased  More pronounced on the right side  Is following up with ENT and possible repeat surgical procedure recommended in the future  Some postnasal drip  Some cough  The following portions of the patient's history were reviewed and updated as appropriate: She  has a past medical history of Anxiety, Depression, Rheumatoid arthritis ([address] ), Sjoegren syndrome, and Thyroid nodule    She   Patient Active Problem List    Diagnosis Date Noted   • Right sided sciatica 06/30/2022   • Anxiety and depression 12/16/2021   • Class 1 obesity 07/29/2021   • Chronic frontal sinusitis 03/24/2021   • Herpes labialis 04/27/2020   • Severe frontal headaches 02/13/2020   • Chronic pansinusitis 01/15/2020   • Deviated nasal septum 01/15/2020   • Hypertrophy of both inferior nasal turbinates 01/15/2020   • Tendonitis 06/25/2019   • Allergic rhinitis 06/29/2018   • Anxiety 06/29/2018   • Inflammatory polyarthropathy ([place] [id number] ) 06/29/2018   • RA (rheumatoid arthritis) ([place] [address] ) 06/29/2018   • Depressed mood 02/14/2017   • Secondary adrenal insufficiency ([place] [address] ) 02/14/2017   • Thyroid nodule, uninodular 02/14/2017   • Sjogren's syndrome ([place] [address] ) 12/31/2016   • Acute adrenal insufficiency ([place] [address] ) 12/30/2016   • Anemia 12/19/2016   • Leucocytosis 12/17/2016   • Varicose vein 03/10/2015     She  has a past surgical history that includes Cholecystectomy; Venous ablation (Left); pr repair of nasal septum (N/A, 6/30/2020); and Nasal/sinus endoscopy (N/A, 6/30/2020)  Her family history includes Breast cancer (age of onset: 64) in her mother; Diabetes in her father; Esophageal cancer in her father; Liver cancer in her mother; No Known Problems in her brother, brother, daughter, maternal aunt, maternal aunt, maternal aunt, maternal grandmother, paternal grandmother, son, and son; Rheum arthritis in her mother; Sjogren's syndrome in her mother; Stroke in her other  She  reports that she quit smoking about 16 years ago  She has a 20 00 pack-year smoking history  She has never used smokeless tobacco  She reports current alcohol use  She reports that she does not use drugs    Current Outpatient Medications   Medication Sig Dispense Refill   • amoxicillin (AMOXIL) 875 mg tablet Take 1 tablet (875 mg total) by mouth 2 (two) times a day for 10 days 20 tablet 0   • predniSONE 10 mg tablet Five pills a day for 2 days, 4 pills a day for 2 days, 3 pills a day for 2 days, 2 pills a day for 2 days, 1 pill a day for 2 days 30 tablet 0   • acetaminophen (TYLENOL) 500 mg tablet Take 1,000 mg by mouth every 6 (six) hours as needed for mild pain     • ALPRAZolam (XANAX) 0 25 mg tablet Take 1 tablet (0 25 mg total) by mouth daily at bedtime as needed for anxiety (Patient not taking: No sig reported) 15 tablet 0   • citalopram (CeleXA) 10 mg tablet Take 1 tablet (10 mg total) by mouth daily 90 tablet 3   • cyclobenzaprine (FLEXERIL) 10 mg tablet Take one tablet by mouth every 12 hours as needed (Patient not taking: Reported on 10/3/2022) 60 tablet 0   • fluticasone (FLONASE) 50 mcg/act nasal spray 1 spray into each nostril 2 (two) times a day (Patient not taking: Reported on 10/3/2022) 16 g 6   • hydroxychloroquine (PLAQUENIL) 200 mg tablet Take 1 tablet (200 mg total) by mouth 2 (two) times a day 180 tablet 1   • ibuprofen (MOTRIN) 200 mg tablet Take 800 mg by mouth every 6 (six) hours as needed for mild pain     • Insulin Pen Needle 32G X 5 MM MISC Use daily as directed (Patient not taking: No sig reported) 90 each 0   • levonorgestrel (MIRENA) 20 MCG/24HR IUD 1 each by Intrauterine route once     • liraglutide (SAXENDA) injection Inject 0 6 mg subcutaneously once per day for the first week  Increase in weekly intervals by 0 6 mg until a dose of 3 mg is reached (Patient not taking: No sig reported) 15 mL 1   • oxybutynin (DITROPAN-XL) 10 MG 24 hr tablet Take 1 tablet (10 mg total) by mouth daily at bedtime 30 tablet 6   • valACYclovir (VALTREX) 1,000 mg tablet Take 2 tablets by mouth every 12 hours x 1 days (Patient taking differently: if needed Take 2 tablets by mouth every 12 hours x 1 days) 4 tablet 0     No current facility-administered medications for this visit       Current Outpatient Medications on File Prior to Visit   Medication Sig   • acetaminophen (TYLENOL) 500 mg tablet Take 1,000 mg by mouth every 6 (six) hours as needed for mild pain   • ALPRAZolam (XANAX) 0 25 mg tablet Take 1 tablet (0 25 mg total) by mouth daily at bedtime as needed for anxiety (Patient not taking: No sig reported)   • citalopram (CeleXA) 10 mg tablet Take 1 tablet (10 mg total) by mouth daily   • cyclobenzaprine (FLEXERIL) 10 mg tablet Take one tablet by mouth every 12 hours as needed (Patient not taking: Reported on 10/3/2022)   • fluticasone (FLONASE) 50 mcg/act nasal spray 1 spray into each nostril 2 (two) times a day (Patient not taking: Reported on 10/3/2022)   • hydroxychloroquine (PLAQUENIL) 200 mg tablet Take 1 tablet (200 mg total) by mouth 2 (two) times a day   • ibuprofen (MOTRIN) 200 mg tablet Take 800 mg by mouth every 6 (six) hours as needed for mild pain   • Insulin Pen Needle 32G X 5 MM MISC Use daily as directed (Patient not taking: No sig reported)   • levonorgestrel (MIRENA) 20 MCG/24HR IUD 1 each by Intrauterine route once   • liraglutide (SAXENDA) injection Inject 0 6 mg subcutaneously once per day for the first week  Increase in weekly intervals by 0 6 mg until a dose of 3 mg is reached (Patient not taking: No sig reported)   • oxybutynin (DITROPAN-XL) 10 MG 24 hr tablet Take 1 tablet (10 mg total) by mouth daily at bedtime   • valACYclovir (VALTREX) 1,000 mg tablet Take 2 tablets by mouth every 12 hours x 1 days (Patient taking differently: if needed Take 2 tablets by mouth every 12 hours x 1 days)     No current facility-administered medications on file prior to visit  She is allergic to doxycycline       Review of Systems   Constitutional: Positive for activity change and fatigue  HENT: Positive for congestion, sinus pressure and sinus pain  Negative for trouble swallowing  Respiratory: Positive for cough  Objective: There were no vitals taken for this visit  Physical Exam  Vitals reviewed  Constitutional:       General: She is not in acute distress  Appearance: She is well-developed and well-groomed  HENT:      Head:      Comments: Moist mucous membranes; boggy nasal mucosa bilaterally right greater than left; maxillary sinus tenderness right greater than left  Cardiovascular:      Rate and Rhythm: Normal rate and regular rhythm  Pulmonary:      Breath sounds: No decreased breath sounds, wheezing or rhonchi  Musculoskeletal:      Cervical back: Neck supple  Lymphadenopathy:      Cervical: No cervical adenopathy  Neurological:      Mental Status: She is alert     Psychiatric:         Behavior: Behavior is cooperative

## 2023-01-17 NOTE — PROGRESS NOTES
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  Also complains of sciatic pain  Denies any joint stiffness  Last eye exam was in Nov-Dec 2022  Do labs  Do right hip x-rays  Continue hydroxychloroquine twice a day; continue regular eye exams per eye doctor  Continue over the counter tylenol and ibuprofen as needed    Return to clinic in 6 months    Plan:  Diagnoses and all orders for this visit:    Rheumatoid arthritis involving multiple sites with positive rheumatoid factor (HCC)  -     hydroxychloroquine (PLAQUENIL) 200 mg tablet; Take 1 tablet (200 mg total) by mouth 2 (two) times a day  -     XR hip/pelv 2-3 vws right if performed; Future  -     Sedimentation rate, automated  -     C-reactive protein    High risk medication use   High risk medication use - Benefits and risks of hydroxychloroquine, including but not limited to retinal toxicity, corneal deposits, gastrointestinal side effects, and headaches were discussed with the patient  The need for a regular eye exam to monitor for ocular toxicity while on this medication was also explained to the patient  Follow-up plan: Return to clinic in 6 months  Rheumatic Disease Summary  Initial visit 10/22/21: Kel Alaniz is a 57 y o   female who presented as a Rheumatology consult referred by LAURA Blair to establish care for her seropositive Rheumatoid arthritis  Admitted to a few hours of morning stiffness  Was not on any DMARDs or steroids  Went into adrenal crisis when trying to get off steroids too fast in the past; will therefore not put her back on prednisone unless absolutely needed to   Was taking 1,000mg of Tylenol and 800mg of ibuprofen a day  Inflammatory arthritis labs ordered to confirm diagnosis; patient's anti CCP and rheumatoid factor ultimately both returned significantly positive  Continued Tylenol and ibuprofen as needed  Started hydroxychloroquine 200mg twice a day; aware to get regular eye exams while on this medication    2/22/22: Patient feels much better after patient started on Plaquenil  Patient is not on tylenol and ibuprofen  Continue hydroxychloroquine twice a day  Get regular eye exams    HPI  Tricia Grijalva is a 57 y o   female who presents for follow up of her Rheumatoid arthritis  Last clinic visit was 2/22/22  Patient is now having pain in right hip, but other than that, things are going good  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, so isn't moving as much or standing as long  Complains of right hip pain going to groin; it comes and goes with movement; has been going on for 6 months  The following portions of the patient's history were reviewed and updated as appropriate: allergies, current medications, past family history, past medical history, past social history, past surgical history and problem list     Review of Systems:   Review of Systems   Constitutional: Negative for chills and fever  HENT: Positive for sinus pressure and sinus pain  Negative for ear pain and sore throat  Eyes: Negative for pain and visual disturbance  Respiratory: Negative for cough and shortness of breath  Cardiovascular: Negative for chest pain and palpitations  Gastrointestinal: Negative for abdominal pain and vomiting  Indigestion/heartburn   Genitourinary: Negative for dysuria and hematuria  Musculoskeletal: Positive for arthralgias and back pain  Skin: Negative for color change and rash  Neurological: Negative for seizures and syncope  All other systems reviewed and are negative      Home Medications:    Current Outpatient Medications:   •  hydroxychloroquine (PLAQUENIL) 200 mg tablet, Take 1 tablet (200 mg total) by mouth 2 (two) times a day, Disp: 180 tablet, Rfl: 2  •  acetaminophen (TYLENOL) 500 mg tablet, Take 1,000 mg by mouth every 6 (six) hours as needed for mild pain, Disp: , Rfl:   •  ALPRAZolam (XANAX) 0 25 mg tablet, Take 1 tablet (0 25 mg total) by mouth daily at bedtime as needed for anxiety (Patient not taking: No sig reported), Disp: 15 tablet, Rfl: 0  •  citalopram (CeleXA) 10 mg tablet, Take 1 tablet (10 mg total) by mouth daily, Disp: 90 tablet, Rfl: 3  •  cyclobenzaprine (FLEXERIL) 10 mg tablet, Take one tablet by mouth every 12 hours as needed (Patient not taking: Reported on 10/3/2022), Disp: 60 tablet, Rfl: 0  •  fluticasone (FLONASE) 50 mcg/act nasal spray, 1 spray into each nostril 2 (two) times a day (Patient not taking: Reported on 10/3/2022), Disp: 16 g, Rfl: 6  •  ibuprofen (MOTRIN) 200 mg tablet, Take 800 mg by mouth every 6 (six) hours as needed for mild pain, Disp: , Rfl:   •  Insulin Pen Needle 32G X 5 MM MISC, Use daily as directed (Patient not taking: No sig reported), Disp: 90 each, Rfl: 0  •  levonorgestrel (MIRENA) 20 MCG/24HR IUD, 1 each by Intrauterine route once, Disp: , Rfl:   •  liraglutide (SAXENDA) injection, Inject 0 6 mg subcutaneously once per day for the first week   Increase in weekly intervals by 0 6 mg until a dose of 3 mg is reached (Patient not taking: No sig reported), Disp: 15 mL, Rfl: 1  •  oxybutynin (DITROPAN-XL) 10 MG 24 hr tablet, Take 1 tablet (10 mg total) by mouth daily at bedtime, Disp: 30 tablet, Rfl: 6  •  predniSONE 10 mg tablet, Five pills a day for 2 days, 4 pills a day for 2 days, 3 pills a day for 2 days, 2 pills a day for 2 days, 1 pill a day for 2 days, Disp: 30 tablet, Rfl: 0  •  valACYclovir (VALTREX) 1,000 mg tablet, Take 2 tablets by mouth every 12 hours x 1 days (Patient taking differently: if needed Take 2 tablets by mouth every 12 hours x 1 days), Disp: 4 tablet, Rfl: 0    Objective:    Vitals:    01/20/23 1531   BP: 102/74   Weight: 98 4 kg (217 lb)   Height: 5' 4" (1 626 m)       Physical Exam  Constitutional:       General: She is not in acute distress  HENT:      Head: Normocephalic and atraumatic  Eyes:      Conjunctiva/sclera: Conjunctivae normal    Cardiovascular:      Rate and Rhythm: Normal rate and regular rhythm  Heart sounds: S1 normal and S2 normal      No friction rub  Pulmonary:      Effort: Pulmonary effort is normal  No respiratory distress  Breath sounds: Normal breath sounds  No wheezing, rhonchi or rales  Musculoskeletal:         General: No tenderness  Cervical back: Neck supple  Skin:     Coloration: Skin is not pale  Neurological:      Mental Status: She is alert  Mental status is at baseline  Psychiatric:         Mood and Affect: Mood normal          Behavior: Behavior normal        Reviewed labs and imaging  Imaging:   Bilateral hand XR 11/8/21  There is no acute fracture or dislocation  Degenerative changes in the distal interphalangeal joint of the fingers   No aggressive erosive changes to suggest active inflammatory arthropathy     Left ankle/heel MRI 07/29/2019  IMPRESSION:  1  Mild posterior tibialis insertional tendinosis and reactive marrow edema at the insertion on the medial navicular  No evidence of tear or PTT dysfunction  2  Mild common peroneal tenosynovitis without tendinosis or tear  3  Intact plantar fascia with a small plantar calcaneal spur noted  4  Mild talonavicular degenerative change as above  Labs: Annual Exam on 10/03/2022   Component Date Value Ref Range Status   • Case Report 10/03/2022    Final                    Value:Gynecologic Cytology Report                       Case: [address] [id number] Provider:  Reynold Contreras MD    Collected:           10/03/2022 1558              Ordering Location:     [facility]      Received: 10/03/2022 [address], [place]                                                         Pathologist:           Dina Damon DO                                                            Specimen:    LIQUID-BASED PAP, SCREENING, Cervix                                                       • Primary Interpretation 10/03/2022 Epithelial cell abnormality   Final   • Interpretation 10/03/2022 Low grade squamous intraepithelial lesion   Final   • Specimen Adequacy 10/03/2022 Satisfactory for evaluation  Absence of endocervical/transformation zone component  Final   • Additional Information 10/03/2022    Final                    Value: This result contains rich text formatting which cannot be displayed here  • HPV Other HR 10/03/2022 Negative  Negative Final    HPV types: 60,77,99,08,64,88,03,15,13,23,39 and 68 DNA are undetectable or below the pre-set threshold     • HPV16 10/03/2022 Negative  Negative Final   • HPV18 10/03/2022 Negative  Negative Final   Office Visit on 09/13/2022   Component Date Value Ref Range Status   • LEUKOCYTE ESTERASE,UA 09/13/2022 -   Final   • Olesya Oiler 09/13/2022 -   Final   • SL AMB POCT UROBILINOGEN 09/13/2022 0 2   Final   • POCT URINE PROTEIN 09/13/2022 -   Final   •  PH,UA 09/13/2022 5 0   Final   • BLOOD,UA 09/13/2022 -   Final   • SPECIFIC GRAVITY,UA 09/13/2022 1 020   Final   • Jodeane Sluder 09/13/2022 -   Final   • BILIRUBIN,UA 09/13/2022 -   Final   • GLUCOSE, UA 09/13/2022 -   Final   •  COLOR,UA 09/13/2022 yellow   Final   • CLARITY,UA 09/13/2022 clear   Final   • POST-VOID RESIDUAL VOLUME, ML POC 09/13/2022 48  mL Final   Admission on 09/03/2022, Discharged on 09/03/2022   Component Date Value Ref Range Status   • Color, UA 09/03/2022 Yellow  Yellow, Straw Final   • Clarity, UA 09/03/2022 Hazy  Hazy, Clear Final   • Specific [place], UA 09/03/2022 >=1 030 (H)  1 005 - 1 030 Final   • pH, UA 09/03/2022 6 0  5 0, 5 5, 6 0, 6 5, 7 0, 7 5 Final   • Leukocytes, UA 09/03/2022 Negative  Negative Final   • Nitrite, UA 09/03/2022 Negative  Negative Final   • Protein, UA 09/03/2022 Negative  Negative, Interference- unable to analyze mg/dl Final   • Glucose, UA 09/03/2022 Negative  Negative mg/dl Final   • Ketones, UA 09/03/2022 Negative  Negative mg/dl Final   • Urobilinogen, UA 09/03/2022 0 2  0 2, 1 0 E U /dl E U /dl Final   • Bilirubin, UA 09/03/2022 Negative  Negative Final   • Occult Blood, UA 09/03/2022 Negative  Negative Final   • WBC 09/03/2022 13 23 (H)  4 31 - 10 16 Thousand/uL Final   • RBC 09/03/2022 4 13  3 81 - 5 12 Million/uL Final   • Hemoglobin 09/03/2022 12 9  11 5 - 15 4 g/dL Final   • Hematocrit 09/03/2022 40 4  34 8 - 46 1 % Final   • MCV 09/03/2022 98  82 - 98 fL Final   • MCH 09/03/2022 31 2  26 8 - 34 3 pg Final   • MCHC 09/03/2022 31 9  31 4 - 37 4 g/dL Final   • RDW 09/03/2022 11 9  11 6 - 15 1 % Final   • MPV 09/03/2022 10 9  8 9 - 12 7 fL Final   • Platelets 61/79/2932 339  149 - 390 Thousands/uL Final   • nRBC 09/03/2022 0  /100 WBCs Final   • Neutrophils Relative 09/03/2022 56  43 - 75 % Final   • Immat GRANS % 09/03/2022 0  0 - 2 % Final   • Lymphocytes Relative 09/03/2022 26  14 - 44 % Final   • Monocytes Relative 09/03/2022 10  4 - 12 % Final   • Eosinophils Relative 09/03/2022 7 (H)  0 - 6 % Final   • Basophils Relative 09/03/2022 1  0 - 1 % Final   • Neutrophils Absolute 09/03/2022 7 40  1 85 - 7 62 Thousands/µL Final   • Immature Grans Absolute 09/03/2022 0 05  0 00 - 0 20 Thousand/uL Final   • Lymphocytes Absolute 09/03/2022 3 43  0 60 - 4 47 Thousands/µL Final   • Monocytes Absolute 09/03/2022 1 34 (H)  0 17 - 1 22 Thousand/µL Final   • Eosinophils Absolute 09/03/2022 0 92 (H)  0 00 - 0 61 Thousand/µL Final   • Basophils Absolute 09/03/2022 0 09  0 00 - 0 10 Thousands/µL Final   • Sodium 09/03/2022 138  135 - 147 mmol/L Final   • Potassium 09/03/2022 4 2  3 5 - 5 3 mmol/L Final   • Chloride 09/03/2022 103  96 - 108 mmol/L Final   • CO2 09/03/2022 24  21 - 32 mmol/L Final   • ANION GAP 09/03/2022 11  4 - 13 mmol/L Final   • BUN 09/03/2022 18  5 - 25 mg/dL Final   • Creatinine 09/03/2022 0 93  0 60 - 1 30 mg/dL Final    Standardized to IDMS reference method   • Glucose 09/03/2022 79  65 - 140 mg/dL Final    If the patient is fasting, the ADA then defines impaired fasting glucose as > 100 mg/dL and diabetes as > or equal to 123 mg/dL  Specimen collection should occur prior to Sulfasalazine administration due to the potential for falsely depressed results  Specimen collection should occur prior to Sulfapyridine administration due to the potential for falsely elevated results  • Calcium 09/03/2022 9 5  8 4 - 10 2 mg/dL Final   • AST 09/03/2022 12 (L)  13 - 39 U/L Final    Specimen collection should occur prior to Sulfasalazine administration due to the potential for falsely depressed results  • ALT 09/03/2022 12  7 - 52 U/L Final    Specimen collection should occur prior to Sulfasalazine administration due to the potential for falsely depressed results  • Alkaline Phosphatase 09/03/2022 61  34 - 104 U/L Final   • Total Protein 09/03/2022 7 6  6 4 - 8 4 g/dL Final   • Albumin 09/03/2022 4 5  3 5 - 5 0 g/dL Final   • Total Bilirubin 09/03/2022 0 44  0 20 - 1 00 mg/dL Final   • eGFR 09/03/2022 74  ml/min/1 73sq m Final   • EXT PREG TEST UR (Ref: Negative) 09/03/2022 negative   Final   • Control 09/03/2022 valid lot: [id number] exp: 2024-02-29   Final   • Urine Culture 09/03/2022 30,000-39,000 cfu/ml   Final    Mixed Contaminants X3   Appointment on 08/27/2022   Component Date Value Ref Range Status   • Hemoglobin A1C 08/27/2022 5 4  Normal 3 8-5 6%; PreDiabetic 5 7-6 4%;  Diabetic >=6 5%; Glycemic control for adults with diabetes <7 0% % Final   • EAG 08/27/2022 108  mg/dl Final   • Cholesterol 08/27/2022 173  See Comment mg/dL Final    Cholesterol:         Pediatric <18 Years        Desirable          <170 mg/dL      Borderline High    170-199 mg/dL      High               >=200 mg/dL        Adult >=18 Years            Desirable         <200 mg/dL      Borderline High   200-239 mg/dL      High              >239 mg/dL     • Triglycerides 08/27/2022 79  See Comment mg/dL Final    Triglyceride:     0-9Y            <75mg/dL     10Y-17Y         <90 mg/dL       >=18Y     Normal          <150 mg/dL     Borderline High 150-199 mg/dL     High            200-499 mg/dL        Very High       >499 mg/dL    Specimen collection should occur prior to N-Acetylcysteine or Metamizole administration due to the potential for falsely depressed results  • HDL, Direct 08/27/2022 42 (L)  >=50 mg/dL Final   • LDL Calculated 08/27/2022 115 (H)  0 - 100 mg/dL Final    LDL Cholesterol:     Optimal           <100 mg/dl     Near Optimal      100-129 mg/dl     Above Optimal       Borderline High 130-159 mg/dl       High            160-189 mg/dl       Very High       >189 mg/dl         This screening LDL is a calculated result  It does not have the accuracy of the Direct Measured LDL in the monitoring of patients with hyperlipidemia and/or statin therapy  Direct Measure LDL (KFS854) must be ordered separately in these patients     • Non-HDL-Chol (CHOL-HDL) 08/27/2022 131  mg/dl Final   Appointment on 08/27/2022   Component Date Value Ref Range Status   • Sodium 08/27/2022 137  135 - 147 mmol/L Final   • Potassium 08/27/2022 3 8  3 5 - 5 3 mmol/L Final   • Chloride 08/27/2022 104  96 - 108 mmol/L Final   • CO2 08/27/2022 24  21 - 32 mmol/L Final   • ANION GAP 08/27/2022 9  4 - 13 mmol/L Final   • BUN 08/27/2022 9  5 - 25 mg/dL Final   • Creatinine 08/27/2022 0 74  0 60 - 1 30 mg/dL Final    Standardized to IDMS reference method   • Glucose 08/27/2022 92  65 - 140 mg/dL Final    If the patient is fasting, the ADA then defines impaired fasting glucose as > 100 mg/dL and diabetes as > or equal to 123 mg/dL  Specimen collection should occur prior to Sulfasalazine administration due to the potential for falsely depressed results  Specimen collection should occur prior to Sulfapyridine administration due to the potential for falsely elevated results  • Calcium 08/27/2022 9 1  8 4 - 10 2 mg/dL Final   • AST 08/27/2022 8 (L)  13 - 39 U/L Final    Specimen collection should occur prior to Sulfasalazine administration due to the potential for falsely depressed results  • ALT 08/27/2022 7  7 - 52 U/L Final    Specimen collection should occur prior to Sulfasalazine administration due to the potential for falsely depressed results      • Alkaline Phosphatase 08/27/2022 56  34 - 104 U/L Final   • Total Protein 08/27/2022 7 5  6 4 - 8 4 g/dL Final   • Albumin 08/27/2022 4 4  3 5 - 5 0 g/dL Final   • Total Bilirubin 08/27/2022 0 49  0 20 - 1 00 mg/dL Final   • eGFR 08/27/2022 98  ml/min/1 73sq m Final   • WBC 08/27/2022 10 17 (H)  4 31 - 10 16 Thousand/uL Final   • RBC 08/27/2022 4 13  3 81 - 5 12 Million/uL Final   • Hemoglobin 08/27/2022 12 9  11 5 - 15 4 g/dL Final   • Hematocrit 08/27/2022 40 0  34 8 - 46 1 % Final   • MCV 08/27/2022 97  82 - 98 fL Final   • MCH 08/27/2022 31 2  26 8 - 34 3 pg Final   • MCHC 08/27/2022 32 3  31 4 - 37 4 g/dL Final   • RDW 08/27/2022 11 9  11 6 - 15 1 % Final   • MPV 08/27/2022 10 8  8 9 - 12 7 fL Final   • Platelets 29/89/8158 303  149 - 390 Thousands/uL Final   • nRBC 08/27/2022 0  /100 WBCs Final   • Neutrophils Relative 08/27/2022 61  43 - 75 % Final   • Immat GRANS % 08/27/2022 0  0 - 2 % Final   • Lymphocytes Relative 08/27/2022 23  14 - 44 % Final   • Monocytes Relative 08/27/2022 8  4 - 12 % Final   • Eosinophils Relative 08/27/2022 7 (H)  0 - 6 % Final   • Basophils Relative 08/27/2022 1  0 - 1 % Final   • Neutrophils Absolute 08/27/2022 6 20  1 85 - 7 62 Thousands/µL Final   • Immature Grans Absolute 08/27/2022 0 04  0 00 - 0 20 Thousand/uL Final   • Lymphocytes Absolute 08/27/2022 2 33  0 60 - 4 47 Thousands/µL Final   • Monocytes Absolute 08/27/2022 0 78  0 17 - 1 22 Thousand/µL Final   • Eosinophils Absolute 08/27/2022 0 75 (H)  0 00 - 0 61 Thousand/µL Final   • Basophils Absolute 08/27/2022 0 07  0 00 - 0 10 Thousands/µL Final   • TSH 3RD GENERATON 08/27/2022 2 422  0 450 - 4 500 uIU/mL Final    The recommended reference ranges for TSH during pregnancy are as follows:   First trimester 0 1 to 2 5 uIU/mL   Second trimester  0 2 to 3 0 uIU/mL   Third trimester 0 3 to 3 0 uIU/m    Note: Normal ranges may not apply to patients who are transgender, non-binary, or whose legal sex, sex at birth, and gender identity differ  Adult TSH (3rd generation) reference range follows the recommended guidelines of the [employer] Association, January, 2020

## 2023-01-20 ENCOUNTER — OFFICE VISIT (OUTPATIENT)
Dept: RHEUMATOLOGY | Facility: CLINIC | Age: 48
End: 2023-01-20

## 2023-01-20 VITALS
HEIGHT: 64 IN | BODY MASS INDEX: 37.05 KG/M2 | DIASTOLIC BLOOD PRESSURE: 74 MMHG | SYSTOLIC BLOOD PRESSURE: 102 MMHG | WEIGHT: 217 LBS

## 2023-01-20 DIAGNOSIS — M05.79 RHEUMATOID ARTHRITIS INVOLVING MULTIPLE SITES WITH POSITIVE RHEUMATOID FACTOR (HCC): Primary | ICD-10-CM

## 2023-01-20 DIAGNOSIS — Z79.899 HIGH RISK MEDICATION USE: ICD-10-CM

## 2023-01-20 RX ORDER — HYDROXYCHLOROQUINE SULFATE 200 MG/1
200 TABLET, FILM COATED ORAL 2 TIMES DAILY
Qty: 180 TABLET | Refills: 2 | Status: SHIPPED | OUTPATIENT
Start: 2023-01-20 | End: 2023-10-17

## 2023-02-25 ENCOUNTER — OFFICE VISIT (OUTPATIENT)
Dept: URGENT CARE | Facility: CLINIC | Age: 48
End: 2023-02-25

## 2023-02-25 ENCOUNTER — APPOINTMENT (OUTPATIENT)
Dept: RADIOLOGY | Facility: CLINIC | Age: 48
End: 2023-02-25

## 2023-02-25 VITALS
HEART RATE: 99 BPM | SYSTOLIC BLOOD PRESSURE: 146 MMHG | OXYGEN SATURATION: 97 % | TEMPERATURE: 98.4 F | RESPIRATION RATE: 18 BRPM | DIASTOLIC BLOOD PRESSURE: 69 MMHG

## 2023-02-25 DIAGNOSIS — R05.1 ACUTE COUGH: ICD-10-CM

## 2023-02-25 DIAGNOSIS — J32.9 CHRONIC RHINOSINUSITIS: Primary | ICD-10-CM

## 2023-02-25 DIAGNOSIS — J31.0 CHRONIC RHINOSINUSITIS: Primary | ICD-10-CM

## 2023-02-25 RX ORDER — CETIRIZINE HYDROCHLORIDE 10 MG/1
10 TABLET ORAL DAILY
Qty: 30 TABLET | Refills: 0 | Status: CANCELLED | OUTPATIENT
Start: 2023-02-25

## 2023-02-25 RX ORDER — FLUTICASONE PROPIONATE 50 MCG
1 SPRAY, SUSPENSION (ML) NASAL 2 TIMES DAILY
Qty: 16 G | Refills: 1 | Status: SHIPPED | OUTPATIENT
Start: 2023-02-25

## 2023-02-25 RX ORDER — FLUTICASONE PROPIONATE 50 MCG
1 SPRAY, SUSPENSION (ML) NASAL 2 TIMES DAILY
Qty: 11.1 G | Refills: 0 | Status: CANCELLED | OUTPATIENT
Start: 2023-02-25

## 2023-02-25 NOTE — PROGRESS NOTES
[email or web address]    NAME: Ilya Mccrary is a 57 y o  female  : 1975    MRN: [id number]  DATE: 2023  TIME: 3:25 PM    Assessment and Plan   Chronic rhinosinusitis [J31 0, J32 9]  1  Chronic rhinosinusitis  fluticasone (FLONASE) 50 mcg/act nasal spray      2  Acute cough  XR chest pa & lateral        Reviewed chest x-ray which did not show any acute consolidations  Official read is pending  Will manage the patient conservatively   Advised the patient to stay well hydrated  Advised patient if there p o  Intake is to decrease, worsening of cough, temperature is uncontrolled with Tylenol to return to our office report to the emergency department immediate    Patient Instructions   There are no Patient Instructions on file for this visit  Follow up with PCP in 3-5 days  Proceed to ER if symptoms worsen  Chief Complaint     Chief Complaint   Patient presents with   • Cough     Cough for two weeks  History of Present Illness   URI   This is a new problem  The current episode started 1 to 4 weeks ago  Associated symptoms comments: Positives: Cough  Negatives: Shortness of breath, chest pain, abdominal pain, nausea, vomiting, ear pain  Works in healthcare  She has tried nothing for the symptoms  Review of Systems   Review of Systems   All other systems reviewed and are negative  The following portions of the patient's history were reviewed and updated as appropriate: allergies, current medications, past family history, past medical history, past social history, past surgical history and problem list      Medications have been verified  Objective   /69   Pulse 99   Temp 98 4 °F (36 9 °C)   Resp 18   SpO2 97%     Physical Exam  Vitals reviewed  Constitutional:       General: She is not in acute distress  Appearance: Normal appearance  She is well-developed  She is not ill-appearing, toxic-appearing or diaphoretic  HENT:      Head: Normocephalic and atraumatic  Right Ear: Tympanic membrane, ear canal and external ear normal  There is no impacted cerumen  Left Ear: Tympanic membrane, ear canal and external ear normal  There is no impacted cerumen  Nose: Rhinorrhea present  No congestion  Comments: Bilaterally inflamed nasal turbinates     Mouth/Throat:      Mouth: Mucous membranes are moist       Pharynx: Posterior oropharyngeal erythema present  No oropharyngeal exudate  Comments: Cobblestoning noted  Eyes:      General:         Right eye: No discharge  Left eye: No discharge  Extraocular Movements: Extraocular movements intact  Conjunctiva/sclera: Conjunctivae normal       Pupils: Pupils are equal, round, and reactive to light  Cardiovascular:      Rate and Rhythm: Normal rate  Pulmonary:      Effort: Pulmonary effort is normal  No respiratory distress  Breath sounds: Normal breath sounds  No wheezing or rales  Musculoskeletal:      Cervical back: Normal range of motion  Lymphadenopathy:      Cervical: No cervical adenopathy  Neurological:      Mental Status: She is alert         Abraham Urban MD

## 2023-04-18 NOTE — TELEPHONE ENCOUNTER
----- Message from Cecil Olivares sent at 7/25/2018  1:33 PM EDT -----  Benefits submitted   ----- Message -----  From: Pb Latham RN  Sent: 7/23/2018   2:27 PM  To:  Cecil Olivares    Interested in getting IUD  Would like to know if covered with her insurance  Please call patient at [phone] Patient  returning call in regards to results , connected to triage.

## 2023-08-09 ENCOUNTER — OFFICE VISIT (OUTPATIENT)
Dept: INTERNAL MEDICINE CLINIC | Facility: CLINIC | Age: 48
End: 2023-08-09
Payer: COMMERCIAL

## 2023-08-09 VITALS — OXYGEN SATURATION: 98 % | TEMPERATURE: 98.2 F | HEART RATE: 110 BPM

## 2023-08-09 DIAGNOSIS — J01.11 ACUTE RECURRENT FRONTAL SINUSITIS: Primary | ICD-10-CM

## 2023-08-09 PROCEDURE — 99214 OFFICE O/P EST MOD 30 MIN: CPT | Performed by: NURSE PRACTITIONER

## 2023-08-09 RX ORDER — AMOXICILLIN AND CLAVULANATE POTASSIUM 875; 125 MG/1; MG/1
1 TABLET, FILM COATED ORAL 2 TIMES DAILY
Qty: 14 TABLET | Refills: 0 | Status: SHIPPED | OUTPATIENT
Start: 2023-08-09 | End: 2023-08-17

## 2023-08-09 NOTE — PROGRESS NOTES
Name: Mirna Castillo      : 1975      MRN: [id number]  Encounter Provider: JAMSHID Gracia  Encounter Date: 2023   Encounter department: [address] Will start on Augmentin take as directed. Did advise to continue supportive care. If any worsening of symptoms did advise to call the office. 1. Acute recurrent frontal sinusitis  -     amoxicillin-clavulanate (AUGMENTIN) 875-125 mg per tablet; Take 1 tablet by mouth 2 (two) times a day for 7 days           Subjective      Miriam is for an acute visit. She is having her normal sinus congestion, ear pain and pressure. She states she has been good since April but is starting with some symptoms again. She is seeing an allergist coming up. She denies any fever she did not take any covid tests. She offers no other issues. Review of Systems   HENT: Positive for congestion and ear pain. All other systems reviewed and are negative.       Current Outpatient Medications on File Prior to Visit   Medication Sig   • fluticasone (FLONASE) 50 mcg/act nasal spray 1 spray into each nostril 2 (two) times a day   • acetaminophen (TYLENOL) 500 mg tablet Take 1,000 mg by mouth every 6 (six) hours as needed for mild pain   • albuterol (2.5 mg/3 mL) 0.083 % nebulizer solution Take 3 mL (2.5 mg total) by nebulization every 6 (six) hours as needed for wheezing or shortness of breath   • ALPRAZolam (XANAX) 0.25 mg tablet Take 1 tablet (0.25 mg total) by mouth daily at bedtime as needed for anxiety (Patient not taking: Reported on 2022)   • citalopram (CeleXA) 10 mg tablet Take 1 tablet (10 mg total) by mouth daily   • cyclobenzaprine (FLEXERIL) 10 mg tablet Take one tablet by mouth every 12 hours as needed (Patient not taking: Reported on 10/3/2022)   • hydroxychloroquine (PLAQUENIL) 200 mg tablet Take 1 tablet (200 mg total) by mouth 2 (two) times a day   • ibuprofen (MOTRIN) 200 mg tablet Take 800 mg by mouth every 6 (six) hours as needed for mild pain   • Insulin Pen Needle 32G X 5 MM MISC Use daily as directed (Patient not taking: Reported on 2/22/2022)   • Levocetirizine Dihydrochloride (XYZAL PO) Take by mouth   • levonorgestrel (MIRENA) 20 MCG/24HR IUD 1 each by Intrauterine route once   • liraglutide (SAXENDA) injection Inject 0.6 mg subcutaneously once per day for the first week. Increase in weekly intervals by 0.6 mg until a dose of 3 mg is reached (Patient not taking: Reported on 2/22/2022)   • montelukast (SINGULAIR) 10 mg tablet Take 1 tablet (10 mg total) by mouth daily at bedtime   • oxybutynin (DITROPAN-XL) 10 MG 24 hr tablet Take 1 tablet (10 mg total) by mouth daily at bedtime   • pantoprazole (PROTONIX) 20 mg tablet Take 20 mg by mouth daily   • predniSONE 10 mg tablet 50 mg by mouth daily for 3 days, then 40 mg by mouth daily for 3 days, then 30 mg by mouth daily for 3 days, then 20 mg daily for 3 days then 10 mg daily by mouth for 3 days   • valACYclovir (VALTREX) 1,000 mg tablet Take 2 tablets by mouth every 12 hours x 1 days (Patient taking differently: if needed Take 2 tablets by mouth every 12 hours x 1 days)       Objective     There were no vitals taken for this visit. Physical Exam  Vitals reviewed. Constitutional:       Appearance: Normal appearance. She is normal weight. HENT:      Left Ear: Tympanic membrane, ear canal and external ear normal.      Ears:      Comments: Redness noted to right TM     Nose: Congestion present. Mouth/Throat:      Mouth: Mucous membranes are moist.      Pharynx: Oropharynx is clear. Cardiovascular:      Rate and Rhythm: Regular rhythm. Tachycardia present. Pulses: Normal pulses. Heart sounds: Normal heart sounds. Pulmonary:      Effort: Pulmonary effort is normal.      Breath sounds: Normal breath sounds. Musculoskeletal:         General: Normal range of motion. Skin:     General: Skin is warm and dry.    Neurological:      General: No focal deficit present. Mental Status: She is alert and oriented to person, place, and time. Mental status is at baseline. Psychiatric:         Mood and Affect: Mood normal.         Behavior: Behavior normal.         Thought Content:  Thought content normal.         Judgment: Judgment normal.       JAMSHID Figueroa

## 2023-08-15 ENCOUNTER — OFFICE VISIT (OUTPATIENT)
Dept: INTERNAL MEDICINE CLINIC | Facility: CLINIC | Age: 48
End: 2023-08-15
Payer: COMMERCIAL

## 2023-08-15 VITALS
WEIGHT: 217 LBS | BODY MASS INDEX: 37.05 KG/M2 | HEART RATE: 87 BPM | SYSTOLIC BLOOD PRESSURE: 117 MMHG | OXYGEN SATURATION: 98 % | HEIGHT: 64 IN | RESPIRATION RATE: 18 BRPM | DIASTOLIC BLOOD PRESSURE: 78 MMHG

## 2023-08-15 DIAGNOSIS — K44.9 HIATAL HERNIA: ICD-10-CM

## 2023-08-15 DIAGNOSIS — J01.11 ACUTE RECURRENT FRONTAL SINUSITIS: ICD-10-CM

## 2023-08-15 DIAGNOSIS — M06.9 RHEUMATOID ARTHRITIS, INVOLVING UNSPECIFIED SITE, UNSPECIFIED WHETHER RHEUMATOID FACTOR PRESENT (HCC): ICD-10-CM

## 2023-08-15 DIAGNOSIS — Z13.1 SCREENING FOR DIABETES MELLITUS: ICD-10-CM

## 2023-08-15 DIAGNOSIS — F32.A ANXIETY AND DEPRESSION: ICD-10-CM

## 2023-08-15 DIAGNOSIS — M35.00 SJOGREN'S SYNDROME, WITH UNSPECIFIED ORGAN INVOLVEMENT (HCC): ICD-10-CM

## 2023-08-15 DIAGNOSIS — Z13.6 SCREENING FOR CARDIOVASCULAR CONDITION: ICD-10-CM

## 2023-08-15 DIAGNOSIS — F41.9 ANXIETY AND DEPRESSION: ICD-10-CM

## 2023-08-15 DIAGNOSIS — Z12.11 SCREENING FOR COLON CANCER: ICD-10-CM

## 2023-08-15 DIAGNOSIS — Z12.31 ENCOUNTER FOR SCREENING MAMMOGRAM FOR MALIGNANT NEOPLASM OF BREAST: ICD-10-CM

## 2023-08-15 DIAGNOSIS — Z00.00 ROUTINE ADULT HEALTH MAINTENANCE: Primary | ICD-10-CM

## 2023-08-15 PROCEDURE — 99396 PREV VISIT EST AGE 40-64: CPT | Performed by: NURSE PRACTITIONER

## 2023-08-15 NOTE — PROGRESS NOTES
Name: Dominga Ashford      : 1975      MRN: [id number]  Encounter Provider: JAMSHID Medina  Encounter Date: 8/15/2023   Encounter department: [address] [facility] Will repeat fasting labs. Will give referral for mammogram and cologuard. Will refer to surgeon for his hiatal hernia. Will notify once labs are back. Will consider starting a weight loss medication and call back. 1. Routine adult health maintenance  -     Comprehensive metabolic panel; Future  -     CBC and differential; Future  -     TSH, 3rd generation with Free T4 reflex; Future    2. Acute recurrent frontal sinusitis  -     Comprehensive metabolic panel; Future  -     CBC and differential; Future  -     TSH, 3rd generation with Free T4 reflex; Future    3. Rheumatoid arthritis, involving unspecified site, unspecified whether rheumatoid factor present ([address])  -     Comprehensive metabolic panel; Future  -     CBC and differential; Future  -     TSH, 3rd generation with Free T4 reflex; Future    4. Anxiety and depression  -     Comprehensive metabolic panel; Future  -     CBC and differential; Future  -     TSH, 3rd generation with Free T4 reflex; Future    5. Sjogren's syndrome, with unspecified organ involvement ([address])  -     Comprehensive metabolic panel; Future  -     CBC and differential; Future  -     TSH, 3rd generation with Free T4 reflex; Future    6. Encounter for screening mammogram for malignant neoplasm of breast  -     Mammo screening bilateral w 3d & cad; Future; Expected date: 08/15/2023    7. Screening for diabetes mellitus  -     HEMOGLOBIN A1C W/ EAG ESTIMATION; Future    8. Screening for cardiovascular condition  -     Lipid panel; Future    9. Screening for colon cancer  -     Cologuard    10. Hiatal hernia           Subjective      Miriam is for a wellness. She is feeling some what better from last week but still not 100 percent. 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 She denies any chest pain, SOB, or palpitations. She denies any depression or anxiety. She is having some issues with her hiatal hernia and feels since gaining weight this has been bothering her. She is willing to do a cologuard and mammo. She is considering starting medication for weight loss but wants to think about this. She offers no other issues. Review of Systems   All other systems reviewed and are negative.       Current Outpatient Medications on File Prior to Visit   Medication Sig   • fluticasone (FLONASE) 50 mcg/act nasal spray 1 spray into each nostril 2 (two) times a day   • acetaminophen (TYLENOL) 500 mg tablet Take 1,000 mg by mouth every 6 (six) hours as needed for mild pain   • albuterol (2.5 mg/3 mL) 0.083 % nebulizer solution Take 3 mL (2.5 mg total) by nebulization every 6 (six) hours as needed for wheezing or shortness of breath   • ALPRAZolam (XANAX) 0.25 mg tablet Take 1 tablet (0.25 mg total) by mouth daily at bedtime as needed for anxiety (Patient not taking: Reported on 6/6/2022)   • amoxicillin-clavulanate (AUGMENTIN) 875-125 mg per tablet Take 1 tablet by mouth 2 (two) times a day for 7 days   • citalopram (CeleXA) 10 mg tablet Take 1 tablet (10 mg total) by mouth daily   • cyclobenzaprine (FLEXERIL) 10 mg tablet Take one tablet by mouth every 12 hours as needed (Patient not taking: Reported on 10/3/2022)   • hydroxychloroquine (PLAQUENIL) 200 mg tablet Take 1 tablet (200 mg total) by mouth 2 (two) times a day   • ibuprofen (MOTRIN) 200 mg tablet Take 800 mg by mouth every 6 (six) hours as needed for mild pain   • Levocetirizine Dihydrochloride (XYZAL PO) Take by mouth   • levonorgestrel (MIRENA) 20 MCG/24HR IUD 1 each by Intrauterine route once   • montelukast (SINGULAIR) 10 mg tablet Take 1 tablet (10 mg total) by mouth daily at bedtime   • oxybutynin (DITROPAN-XL) 10 MG 24 hr tablet Take 1 tablet (10 mg total) by mouth daily at bedtime   • pantoprazole (PROTONIX) 20 mg tablet Take 20 mg by mouth daily   • predniSONE 10 mg tablet 50 mg by mouth daily for 3 days, then 40 mg by mouth daily for 3 days, then 30 mg by mouth daily for 3 days, then 20 mg daily for 3 days then 10 mg daily by mouth for 3 days   • valACYclovir (VALTREX) 1,000 mg tablet Take 2 tablets by mouth every 12 hours x 1 days (Patient taking differently: if needed Take 2 tablets by mouth every 12 hours x 1 days)   • [DISCONTINUED] Insulin Pen Needle 32G X 5 MM MISC Use daily as directed (Patient not taking: Reported on 2/22/2022)   • [DISCONTINUED] liraglutide (SAXENDA) injection Inject 0.6 mg subcutaneously once per day for the first week. Increase in weekly intervals by 0.6 mg until a dose of 3 mg is reached (Patient not taking: Reported on 2/22/2022)       Objective     Pulse 87   Resp (!) 98   Ht 5' 4" (1.626 m)   Wt 98.4 kg (217 lb)   BMI 37.25 kg/m²     Physical Exam  Vitals reviewed. Constitutional:       Appearance: Normal appearance. She is obese. HENT:      Head: Normocephalic and atraumatic. Right Ear: Tympanic membrane, ear canal and external ear normal.      Left Ear: Tympanic membrane, ear canal and external ear normal.      Nose: Congestion present. Mouth/Throat:      Mouth: Mucous membranes are moist.      Pharynx: Oropharynx is clear. Eyes:      Extraocular Movements: Extraocular movements intact. Conjunctiva/sclera: Conjunctivae normal.      Pupils: Pupils are equal, round, and reactive to light. Cardiovascular:      Rate and Rhythm: Normal rate and regular rhythm. Pulses: Normal pulses. Heart sounds: Normal heart sounds. Pulmonary:      Effort: Pulmonary effort is normal.      Breath sounds: Normal breath sounds. Abdominal:      General: Abdomen is flat. Bowel sounds are normal.   Musculoskeletal:         General: Normal range of motion. Cervical back: Normal range of motion and neck supple. Skin:     General: Skin is warm and dry.       Capillary Refill: Capillary refill takes less than 2 seconds. Neurological:      General: No focal deficit present. Mental Status: She is alert and oriented to person, place, and time. Mental status is at baseline. Psychiatric:         Mood and Affect: Mood normal.         Behavior: Behavior normal.         Thought Content: Thought content normal.         Judgment: Judgment normal.       JAMSHID Hudson  BMI Counseling: Body mass index is 37.25 kg/m². The BMI is above normal. Nutrition recommendations include reducing portion sizes, decreasing overall calorie intake, 3-5 servings of fruits/vegetables daily, reducing fast food intake, consuming healthier snacks, decreasing soda and/or juice intake, moderation in carbohydrate intake, increasing intake of lean protein, reducing intake of saturated fat and trans fat and reducing intake of cholesterol.

## 2023-08-16 ENCOUNTER — TELEPHONE (OUTPATIENT)
Dept: HEMATOLOGY ONCOLOGY | Facility: CLINIC | Age: 48
End: 2023-08-16

## 2023-08-16 DIAGNOSIS — K44.9 HIATAL HERNIA: Primary | ICD-10-CM

## 2023-08-16 NOTE — PATIENT INSTRUCTIONS
Low Fat Diet   AMBULATORY CARE:   A low-fat diet  is an eating plan that is low in total fat, unhealthy fat, and cholesterol. You may need to follow a low-fat diet if you have trouble digesting or absorbing fat. You may also need to follow this diet if you have high cholesterol. You can also lower your cholesterol by increasing the amount of fiber in your diet. Soluble fiber is a type of fiber that helps to decrease cholesterol levels. Different types of fat in food:   • Limit unhealthy fats. A diet that is high in cholesterol, saturated fat, and trans fat may cause unhealthy cholesterol levels. Unhealthy cholesterol levels increase your risk of heart disease. ? Cholesterol:  Limit intake of cholesterol to less than 200 mg per day. Cholesterol is found in meat, eggs, and dairy. ? Saturated fat:  Limit saturated fat to less than 7% of your total daily calories. Ask your dietitian how many calories you need each day. Saturated fat is found in butter, cheese, ice cream, whole milk, and palm oil. Saturated fat is also found in meat, such as beef, pork, chicken skin, and processed meats. Processed meats include sausage, hot dogs, and bologna. ? Trans fat:  Avoid trans fat as much as possible. Trans fat is used in fried and baked foods. Foods that say trans fat free on the label may still have up to 0.5 grams of trans fat per serving. • Include healthy fats. Replace foods that are high in saturated and trans fat with foods high in healthy fats. This may help to decrease high cholesterol levels. ? Monounsaturated fats: These are found in avocados, nuts, and vegetable oils, such as olive, canola, and sunflower oil. ? Polyunsaturated fats: These can be found in vegetable oils, such as soybean or corn oil. Omega-3 fats can help to decrease the risk of heart disease. Omega-3 fats are found in fish, such as salmon, herring, trout, and tuna.  Omega-3 fats can also be found in plant foods, such as walnuts, flaxseed, soybeans, and canola oil. Foods to limit or avoid:   • Grains:      ? Snacks that are made with partially hydrogenated oils, such as chips, regular crackers, and butter-flavored popcorn    ? High-fat baked goods, such as biscuits, croissants, doughnuts, pies, cookies, and pastries    • Dairy:      ? Whole milk, 2% milk, and yogurt and ice cream made with whole milk    ? Half and half creamer, heavy cream, and whipping cream    ? Cheese, cream cheese, and sour cream    • Meats and proteins:      ? High-fat cuts of meat (T-bone steak, regular hamburger, and ribs)    ? Fried meat, poultry (turkey and chicken), and fish    ? Poultry (chicken and turkey) with skin    ? Cold cuts (salami or bologna), hot dogs, willett, and sausage    ? Whole eggs and egg yolks    • Vegetables and fruits with added fat:      ? Fried vegetables or vegetables in butter or high-fat sauces, such as cream or cheese sauces    ? Fried fruit or fruit served with butter or cream    • Fats:      ? Butter, stick margarine, and shortening    ? Coconut, palm oil, and palm kernel oil    Foods to include:       • Grains:      ? Whole-grain breads, cereals, pasta, and brown rice    ? Low-fat crackers and pretzels    • Vegetables and fruits:      ? Fresh, frozen, or canned vegetables (no salt or low-sodium)    ? Fresh, frozen, dried, or canned fruit (canned in light syrup or fruit juice)    ? Avocado    • Low-fat dairy products:      ? Nonfat (skim) or 1% milk    ? Nonfat or low-fat cheese, yogurt, and cottage cheese    • Meats and proteins:      ? Chicken or turkey with no skin    ? Baked or broiled fish    ? Lean beef and pork (loin, round, extra lean hamburger)    ? Beans and peas, unsalted nuts, soy products    ? Egg whites and substitutes    ? Seeds and nuts    • Fats:      ? Unsaturated oil, such as canola, olive, peanut, soybean, or sunflower oil    ? Soft or liquid margarine and vegetable oil spread    ?  Low-fat salad dressing  Other ways to decrease fat:   • Read food labels before you buy foods. Choose foods that have less than 30% of calories from fat. Choose low-fat or fat-free dairy products. Remember that fat free does not mean calorie free. These foods still contain calories, and too many calories can lead to weight gain. • Trim fat from meat and avoid fried food. Trim all visible fat from meat before you cook it. Remove the skin from poultry. Do not adames meat, fish, or poultry. Bake, roast, boil, or broil these foods instead. Avoid fried foods. Eat a baked potato instead of [place] fries. Steam vegetables instead of sautéing them in butter. • Add less fat to foods. Use imitation willett bits on salads and baked potatoes instead of regular willett bits. Use fat-free or low-fat salad dressings instead of regular dressings. Use low-fat or nonfat butter-flavored topping instead of regular butter or margarine on popcorn and other foods. Ways to decrease fat in recipes:  Replace high-fat ingredients with low-fat or nonfat ones. This may cause baked goods to be drier than usual. You may need to use nonfat cooking spray on pans to prevent food from sticking. You also may need to change the amount of other ingredients, such as water, in the recipe. Try the following:  • Use low-fat or light margarine instead of regular margarine or shortening. • Use lean ground turkey breast or chicken, or lean ground beef (less than 5% fat) instead of hamburger. • Add 1 teaspoon of canola oil to 8 ounces of skim milk instead of using cream or half and half. • Use grated zucchini, carrots, or apples in breads instead of coconut. • Use blenderized, low-fat cottage cheese, plain tofu, or low-fat ricotta cheese instead of cream cheese. • Use 1 egg white and 1 teaspoon of canola oil, or use ¼ cup (2 ounces) of fat-free egg substitute instead of a whole egg.      • Replace half of the oil that is called for in a recipe with applesauce when you bake. Use 3 tablespoons of cocoa powder and 1 tablespoon of canola oil instead of a square of baking chocolate. How to increase fiber:  Eat enough high-fiber foods to get 20 to 30 grams of fiber every day. Slowly increase your fiber intake to avoid stomach cramps, gas, and other problems. • Eat 3 ounces of whole-grain foods each day. An ounce is about 1 slice of bread. Eat whole-grain breads, such as whole-wheat bread. Whole wheat, whole-wheat flour, or other whole grains should be listed as the first ingredient on the food label. Replace white flour with whole-grain flour or use half of each in recipes. Whole-grain flour is heavier than white flour, so you may have to add more yeast or baking powder. • Eat a high-fiber cereal for breakfast.  Oatmeal is a good source of soluble fiber. Look for cereals that have bran or fiber in the name. Choose whole-grain products, such as brown rice, barley, and whole-wheat pasta. • Eat more beans, peas, and lentils. For example, add beans to soups or salads. Eat at least 5 cups of fruits and vegetables each day. Eat fruits and vegetables with the peel because the peel is high in fiber. © Copyright [employer] 2022 Information is for End User's use only and may not be sold, redistributed or otherwise used for commercial purposes. The above information is an  only. It is not intended as medical advice for individual conditions or treatments. Talk to your doctor, nurse or pharmacist before following any medical regimen to see if it is safe and effective for you. Heart Healthy Diet   AMBULATORY CARE:   A heart healthy diet  is an eating plan low in unhealthy fats and sodium (salt). The plan is high in healthy fats and fiber. A heart healthy diet helps improve your cholesterol levels and lowers your risk for heart disease and stroke. A dietitian will teach you how to read and understand food labels.   Heart healthy diet guidelines to follow:   • Choose foods that contain healthy fats:      ? Unsaturated fats  include monounsaturated and polyunsaturated fats. Unsaturated fat is found in foods such as soybean, canola, olive, corn, and safflower oils. It is also found in soft tub margarine that is made with liquid vegetable oil. ? Omega-3 fat  is found in certain fish, such as salmon, tuna, and trout, and in walnuts and flaxseed. Eat fish high in omega-3 fats at least 2 times a week. • Limit or do not have unhealthy fats:      ? Cholesterol  is found in animal foods, such as eggs and lobster, and in dairy products made from whole milk. Limit cholesterol to less than 200 mg each day. ? Saturated fat  is found in meats, such as willett and hamburger. It is also found in chicken or turkey skin, whole milk, and butter. Limit saturated fat to less than 7% of your total daily calories. ? Trans fat  is found in packaged foods, such as potato chips and cookies. It is also in hard margarine, some fried foods, and shortening. Do not eat foods that contain trans fats. • Get 20 to 30 grams of fiber each day. Fruits, vegetables, whole-grain foods, and legumes (cooked beans) are good sources of fiber. • Limit sodium as directed. You may be told to limit sodium, such as to 2,000 mg or less each day. Choose low-sodium or no-salt-added foods. Add little or no salt to food you prepare. Use herbs and spices in place of salt. Include the following in your heart healthy plan:  Ask your dietitian or healthcare provider how many servings to have each day from the following food groups:  • Grains:      ? Whole-wheat breads, cereals, and pastas, and brown rice    ? Low-fat, low-sodium crackers and chips    • Vegetables:      ? Broccoli, green beans, green peas, and spinach    ? Collards, kale, and lima beans    ? Carrots, sweet potatoes, tomatoes, and peppers    ?  Canned vegetables with no salt added    • Fruits:      ? Bananas, peaches, pears, and pineapple    ? Grapes, raisins, and dates    ? Oranges, tangerines, grapefruit, orange juice, and grapefruit juice    ? Apricots, mangoes, melons, and papaya    ? Raspberries and strawberries    ? Canned fruit with no added sugar    • Low-fat dairy:      ? Nonfat (skim) milk, 1% milk, and low-fat almond, cashew, or soy milks fortified with calcium    ? Low-fat cheese, regular or frozen yogurt, and cottage cheese    • Meats and proteins:      ? Lean cuts of beef and pork (loin, leg, round), skinless chicken and turkey    ? Legumes, soy products, egg whites, or nuts    Limit or do not include the following in your heart healthy plan:   • Foods and liquids that contain unhealthy fats and oils:      ? Whole or 2% milk, cream cheese, sour cream, or cheese    ? High-fat cuts of beef (T-bone steaks, ribs), chicken or turkey with skin, and organ meats such as liver    ? Butter, stick margarine, shortening, and cooking oils such as coconut or palm oil    • Foods and liquids high in sodium:      ? Packaged foods, such as frozen dinners, cookies, macaroni and cheese, and cereals with more than 300 mg of sodium per serving    ? Vegetables with added sodium, such as instant potatoes, vegetables with added sauces, or regular canned vegetables    ? Cured or smoked meats, such as hot dogs, willett, and sausage    ? High-sodium ketchup, barbecue sauce, salad dressing, pickles, olives, soy sauce, or miso    • Foods and liquids high in sugar:      ? Candy, cake, cookies, pies, or doughnuts    ? Soft drinks (soda), sports drinks, or sweetened tea    ? Canned or dry mixes for cakes, soups, sauces, or gravies    Other healthy heart guidelines:   • Do not smoke. Nicotine and other chemicals in cigarettes and cigars can cause lung and heart damage. Ask your healthcare provider for information if you currently smoke and need help to quit. E-cigarettes or smokeless tobacco still contain nicotine.  Talk to your provider before you use these products. • Limit or do not drink alcohol as directed. Alcohol can damage your heart and raise your blood pressure. Your healthcare provider may give you specific daily and weekly limits. The general recommended limit is 1 drink a day for women 21 or older and for men 72 or older. Do not have more than 3 drinks within 24 hours or 7 within a week. The recommended limit is 2 drinks a day for men 24to 59years of age. Do not have more than 4 drinks within 24 hours or 14 within a week. A drink of alcohol is 12 ounces of beer, 5 ounces of wine, or 1½ ounces of liquor. • Maintain a healthy weight. Extra body weight makes your heart work harder. Ask your provider what a healthy weight is for you. He or she can help you create a safe weight loss plan, if needed. • Exercise regularly. Exercise can help you maintain a healthy weight and improve your blood pressure and cholesterol levels. Regular exercise can also decrease your risk for heart problems. Ask your provider about the best exercise plan for you. Do not start an exercise program without asking your provider. Follow up with your doctor or cardiologist as directed:  Write down your questions so you remember to ask them during your visits. © Copyright [employer]eves 2022 Information is for End User's use only and may not be sold, redistributed or otherwise used for commercial purposes. The above information is an  only. It is not intended as medical advice for individual conditions or treatments. Talk to your doctor, nurse or pharmacist before following any medical regimen to see if it is safe and effective for you.

## 2023-08-16 NOTE — TELEPHONE ENCOUNTER
I called [facility] in response to a referral that was received for patient to establish care with Thoracic Surgery. Outreach was made to complete patient's intake questionnaire . Patient's intake questionnaire was reviewed and complete. Patient's intake has been sent to the team for clinical review.

## 2023-08-17 ENCOUNTER — TELEPHONE (OUTPATIENT)
Dept: CARDIAC SURGERY | Facility: CLINIC | Age: 48
End: 2023-08-17

## 2023-08-17 PROBLEM — J30.89 ALLERGIC RHINITIS DUE TO HOUSE DUST MITE: Status: ACTIVE | Noted: 2023-08-17

## 2023-08-17 PROBLEM — J30.1 CHRONIC SEASONAL ALLERGIC RHINITIS DUE TO POLLEN: Status: ACTIVE | Noted: 2023-08-17

## 2023-08-17 NOTE — TELEPHONE ENCOUNTER
I called and spoke with pt, she would prefer to call central scheduling herself to schedule barium swallow since she knows what her availability is, I left her with my direct line so she can call me once she is done and I can get her scheduled for a consultation with thoracic

## 2023-08-18 ENCOUNTER — APPOINTMENT (OUTPATIENT)
Dept: RADIOLOGY | Facility: CLINIC | Age: 48
End: 2023-08-18
Payer: COMMERCIAL

## 2023-08-18 ENCOUNTER — APPOINTMENT (OUTPATIENT)
Dept: LAB | Facility: CLINIC | Age: 48
End: 2023-08-18
Payer: COMMERCIAL

## 2023-08-18 DIAGNOSIS — J01.11 ACUTE RECURRENT FRONTAL SINUSITIS: ICD-10-CM

## 2023-08-18 DIAGNOSIS — M35.00 SJOGREN'S SYNDROME, WITH UNSPECIFIED ORGAN INVOLVEMENT (HCC): ICD-10-CM

## 2023-08-18 DIAGNOSIS — Z13.1 SCREENING FOR DIABETES MELLITUS: ICD-10-CM

## 2023-08-18 DIAGNOSIS — N95.1 MENOPAUSAL SYMPTOMS: ICD-10-CM

## 2023-08-18 DIAGNOSIS — F41.9 ANXIETY AND DEPRESSION: ICD-10-CM

## 2023-08-18 DIAGNOSIS — Z00.00 ROUTINE ADULT HEALTH MAINTENANCE: ICD-10-CM

## 2023-08-18 DIAGNOSIS — M06.9 RHEUMATOID ARTHRITIS, INVOLVING UNSPECIFIED SITE, UNSPECIFIED WHETHER RHEUMATOID FACTOR PRESENT (HCC): ICD-10-CM

## 2023-08-18 DIAGNOSIS — F32.A ANXIETY AND DEPRESSION: ICD-10-CM

## 2023-08-18 DIAGNOSIS — M05.79 RHEUMATOID ARTHRITIS INVOLVING MULTIPLE SITES WITH POSITIVE RHEUMATOID FACTOR (HCC): ICD-10-CM

## 2023-08-18 DIAGNOSIS — Z13.6 SCREENING FOR CARDIOVASCULAR CONDITION: ICD-10-CM

## 2023-08-18 LAB
ALBUMIN SERPL BCP-MCNC: 3.6 G/DL (ref 3.5–5)
ALP SERPL-CCNC: 66 U/L (ref 46–116)
ALT SERPL W P-5'-P-CCNC: 20 U/L (ref 12–78)
ANION GAP SERPL CALCULATED.3IONS-SCNC: 5 MMOL/L
AST SERPL W P-5'-P-CCNC: 14 U/L (ref 5–45)
BASOPHILS # BLD AUTO: 0.08 THOUSANDS/ÂΜL (ref 0–0.1)
BASOPHILS NFR BLD AUTO: 1 % (ref 0–1)
BILIRUB SERPL-MCNC: 0.46 MG/DL (ref 0.2–1)
BUN SERPL-MCNC: 14 MG/DL (ref 5–25)
CALCIUM SERPL-MCNC: 8.6 MG/DL (ref 8.3–10.1)
CHLORIDE SERPL-SCNC: 110 MMOL/L (ref 96–108)
CHOLEST SERPL-MCNC: 123 MG/DL
CO2 SERPL-SCNC: 24 MMOL/L (ref 21–32)
CREAT SERPL-MCNC: 0.78 MG/DL (ref 0.6–1.3)
CRP SERPL QL: <3 MG/L
EOSINOPHIL # BLD AUTO: 0.9 THOUSAND/ÂΜL (ref 0–0.61)
EOSINOPHIL NFR BLD AUTO: 8 % (ref 0–6)
ERYTHROCYTE [DISTWIDTH] IN BLOOD BY AUTOMATED COUNT: 12.3 % (ref 11.6–15.1)
ERYTHROCYTE [SEDIMENTATION RATE] IN BLOOD: 42 MM/HOUR (ref 0–19)
EST. AVERAGE GLUCOSE BLD GHB EST-MCNC: 108 MG/DL
GFR SERPL CREATININE-BSD FRML MDRD: 91 ML/MIN/1.73SQ M
GLUCOSE P FAST SERPL-MCNC: 100 MG/DL (ref 65–99)
HBA1C MFR BLD: 5.4 %
HCT VFR BLD AUTO: 41.8 % (ref 34.8–46.1)
HDLC SERPL-MCNC: 30 MG/DL
HGB BLD-MCNC: 13.5 G/DL (ref 11.5–15.4)
IMM GRANULOCYTES # BLD AUTO: 0.05 THOUSAND/UL (ref 0–0.2)
IMM GRANULOCYTES NFR BLD AUTO: 0 % (ref 0–2)
LDLC SERPL CALC-MCNC: 67 MG/DL (ref 0–100)
LYMPHOCYTES # BLD AUTO: 2.39 THOUSANDS/ÂΜL (ref 0.6–4.47)
LYMPHOCYTES NFR BLD AUTO: 21 % (ref 14–44)
MCH RBC QN AUTO: 30.2 PG (ref 26.8–34.3)
MCHC RBC AUTO-ENTMCNC: 32.3 G/DL (ref 31.4–37.4)
MCV RBC AUTO: 94 FL (ref 82–98)
MONOCYTES # BLD AUTO: 0.85 THOUSAND/ÂΜL (ref 0.17–1.22)
MONOCYTES NFR BLD AUTO: 8 % (ref 4–12)
NEUTROPHILS # BLD AUTO: 6.89 THOUSANDS/ÂΜL (ref 1.85–7.62)
NEUTS SEG NFR BLD AUTO: 62 % (ref 43–75)
NONHDLC SERPL-MCNC: 93 MG/DL
NRBC BLD AUTO-RTO: 0 /100 WBCS
PLATELET # BLD AUTO: 343 THOUSANDS/UL (ref 149–390)
PMV BLD AUTO: 11.8 FL (ref 8.9–12.7)
POTASSIUM SERPL-SCNC: 4.1 MMOL/L (ref 3.5–5.3)
PROT SERPL-MCNC: 7.6 G/DL (ref 6.4–8.4)
RBC # BLD AUTO: 4.47 MILLION/UL (ref 3.81–5.12)
SODIUM SERPL-SCNC: 139 MMOL/L (ref 135–147)
TRIGL SERPL-MCNC: 129 MG/DL
WBC # BLD AUTO: 11.16 THOUSAND/UL (ref 4.31–10.16)

## 2023-08-18 PROCEDURE — 80053 COMPREHEN METABOLIC PANEL: CPT

## 2023-08-18 PROCEDURE — 84403 ASSAY OF TOTAL TESTOSTERONE: CPT

## 2023-08-18 PROCEDURE — 85025 COMPLETE CBC W/AUTO DIFF WBC: CPT

## 2023-08-18 PROCEDURE — 80061 LIPID PANEL: CPT

## 2023-08-18 PROCEDURE — 82670 ASSAY OF TOTAL ESTRADIOL: CPT

## 2023-08-18 PROCEDURE — 83001 ASSAY OF GONADOTROPIN (FSH): CPT

## 2023-08-18 PROCEDURE — 36415 COLL VENOUS BLD VENIPUNCTURE: CPT

## 2023-08-18 PROCEDURE — 73502 X-RAY EXAM HIP UNI 2-3 VIEWS: CPT

## 2023-08-18 PROCEDURE — 83036 HEMOGLOBIN GLYCOSYLATED A1C: CPT

## 2023-08-18 PROCEDURE — 84443 ASSAY THYROID STIM HORMONE: CPT

## 2023-08-19 LAB
ESTRADIOL SERPL-MCNC: 73.6 PG/ML
FSH SERPL-ACNC: 3.2 MIU/ML
TESTOST SERPL-MSCNC: 13 NG/DL
TSH SERPL DL<=0.05 MIU/L-ACNC: 2.24 UIU/ML (ref 0.45–4.5)

## 2023-08-28 ENCOUNTER — OFFICE VISIT (OUTPATIENT)
Dept: RHEUMATOLOGY | Facility: CLINIC | Age: 48
End: 2023-08-28
Payer: COMMERCIAL

## 2023-08-28 VITALS
BODY MASS INDEX: 37.22 KG/M2 | SYSTOLIC BLOOD PRESSURE: 118 MMHG | HEIGHT: 64 IN | WEIGHT: 218 LBS | DIASTOLIC BLOOD PRESSURE: 78 MMHG

## 2023-08-28 DIAGNOSIS — Z79.899 HIGH RISK MEDICATION USE: ICD-10-CM

## 2023-08-28 DIAGNOSIS — M05.79 RHEUMATOID ARTHRITIS INVOLVING MULTIPLE SITES WITH POSITIVE RHEUMATOID FACTOR (HCC): Primary | ICD-10-CM

## 2023-08-28 PROCEDURE — 99214 OFFICE O/P EST MOD 30 MIN: CPT | Performed by: INTERNAL MEDICINE

## 2023-08-28 RX ORDER — HYDROXYCHLOROQUINE SULFATE 200 MG/1
200 TABLET, FILM COATED ORAL 2 TIMES DAILY
Qty: 180 TABLET | Refills: 1 | Status: SHIPPED | OUTPATIENT
Start: 2023-08-28 | End: 2024-02-24

## 2023-08-28 NOTE — PATIENT INSTRUCTIONS
Do labs before next visit  Continue hydroxychloroquine twice a day; continue regular eye exams per eye doctor  Continue over the counter tylenol as needed     Return to clinic in 6 months

## 2023-08-28 NOTE — PROGRESS NOTES
Assessment and Plan:  Paxton Heart is a 57 y.o.  female who presents for follow-up of Rheumatoid arthritis, controlled. Rheumatoid arthritis. She has been good with no recent flareups. She will continue taking hydroxychloroquine and over-the-counter Tylenol as needed. Do labs before next visit  Continue hydroxychloroquine twice a day; continue regular eye exams per eye doctor  Continue over the counter tylenol as needed     Return to clinic in 6 months    Plan:  Problem List Items Addressed This Visit        Musculoskeletal and Integument    RA (rheumatoid arthritis) ([address]) - Primary    Relevant Medications    hydroxychloroquine (PLAQUENIL) 200 mg tablet    Other Relevant Orders    Sedimentation rate, automated    C-reactive protein   Other Visit Diagnoses     High risk medication use           high risk medication use - Benefits and risks of hydroxychloroquine, including but not limited to retinal toxicity, corneal deposits, gastrointestinal side effects, and headaches were discussed with the patient. The need for a regular eye exam to monitor for ocular toxicity while on this medication was also explained to the patient. Chief Complaint  Follow-up of rheumatoid arthritis    Rheumatic Disease Summary  Initial visit 10/22/21: Haider Tipton a 57 y.o.  female who presented as a Rheumatology consult referred by LAURA Payton to establish care for her seropositive Rheumatoid arthritis.  Admitted to a few hours of morning stiffness.  Was not on any DMARDs or steroids.  Went into adrenal crisis when trying to get off steroids too fast in the past; will therefore not put her back on prednisone unless absolutely needed to. Was taking 1,000mg of Tylenol and 800mg of ibuprofen a day.  Inflammatory arthritis labs ordered to confirm diagnosis; patient's anti CCP and rheumatoid factor ultimately both returned significantly positive. Continued Tylenol and ibuprofen as needed.  Started hydroxychloroquine 200mg twice a day; aware to get regular eye exams while on this medication     2/22/22: Patient feels much better after patient started on Plaquenil. Patient is not on tylenol and ibuprofen. Continue hydroxychloroquine twice a day. Get regular eye exams    HPI  Ms. Elizabeth Velazquez is a 70-year-old female who presents for today for follow-up with rheumatoid arthritis. She consents to the use of DAJA. The following portions of the patient's history were reviewed and updated as appropriate: allergies, current medications, past family history, past medical history, past social history, past surgical history, and problem list.    Interval History:  She has not had a rheumatoid flare in a while but notes having normal aches and pains which she attributes to her age. She has not experienced morning stiffness lately. She complains of persistent left hip pain for which she obtained a left hip x-ray 10 days ago, but the results are not yet uploaded on [email or web address] for her to see. She notes low back stiffness with prolonged sitting. She has sciatic pain which is not as bad as it has been and has been trying to keep moving. 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 She has been taking Tylenol as needed. She had bloodwork done due to constant fatigue; however, the reports were unrevealing. She has seen an allergist and has been recommended to avoid taking ibuprofen. She anyway has not felt a need to take ibuprofen. She has sinus issues. Her allergist thinks she has aspirin allergy which causes her sinus issues. She had deviated septum and nasal polyps for which she was treated surgically. She has been following up with her primary care doctor as well as her ENT regarding her symptoms including coughing and bronchospasms. She had a sinus infection a couple of weeks ago.      She started on a low carb diet as she felt she gained a lot of weight in the past year. She is trying to be healthy. She does not feel well if she has a bad diet and experiences joint aches when she eats too much sugar. She had a baseline eye exam in 11/20222 and was informed that she can return at 5-year davide. Otherwise, she could follow up with them yearly if needed in 11/2023. She used to work as an ED nurse in the past. She is currently working from home. She got engaged and is planning to get  in 09/2024. Review of Systems:   Pertinent ROS positive for sinus problems, indigestion, joint pain, back pain, feeling tired. The rest of the 14-point ROS reviewed and were negative.     Home Medications:    Current Outpatient Medications:   •  acetaminophen (TYLENOL) 500 mg tablet, Take 1,000 mg by mouth every 6 (six) hours as needed for mild pain, Disp: , Rfl:   •  albuterol (2.5 mg/3 mL) 0.083 % nebulizer solution, Take 3 mL (2.5 mg total) by nebulization every 6 (six) hours as needed for wheezing or shortness of breath, Disp: 180 mL, Rfl: 5  •  fluticasone (FLONASE) 50 mcg/act nasal spray, 1 spray into each nostril 2 (two) times a day, Disp: 16 g, Rfl: 1  •  hydroxychloroquine (PLAQUENIL) 200 mg tablet, Take 1 tablet (200 mg total) by mouth 2 (two) times a day, Disp: 180 tablet, Rfl: 1  •  ibuprofen (MOTRIN) 200 mg tablet, Take 800 mg by mouth every 6 (six) hours as needed for mild pain, Disp: , Rfl:   •  Levocetirizine Dihydrochloride (XYZAL PO), Take by mouth, Disp: , Rfl:   •  levonorgestrel (MIRENA) 20 MCG/24HR IUD, 1 each by Intrauterine route once, Disp: , Rfl:   •  montelukast (SINGULAIR) 10 mg tablet, Take 1 tablet (10 mg total) by mouth daily at bedtime, Disp: 30 tablet, Rfl: 4  •  Olopatadine HCl 0.6 % SOLN, 2 actuation into each nostril daily, Disp: 30.5 g, Rfl: 11  •  omeprazole (PriLOSEC) 20 mg delayed release capsule, Take 1 capsule (20 mg total) by mouth 2 (two) times a day, Disp: 60 capsule, Rfl: 2  •  valACYclovir (VALTREX) 1,000 mg tablet, Take 2 tablets by mouth every 12 hours x 1 days (Patient taking differently: if needed Take 2 tablets by mouth every 12 hours x 1 days), Disp: 4 tablet, Rfl: 0    Objective:    Vitals:    08/28/23 1626   BP: 118/78   Weight: 98.9 kg (218 lb)   Height: 5' 4" (1.626 m)       Physical Exam:  Constitutional:       General: She is not in acute distress. HENT:      Head: Normocephalic and atraumatic. Eyes:      Conjunctiva/sclera: Conjunctivae normal.   Cardiovascular:      Rate and Rhythm: Normal rate and regular rhythm. Heart sounds: S1 normal and S2 normal.      No friction rub. Pulmonary:      Effort: Pulmonary effort is normal. No respiratory distress. Breath sounds: Normal breath sounds. No wheezing, rhonchi or rales. Musculoskeletal:      Cervical back: Neck supple. Skin:     Coloration: Skin is not pale. Neurological:      Mental Status: She is alert. Mental status is at baseline. Psychiatric:         Mood and Affect: Mood normal.         Behavior: Behavior normal.      I have personally reviewed results with the patient. The labs were obtained approximately 10 days ago which revealed improved cholesterol levels than previously. Her C-reactive protein was within normal limits, but sedimentation rate was elevated from 27 mm/hr to 42 mm/hr. Her eosinophils were elevated. Imaging:   POCT spirometry  with bronchodilator    Result Date: 8/17/2023  Impression: Spirometry Normal.  FEV1 = 95 % and FVC = 99 % Supervised, reviewed and interpreted by DO Jian Randall DO Fellow of American Academy of Allergy, Asthma & Immunology Fellow of American College of Allergy, Asthma & Immunology      Bilateral hand XR 11/8/21  There is no acute fracture or dislocation.  Degenerative changes in the distal interphalangeal joint of the fingers   No aggressive erosive changes to suggest active inflammatory arthropathy      Left ankle/heel MRI 07/29/2019  IMPRESSION:  1.  Mild posterior tibialis insertional tendinosis and reactive marrow edema at the insertion on the medial navicular. No evidence of tear or PTT dysfunction. 2. Mild common peroneal tenosynovitis without tendinosis or tear. 3. Intact plantar fascia with a small plantar calcaneal spur noted. 4. Mild talonavicular degenerative change as above. Labs:   Appointment on 08/18/2023   Component Date Value Ref Range Status   • [address] 08/18/2023 3.2  See Comment mIU/mL Final    FSH:   Menstruating Females:     Mid-Follicular Phase  9.7-1.3 mIU/mL     Mid-Cycle Phase       4.5-22.5 mIU/mL     Mid-Luteal Phase      1.8-5.1  mIU/mL   Postmenopausal Females     Post Menopausal       16.7-113.6   mIU/mL         Note: Normal ranges may not apply to patients who are transgender, non-binary, or whose legal sex, sex at birth, and gender identity differ. • Estradiol 08/18/2023 73.6  See Comment pg/mL Final    ESTRADIOL:    Non preg Female:      Early Foll. (-10 to-14d from LH Peak)    20.0-157.0 pg/mL      Mid Foll (-9 to-4d from [facility] Peak)         20.7-139.0 pg/mL      Ovulatory Peak (LH Peak)                 19.3-622.0 pg/mL       Mid Luteal (+4 to +11 from LH Peak)      25.8-279.0 pg/mL      Post Radha(NHT)                           0-30.0     pg/mL      Note: Normal ranges may not apply to patients who are transgender, non-binary, or whose legal sex, sex at birth, and gender identity differ.          • TSH 3RD GENERATON 08/18/2023 2.239  0.450 - 4.500 uIU/mL Final    The recommended reference ranges for TSH during pregnancy are as follows:   First trimester 0.1 to 2.5 uIU/mL   Second trimester  0.2 to 3.0 uIU/mL   Third trimester 0.3 to 3.0 uIU/m    Note: Normal ranges may not apply to patients who are transgender, non-binary, or whose legal sex, sex at birth, and gender identity differ. Adult TSH (3rd generation) reference range follows the recommended guidelines of the American Thyroid Association, January, 2020.    • Testosterone 08/18/2023 13  0 - 75 ng/dL ng/dL Final    Testosterone Reference Range:  Patients (<18y) -   Reference Range not established for this age group. Male      (>=18y)    175-781 ng/dL  Female  (>=18y)    0-75 ng/dL  Note: Normal ranges may not apply to patients who are transgender, non-binary, or whose legal  sex, sex at birth and gender identity differ. • Sodium 08/18/2023 139  135 - 147 mmol/L Final   • Potassium 08/18/2023 4.1  3.5 - 5.3 mmol/L Final   • Chloride 08/18/2023 110 (H)  96 - 108 mmol/L Final   • CO2 08/18/2023 24  21 - 32 mmol/L Final   • ANION GAP 08/18/2023 5  mmol/L Final   • BUN 08/18/2023 14  5 - 25 mg/dL Final   • Creatinine 08/18/2023 0.78  0.60 - 1.30 mg/dL Final    Standardized to IDMS reference method   • Glucose, Fasting 08/18/2023 100 (H)  65 - 99 mg/dL Final    Specimen collection should occur prior to Sulfasalazine administration due to the potential for falsely depressed results. Specimen collection should occur prior to Sulfapyridine administration due to the potential for falsely elevated results. • Calcium 08/18/2023 8.6  8.3 - 10.1 mg/dL Final   • AST 08/18/2023 14  5 - 45 U/L Final    Specimen collection should occur prior to Sulfasalazine administration due to the potential for falsely depressed results. • ALT 08/18/2023 20  12 - 78 U/L Final    Specimen collection should occur prior to Sulfasalazine and/or Sulfapyridine administration due to the potential for falsely depressed results. • Alkaline Phosphatase 08/18/2023 66  46 - 116 U/L Final   • Total Protein 08/18/2023 7.6  6.4 - 8.4 g/dL Final   • Albumin 08/18/2023 3.6  3.5 - 5.0 g/dL Final   • Total Bilirubin 08/18/2023 0.46  0.20 - 1.00 mg/dL Final    Use of this assay is not recommended for patients undergoing treatment with eltrombopag due to the potential for falsely elevated results.    • eGFR 08/18/2023 91  ml/min/1.73sq m Final   • WBC 08/18/2023 11.16 (H)  4.31 - 10.16 Thousand/uL Final   • RBC 08/18/2023 4.47  3.81 - 5.12 Million/uL Final • Hemoglobin 08/18/2023 13.5  11.5 - 15.4 g/dL Final   • Hematocrit 08/18/2023 41.8  34.8 - 46.1 % Final   • MCV 08/18/2023 94  82 - 98 fL Final   • MCH 08/18/2023 30.2  26.8 - 34.3 pg Final   • MCHC 08/18/2023 32.3  31.4 - 37.4 g/dL Final   • RDW 08/18/2023 12.3  11.6 - 15.1 % Final   • MPV 08/18/2023 11.8  8.9 - 12.7 fL Final   • Platelets 15/07/0379 343  149 - 390 Thousands/uL Final   • nRBC 08/18/2023 0  /100 WBCs Final   • Neutrophils Relative 08/18/2023 62  43 - 75 % Final   • Immat GRANS % 08/18/2023 0  0 - 2 % Final   • Lymphocytes Relative 08/18/2023 21  14 - 44 % Final   • Monocytes Relative 08/18/2023 8  4 - 12 % Final   • Eosinophils Relative 08/18/2023 8 (H)  0 - 6 % Final   • Basophils Relative 08/18/2023 1  0 - 1 % Final   • Neutrophils Absolute 08/18/2023 6.89  1.85 - 7.62 Thousands/µL Final   • Immature Grans Absolute 08/18/2023 0.05  0.00 - 0.20 Thousand/uL Final   • Lymphocytes Absolute 08/18/2023 2.39  0.60 - 4.47 Thousands/µL Final   • Monocytes Absolute 08/18/2023 0.85  0.17 - 1.22 Thousand/µL Final   • Eosinophils Absolute 08/18/2023 0.90 (H)  0.00 - 0.61 Thousand/µL Final   • Basophils Absolute 08/18/2023 0.08  0.00 - 0.10 Thousands/µL Final   • Cholesterol 08/18/2023 123  See Comment mg/dL Final    Cholesterol:         Pediatric <18 Years        Desirable          <170 mg/dL      Borderline High    170-199 mg/dL      High               >=200 mg/dL        Adult >=18 Years            Desirable         <200 mg/dL      Borderline High   200-239 mg/dL      High              >239 mg/dL     • Triglycerides 08/18/2023 129  See Comment mg/dL Final    Triglyceride:     0-9Y            <75mg/dL     10Y-17Y         <90 mg/dL       >=18Y     Normal          <150 mg/dL     Borderline High 150-199 mg/dL     High            200-499 mg/dL        Very High       >499 mg/dL    Specimen collection should occur prior to N-Acetylcysteine or Metamizole administration due to the potential for falsely depressed results. • HDL, Direct 08/18/2023 30 (L)  >=50 mg/dL Final    Specimen collection should occur prior to Metamizole administration due to the potential for falsley depressed results. • LDL Calculated 08/18/2023 67  0 - 100 mg/dL Final    LDL Cholesterol:     Optimal           <100 mg/dl     Near Optimal      100-129 mg/dl     Above Optimal       Borderline High 130-159 mg/dl       High            160-189 mg/dl       Very High       >189 mg/dl         This screening LDL is a calculated result. It does not have the accuracy of the Direct Measured LDL in the monitoring of patients with hyperlipidemia and/or statin therapy. Direct Measure LDL (BJP505) must be ordered separately in these patients. • Non-HDL-Chol (CHOL-HDL) 08/18/2023 93  mg/dl Final   • Hemoglobin A1C 08/18/2023 5.4  Normal 4.0-5.6%; PreDiabetic 5.7-6.4%; Diabetic >=6.5%; Glycemic control for adults with diabetes <7.0% % Final   • EAG 08/18/2023 108  mg/dl Final       Transcribed for John Saucedo MD, by Johnny Urena/Reviewed by Lindsey Ceja on 09/18/23 at 5:37 AM. Powered by [employer].

## 2023-09-06 ENCOUNTER — HOSPITAL ENCOUNTER (OUTPATIENT)
Dept: RADIOLOGY | Facility: HOSPITAL | Age: 48
Discharge: HOME | End: 2023-09-06
Payer: COMMERCIAL

## 2023-09-06 DIAGNOSIS — K44.9 HIATAL HERNIA: ICD-10-CM

## 2023-09-06 PROCEDURE — 74220 X-RAY XM ESOPHAGUS 1CNTRST: CPT

## 2023-09-13 ENCOUNTER — HOSPITAL ENCOUNTER (OUTPATIENT)
Dept: MAMMOGRAPHY | Facility: HOSPITAL | Age: 48
Discharge: HOME | End: 2023-09-13
Payer: COMMERCIAL

## 2023-09-13 ENCOUNTER — OFFICE VISIT (OUTPATIENT)
Dept: CARDIAC SURGERY | Facility: CLINIC | Age: 48
End: 2023-09-13
Payer: COMMERCIAL

## 2023-09-13 VITALS
TEMPERATURE: 98.5 F | SYSTOLIC BLOOD PRESSURE: 132 MMHG | OXYGEN SATURATION: 97 % | WEIGHT: 217.37 LBS | RESPIRATION RATE: 14 BRPM | HEART RATE: 90 BPM | BODY MASS INDEX: 37.11 KG/M2 | DIASTOLIC BLOOD PRESSURE: 79 MMHG | HEIGHT: 64 IN

## 2023-09-13 VITALS — BODY MASS INDEX: 36.7 KG/M2 | WEIGHT: 215 LBS | HEIGHT: 64 IN

## 2023-09-13 DIAGNOSIS — K44.9 HIATAL HERNIA: ICD-10-CM

## 2023-09-13 DIAGNOSIS — Z12.31 ENCOUNTER FOR SCREENING MAMMOGRAM FOR MALIGNANT NEOPLASM OF BREAST: ICD-10-CM

## 2023-09-13 PROCEDURE — 99245 OFF/OP CONSLTJ NEW/EST HI 55: CPT | Performed by: THORACIC SURGERY (CARDIOTHORACIC VASCULAR SURGERY)

## 2023-09-13 PROCEDURE — 77067 SCR MAMMO BI INCL CAD: CPT

## 2023-09-13 PROCEDURE — 77063 BREAST TOMOSYNTHESIS BI: CPT

## 2023-09-13 RX ORDER — OMEPRAZOLE 20 MG/1
20 CAPSULE, DELAYED RELEASE ORAL 2 TIMES DAILY
Qty: 60 CAPSULE | Refills: 2 | Status: SHIPPED | OUTPATIENT
Start: 2023-09-13 | End: 2023-12-12

## 2023-09-13 NOTE — PROGRESS NOTES
Thoracic Consult  Assessment/Plan:    Hiatal hernia  Ms Brenda Gregory has a hiatal hernia and after gaining about 20lbs over the last year she has had worsening abdominal bloating and postprandial abdominal pain with some regurgitation of liquid acid into her mouth. She is not on a consistent anti-acid medication and occasionally takes Prilosec with some improvement. Dr Praveen Adams explained different options including trial of scheduled PPI with or without attempted medical treatment with weight loss medication under direction of her PCP, robotic-assisted hiatal hernia repair with fundoplication or referral to bariatric surgery for evaluation for RYGB. . Dr Praveen Adams did discuss that given her elevated BMI she is at increased risk for recurrence should we proceed with a robotic-assisted hiatal hernia repair and fundoplication. She is interested in talking about surgical intervention with bariatric surgery and we will place a referral and contact to Dr Rogers Buenrostro to ensure that she is able to see them. In the meantime we will start her on Prilosec 20mg BID. She needs no further follow-up with our office but if she has any questions or would like to discuss her options again we are always available. Diagnoses and all orders for this visit:    Hiatal hernia  -     Ambulatory Referral to Thoracic Surgery  -     Ambulatory Referral to Bariatric Surgery; Future  -     omeprazole (PriLOSEC) 20 mg delayed release capsule; Take 1 capsule (20 mg total) by mouth 2 (two) times a day          Thoracic History   Diagnosis: Hiatal hernia, GERD   Procedures/Surgeries:    Pathology:    Adjuvant Therapy:       Subjective:    Patient ID: Latrice James is a 57 y.o. female.     HPI   ECOG 0    Ms Brenda Gregory is a 57year old female with PMH obesity (BMI 37) rrheumatoid arthritis and Sjogren's on Plaquenil, former tobacco abuse 20pk/yr history quit 2006 who was referred to out office by JAMSHID Pan for evaluation of a a hiatal hernia and GERD. Barium swallow on 9/6/2023 was personally reviewed by myself in PACS and reveals a moderate size reducible hiatal hernia with marked gastroesophageal reflux. PSH: Laparoscopic cholecystectomy in 2009    They are on no AC/AP     On discussion she has a lot of upper abdominal post-prandial pain and bloating. She has a feeling of tightness in her throat and some tightness in her chest as well as occasional palpitations. She has episodes of regurgitation of acidic liquid into her mouth about 4-5 times a month. These symptoms have gotten significantly worse over the last year as she has switched her career from [employer] work to working from home and gained about 25lbs over the last year. She has attempted weight loss with our medical weight management team and has also discussed medical options such as Wegovy with her PCP. She does not have any dysphagia, nausea or vomiting. She takes Prilosec on occasion but does not take it regularly.      GERD HRQL 12    The following portions of the patient's history were reviewed and updated as appropriate: allergies, current medications, past family history, past medical history, past social history, past surgical history and problem list.    Past Medical History:   Diagnosis Date   • Anxiety    • Depression    • History of chickenpox    • Nasal polyps    • Rheumatoid arthritis ([address])    • Sinusitis    • Sjoegren syndrome    • Thyroid nodule    • Wears glasses       Past Surgical History:   Procedure Laterality Date   • CHOLECYSTECTOMY     • NASAL/SINUS ENDOSCOPY N/A 06/30/2020    Procedure: IMAGED GUIDED F.E.S.S.;  Surgeon: Roosevelt Simms DO;  Location: [facility];  Service: ENT   • VT SEPTOPLASTY/SUBMUCOUS RESECJ W/WO CARTILAGE GRF N/A 06/30/2020    Procedure: SEPTOPLASTY;  Surgeon: Roosevelt Simms DO;  Location: [facility];  Service: ENT   • US GUIDED THYROID BIOPSY  7/31/2017   • VENOUS ABLATION Left     lower extremity   • WISDOM TOOTH EXTRACTION Family History   Problem Relation Age of Onset   • Breast cancer Mother 64   • Rheum arthritis Mother    • Sjogren's syndrome Mother    • Liver cancer Mother    • Esophageal cancer Father    • Diabetes Father    • No Known Problems Brother    • No Known Problems Brother    • No Known Problems Maternal Grandmother    • No Known Problems Paternal Grandmother    • No Known Problems Daughter    • No Known Problems Son    • No Known Problems Son    • No Known Problems Maternal Aunt    • No Known Problems Maternal Aunt    • No Known Problems Maternal Aunt    • Stroke Other    • Heart disease Neg Hx       Social History     Socioeconomic History   • Marital status: Single     Spouse name: Not on file   • Number of children: 3   • Years of education: Not on file   • Highest education level: Not on file   Occupational History   • Not on file   Tobacco Use   • Smoking status: Former     Packs/day: 1.00     Years: 20.00     Total pack years: 20.00     Types: Cigarettes     Quit date:      Years since quittin.7   • Smokeless tobacco: Never   Vaping Use   • Vaping Use: Never used   Substance and Sexual Activity   • Alcohol use: Yes     Comment: occasional   • Drug use: No   • Sexual activity: Yes     Partners: Male     Birth control/protection: I.U.D.    Other Topics Concern   • Not on file   Social History Narrative    Who lives in your home: Boyfriend and daughter     What type of home do you live in: Single house    Age of your home: 39 yrs ago     How long have you been living there:      Type of heat: Baseboard/mini-splits     Type of fuel: Electric     What type of vika is in your bedroom: Carpet    Do you have the following in or near your home:    Air products: Central air and Dehumidifier    Pests: None    Pets: Cats x 2     Are pets allowed in bedroom: No    Open fields, wooded areas nearby: Open fields and Wooded areas    Basement: Dry and Finished    Exposure to second hand smoke: No        Habits: Caffeine: coffee 3 cups daily-soda occasionally-    Chocolate: occasionally     Other:              Social Determinants of Health     Financial Resource Strain: Not on file   Food Insecurity: Not on file   Transportation Needs: Not on file   Physical Activity: Inactive (8/17/2023)    Exercise Vital Sign    • Days of Exercise per Week: 0 days    • Minutes of Exercise per Session: 0 min   Stress: Not on file   Social Connections: Not on file   Intimate Partner Violence: Not on file   Housing Stability: Not on file      Review of Systems   Constitutional: Negative for chills, diaphoresis and unexpected weight change. HENT: Negative. Eyes: Negative. Respiratory: Negative for cough, shortness of breath and wheezing. Cardiovascular: Negative for chest pain and leg swelling. Gastrointestinal: Positive for abdominal pain. Negative for diarrhea, nausea and vomiting.        +bloating  +regurgitation   Endocrine: Negative. Genitourinary: Negative. Musculoskeletal: Negative. Skin: Negative. Allergic/Immunologic: Negative. Neurological: Negative. Hematological: Negative for adenopathy. Does not bruise/bleed easily. Psychiatric/Behavioral: Negative. All other systems reviewed and are negative. Objective:   Physical Exam  Vitals reviewed. Constitutional:       General: She is not in acute distress. Appearance: Normal appearance. She is obese. She is not ill-appearing. HENT:      Head: Normocephalic. Nose: Nose normal.      Mouth/Throat:      Mouth: Mucous membranes are moist.   Eyes:      Pupils: Pupils are equal, round, and reactive to light. Cardiovascular:      Rate and Rhythm: Normal rate and regular rhythm. Heart sounds: Normal heart sounds. Pulmonary:      Effort: Pulmonary effort is normal.      Breath sounds: Normal breath sounds. No wheezing, rhonchi or rales. Abdominal:      Palpations: Abdomen is soft. Musculoskeletal:         General: No swelling. Normal range of motion. Lymphadenopathy:      Cervical: No cervical adenopathy. Skin:     General: Skin is warm. Neurological:      General: No focal deficit present. Mental Status: She is alert and oriented to person, place, and time. Psychiatric:         Mood and Affect: Mood normal.     /79 (BP Location: Left arm, Patient Position: Sitting, Cuff Size: Large)   Pulse 90   Temp 98.5 °F (36.9 °C) (Temporal)   Resp 14   Ht 5' 4" (1.626 m)   Wt 98.6 kg (217 lb 6 oz)   SpO2 97%   BMI 37.31 kg/m²     XR chest pa & lateral    Result Date: 2/26/2023  Impression No acute cardiopulmonary disease. Findings are stable Workstation performed: [id number]      No CT Chest results available for this patient. No CT Chest,Abdomen,Pelvis results available for this patient. No NM PET CT results available for this patient. FL barium swallow ROUTINE esophagus    Result Date: 9/6/2023  Narrative BARIUM SWALLOW-ESOPHAGRAM INDICATION:   K44.9: Diaphragmatic hernia without obstruction or gangrene. COMPARISON:  None IMAGES: 25 fluoroscopic spot views FLUOROSCOPY TIME:   2.1 min. TECHNIQUE: The patient was given effervescent granules and barium by mouth and images of the esophagus were obtained. FINDINGS: The esophagus is normal in caliber. Esophageal motility is normal and emptying of contrast from the esophagus is prompt. No mucosal lesion, ulceration or evidence of fold thickening is seen. Moderate size reducible hiatal hernia with remarkable gastroesophageal reflux     Impression Moderate size reducible hiatal hernia with remarkable gastroesophageal reflux The study was marked in EPIC for significant notification.  Workstation performed: [id number]

## 2023-09-13 NOTE — ASSESSMENT & PLAN NOTE
Ms Aníbal Martinez has a hiatal hernia and after gaining about 20lbs over the last year she has had worsening abdominal bloating and postprandial abdominal pain with some regurgitation of liquid acid into her mouth. She is not on a consistent anti-acid medication and occasionally takes Prilosec with some improvement. Dr Cherelle Alejandra explained different options including trial of scheduled PPI with or without attempted medical treatment with weight loss medication under direction of her PCP, robotic-assisted hiatal hernia repair with fundoplication or referral to bariatric surgery for evaluation for RYGB. . Dr Cherelle Alejandra did discuss that given her elevated BMI she is at increased risk for recurrence should we proceed with a robotic-assisted hiatal hernia repair and fundoplication. She is interested in talking about surgical intervention with bariatric surgery and we will place a referral and contact to Dr Joellen Whitehead to ensure that she is able to see them. In the meantime we will start her on Prilosec 20mg BID. She needs no further follow-up with our office but if she has any questions or would like to discuss her options again we are always available.

## 2023-10-04 ENCOUNTER — OFFICE VISIT (OUTPATIENT)
Dept: BARIATRICS | Facility: CLINIC | Age: 48
End: 2023-10-04
Payer: COMMERCIAL

## 2023-10-04 VITALS
TEMPERATURE: 98.1 F | BODY MASS INDEX: 35.9 KG/M2 | HEART RATE: 95 BPM | SYSTOLIC BLOOD PRESSURE: 110 MMHG | DIASTOLIC BLOOD PRESSURE: 68 MMHG | WEIGHT: 215.5 LBS | HEIGHT: 65 IN

## 2023-10-04 DIAGNOSIS — E66.9 CLASS 1 OBESITY: ICD-10-CM

## 2023-10-04 DIAGNOSIS — E66.9 OBESITY, CLASS II, BMI 35-39.9: Primary | ICD-10-CM

## 2023-10-04 DIAGNOSIS — K44.9 HIATAL HERNIA: ICD-10-CM

## 2023-10-04 PROCEDURE — 99213 OFFICE O/P EST LOW 20 MIN: CPT | Performed by: SURGERY

## 2023-10-04 NOTE — PROGRESS NOTES
OFFICE VISIT - BARIATRIC SURGERY  Seymour Perdomo 57 y.o. female MRN: [id number]  Unit/Bed#:  Encounter: [id number]      HPI:  Seymour Perdomo is a 57 y.o. female status post to be evaluated for a hiatal hernia    Subjective   58 y/o F with history of anxiety, depression, rheumatoid arthritis with a known hiatal hernia presents with a few months of upper abdominal discomfort and pain. She states she has known she's had a hiatal hernia for some time and it has been causing her GERD. She was started on PPI and states that her heartburn has improved with medication but is concerned that her discomfort is from her hiatal hernia. Of note she does have some episodes of regurgitation when lying down but not unmanageable. Also notes that she has had some weight gain which she is interested in losing. Review of Systems   All other systems reviewed and are negative.       Historical Information   Past Medical History:   Diagnosis Date   • Anxiety    • Depression    • History of chickenpox    • Nasal polyps    • Rheumatoid arthritis ([address])    • Sinusitis    • Sjoegren syndrome    • Thyroid nodule    • Wears glasses      Past Surgical History:   Procedure Laterality Date   • CHOLECYSTECTOMY     • NASAL/SINUS ENDOSCOPY N/A 06/30/2020    Procedure: IMAGED GUIDED F.E.S.S.;  Surgeon: Jewels Espinal DO;  Location: [facility];  Service: ENT   • SD SEPTOPLASTY/SUBMUCOUS RESECJ W/WO CARTILAGE GRF N/A 06/30/2020    Procedure: SEPTOPLASTY;  Surgeon: Jewels Espinal DO;  Location: [facility];  Service: ENT   • US GUIDED THYROID BIOPSY  7/31/2017   • VENOUS ABLATION Left     lower extremity   • WISDOM TOOTH EXTRACTION       Social History   Social History     Substance and Sexual Activity   Alcohol Use Yes    Comment: occasional     Social History     Substance and Sexual Activity   Drug Use No     Social History     Tobacco Use   Smoking Status Former   • Packs/day: 1.00   • Years: 20.00   • Total pack years: 20.00   • Types: Cigarettes   • Quit date:    • Years since quittin.7   Smokeless Tobacco Never       Objective       Current Vitals:   Blood Pressure: 110/68 (10/04/23 152)  Pulse: 95 (10/04/23 1522)  Temperature: 98.1 °F (36.7 °C) (10/04/23 1522)  Temp Source: Tympanic (10/04/23 1522)  Height: 5' 4.5" (163.8 cm) (10/04/23 1522)  Weight - Scale: 97.8 kg (215 lb 8 oz) (10/04/23 1522)    Invasive Devices     None                 Physical Exam  Constitutional:       Appearance: Normal appearance. HENT:      Head: Normocephalic. Cardiovascular:      Rate and Rhythm: Normal rate. Pulses: Normal pulses. Pulmonary:      Effort: Pulmonary effort is normal.   Skin:     General: Skin is warm. Neurological:      General: No focal deficit present. Mental Status: She is alert and oriented to person, place, and time. Pathology, and Other Studies: I have personally reviewed pertinent reports. Assessment/PLAN:    Nancy Colvin is a 57 y.o. female with a symptomatic hiatal hernia who presents to clinic for evaluation of hiatal hernia. Given her weight and interest in additional weight loss, we discussed hiatal hernia repair with possible gastric bypass in conjunction. Discussed with patient on the side effects of lengthy use of PPI. Discussed with patient regarding benefits of hiatal hernia repair  Discussed with patient whether she is interested in weight loss surgery in addition  Will need EGD  Will treat patient as a new bariatric patient and work on getting her through the process for possible gastric bypass    UGI     INDICATION:   K44.9: Diaphragmatic hernia without obstruction or gangrene.     COMPARISON:  None     IMAGES: 25 fluoroscopic spot views     FLUOROSCOPY TIME:   2.1 min.     TECHNIQUE:  The patient was given effervescent granules and barium by mouth and images of the esophagus were obtained.     FINDINGS:     The esophagus is normal in caliber.   Esophageal motility is normal and emptying of contrast from the esophagus is prompt. No mucosal lesion, ulceration or evidence of fold thickening is seen.     Moderate size reducible hiatal hernia with remarkable gastroesophageal reflux     IMPRESSION:     Moderate size reducible hiatal hernia with remarkable gastroesophageal reflux     The study was marked in EPIC for significant notification. EGD        Pathology from EGD         MANOMETRY/pH Study        GASTRIC EMPTYING STUDY      --------------------------------------------------------------------    Discussed with patient on the side effects of lengthy use of PPI.   Discussed with patient regarding benefits of hiatal hernia repair  Discussed with patient whether she is interested in weight loss surgery in addition  Will need EGD  Will treat patient as a new bariatric patient and work on getting her through the process for possible gastric bypass              Pal Aldana MD  Bariatric Surgery  10/4/2023  4:08 PM

## 2023-10-05 ENCOUNTER — TELEPHONE (OUTPATIENT)
Dept: SLEEP CENTER | Facility: CLINIC | Age: 48
End: 2023-10-05

## 2023-10-08 PROBLEM — J01.11 ACUTE RECURRENT FRONTAL SINUSITIS: Status: RESOLVED | Noted: 2023-08-09 | Resolved: 2023-10-08

## 2023-10-09 ENCOUNTER — PREP FOR PROCEDURE (OUTPATIENT)
Dept: BARIATRICS | Facility: CLINIC | Age: 48
End: 2023-10-09

## 2023-10-09 DIAGNOSIS — K44.9 HIATAL HERNIA: Primary | ICD-10-CM

## 2023-10-14 PROBLEM — Z13.1 SCREENING FOR DIABETES MELLITUS: Status: RESOLVED | Noted: 2023-08-15 | Resolved: 2023-10-14

## 2023-10-14 PROBLEM — Z12.11 SCREENING FOR COLON CANCER: Status: RESOLVED | Noted: 2023-08-15 | Resolved: 2023-10-14

## 2023-10-14 PROBLEM — Z13.6 SCREENING FOR CARDIOVASCULAR CONDITION: Status: RESOLVED | Noted: 2023-08-15 | Resolved: 2023-10-14

## 2023-10-14 PROBLEM — Z00.00 ROUTINE ADULT HEALTH MAINTENANCE: Status: RESOLVED | Noted: 2023-08-15 | Resolved: 2023-10-14

## 2023-10-31 ENCOUNTER — TELEPHONE (OUTPATIENT)
Dept: BARIATRICS | Facility: CLINIC | Age: 48
End: 2023-10-31

## 2023-10-31 NOTE — TELEPHONE ENCOUNTER
Called and LVM to ensure pt knew of their EGD scheduled for 11/8. Let pt know to call us with any concerns or questions [phone]. Reminded patient that the hospital will call the day before with a time and not to eat or drink anything after midnight the day before and to have .

## 2023-11-06 RX ORDER — ONDANSETRON 2 MG/ML
4 INJECTION INTRAMUSCULAR; INTRAVENOUS ONCE AS NEEDED
Status: CANCELLED | OUTPATIENT
Start: 2023-11-06

## 2023-11-06 RX ORDER — FENTANYL CITRATE/PF 50 MCG/ML
25 SYRINGE (ML) INJECTION
Status: CANCELLED | OUTPATIENT
Start: 2023-11-06

## 2023-11-06 RX ORDER — SODIUM CHLORIDE 9 MG/ML
125 INJECTION, SOLUTION INTRAVENOUS CONTINUOUS
Status: CANCELLED | OUTPATIENT
Start: 2023-11-06

## 2023-11-08 ENCOUNTER — ANESTHESIA (OUTPATIENT)
Dept: GASTROENTEROLOGY | Facility: HOSPITAL | Age: 48
End: 2023-11-08

## 2023-11-08 ENCOUNTER — HOSPITAL ENCOUNTER (OUTPATIENT)
Dept: GASTROENTEROLOGY | Facility: HOSPITAL | Age: 48
Setting detail: OUTPATIENT SURGERY
Discharge: HOME | End: 2023-11-08
Attending: SURGERY
Payer: COMMERCIAL

## 2023-11-08 ENCOUNTER — ANESTHESIA EVENT (OUTPATIENT)
Dept: GASTROENTEROLOGY | Facility: HOSPITAL | Age: 48
End: 2023-11-08

## 2023-11-08 VITALS
DIASTOLIC BLOOD PRESSURE: 67 MMHG | SYSTOLIC BLOOD PRESSURE: 113 MMHG | OXYGEN SATURATION: 97 % | TEMPERATURE: 98 F | HEART RATE: 84 BPM | RESPIRATION RATE: 21 BRPM

## 2023-11-08 DIAGNOSIS — K44.9 HIATAL HERNIA: ICD-10-CM

## 2023-11-08 PROBLEM — R11.2 PONV (POSTOPERATIVE NAUSEA AND VOMITING): Status: ACTIVE | Noted: 2023-11-08

## 2023-11-08 PROBLEM — Z98.890 PONV (POSTOPERATIVE NAUSEA AND VOMITING): Status: ACTIVE | Noted: 2023-11-08

## 2023-11-08 LAB
EXT PREGNANCY TEST URINE: NEGATIVE
EXT. CONTROL: NORMAL

## 2023-11-08 PROCEDURE — 43239 EGD BIOPSY SINGLE/MULTIPLE: CPT | Performed by: SURGERY

## 2023-11-08 PROCEDURE — 81025 URINE PREGNANCY TEST: CPT | Performed by: ANESTHESIOLOGY

## 2023-11-08 PROCEDURE — 88305 TISSUE EXAM BY PATHOLOGIST: CPT | Performed by: PATHOLOGY

## 2023-11-08 RX ORDER — SODIUM CHLORIDE 9 MG/ML
125 INJECTION, SOLUTION INTRAVENOUS CONTINUOUS
Status: DISCONTINUED | OUTPATIENT
Start: 2023-11-08 | End: 2023-11-12 | Stop reason: HOSPADM

## 2023-11-08 RX ORDER — FENTANYL CITRATE/PF 50 MCG/ML
25 SYRINGE (ML) INJECTION
Status: DISCONTINUED | OUTPATIENT
Start: 2023-11-08 | End: 2023-11-12 | Stop reason: HOSPADM

## 2023-11-08 RX ORDER — ONDANSETRON 2 MG/ML
4 INJECTION INTRAMUSCULAR; INTRAVENOUS ONCE AS NEEDED
Status: DISCONTINUED | OUTPATIENT
Start: 2023-11-08 | End: 2023-11-12 | Stop reason: HOSPADM

## 2023-11-08 RX ORDER — LIDOCAINE HYDROCHLORIDE 20 MG/ML
INJECTION, SOLUTION EPIDURAL; INFILTRATION; INTRACAUDAL; PERINEURAL AS NEEDED
Status: DISCONTINUED | OUTPATIENT
Start: 2023-11-08 | End: 2023-11-08

## 2023-11-08 RX ORDER — PROPOFOL 10 MG/ML
INJECTION, EMULSION INTRAVENOUS AS NEEDED
Status: DISCONTINUED | OUTPATIENT
Start: 2023-11-08 | End: 2023-11-08

## 2023-11-08 RX ADMIN — PROPOFOL 200 MG: 10 INJECTION, EMULSION INTRAVENOUS at 11:25

## 2023-11-08 RX ADMIN — SODIUM CHLORIDE 125 ML/HR: 0.9 INJECTION, SOLUTION INTRAVENOUS at 10:36

## 2023-11-08 RX ADMIN — LIDOCAINE HYDROCHLORIDE 100 MG: 20 INJECTION, SOLUTION EPIDURAL; INFILTRATION; INTRACAUDAL at 11:25

## 2023-11-08 NOTE — ANESTHESIA POSTPROCEDURE EVALUATION
Post-Op Assessment Note    CV Status:  Stable    Pain management: adequate     Mental Status:  Alert and awake   Hydration Status:  Euvolemic   PONV Controlled:  Controlled   Airway Patency:  Patent      Post Op Vitals Reviewed: Yes      Staff: Anesthesiologist, CRNA         No notable events documented.     BP      Temp      Pulse     Resp      SpO2      /67   Pulse 84   Temp 98 °F (36.7 °C) (Temporal)   Resp 21   SpO2 97%

## 2023-11-08 NOTE — ANESTHESIA PREPROCEDURE EVALUATION
Procedure:  EGD    Relevant Problems   ANESTHESIA   (+) PONV (postoperative nausea and vomiting)      GI/HEPATIC   (+) Hiatal hernia      HEMATOLOGY   (+) Anemia      MUSCULOSKELETAL   (+) Hiatal hernia   (+) Inflammatory polyarthropathy (HCC)   (+) RA (rheumatoid arthritis) (HCC)   (+) Right sided sciatica      NEURO/PSYCH   (+) Anxiety   (+) Anxiety and depression   (+) Severe frontal headaches        Physical Exam    Airway    Mallampati score: II  TM Distance: >3 FB  Neck ROM: full     Dental   No notable dental hx     Cardiovascular  Rhythm: regular, Rate: normal, Cardiovascular exam normal    Pulmonary  Pulmonary exam normal Breath sounds clear to auscultation    Other Findings        Anesthesia Plan  ASA Score- 2     Anesthesia Type- general with ASA Monitors. Additional Monitors:     Airway Plan:            Plan Factors-    Chart reviewed. Existing labs reviewed. Patient summary reviewed. Induction-     Postoperative Plan-     Informed Consent- Anesthetic plan and risks discussed with patient.

## 2023-11-14 PROCEDURE — 88305 TISSUE EXAM BY PATHOLOGIST: CPT | Performed by: PATHOLOGY

## 2023-11-20 ENCOUNTER — OFFICE VISIT (OUTPATIENT)
Dept: INTERNAL MEDICINE CLINIC | Facility: CLINIC | Age: 48
End: 2023-11-20
Payer: COMMERCIAL

## 2023-11-20 VITALS — OXYGEN SATURATION: 97 % | HEART RATE: 82 BPM | TEMPERATURE: 98.8 F

## 2023-11-20 DIAGNOSIS — J32.4 CHRONIC PANSINUSITIS: Primary | ICD-10-CM

## 2023-11-20 PROCEDURE — 99213 OFFICE O/P EST LOW 20 MIN: CPT | Performed by: FAMILY MEDICINE

## 2023-11-20 RX ORDER — PREDNISONE 10 MG/1
TABLET ORAL
Qty: 30 TABLET | Refills: 0 | Status: SHIPPED | OUTPATIENT
Start: 2023-11-20 | End: 2023-11-20 | Stop reason: SDUPTHER

## 2023-11-20 RX ORDER — PREDNISONE 10 MG/1
TABLET ORAL
Qty: 30 TABLET | Refills: 0 | Status: SHIPPED | OUTPATIENT
Start: 2023-11-20

## 2023-11-20 RX ORDER — AMOXICILLIN AND CLAVULANATE POTASSIUM 875; 125 MG/1; MG/1
1 TABLET, FILM COATED ORAL 2 TIMES DAILY
Qty: 20 TABLET | Refills: 0 | Status: SHIPPED | OUTPATIENT
Start: 2023-11-20 | End: 2023-11-20 | Stop reason: SDUPTHER

## 2023-11-20 RX ORDER — AMOXICILLIN AND CLAVULANATE POTASSIUM 875; 125 MG/1; MG/1
1 TABLET, FILM COATED ORAL 2 TIMES DAILY
Qty: 20 TABLET | Refills: 0 | Status: SHIPPED | OUTPATIENT
Start: 2023-11-20 | End: 2023-11-30

## 2023-11-21 NOTE — PROGRESS NOTES
Assessment/Plan:    No problem-specific Assessment & Plan notes found for this encounter. Diagnoses and all orders for this visit:    Chronic pansinusitis  -     Discontinue: amoxicillin-clavulanate (AUGMENTIN) 875-125 mg per tablet; Take 1 tablet by mouth 2 (two) times a day for 10 days  -     Discontinue: predniSONE 10 mg tablet; Five pills a day for 2 days, 4 pills a day for 2 days, 3 pills a day for 2 days, 2 pills a day for 2 days, 1 pill a day for 2 days  -     amoxicillin-clavulanate (AUGMENTIN) 875-125 mg per tablet; Take 1 tablet by mouth 2 (two) times a day for 10 days  -     predniSONE 10 mg tablet; Five pills a day for 2 days, 4 pills a day for 2 days, 3 pills a day for 2 days, 2 pills a day for 2 days, 1 pill a day for 2 days        Fluids. Rest.  Augmentin as noted above. Watch for any GI side effects. Prednisone taper as noted above. Nasal irrigation regularly. Watch for any increased ear symptoms with the nasal irrigation. We will have her follow-up if needed based on her symptoms. Subjective:      Patient ID: Gerald Duarte is a 57 y.o. female. She presents for acute visit. Started over the last few weeks with increased nasal congestion and postnasal drip. Has been doing nasal irrigation but has been feeling this more into her ears at times. Has had postnasal drip and cough. Increasing fatigue. No recent fevers or chills. The following portions of the patient's history were reviewed and updated as appropriate: She  has a past medical history of Anxiety, Depression, History of chickenpox, Nasal polyps, Rheumatoid arthritis ([address]), Sinusitis, Sjoegren syndrome, Thyroid nodule, and Wears glasses.   She   Patient Active Problem List    Diagnosis Date Noted   • PONV (postoperative nausea and vomiting) 11/08/2023   • Chronic seasonal allergic rhinitis due to pollen 08/17/2023   • Allergic rhinitis due to house dust mite 08/17/2023   • Encounter for screening mammogram for malignant neoplasm of breast 08/15/2023   • Hiatal hernia 08/15/2023   • Right sided sciatica 06/30/2022   • Anxiety and depression 12/16/2021   • Class 1 obesity 07/29/2021   • Chronic frontal sinusitis 03/24/2021   • Herpes labialis 04/27/2020   • Severe frontal headaches 02/13/2020   • Chronic pansinusitis 01/15/2020   • Deviated nasal septum 01/15/2020   • Hypertrophy of both inferior nasal turbinates 01/15/2020   • Tendonitis 06/25/2019   • Allergic rhinitis 06/29/2018   • Anxiety 06/29/2018   • Inflammatory polyarthropathy ([address]) 06/29/2018   • RA (rheumatoid arthritis) ([address]) 06/29/2018   • Depressed mood 02/14/2017   • Secondary adrenal insufficiency ([address]) 02/14/2017   • Thyroid nodule, uninodular 02/14/2017   • Sjogren's syndrome ([address]) 12/31/2016   • Acute adrenal insufficiency ([address]) 12/30/2016   • Anemia 12/19/2016   • Leucocytosis 12/17/2016   • Varicose vein 03/10/2015     She  has a past surgical history that includes Cholecystectomy; Venous ablation (Left); pr septoplasty/submucous resecj w/wo cartilage grf (N/A, 06/30/2020); Nasal/sinus endoscopy (N/A, 06/30/2020); [place] tooth extraction; and US guided thyroid biopsy (7/31/2017). Her family history includes Breast cancer (age of onset: 64) in her mother; Diabetes in her father; Esophageal cancer in her father; Liver cancer in her mother; No Known Problems in her brother, brother, daughter, maternal aunt, maternal aunt, maternal aunt, maternal grandmother, paternal grandmother, son, and son; Rheum arthritis in her mother; Sjogren's syndrome in her mother; Stroke in her other. She  reports that she quit smoking about 17 years ago. Her smoking use included cigarettes. She has a 20.00 pack-year smoking history. She has never used smokeless tobacco. She reports current alcohol use. She reports that she does not use drugs.   Current Outpatient Medications   Medication Sig Dispense Refill   • amoxicillin-clavulanate (AUGMENTIN) 875-125 mg per tablet Take 1 tablet by mouth 2 (two) times a day for 10 days 20 tablet 0   • predniSONE 10 mg tablet Five pills a day for 2 days, 4 pills a day for 2 days, 3 pills a day for 2 days, 2 pills a day for 2 days, 1 pill a day for 2 days 30 tablet 0   • acetaminophen (TYLENOL) 500 mg tablet Take 1,000 mg by mouth every 6 (six) hours as needed for mild pain     • albuterol (2.5 mg/3 mL) 0.083 % nebulizer solution Take 3 mL (2.5 mg total) by nebulization every 6 (six) hours as needed for wheezing or shortness of breath 180 mL 5   • fluticasone (FLONASE) 50 mcg/act nasal spray 1 spray into each nostril 2 (two) times a day 16 g 1   • hydroxychloroquine (PLAQUENIL) 200 mg tablet Take 1 tablet (200 mg total) by mouth 2 (two) times a day 180 tablet 1   • ibuprofen (MOTRIN) 200 mg tablet Take 800 mg by mouth every 6 (six) hours as needed for mild pain     • Levocetirizine Dihydrochloride (XYZAL PO) Take by mouth     • levonorgestrel (MIRENA) 20 MCG/24HR IUD 1 each by Intrauterine route once     • montelukast (SINGULAIR) 10 mg tablet Take 1 tablet (10 mg total) by mouth daily at bedtime 30 tablet 4   • Olopatadine HCl 0.6 % SOLN 2 actuation into each nostril daily 30.5 g 11   • omeprazole (PriLOSEC) 20 mg delayed release capsule Take 1 capsule (20 mg total) by mouth 2 (two) times a day 60 capsule 2   • valACYclovir (VALTREX) 1,000 mg tablet Take 2 tablets by mouth every 12 hours x 1 days (Patient taking differently: if needed Take 2 tablets by mouth every 12 hours x 1 days) 4 tablet 0     No current facility-administered medications for this visit.      Current Outpatient Medications on File Prior to Visit   Medication Sig   • acetaminophen (TYLENOL) 500 mg tablet Take 1,000 mg by mouth every 6 (six) hours as needed for mild pain   • albuterol (2.5 mg/3 mL) 0.083 % nebulizer solution Take 3 mL (2.5 mg total) by nebulization every 6 (six) hours as needed for wheezing or shortness of breath   • fluticasone (FLONASE) 50 mcg/act nasal spray 1 spray into each nostril 2 (two) times a day   • hydroxychloroquine (PLAQUENIL) 200 mg tablet Take 1 tablet (200 mg total) by mouth 2 (two) times a day   • ibuprofen (MOTRIN) 200 mg tablet Take 800 mg by mouth every 6 (six) hours as needed for mild pain   • Levocetirizine Dihydrochloride (XYZAL PO) Take by mouth   • levonorgestrel (MIRENA) 20 MCG/24HR IUD 1 each by Intrauterine route once   • montelukast (SINGULAIR) 10 mg tablet Take 1 tablet (10 mg total) by mouth daily at bedtime   • Olopatadine HCl 0.6 % SOLN 2 actuation into each nostril daily   • omeprazole (PriLOSEC) 20 mg delayed release capsule Take 1 capsule (20 mg total) by mouth 2 (two) times a day   • valACYclovir (VALTREX) 1,000 mg tablet Take 2 tablets by mouth every 12 hours x 1 days (Patient taking differently: if needed Take 2 tablets by mouth every 12 hours x 1 days)     No current facility-administered medications on file prior to visit. She is allergic to doxycycline. .    Review of Systems   Constitutional:  Positive for fatigue. Negative for activity change, appetite change and chills. HENT:  Positive for congestion, postnasal drip and sinus pressure. Negative for ear pain. Respiratory:  Positive for cough. Negative for shortness of breath. Neurological:  Negative for light-headedness. Objective:      Pulse 82   Temp 98.8 °F (37.1 °C)   SpO2 97%          Physical Exam  Vitals reviewed. Constitutional:       Appearance: She is well-developed and well-groomed. HENT:      Head:      Comments: Boggy nasal mucosa with purulent nasal discharge bilaterally; slight posterior pharyngeal erythema; slight effusion bilateral tympanic membranes right greater than left  Cardiovascular:      Rate and Rhythm: Normal rate and regular rhythm. Pulmonary:      Breath sounds: Transmitted upper airway sounds present. No wheezing. Musculoskeletal:      Cervical back: Neck supple. Lymphadenopathy:      Cervical: No cervical adenopathy. Neurological:      Mental Status: She is alert. Psychiatric:         Behavior: Behavior is cooperative.

## 2023-11-24 ENCOUNTER — OFFICE VISIT (OUTPATIENT)
Dept: BARIATRICS | Facility: CLINIC | Age: 48
End: 2023-11-24
Payer: COMMERCIAL

## 2023-11-24 ENCOUNTER — TELEPHONE (OUTPATIENT)
Dept: BARIATRICS | Facility: CLINIC | Age: 48
End: 2023-11-24

## 2023-11-24 VITALS
DIASTOLIC BLOOD PRESSURE: 82 MMHG | BODY MASS INDEX: 36.4 KG/M2 | HEIGHT: 65 IN | SYSTOLIC BLOOD PRESSURE: 128 MMHG | WEIGHT: 218.5 LBS | TEMPERATURE: 98.2 F | HEART RATE: 108 BPM

## 2023-11-24 DIAGNOSIS — K44.9 HIATAL HERNIA WITH GASTROESOPHAGEAL REFLUX: Primary | ICD-10-CM

## 2023-11-24 DIAGNOSIS — K21.9 HIATAL HERNIA WITH GASTROESOPHAGEAL REFLUX: Primary | ICD-10-CM

## 2023-11-24 PROCEDURE — 99213 OFFICE O/P EST LOW 20 MIN: CPT | Performed by: SURGERY

## 2023-11-24 NOTE — PROGRESS NOTES
OFFICE VISIT - BARIATRIC SURGERY  Lamar Cueva 57 y.o. female MRN: [id number]  Unit/Bed#:  Encounter: [id number]      HPI:  Lamar Cueva is a 57 y.o. female presents as follow-up EGD for hiatal hernia. Referred to Dr. Melissa Nicole. She s/p cholecystectomy in 2009. Having significant bloating pain, reflux controlled with omeprazole. Subjective     Don't want bariatric surgery, wants to hiatal hernia repair with antireflux procedure     Review of Systems   Constitutional:  Negative for chills and fever. HENT:  Negative for ear pain and sore throat. Eyes:  Negative for pain and visual disturbance. Respiratory:  Negative for cough and shortness of breath. Cardiovascular:  Negative for chest pain and palpitations. Gastrointestinal:  Negative for abdominal pain and vomiting. Genitourinary:  Negative for dysuria and hematuria. Musculoskeletal:  Negative for arthralgias and back pain. Skin:  Negative for color change and rash. Neurological:  Negative for seizures and syncope. All other systems reviewed and are negative.       Historical Information   Past Medical History:   Diagnosis Date    Anxiety     Depression     History of chickenpox     Nasal polyps     Rheumatoid arthritis ([address])     Sinusitis     Sjoegren syndrome     Thyroid nodule     Wears glasses      Past Surgical History:   Procedure Laterality Date    CHOLECYSTECTOMY      NASAL/SINUS ENDOSCOPY N/A 06/30/2020    Procedure: IMAGED GUIDED F.E.S.S.;  Surgeon: Estefany Wade DO;  Location: [facility];  Service: ENT    [place] SEPTOPLASTY/SUBMUCOUS RESECJ W/WO CARTILAGE GRF N/A 06/30/2020    Procedure: SEPTOPLASTY;  Surgeon: Estefany Wade DO;  Location: [facility];  Service: ENT    US GUIDED THYROID BIOPSY  7/31/2017    VENOUS ABLATION Left     lower extremity    WISDOM TOOTH EXTRACTION       Social History   Social History     Substance and Sexual Activity   Alcohol Use Yes    Comment: occasional     Social History     Substance and Sexual Activity   Drug Use No     Social History     Tobacco Use   Smoking Status Former    Packs/day: 1.00    Years: 20.00    Total pack years: 20.00    Types: Cigarettes    Quit date:     Years since quittin.9   Smokeless Tobacco Never       Objective       Current Vitals:   Blood Pressure: 128/82 (23 1445)  Pulse: (!) 108 (23 144)  Temperature: 98.2 °F (36.8 °C) (23 1445)  Temp Source: Tympanic (23)  Height: 5' 4.5" (163.8 cm) (23 144)  Weight - Scale: 99.1 kg (218 lb 8 oz) (23)    Invasive Devices       None                   Physical Exam  Vitals reviewed. Constitutional:       General: She is not in acute distress. Appearance: Normal appearance. She is not ill-appearing. HENT:      Head: Normocephalic. Nose: Nose normal.      Mouth/Throat:      Mouth: Mucous membranes are moist.      Pharynx: Oropharynx is clear. Eyes:      General: No scleral icterus. Cardiovascular:      Rate and Rhythm: Normal rate. Pulmonary:      Effort: Pulmonary effort is normal. No respiratory distress. Abdominal:      Palpations: Abdomen is soft. Tenderness: There is no abdominal tenderness. Hernia: No hernia is present. Musculoskeletal:         General: Normal range of motion. Cervical back: Normal range of motion. Skin:     General: Skin is warm and dry. Capillary Refill: Capillary refill takes less than 2 seconds. Neurological:      General: No focal deficit present. Mental Status: She is alert. Mental status is at baseline. Psychiatric:         Mood and Affect: Mood normal.           Pathology, and Other Studies: I have personally reviewed pertinent reports. Assessment/PLAN:    Fabi Gandhi is a 57 y.o. female with reflux and dysphagia related to a large hiatal hernia. Workup thus far reviewed and discussed with patient: positive for abnormal findings.   Workup includes:     UGI      2023  Narrative & Impression   BARIUM SWALLOW-ESOPHAGRAM     INDICATION:   K44.9: Diaphragmatic hernia without obstruction or gangrene. COMPARISON:  None     IMAGES: 25 fluoroscopic spot views     FLUOROSCOPY TIME:   2.1 min. TECHNIQUE:  The patient was given effervescent granules and barium by mouth and images of the esophagus were obtained. FINDINGS:     The esophagus is normal in caliber. Esophageal motility is normal and emptying of contrast from the esophagus is prompt. No mucosal lesion, ulceration or evidence of fold thickening is seen. Moderate size reducible hiatal hernia with remarkable gastroesophageal reflux     IMPRESSION:     Moderate size reducible hiatal hernia with remarkable gastroesophageal reflux     The study was marked in EPIC for significant notification. EGD    DATE OF SERVICE:  11/08/23     PHYSICIAN(S):  Attending:   Cheryl Bradley MD      Fellow:   No Staff Documented         INDICATION:  Hiatal hernia     POST-OP DIAGNOSIS:  See the impression below. PREPROCEDURE:  Informed consent was obtained for the procedure, including sedation. Risks of perforation, hemorrhage, adverse drug reaction and aspiration were discussed. The patient was placed in the left lateral decubitus position. Patient was explained about the risks and benefits of the procedure. Risks including but not limited to bleeding, infection, and perforation were explained in detail. Also explained about less than 100% sensitivity with the exam and other alternatives. PROCEDURE: EGD     DETAILS OF PROCEDURE:   Patient was taken to the procedure room where a time out was performed to confirm correct patient and correct procedure. The patient underwent monitored anesthesia care, which was administered by an anesthesia professional. The patient's blood pressure, heart rate, level of consciousness, respirations and oxygen were monitored throughout the procedure.  The scope was advanced to the second part of the duodenum. Retroflexion was performed in the fundus. The patient experienced no blood loss. The procedure was not difficult. The patient tolerated the procedure well. There were no apparent adverse events. ANESTHESIA INFORMATION:  ASA: II  Anesthesia Type: General     MEDICATIONS:  No administrations occurring from 1121 to 1124 on 11/08/23         FINDINGS:  Large sliding hiatal hernia (type I hiatal hernia) - GE junction 37 cm from the incisors, diaphragmatic impression 42 cm from the incisors:  Hill classification: Grade IV  The esophagus, stomach and 2nd part of the duodenum appeared normal.  Performed forceps biopsies in the antrum to rule out H. pylori        SPECIMENS:  * No specimens in log *        IMPRESSION:  Large type I hiatal hernia  The esophagus, stomach and 2nd part of the duodenum appeared normal.  Performed forceps biopsies in the antrum to rule out H. pylori    Pathology from EGD     Final Diagnosis   A. Stomach (biopsy):     - Minimal chronic inactive gastritis involving antral mucosa. - No definitive morphologic evidence of Helicobacter on routine sections.     - No intestinal metaplasia, dysplasia or neoplasia identified.         --------------------------------------------------------------------    We will refer for medical clearance preoperatively. Will need a hiatal hernia repair with C-TIFF in coordination with GI. Patient will be contacted by  for the procedure on Monday.               Kenyatta Rios MD  Bariatric Surgery  11/24/2023  3:02 PM

## 2023-11-30 DIAGNOSIS — K44.9 HIATAL HERNIA: ICD-10-CM

## 2023-11-30 DIAGNOSIS — Z01.818 PREOP TESTING: Primary | ICD-10-CM

## 2024-02-26 ENCOUNTER — OFFICE VISIT (OUTPATIENT)
Dept: RHEUMATOLOGY | Facility: CLINIC | Age: 49
End: 2024-02-26
Payer: COMMERCIAL

## 2024-02-26 VITALS
HEART RATE: 93 BPM | OXYGEN SATURATION: 96 % | WEIGHT: 215 LBS | HEIGHT: 65 IN | DIASTOLIC BLOOD PRESSURE: 84 MMHG | SYSTOLIC BLOOD PRESSURE: 118 MMHG | BODY MASS INDEX: 35.82 KG/M2

## 2024-02-26 DIAGNOSIS — J45.909 UNCOMPLICATED ASTHMA, UNSPECIFIED ASTHMA SEVERITY, UNSPECIFIED WHETHER PERSISTENT: ICD-10-CM

## 2024-02-26 DIAGNOSIS — M05.79 RHEUMATOID ARTHRITIS INVOLVING MULTIPLE SITES WITH POSITIVE RHEUMATOID FACTOR (HCC): Primary | ICD-10-CM

## 2024-02-26 DIAGNOSIS — M25.522 LEFT ELBOW PAIN: ICD-10-CM

## 2024-02-26 DIAGNOSIS — M25.551 RIGHT HIP PAIN: ICD-10-CM

## 2024-02-26 DIAGNOSIS — M54.32 SCIATICA OF LEFT SIDE: ICD-10-CM

## 2024-02-26 DIAGNOSIS — Z79.899 HIGH RISK MEDICATION USE: ICD-10-CM

## 2024-02-26 PROCEDURE — 99214 OFFICE O/P EST MOD 30 MIN: CPT | Performed by: INTERNAL MEDICINE

## 2024-02-26 RX ORDER — GABAPENTIN 100 MG/1
100 CAPSULE ORAL 3 TIMES DAILY
Qty: 90 CAPSULE | Refills: 6 | Status: SHIPPED | OUTPATIENT
Start: 2024-02-26

## 2024-02-26 RX ORDER — HYDROXYCHLOROQUINE SULFATE 200 MG/1
200 TABLET, FILM COATED ORAL 2 TIMES DAILY
Qty: 180 TABLET | Refills: 1 | Status: SHIPPED | OUTPATIENT
Start: 2024-02-26 | End: 2024-08-24

## 2024-02-26 NOTE — PROGRESS NOTES
Assessment and Plan:   50 yo female presents for follow-up of patient's RA, which is under good control on current dose of Plaquenil. She has other complaints today including chronic right hip pain and more acute left elbow pain and left-sided sciatica.    Continue plaquenil 200 mg BID  Trial voltaren gel  Trial gabapentin up to three times daily  Refer to PT  Check updated inflammatory markers ordered at last office visit    Do labs  Continue hydroxychloroquine twice a day; continue regular eye exams  Continue over the counter tylenol as needed  Try diclofenac gel as needed for joint pain  Take gabapentin up to 3 times a day as needed for sciatica pain  Physical therapy referral made for left elbow, left sciatica, and right hip pain     Return to clinic in 6 months    Plan:  Diagnoses and all orders for this visit:    Rheumatoid arthritis involving multiple sites with positive rheumatoid factor (HCC)  -     hydroxychloroquine (PLAQUENIL) 200 mg tablet; Take 1 tablet (200 mg total) by mouth 2 (two) times a day  -     Diclofenac Sodium (VOLTAREN) 1 %; Apply 2 g topically 4 (four) times a day    Uncomplicated asthma, unspecified asthma severity, unspecified whether persistent  -     IgE    Sciatica of left side  -     gabapentin (Neurontin) 100 mg capsule; Take 1 capsule (100 mg total) by mouth 3 (three) times a day As needed for numbness  -     Ambulatory referral to Physical Therapy; Future    Right hip pain  -     Ambulatory referral to Physical Therapy; Future    Left elbow pain  -     Ambulatory referral to Physical Therapy; Future    High risk medication use    High risk medication use - Benefits and risks of hydroxychloroquine, including but not limited to retinal toxicity, corneal deposits, gastrointestinal side effects, and headaches were discussed with the patient. The need for a regular eye exam to monitor for ocular toxicity while on this medication was also explained to the patient.     Follow-up plan:  "RTC in 6 months         Rheumatic Disease Summary  Last visit 8/28/23: Patient presents for follow-up of Rheumatoid arthritis, controlled.     Rheumatoid arthritis.  She has been good with no recent flareups.   She will continue taking hydroxychloroquine and over-the-counter Tylenol as needed.      Do labs before next visit  Continue hydroxychloroquine twice a day; continue regular eye exams per eye doctor  Continue over the counter tylenol as needed    HPI  Beryl Cleary is a 49 y.o.  female who presents for follow up of rheumatoid arthritis.    Patient is doing well overall. She is continuing to have pain in her right hip, worse at night after day of activity. Pain is worse when she is laying on it and better with rest. XR done in August for this showed mild arthritis. She is complaining of left lateral elbow pain related to lifting weights that started about 1.5 months ago. She first noticed it while pulling herself up on a workout machine. The pain is intermittent and sharp that does not last very long. It is exacerbated by flexing and overhead lifting. It stops once she stops the exacerbating activity. She is having sharp, burning left lower back pain for about 1 month with pain shooting down her leg into her foot. She has a \"heavy\" feeling. This usually occurs with prolonged standing. She saw PT for similar symptoms on the right side in the past. She has been doing the exercises recommended to her previously. She is taking plaquenil 200 mg BID. She is taking PRN tylenol but no NSAIDs. Over the last few months, she has been following with her PCP, thoracic surgery, and Bariatrics regarding her moderately sized hiatal hernia with symptoms of reflux. She was recommended to get bariatric surgery prior to pursuing hernia repair given the risk of recurrence. She is not interested in bariatric surgery or weight loss medications. She has been trying to lose weight working with a .    The following " portions of the patient's history were reviewed and updated as appropriate: allergies, current medications, past family history, past medical history, past social history, past surgical history and problem list.    Review of Systems:   Review of Systems   Constitutional:  Positive for fatigue.   Musculoskeletal:  Positive for arthralgias and myalgias.   All other systems reviewed and are negative.    Reviewed and agree.    Home Medications:    Current Outpatient Medications:     acetaminophen (TYLENOL) 500 mg tablet, Take 1,000 mg by mouth every 6 (six) hours as needed for mild pain, Disp: , Rfl:     albuterol (2.5 mg/3 mL) 0.083 % nebulizer solution, Take 3 mL (2.5 mg total) by nebulization every 6 (six) hours as needed for wheezing or shortness of breath, Disp: 180 mL, Rfl: 5    Diclofenac Sodium (VOLTAREN) 1 %, Apply 2 g topically 4 (four) times a day, Disp: 100 g, Rfl: 6    fluticasone (FLONASE) 50 mcg/act nasal spray, 1 spray into each nostril 2 (two) times a day, Disp: 16 g, Rfl: 1    gabapentin (Neurontin) 100 mg capsule, Take 1 capsule (100 mg total) by mouth 3 (three) times a day As needed for numbness, Disp: 90 capsule, Rfl: 6    hydroxychloroquine (PLAQUENIL) 200 mg tablet, Take 1 tablet (200 mg total) by mouth 2 (two) times a day, Disp: 180 tablet, Rfl: 1    levonorgestrel (MIRENA) 20 MCG/24HR IUD, 1 each by Intrauterine route once, Disp: , Rfl:     montelukast (SINGULAIR) 10 mg tablet, Take 1 tablet (10 mg total) by mouth daily at bedtime, Disp: 30 tablet, Rfl: 4    Olopatadine HCl 0.6 % SOLN, 2 actuation into each nostril daily, Disp: 30.5 g, Rfl: 11    celecoxib (CeleBREX) 100 mg capsule, Take 1 capsule (100 mg total) by mouth 2 (two) times a day, Disp: 60 capsule, Rfl: 0    Levocetirizine Dihydrochloride (XYZAL PO), Take by mouth (Patient not taking: Reported on 2/26/2024), Disp: , Rfl:     omeprazole (PriLOSEC) 20 mg delayed release capsule, Take 1 capsule (20 mg total) by mouth 2 (two) times a day,  "Disp: 180 capsule, Rfl: 3    predniSONE 10 mg tablet, Five pills a day for 2 days, 4 pills a day for 2 days, 3 pills a day for 2 days, 2 pills a day for 2 days, 1 pill a day for 2 days (Patient not taking: Reported on 2/26/2024), Disp: 30 tablet, Rfl: 0    valACYclovir (VALTREX) 1,000 mg tablet, Take 2 tablets by mouth every 12 hours x 1 days, Disp: 4 tablet, Rfl: 0    Objective:    Vitals:    02/26/24 1614   BP: 118/84   Pulse: 93   SpO2: 96%   Weight: 97.5 kg (215 lb)   Height: 5' 4.5\" (1.638 m)       Physical Exam  Constitutional:       General: She is not in acute distress.  HENT:      Head: Normocephalic and atraumatic.   Eyes:      Conjunctiva/sclera: Conjunctivae normal.   Cardiovascular:      Rate and Rhythm: Normal rate and regular rhythm.      Heart sounds: Normal heart sounds.   Pulmonary:      Effort: Pulmonary effort is normal.      Breath sounds: Normal breath sounds.   Musculoskeletal:      Right elbow: Normal range of motion. No tenderness.      Left elbow: Normal range of motion. No tenderness.      Cervical back: Neck supple.      Lumbar back: No swelling, deformity, tenderness or bony tenderness. Negative right straight leg raise test and negative left straight leg raise test.      Comments: Pain elicited by left elbow flexion and left forearm supination and pronation   Skin:     General: Skin is warm and dry.   Neurological:      Mental Status: She is alert.      Sensory: Sensation is intact.      Motor: Motor function is intact.   Psychiatric:         Speech: Speech normal.         Behavior: Behavior normal. Behavior is cooperative.       Reviewed labs and imaging.    Imaging:   Right hip x-rays 8/18/23  Bilateral mild degenerative hip joints     Labs:   Hospital Outpatient Visit on 11/08/2023   Component Date Value Ref Range Status    EXT Preg Test, Ur 11/08/2023 Negative  Negative Final    Control 11/08/2023 Valid  Valid Final    Case Report 11/08/2023    Final                    " Value:Surgical Pathology Report                         Case: [id number]                                   Authorizing Provider:  Stevenson Melton MD         Collected:           11/08/2023 1128              Ordering Location:     [facility]        Received:            11/08/2023 1248                                     [facility]                                                          Pathologist:           Zane Saini MD                                                                Specimen:    Stomach, r/o h pylori                                                                      Final Diagnosis 11/08/2023    Final                    Value:This result contains rich text formatting which cannot be displayed here.    Additional Information 11/08/2023    Final                    Value:This result contains rich text formatting which cannot be displayed here.    Gross Description 11/08/2023    Final                    Value:This result contains rich text formatting which cannot be displayed here.

## 2024-02-26 NOTE — PATIENT INSTRUCTIONS
Do labs  Continue hydroxychloroquine twice a day; continue regular eye exams  Continue over the counter tylenol as needed  Try diclofenac gel as needed for joint pain  Take gabapentin up to 3 times a day as needed for sciatica pain  Physical therapy referral made for left elbow, left sciatica, and right hip pain     Return to clinic in 6 months

## 2024-02-29 DIAGNOSIS — K44.9 HIATAL HERNIA: ICD-10-CM

## 2024-02-29 DIAGNOSIS — B00.1 HERPES LABIALIS: ICD-10-CM

## 2024-02-29 RX ORDER — OMEPRAZOLE 20 MG/1
20 CAPSULE, DELAYED RELEASE ORAL 2 TIMES DAILY
Qty: 180 CAPSULE | Refills: 3 | Status: SHIPPED | OUTPATIENT
Start: 2024-02-29 | End: 2025-02-23

## 2024-02-29 RX ORDER — VALACYCLOVIR HYDROCHLORIDE 1 G/1
TABLET, FILM COATED ORAL
Qty: 4 TABLET | Refills: 0 | Status: SHIPPED | OUTPATIENT
Start: 2024-02-29 | End: 2026-03-24

## 2024-03-06 ENCOUNTER — EVALUATION (OUTPATIENT)
Dept: PHYSICAL THERAPY | Facility: CLINIC | Age: 49
End: 2024-03-06
Payer: COMMERCIAL

## 2024-03-06 DIAGNOSIS — M25.522 LEFT ELBOW PAIN: ICD-10-CM

## 2024-03-06 DIAGNOSIS — M54.32 SCIATICA OF LEFT SIDE: ICD-10-CM

## 2024-03-06 DIAGNOSIS — M25.551 RIGHT HIP PAIN: ICD-10-CM

## 2024-03-06 PROCEDURE — 97110 THERAPEUTIC EXERCISES: CPT

## 2024-03-06 PROCEDURE — 97161 PT EVAL LOW COMPLEX 20 MIN: CPT

## 2024-03-06 NOTE — PROGRESS NOTES
PT Evaluation     Today's date: 3/6/2024  Patient name: Beryl Cleary  : 1975  MRN: [id number]  Referring provider: Sonia Waldron MD  Dx:   Encounter Diagnosis     ICD-10-CM    1. Sciatica of left side  M54.32 Ambulatory referral to Physical Therapy      2. Right hip pain  M25.551 Ambulatory referral to Physical Therapy      3. Left elbow pain  M25.522 Ambulatory referral to Physical Therapy          Start Time: 1445  Stop Time: 1530  Total time in clinic (min): 45 minutes    Assessment  Assessment details: Beryl Cleary is a 49 y.o. female presenting to outpatient physical therapy with noted impairments including R hip and R low back/LLE and L sided low back pain, impaired hamstring and piriformis soft tissue extensibility, reduced hip/core/glute strength, and reduced activity tolerance. Significant neural irritation noted in LLE with slump test and passive SLR. Familiar symptoms provoked in R hip/R low back with provacative tests biasing SI/lumbar. Due to noted impairments, the patient's present functional limitations include difficulty with ADLs/IADLs, difficulty standing long periods, walking long periods, bending/lifting, and difficulty completing HH responsibilities and standing during work activities. Patient to benefit from skilled outpatient physical therapy 2x/week for 8 weeks in order to reduce pain, maximize pain free hip/lumbar range of motion, increase hip/core/glute strength and stability, and improve functional mobility/functional activity in order to maximize return to prior level of function with reduced limitations. Discussed with pt possible OT evaluation for R elbow pain, and then continuing with both skilled PT/OT focusing on R hip/LLE/low back pain and L elbow pain. Home exercise program was provided and all questions answered to patient's level of satisfaction. Thank you for your referral.       Impairments: abnormal or restricted ROM, activity intolerance, impaired physical  "strength, lacks appropriate home exercise program, pain with function and poor body mechanics    Symptom irritability: moderateUnderstanding of Dx/Px/POC: good   Prognosis: good    Goals  STGs to be achieved in 4 weeks:  1. Pt to demonstrate reduced subjective pain rating \"at worst\" by at least 2-3 points from Initial Eval in order to allow for reduced pain with ADLs and improved functional activity tolerance.   2. Pt to report at least 25% improvement in symptoms with ADLs/IADLs to demonstrate improving function.   3. Pt to demonstrate increased MMT of B hip flexion/hip abduction by at least 1/2-1 grade in order to improve safety and stability with ADLs and functional mobility.   4. Pt to demonstrate increased MMT of B gluteus sarah and gluteus medius by at least 1/2-1 grade in order to improve safety and stability with ADLs and functional mobility.     LTGs to be achieved by discharge:  1. Pt will be I with HEP in order to continue to improve quality of life and independence and reduce risk for re-injury.   2. Pt to demonstrate return to walking 3-5 miles with no more than 1-2/10 pt reported R LB/hip pain without limitations or restrictions.   3. Pt to demonstrate improved function as noted by achieving or exceeding predicted score on FOTO outcomes assessment tool.    4. Pt to report ability to stand over 10 minutes at standing desk for work with no more than 1-2/10 pt reported LLE pain to demonstrate improved function.     Plan  Patient would benefit from: OT eval, skilled physical therapy and skilled occupational therapy  Planned modality interventions: cryotherapy  Planned therapy interventions: abdominal trunk stabilization, IASTM, joint mobilization, manual therapy, massage, balance, neuromuscular re-education, patient education, strengthening, stretching, therapeutic activities, therapeutic exercise, home exercise program, flexibility, balance/weight bearing training and body mechanics training  Frequency: " 2x week  Duration in weeks: 8  Plan of Care beginning date: 3/6/2024  Plan of Care expiration date: 5/15/2024  Treatment plan discussed with: patient        Subjective Evaluation    History of Present Illness  Mechanism of injury: Pt is presenting to OPPT with chief complaint of  R hip pain, L hip/LLE pain. Pt reports R hip pain that has been occurring for a few years. Reports she had x-rays; reports it showed arthritis. Reports that it is intermittent; reports that increased walking causes increased pain and also sleeping on R side. Reports that she will have increased pain after long walks. Pt reports that she had sciatica symptoms on R side before; was seen in PT before this.   Reports that L sided leg pain started in the last 6 months. Reports when she stands stationary for long periods, she feels numbness/cooling/burning feeling down the entire left leg. Reports her L foot also gets numb. Reports that if she walks for 10 mins, numbness/tingling will subside. Reports that this pain only happens when she is standing still.   Reports that R hip hurts more than L leg and affects her mobility more.   Reports the last 6 months she started going to fitness center and strength training.   Reports she sits all day for work but she has a walking desk. Reports she tries to do a couple hours standing but is limited by pain.     Reports she also has L elbow pain that started about 1.5 months ago when she started to work out with training  approx . She reports she has not been strength training heavy, Reports pain shoots down arm whenever she tries to lift over 10 pounds when strength trains. Reports that she struggles to lift overhead; reports it fatigues quickly. Reports pain starts in forearm and is sharp down her arm.         Pt also has MH of RA.   Reports no trauma, no OREN. Denies any changes in bowel/bladder, saddle paresthesia.      Reports she takes tylenol for pain management. Was prescribed gabapentin but has  not started it yet.   Patient Goals  Patient goals for therapy: decreased pain, improved balance, increased motion, increased strength, independence with ADLs/IADLs and return to sport/leisure activities  Patient goal: decreae pain, want to be able to stand over 10 minutes without pain, be able to walk without pain  Pain  Current pain ratin  At best pain ratin  At worst pain ratin  Location: R hip, L leg  Quality: dull ache, sharp, discomfort and burning  Relieving factors: rest, relaxation and medications  Aggravating factors: walking, stair climbing, standing and lifting  Progression: worsening    Social Support  Stairs in house: yes   Lives in: one-story house  Lives with: significant other and young children    Employment status: working (clinical )    Diagnostic Tests  X-ray: abnormal  Treatments  Current treatment: physical therapy        Objective     Concurrent Complaints  Negative for night pain, disturbed sleep, bladder dysfunction, bowel dysfunction and saddle (S4) numbness    Palpation   Left   Tenderness of the erector spinae.     Right   Tenderness of the erector spinae.     Tenderness     Lumbar Spine  No tenderness in the spinous process.     Left Hip   No tenderness in the PSIS.     Right Hip   Tenderness in the PSIS.     Neurological Testing     Sensation     Lumbar   Left   Intact: light touch    Right   Intact: light touch    Reflexes   Left   Patellar (L4): normal (2+)  Achilles (S1): normal (2+)  Clonus sign: negative    Right   Patellar (L4): normal (2+)  Achilles (S1): normal (2+)  Clonus sign: negative    Active Range of Motion     Lumbar   Flexion:  with pain Restriction level: minimal  Extension:  Restriction level: minimal  Left lateral flexion:  Restriction level: minimal  Right lateral flexion:  with pain Restriction level: minimal  Left rotation:  Restriction level: minimal  Right rotation:  Restriction level: minimal    Strength/Myotome Testing      Left Hip   Planes of Motion   Flexion: 4  Extension: 4  Abduction: 4  Adduction: 4+  External rotation: 4+  Internal rotation: 4+    Isolated Muscles   Gluteus sarah: 4  Gluteus medius: 4    Right Hip   Planes of Motion   Flexion: 4  Extension: 4  Abduction: 4  Adduction: 4+  External rotation: 4+  Internal rotation: 4+    Isolated Muscles   Gluteus maximums: 4  Gluteus medius: 4    Left Knee   Flexion: 5  Extension: 5    Right Knee   Flexion: 5  Extension: 5    Left Ankle/Foot   Dorsiflexion: 5  Plantar flexion: 5  Great toe extension: 4+    Right Ankle/Foot   Dorsiflexion: 5  Plantar flexion: 5  Great toe extension: 4+    Tests     Lumbar   Positive sacral thrust.     Left   Positive passive SLR and slump test.   Negative crossed SLR.     Right   Negative crossed SLR, passive SLR and slump test.     Right Pelvic Girdle/Sacrum   Positive: thigh thrust.     Left Hip   Negative ANA, FADIR and scour.     Right Hip   Positive SI compression.   Negative ANA, FADIR and scour.     Functional Assessment      Squat    Left valgus and right valgus.              Precautions: RA      Re-eval Date:  by 4/6/24    Date 3/6/2024        Visit Count 1       FOTO 3/6/2024        Pain In See IE       Pain Out See IE           Manuals 3/6/2024        Lateral hip distraction with gait belt R hip        LAD lumbar, SI bias B                        Neuro Re-Ed        TA contract Reviewed HEP       TA with BKFO        TA with SLR        TA with marches        TA with alt UE/LE seated        Multifidus hip hike        Palloff press                 Ther Ex        Nustep        bridges Reviewed HEP       clamshells Reviewed HEP       Hamstring stretch        Piriformis stretch Reviewed HEP       Side steps        Monster walks        Leg press         Squat matrix        Step ups with knee drive         Ther Activity                        Gait Training                        Modalities                              3/6/2024  - HEP  was issued and reviewed this date for above noted exercises. Pt demonstrated understanding without incident and without questions/concerns. Will continue to update upcoming.

## 2024-03-11 ENCOUNTER — TELEPHONE (OUTPATIENT)
Age: 49
End: 2024-03-11

## 2024-03-11 ENCOUNTER — OFFICE VISIT (OUTPATIENT)
Dept: PHYSICAL THERAPY | Facility: CLINIC | Age: 49
End: 2024-03-11
Payer: COMMERCIAL

## 2024-03-11 DIAGNOSIS — M25.522 LEFT ELBOW PAIN: ICD-10-CM

## 2024-03-11 DIAGNOSIS — M54.32 SCIATICA OF LEFT SIDE: Primary | ICD-10-CM

## 2024-03-11 DIAGNOSIS — M25.551 RIGHT HIP PAIN: ICD-10-CM

## 2024-03-11 PROCEDURE — 97110 THERAPEUTIC EXERCISES: CPT

## 2024-03-11 PROCEDURE — 97112 NEUROMUSCULAR REEDUCATION: CPT

## 2024-03-11 NOTE — TELEPHONE ENCOUNTER
Pt calls and state she was given a referral for PT and when she called to scheduled they informed her that her right elbow and shoulder will have to be under occupational therapy.  Pt is wondering if referral can be placed.

## 2024-03-11 NOTE — PROGRESS NOTES
"Daily Note     Today's date: 3/11/2024  Patient name: Beryl Cleary  : 1975  MRN: [id number]  Referring provider: Sonia Waldron MD  Dx:   Encounter Diagnosis     ICD-10-CM    1. Sciatica of left side  M54.32       2. Right hip pain  M25.551       3. Left elbow pain  M25.522           Start Time: 1600  Stop Time: 1645  Total time in clinic (min): 45 minutes    Subjective: Pt reports she has very minimal pain today. Reports that she has not taken gabapentin yet. Reports that she did the exercises at home and they were challenging.       Objective: See treatment diary below      Assessment: Tolerated treatment well. Able to complete POC without incident. Demo difficulty maintaining TA activation with dynamic LE movement. Significant gluteal medius fatigue noted after side steps and monster walks. Requiring occasional verbal/tactile cues for proper performance/muscle engagement. Slight increase in pain at end of low back discomfort at end of session. Will continue to progress as tolerated. Pt demonstrated fatigue at end of session.       Plan: Continue per plan of care.  Progress treatment as tolerated.       Precautions: RA      Re-eval Date:  by 24    Date 3/6/2024  3/11      Visit Count 1 2      FOTO 3/6/2024        Pain In See IE 0/10       Pain Out See IE 1/10           Manuals 3/6/2024  3/11      Lateral hip distraction with gait belt R hip        LAD lumbar, SI bias B  10'                       Neuro Re-Ed        TA contract Reviewed HEP X20 5\"      TA with BKFO  X10 ea       TA with SLR  2x10 ea       TA with marches  X30       TA with alt UE/LE seated        Multifidus hip hike        Palloff press                 Ther Ex        Nustep  L5 10'       bridges Reviewed HEP X20       clamshells Reviewed HEP       Hamstring stretch  2x30\" ea       Piriformis stretch Reviewed HEP       Side steps  X3 laps rtb       Monster walks  X3 laps rtb       Leg press         Squat matrix        Step ups with " knee drive         Ther Activity                        Gait Training                        Modalities                              3/6/2024  - HEP was issued and reviewed this date for above noted exercises. Pt demonstrated understanding without incident and without questions/concerns. Will continue to update upcoming.

## 2024-03-13 ENCOUNTER — OFFICE VISIT (OUTPATIENT)
Dept: PHYSICAL THERAPY | Facility: CLINIC | Age: 49
End: 2024-03-13
Payer: COMMERCIAL

## 2024-03-13 DIAGNOSIS — M25.551 RIGHT HIP PAIN: ICD-10-CM

## 2024-03-13 DIAGNOSIS — M25.522 LEFT ELBOW PAIN: ICD-10-CM

## 2024-03-13 DIAGNOSIS — M54.32 SCIATICA OF LEFT SIDE: Primary | ICD-10-CM

## 2024-03-13 PROCEDURE — 97110 THERAPEUTIC EXERCISES: CPT

## 2024-03-13 PROCEDURE — 97112 NEUROMUSCULAR REEDUCATION: CPT

## 2024-03-13 NOTE — PROGRESS NOTES
"Daily Note     Today's date: 3/13/2024  Patient name: Beryl Cleary  : 1975  MRN: [id number]  Referring provider: Sonia Waldron MD  Dx:   Encounter Diagnosis     ICD-10-CM    1. Sciatica of left side  M54.32       2. Right hip pain  M25.551       3. Left elbow pain  M25.522           Start Time: 1500  Stop Time: 1545  Total time in clinic (min): 45 minutes    Subjective: \" My sides are still sore from the other day, but I feel good otherwise. Still a little tenderness but I felt pretty good all day\"       Objective: See treatment diary below      Assessment: Tolerated treatment well. Good TA activation with PPT. Still experiencing some difficulty maintaining TA activation with dynamic LE movements. Cont with significant glute fatigue especially after performance of side steps/monster walks.  No increase in baseline symptoms/pain levels at end of session. Will cont to progress as tolerated. Patient demonstrated fatigue post treatment and would benefit from continued PT      Plan: Continue per plan of care.  Progress treatment as tolerated.       Precautions: RA      Re-eval Date:  by 24    Date 3/6/2024  3/11 3/13     Visit Count 1 2 3     FOTO 3/6/2024        Pain In See IE 0/10  0/10      Pain Out See IE 1/10  0/10         Manuals 3/6/2024  3/11 3/13     Lateral hip distraction with gait belt R hip        LAD lumbar, SI bias B  10'                       Neuro Re-Ed        TA contract Reviewed HEP X20 5\" X20 5\"      TA with BKFO  X10 ea  X20 ea     TA with SLR  2x10 ea  2x10 ea     TA with marches  X30  X30 ea      TA with alt UE/LE seated        Multifidus hip hike        Palloff press                 Ther Ex        Nustep  L5 10'  L5 10'      bridges Reviewed HEP X20  X20      clamshells Reviewed HEP       Hamstring stretch  2x30\" ea  2x30\"      Piriformis stretch Reviewed HEP       Side steps  X3 laps rtb  X3 laps rtb     Monster walks  X3 laps rtb  X3 laps rtb     Leg press         Squat matrix   "      Step ups with knee drive         Ther Activity                        Gait Training                        Modalities                              3/6/2024  - HEP was issued and reviewed this date for above noted exercises. Pt demonstrated understanding without incident and without questions/concerns. Will continue to update upcoming.

## 2024-03-14 DIAGNOSIS — M25.522 LEFT ELBOW PAIN: Primary | ICD-10-CM

## 2024-03-14 DIAGNOSIS — M19.90 ARTHRITIS: ICD-10-CM

## 2024-03-18 ENCOUNTER — OFFICE VISIT (OUTPATIENT)
Dept: PHYSICAL THERAPY | Facility: CLINIC | Age: 49
End: 2024-03-18
Payer: COMMERCIAL

## 2024-03-18 DIAGNOSIS — M25.551 RIGHT HIP PAIN: ICD-10-CM

## 2024-03-18 DIAGNOSIS — M54.32 SCIATICA OF LEFT SIDE: Primary | ICD-10-CM

## 2024-03-18 DIAGNOSIS — M25.522 LEFT ELBOW PAIN: ICD-10-CM

## 2024-03-18 PROCEDURE — 97112 NEUROMUSCULAR REEDUCATION: CPT

## 2024-03-18 PROCEDURE — 97110 THERAPEUTIC EXERCISES: CPT

## 2024-03-18 NOTE — PROGRESS NOTES
"Daily Note     Today's date: 3/18/2024  Patient name: Beryl Cleary  : 1975  MRN: [id number]  Referring provider: Sonia Waldron MD  Dx:   Encounter Diagnosis     ICD-10-CM    1. Sciatica of left side  M54.32       2. Right hip pain  M25.551       3. Left elbow pain  M25.522           Start Time: 1630  Stop Time: 1715  Total time in clinic (min): 45 minutes    Subjective: \" I had some hip pain after working all weekend but the pain was not awful. I feel like it was arthritic pain; today I have some discomfort but nothing bad. I have not have any radiating pain recently\"       Objective: See treatment diary below      Assessment: Tolerated treatment well. Able to complete POC without incident. Pt did well maintaining TA activation with dynamic LE movements this session. Less gluteal fatigue noted with side steps/monster walks. Slight increase in L hip flexor discomfort with SLR. Tolerated all progressions well today; slight increase in baseline symptoms at end of session due to some noted low back discomfort at end reps of bridges. Will cont to progress as tolerated.  Patient demonstrated fatigue post treatment and would benefit from continued PT      Plan: Continue per plan of care.  Progress treatment as tolerated.       Precautions: RA      Re-eval Date:  by 24    Date 3/6/2024  3/11 3/13 3/18    Visit Count 1 2 3 4    FOTO 3/6/2024    NV THIS VISIT   Pain In See IE 0/10  0/10  0/10    Pain Out See IE /10  0/10 1/10        Manuals 3/6/2024  3/11 3/13 3/18     Lateral hip distraction with gait belt R hip        LAD lumbar, SI bias B  10'                       Neuro Re-Ed        TA contract Reviewed HEP X20 5\" X20 5\"  X20 5\"     TA with BKFO  X10 ea  X20 ea X20 ea    TA with SLR  2x10 ea  2x10 ea 2x10 ea    TA with marches  X30  X30 ea  X30 ea    TA with alt UE/LE seated        Multifidus hip hike        Palloff press                 Ther Ex        Nustep  L5 10'  L5 10'  L5 10'     bridges Reviewed " "HEP X20  X20  X20 with rtb with hip abd     clamshells Reviewed HEP       Hamstring stretch  2x30\" ea  2x30\"  2x30\"     Piriformis stretch Reviewed HEP       Side steps  X3 laps rtb  X3 laps rtb X3 laps rtb    Monster walks  X3 laps rtb  X3 laps rtb X3 laps rtb    Leg press         Squat matrix        Step ups with knee drive         Ther Activity                        Gait Training                        Modalities                              3/6/2024  - HEP was issued and reviewed this date for above noted exercises. Pt demonstrated understanding without incident and without questions/concerns. Will continue to update upcoming.               "

## 2024-03-21 ENCOUNTER — OFFICE VISIT (OUTPATIENT)
Dept: PHYSICAL THERAPY | Facility: CLINIC | Age: 49
End: 2024-03-21
Payer: COMMERCIAL

## 2024-03-21 ENCOUNTER — EVALUATION (OUTPATIENT)
Dept: OCCUPATIONAL THERAPY | Facility: CLINIC | Age: 49
End: 2024-03-21
Payer: COMMERCIAL

## 2024-03-21 DIAGNOSIS — M25.512 ACUTE PAIN OF LEFT SHOULDER: ICD-10-CM

## 2024-03-21 DIAGNOSIS — M25.522 LEFT ELBOW PAIN: Primary | ICD-10-CM

## 2024-03-21 DIAGNOSIS — M25.551 RIGHT HIP PAIN: ICD-10-CM

## 2024-03-21 DIAGNOSIS — M25.522 LEFT ELBOW PAIN: ICD-10-CM

## 2024-03-21 DIAGNOSIS — M54.32 SCIATICA OF LEFT SIDE: Primary | ICD-10-CM

## 2024-03-21 PROCEDURE — 97110 THERAPEUTIC EXERCISES: CPT

## 2024-03-21 PROCEDURE — 97140 MANUAL THERAPY 1/> REGIONS: CPT

## 2024-03-21 PROCEDURE — 97166 OT EVAL MOD COMPLEX 45 MIN: CPT

## 2024-03-21 PROCEDURE — 97112 NEUROMUSCULAR REEDUCATION: CPT

## 2024-03-21 PROCEDURE — 97535 SELF CARE MNGMENT TRAINING: CPT

## 2024-03-21 NOTE — PROGRESS NOTES
"Daily Note     Today's date: 3/21/2024  Patient name: Beryl Cleary  : 1975  MRN: [id number]  Referring provider: Sonia Waldron MD  Dx:   Encounter Diagnosis     ICD-10-CM    1. Sciatica of left side  M54.32       2. Right hip pain  M25.551       3. Left elbow pain  M25.522                      Subjective: \"I still can't stand for long periods before I get the burning/coldness down my leg and across to the R hip.\"      Objective: See treatment diary below      Assessment: Tolerated treatment well.  No issues reported supine exercises.  Symptoms exacerbated performing resistive side stepping and monster walk.  Pain down L leg and across LB.  No specific position for relief.  Will continue to monitor and progress as able.  Patient demonstrated fatigue post treatment and would benefit from continued PT      Plan: Continue per plan of care.  Progress treatment as tolerated.       Precautions: RA      Re-eval Date:  by 24    Date 3/6/2024  3/11 3/13 3/18 3/21   Visit Count 1 2 3 4 5   FOTO 3/6/2024    NV 3/21   Pain In See IE 0/10  0/10  0/10 0/10   Pain Out See IE 1/10  0/10 1/10 1-2/10       Manuals 3/6/2024  3/11 3/13 3/18  3/21   Lateral hip distraction with gait belt R hip        LAD lumbar, SI bias B  10'                       Neuro Re-Ed        TA contract Reviewed HEP X20 5\" X20 5\"  X20 5\"     TA with BKFO  X10 ea  X20 ea X20 ea X20 ea   TA with SLR  2x10 ea  2x10 ea 2x10 ea 2x10 ea   TA with marches  X30  X30 ea  X30 ea X30 ea   TA with alt UE/LE seated        Multifidus hip hike        Palloff press                 Ther Ex        Nustep  L5 10'  L5 10'  L5 10'  L5 10'   bridges Reviewed HEP X20  X20  X20 with rtb with hip abd  X20 with blue with hip abd    clamshells Reviewed HEP       Hamstring stretch  2x30\" ea  2x30\"  2x30\"  3x30\"   Piriformis stretch Reviewed HEP       Side steps  X3 laps rtb  X3 laps rtb X3 laps rtb X3 laps rtb   Monster walks  X3 laps rtb  X3 laps rtb X3 laps rtb X3 laps " rtb   Leg press         Squat matrix        Step ups with knee drive         Ther Activity                        Gait Training                        Modalities                              3/6/2024  - HEP was issued and reviewed this date for above noted exercises. Pt demonstrated understanding without incident and without questions/concerns. Will continue to update upcoming.

## 2024-03-21 NOTE — PROGRESS NOTES
OT Evaluation     Today's date: 3/21/2024  Patient name: Beryl Cleary  : 1975  MRN: [id number]  Referring provider: Sonia Waldron MD  Dx:   Encounter Diagnosis     ICD-10-CM    1. Left elbow pain  M25.522       2. Acute pain of left shoulder  M25.512                      Assessment  Assessment details: Patient presenting with OP OT services with a dx of left tennis elbow and left shoulder impingement. Patient reports symptoms began a month and a half ago. Patient reports being limited with activity due to pain specifically lifting and grabbing. Patient demonstrating with decreases in  strength, lifting, functional use and activity tolerance. Patient had initial MD appointment on 2024. Patient is expected to return to Rheumatology on 2024. Patient is right hand dominant. Patient noted increased symptoms while completing various activities at the gym.   Impairments: abnormal or restricted ROM, activity intolerance and impaired physical strength  Other impairment: Pain with activity    Symptom irritability: moderateUnderstanding of Dx/Px/POC: good   Prognosis: good    Goals  STGs    Pt will increase  strength by 5-10#. - Not Met    Pt will increase wrist and forearm strength by 1/2 grade. - Not Met    Pt will increase shoulder strength by 1/2 grade - Not Met    Independent with HEP. - Not Met    Pt will report no more than half of current pain with activity - Not Met    LTGs     Pt will increase  strength by an additional 5-10#. - Not Met    Pt will increase wrist and forearm strength by 1-2 grade. - Not Met    Pt will demonstrate a resolution of pain with special testing. - Not Met    Pt will increase pinch strength by 3-5#. - Not Met    Pt will report an increase in ADL/IADL participation. - Not Met    Pt will report no more than 0/10 pain with activity - Not Met            Plan  Plan details: Patient has presenting to OP OT services with a dx of left shoulder impingement and  Tennis Elbow. Patient demonstrating increased pain, decreased strength, decreased ROM and decreased activity. Pt would benefit from continued Occupational Therapy services two times per week for 4 weeks to return to prior level of function and achieve all established goals. Thank you for the referral!    Patient would benefit from: OT eval and skilled occupational therapy  Referral necessary: No  Planned modality interventions: cryotherapy, thermotherapy: hydrocollator packs and ultrasound  Planned therapy interventions: joint mobilization, manual therapy, massage, motor coordination training, patient education, self care, strengthening, therapeutic activities, therapeutic exercise, stretching, fine motor coordination training, functional ROM exercises, home exercise program, IASTM and kinesiology taping  Frequency: 2x week  Duration in visits: 8  Duration in weeks: 4  Treatment plan discussed with: patient        Subjective Evaluation    History of Present Illness  Mechanism of injury: Left Shoulder/elbow          Not a recurrent problem   Quality of life: good    Patient Goals  Patient goals for therapy: decreased edema, decreased pain, increased motion, increased strength and independence with ADLs/IADLs    Pain  Current pain ratin  At best pain ratin  At worst pain ratin  Location: Right Shoulder/elbow  Quality: dull ache    Social Support    Employment status: working  Hand dominance: right    Treatments  Current treatment: physical therapy and occupational therapy        Objective     Active Range of Motion   Left Shoulder   Normal active range of motion    Right Shoulder   Normal active range of motion    Left Elbow   Normal active range of motion    Right Elbow   Normal active range of motion    Left Wrist   Normal active range of motion    Right Wrist   Normal active range of motion    Strength/Myotome Testing     Left Shoulder     Planes of Motion   Flexion: 4-   Extension: 4-   Abduction: 4-    Adduction: 4-     Right Shoulder   Normal muscle strength    Left Elbow   Flexion: 4  Extension: 4  Forearm supination: 4-  Forearm pronation: 4    Right Elbow   Normal strength    Left Wrist/Hand   Wrist extension: 4-  Wrist flexion: 4-  Radial deviation: 4-  Ulnar deviation: 4-     (2nd hand position)     Trial 1: 45    Right Wrist/Hand   Normal wrist strength     (2nd hand position)     Trial 1: 50    Additional Strength Details  Patient presenting with decreased strength of left shoulder compared to unaffected side.     Patient presenting with decreased strength of left elbow compared to unaffected side.      testing with elbow extended  Right 50#  Left 45#    Tests     Left Shoulder   Positive Salvador's.     Left Elbow   Positive Cozen's.     Additional Tests Details  Suggesting shoulder impingement  Suggesting tendonitis of supinator  Neuro Exam:     Functional outcomes   Left 9 peg hole test: 18.80 (seconds)  Right 9 hole peg test: 17.41 (seconds)    Patient tolerated session well. Patient and therapist discussed exercises as per initial HEP. Patient verbalized understanding of education and demonstrated proper technique. Therapist will make increases as tolerated. Continue with POC              Precautions Lateral Epicondylitis, Left shoulder impingement  Initial Evaluation completed on: 03/21/2024  Re-Evaluation needs to be completed before: 04/21/2024  Insurance: [employer]         FOTO COUNT 03/21/2024       RE-EVAL 04/21/2024       Manuals 03/21/2024       IASTM Extensor Mass GT #4  10'       Wrist Extensor Stretch        Supinator Stretch HEP       KT Tape Tennis Elbow Supinator               Neuro Re-Ed  03/21/2024                       Ther Ex 03/21/2024       Wrist Flexion Stretch HEP       Wrist Ext Iso        Eccentric Wrist Flexion        Digi-Flex        Hand Power Pro        Wrist Maze        Hammer Stretch        UBE Machine        Flex Bar  Twists  Bends        Sleeper Stretch HEP        ER Stretch along Door HEP       Sidelying ER                                Ther Activity 03/21/2024                                               Modalities 03/21/2024       Ultrasound        E-STIM w/ MH        CP        MH  Left Elbow  Left Shoulder 10' end of session                 Access Code: [email or web address]  URL: [email or web address]  Date: 03/21/2024  Prepared by: Hemal Amato    Exercises  - Introduction   - Standing Wrist Flexion Stretch  - 1 x daily - 7 x weekly - 3 sets - 1 reps - 30 hold  - Wrist Pronation Stretch  - 1 x daily - 7 x weekly - 3 sets - 1 reps - 30 hold  - Standing Sleeper Stretch at Wall  - 1 x daily - 7 x weekly - 3 sets - 1 reps - 30 hold  - Standing Shoulder External Rotation Stretch in Doorway  - 1 x daily - 7 x weekly - 3 sets - 1 reps - 30 hold  - Strengthening & Stretching

## 2024-03-25 ENCOUNTER — APPOINTMENT (OUTPATIENT)
Dept: OCCUPATIONAL THERAPY | Facility: CLINIC | Age: 49
End: 2024-03-25
Payer: COMMERCIAL

## 2024-03-25 ENCOUNTER — APPOINTMENT (OUTPATIENT)
Dept: PHYSICAL THERAPY | Facility: CLINIC | Age: 49
End: 2024-03-25
Payer: COMMERCIAL

## 2024-03-27 ENCOUNTER — OFFICE VISIT (OUTPATIENT)
Dept: OCCUPATIONAL THERAPY | Facility: CLINIC | Age: 49
End: 2024-03-27
Payer: COMMERCIAL

## 2024-03-27 ENCOUNTER — OFFICE VISIT (OUTPATIENT)
Dept: PHYSICAL THERAPY | Facility: CLINIC | Age: 49
End: 2024-03-27
Payer: COMMERCIAL

## 2024-03-27 DIAGNOSIS — M54.32 SCIATICA OF LEFT SIDE: Primary | ICD-10-CM

## 2024-03-27 DIAGNOSIS — M25.522 LEFT ELBOW PAIN: ICD-10-CM

## 2024-03-27 DIAGNOSIS — M25.551 RIGHT HIP PAIN: ICD-10-CM

## 2024-03-27 DIAGNOSIS — M25.522 LEFT ELBOW PAIN: Primary | ICD-10-CM

## 2024-03-27 PROCEDURE — 97140 MANUAL THERAPY 1/> REGIONS: CPT

## 2024-03-27 PROCEDURE — 97112 NEUROMUSCULAR REEDUCATION: CPT

## 2024-03-27 PROCEDURE — 97035 APP MDLTY 1+ULTRASOUND EA 15: CPT

## 2024-03-27 PROCEDURE — 97110 THERAPEUTIC EXERCISES: CPT

## 2024-03-27 NOTE — PROGRESS NOTES
"Daily Note     Today's date: 3/27/2024  Patient name: Beryl Cleary  : 1975  MRN: [id number]  Referring provider: Sonia Waldron MD  Dx:   Encounter Diagnosis     ICD-10-CM    1. Sciatica of left side  M54.32       2. Right hip pain  M25.551       3. Left elbow pain  M25.522           Start Time: 1530  Stop Time: 1615  Total time in clinic (min): 45 minutes    Subjective: \"My pain is not too bad, I did feel pain down my L leg today when I was standing but it went away. My right hip has not been hurting really at all but feels a little sore today due to how I was sitting.\"       Objective: See treatment diary below      Assessment: Tolerated treatment well. Able to complete POC without incident. Still cont to require occasional cues for proper activation of TA with there ex. Abdominal endurance remains impaired with noted abdominal fatigue with addition of 1# ankle weight. Noted gluteal fatigue with strengthening exercises; tolerated progression of resistance well. Good performance of plalloff press with good technique noted. No significant increases in baseline symptoms. Will cont to progress as tolerated. Patient demonstrated fatigue post treatment and would benefit from continued PT      Plan: Continue per plan of care.  Progress treatment as tolerated.          Re-eval Date:  by 24     Date 3/27 3/11 3/13 3/18 3/21   Visit Count 6 2 3 4 5   FOTO       NV 3/21   Pain In 1/10 0/10  0/10  0/10 0/10   Pain Out  1/10  0/10 1/10 1-2/10         Manuals 3/27 3/11 3/13 3/18  3/21   Lateral hip distraction with gait belt R hip             LAD lumbar, SI bias B   10'                                      Neuro Re-Ed             TA contract  X20 5\" X20 5\"  X20 5\"      TA with BKFO  x20 ea X10 ea  X20 ea X20 ea X20 ea   TA with SLR  2x10 ea 2x10 ea  2x10 ea 2x10 ea 2x10 ea   TA with marches  x30 ea X30  X30 ea  X30 ea X30 ea   TA with alt UE/LE seated             Multifidus hip hike             Palloff press   " "2x10 blue tb                         Ther Ex             Nustep  L5 10'  L5 10'  L5 10'  L5 10'  L5 10'   bridges X20 with blue with hip abd  X20  X20  X20 with rtb with hip abd  X20 with blue with hip abd    clamshells            Hamstring stretch   2x30\" ea  2x30\"  2x30\"  3x30\"   Piriformis stretch            Side steps  x3 laps blue tb X3 laps rtb  X3 laps rtb X3 laps rtb X3 laps rtb   Monster walks  x3 laps blue tb X3 laps rtb  X3 laps rtb X3 laps rtb X3 laps rtb   Leg press              Squat matrix             Step ups with knee drive              Ther Activity                                         Gait Training                                         Modalities                                                         "

## 2024-03-27 NOTE — PROGRESS NOTES
Daily Note     Today's date: 3/27/2024  Patient name: Beryl Cleary  : 1975  MRN: [id number]  Referring provider: Sonia Waldron MD  Dx:   Encounter Diagnosis     ICD-10-CM    1. Left elbow pain  M25.522                      Subjective: I can't do any curls it hurts to much.      Objective: See treatment diary below      Assessment: Tolerated treatment well. Patient demonstrated fatigue post treatment, exhibited good technique with therapeutic exercises, and would benefit from continued OT. Tx primarily consisted of manual massage and stretching and modalities. Slight increase in pain with elbow flexion isometrics. Denied pain pre and post tx. Mild tenderness and tightness noted at bicep insertion. No forearm tightness noted. Pt verbalized forearm felt a little better since previous tx session. Discussed gym modifications with pt demonstrating good understanding of same.      Plan: Continue per plan of care.  Progress treatment as tolerated.        FOTO COUNT 2024           RE-EVAL 2024           Manuals 2024  3/27/2024         IASTM Extensor Mass GT #4  10'  rockblade 5'  Cupping 5'         Wrist Extensor Stretch             Supinator Stretch HEP           KT Tape Tennis Elbow Supinator  lateral elbow          manual massage    10'         Neuro Re-Ed  2024  3/27/2024                                     Ther Ex 2024  3/27/2024         Wrist Flexion Stretch HEP           Wrist Ext Iso    10 sec x 3         Eccentric Wrist Flexion             Digi-Flex             Hand Power Pro             Wrist Maze             Hammer Stretch             UBE Machine             Flex Bar  Twists  Bends             Sleeper Stretch HEP           ER Stretch along Door HEP           Sidelying ER              elbow isometrics    10 sec x 3                                     Ther Activity 2024  3/27/2024                                                                               Modalities  03/21/2024  3/27/2024         Ultrasound    3MHz  50%  .8         E-STIM w/ MH             CP             MH  Left Elbow  Left Shoulder 10' end of session

## 2024-04-01 ENCOUNTER — OFFICE VISIT (OUTPATIENT)
Dept: OCCUPATIONAL THERAPY | Facility: CLINIC | Age: 49
End: 2024-04-01
Payer: COMMERCIAL

## 2024-04-01 ENCOUNTER — OFFICE VISIT (OUTPATIENT)
Dept: PHYSICAL THERAPY | Facility: CLINIC | Age: 49
End: 2024-04-01
Payer: COMMERCIAL

## 2024-04-01 DIAGNOSIS — M25.522 LEFT ELBOW PAIN: Primary | ICD-10-CM

## 2024-04-01 DIAGNOSIS — M25.512 ACUTE PAIN OF LEFT SHOULDER: ICD-10-CM

## 2024-04-01 DIAGNOSIS — M54.32 SCIATICA OF LEFT SIDE: Primary | ICD-10-CM

## 2024-04-01 DIAGNOSIS — M25.551 RIGHT HIP PAIN: ICD-10-CM

## 2024-04-01 DIAGNOSIS — M25.522 LEFT ELBOW PAIN: ICD-10-CM

## 2024-04-01 PROCEDURE — 97110 THERAPEUTIC EXERCISES: CPT

## 2024-04-01 PROCEDURE — 97140 MANUAL THERAPY 1/> REGIONS: CPT

## 2024-04-01 PROCEDURE — 97014 ELECTRIC STIMULATION THERAPY: CPT

## 2024-04-01 NOTE — PROGRESS NOTES
Daily Note     Today's date: 2024  Patient name: Beryl Cleary  : 1975  MRN: [id number]  Referring provider: Sonia Waldron MD  Dx:   Encounter Diagnosis     ICD-10-CM    1. Left elbow pain  M25.522       2. Acute pain of left shoulder  M25.512                      Subjective: It just feels really weak.      Objective: See treatment diary below      Assessment: Tolerated treatment well. Patient demonstrated fatigue post treatment, exhibited good technique with therapeutic exercises, and would benefit from continued OT. Pt reports tenderness over flexor mass, decrease tightness noted since previous tx session. Tolerated additional strengthening exercises well. Pain increases with bicep isometric.      Plan: Continue per plan of care.  Progress treatment as tolerated.        FOTO COUNT 2024           RE-EVAL 2024           Manuals 2024  3/27/2024  2024       IASTM Extensor Mass GT #4  10'  rockblade 5'  Cupping 5'  GT#3 3'  Cupping 3'       Wrist Extensor Stretch             Supinator Stretch HEP           KT Tape Tennis Elbow Supinator  lateral elbow          manual massage    10'  10'       Neuro Re-Ed  2024  3/27/2024  2024                                   Ther Ex 2024  3/27/2024  2024       Wrist Flexion Stretch HEP           Wrist  Iso    10 sec x 3  10 sec x 3       Eccentric Wrist Flexion             Digi-Flex      Red x 20       Hand Power Pro      Red/Blue  X 20       Wrist Maze             Hammer Stretch      20 sec x 5       UBE Machine      2F/2B L 120       Flex Bar  Twists  Bends      thin yellow  X 20  X 20       Sleeper Stretch HEP           ER Stretch along Door HEP           Sidelying ER              elbow isometrics    10 sec x 3  10 sec x 3                                   Ther Activity 2024  3/27/2024  2024                                                                             Modalities 2024  3/27/2024  2024        Ultrasound    3MHz  50%  .8  3 MHz  50%  8'       E-STIM w/ MH             CP             MH  Left Elbow  Left Shoulder 10' end of session    MH/stim post tx 10'

## 2024-04-01 NOTE — PROGRESS NOTES
"Daily Note     Today's date: 2024  Patient name: Beryl Cleary  : 1975  MRN: [id number]  Referring provider: Sonia Waldron MD  Dx:   Encounter Diagnosis     ICD-10-CM    1. Sciatica of left side  M54.32       2. Right hip pain  M25.551       3. Left elbow pain  M25.522           Start Time: 1515  Stop Time: 1600  Total time in clinic (min): 45 minutes    Subjective: \"I am having more pain in my R hip today.  I have been up since 2 this morning with pain.\"      Objective: See treatment diary below      Assessment: Tolerated treatment well. Able to complete program with slight increase in symptoms, especially with side stepping and bridges; no change in pain levels.  Trial of lateral hip distraction and LAD; no change in symptoms.  Will continue to monitor and progress as able.  Patient demonstrated fatigue post treatment and would benefit from continued PT      Plan: Continue per plan of care.  Progress treatment as tolerated.       Re-eval Date:  by 24     Date 3/27 4/1      Visit Count 6 7      FOTO          Pain In 1/10 5/10      Pain Out  10             Manuals 3/27 4/1      Lateral hip distraction with gait belt R hip    Lateral hip distraction with gait belt R hip 5'      LAD lumbar, SI bias B  LAD lumbar, SI bias B 5'                                     Neuro Re-Ed             TA contract         TA with BKFO  x20 ea X20 ea       TA with SLR  2x10 ea 1# 2x10 ea       TA with marches  x30 ea 1# X30       TA with alt UE/LE seated             Multifidus hip hike             Palloff press   2x10 blue tb                         Ther Ex             Nustep  L5 10'  L3 10'       bridges X20 with blue with hip abd  X20 with blue with hip abd       clamshells            Hamstring stretch         Piriformis stretch            Side steps  x3 laps blue tb X3 laps rtb       Monster walks  x3 laps blue tb X3 laps rtb       Leg press              Squat matrix             Step ups with knee drive          "     Ther Activity                                         Gait Training                                         Modalities

## 2024-04-04 ENCOUNTER — EVALUATION (OUTPATIENT)
Dept: PHYSICAL THERAPY | Facility: CLINIC | Age: 49
End: 2024-04-04
Payer: COMMERCIAL

## 2024-04-04 ENCOUNTER — OFFICE VISIT (OUTPATIENT)
Dept: OCCUPATIONAL THERAPY | Facility: CLINIC | Age: 49
End: 2024-04-04
Payer: COMMERCIAL

## 2024-04-04 DIAGNOSIS — M25.522 LEFT ELBOW PAIN: Primary | ICD-10-CM

## 2024-04-04 DIAGNOSIS — M25.512 ACUTE PAIN OF LEFT SHOULDER: ICD-10-CM

## 2024-04-04 DIAGNOSIS — M54.32 SCIATICA OF LEFT SIDE: Primary | ICD-10-CM

## 2024-04-04 DIAGNOSIS — M25.551 RIGHT HIP PAIN: ICD-10-CM

## 2024-04-04 DIAGNOSIS — M25.522 LEFT ELBOW PAIN: ICD-10-CM

## 2024-04-04 PROCEDURE — 97110 THERAPEUTIC EXERCISES: CPT

## 2024-04-04 PROCEDURE — 97014 ELECTRIC STIMULATION THERAPY: CPT

## 2024-04-04 PROCEDURE — 97140 MANUAL THERAPY 1/> REGIONS: CPT

## 2024-04-04 PROCEDURE — 97112 NEUROMUSCULAR REEDUCATION: CPT

## 2024-04-04 NOTE — PROGRESS NOTES
PT Re-Evaluation     Today's date: 2024  Patient name: Beryl Cleary  : 1975  MRN: [id number]  Referring provider: Sonia Waldron MD  Dx:   Encounter Diagnosis     ICD-10-CM    1. Sciatica of left side  M54.32       2. Right hip pain  M25.551       3. Left elbow pain  M25.522             Start Time: 1515  Stop Time: 1600  Total time in clinic (min): 45 minutes    Assessment  Assessment details: Beryl has been making steady improvements with skilled PT services. Noted steady improvements in LE/core strength. Still demonstrating impaired gluteal strength and glutes still remain quick to fatigue. Noted continuing improvement in TA contraction, especially with dynamic LE movements. Pt has been able to tolerate progression of core stabilization progressions well, though still demo impaired abdominal endurance.  Still experiencing pain when sitting for long periods in R hip and continues with decreased tissue extensibility of LE. Noting significantly less neural irritation of LLE, though still experiencing occasional pain into L hip. Pt now able to stand and walk longer distances and time periods with less severe and frequent pain. Pt is making steady progress towards goals; Pt will continue to benefit from skilled PT services 1-2x/wk for another 4 weeks to address continued impairments that continue to impact overall function, performance of ADLs/IADLs, and mobility. Exam findings were discussed with pt and all questions answered to pt satisfaction.     Pt tolerated treatment well today. Noted posteriorly rotated R innominate; supine MET performed with good response/results. No increase in baseline pain levels at end of session.         Impairments: abnormal or restricted ROM, activity intolerance, impaired physical strength, lacks appropriate home exercise program, pain with function and poor body mechanics    Symptom irritability: moderateUnderstanding of Dx/Px/POC: good   Prognosis:  "good    Goals  STGs to be achieved in 4 weeks:  1. Pt to demonstrate reduced subjective pain rating \"at worst\" by at least 2-3 points from Initial Eval in order to allow for reduced pain with ADLs and improved functional activity tolerance. MET  2. Pt to report at least 25% improvement in symptoms with ADLs/IADLs to demonstrate improving function. MET  3. Pt to demonstrate increased MMT of B hip flexion/hip abduction by at least 1/2-1 grade in order to improve safety and stability with ADLs and functional mobility. PARTIALLY MET  4. Pt to demonstrate increased MMT of B gluteus sarah and gluteus medius by at least 1/2-1 grade in order to improve safety and stability with ADLs and functional mobility. PARTIALLY MET    LTGs to be achieved by discharge:  1. Pt will be I with HEP in order to continue to improve quality of life and independence and reduce risk for re-injury. MET  2. Pt to demonstrate return to walking 3-5 miles with no more than 1-2/10 pt reported R LB/hip pain without limitations or restrictions. PARTIALLY MET  3. Pt to demonstrate improved function as noted by achieving or exceeding predicted score on FOTO outcomes assessment tool.  NOT MET  4. Pt to report ability to stand over 10 minutes at standing desk for work with no more than 1-2/10 pt reported LLE pain to demonstrate improved function. MET    Plan  Patient would benefit from: skilled physical therapy  Planned modality interventions: cryotherapy  Planned therapy interventions: abdominal trunk stabilization, IASTM, joint mobilization, manual therapy, massage, balance, neuromuscular re-education, patient education, strengthening, stretching, therapeutic activities, therapeutic exercise, home exercise program, flexibility, balance/weight bearing training and body mechanics training  Frequency: 2x week  Duration in weeks: 4  Plan of Care beginning date: 4/4/2024  Plan of Care expiration date: 5/15/2024  Treatment plan discussed with: " patient        Subjective Evaluation    History of Present Illness  Mechanism of injury: Pt is presenting to OPPT with chief complaint of  R hip pain, L hip/LLE pain. Pt reports R hip pain that has been occurring for a few years. Reports she had x-rays; reports it showed arthritis. Reports that it is intermittent; reports that increased walking causes increased pain and also sleeping on R side. Reports that she will have increased pain after long walks. Pt reports that she had sciatica symptoms on R side before; was seen in PT before this.   Reports that L sided leg pain started in the last 6 months. Reports when she stands stationary for long periods, she feels numbness/cooling/burning feeling down the entire left leg. Reports her L foot also gets numb. Reports that if she walks for 10 mins, numbness/tingling will subside. Reports that this pain only happens when she is standing still.   Reports that R hip hurts more than L leg and affects her mobility more.   Reports the last 6 months she started going to fitness center and strength training.   Reports she sits all day for work but she has a walking desk. Reports she tries to do a couple hours standing but is limited by pain.     Reports she also has L elbow pain that started about 1.5 months ago when she started to work out with training  approx . She reports she has not been strength training heavy, Reports pain shoots down arm whenever she tries to lift over 10 pounds when strength trains. Reports that she struggles to lift overhead; reports it fatigues quickly. Reports pain starts in forearm and is sharp down her arm.         Pt also has MH of RA.   Reports no trauma, no OREN. Denies any changes in bowel/bladder, saddle paresthesia.      Reports she takes tylenol for pain management. Was prescribed gabapentin but has not started it yet.     4/4/24: Pt reports good and bad days. Pt reports she is still having pain when sitting. Reports she feels good when  "standing and walking and she has no pain. Reports the L hip pain comes and goes. Sometimes she does not feel pain at all, other days she will feel it. Reports that the pain does not hurt as much anymore and pain does not last as long; reports that pain only lasts a few minutes. Reports she is still taking gabapentin as needed. Reports that overall pain is less severe and overall less frequent. Reports she can \"walk pain out quicker and easier\". Reports that she still is having  the most pain when sitting for a long time or when sleeping on the R side. Pt reports she can now stand over 10 minutes without pain. Reports she feels she is making steady improvements.   Patient Goals  Patient goals for therapy: decreased pain, improved balance, increased motion, increased strength, independence with ADLs/IADLs and return to sport/leisure activities  Patient goal: decreae pain PROGRESSING, want to be able to stand over 10 minutes without pain MET, be able to walk without pain MET  Pain  Current pain ratin  At best pain ratin  At worst pain ratin  Location: R hip, L leg  Quality: dull ache, sharp, discomfort and burning  Relieving factors: rest, relaxation and medications  Aggravating factors: walking, stair climbing, standing and lifting  Progression: worsening    Social Support  Stairs in house: yes   Lives in: one-story house  Lives with: significant other and young children    Employment status: working (clinical )    Diagnostic Tests  X-ray: abnormal  Treatments  Current treatment: physical therapy        Objective     Concurrent Complaints  Negative for night pain, disturbed sleep, bladder dysfunction, bowel dysfunction and saddle (S4) numbness    Neurological Testing     Sensation     Lumbar   Left   Intact: light touch    Right   Intact: light touch    Reflexes   Left   Patellar (L4): normal (2+)  Achilles (S1): normal (2+)  Clonus sign: negative    Right   Patellar (L4): normal " (2+)  Achilles (S1): normal (2+)  Clonus sign: negative    Active Range of Motion     Lumbar   Flexion:  Restriction level: minimal  Extension:  Restriction level: minimal  Left lateral flexion:  Restriction level: minimal  Right lateral flexion:  Restriction level: minimal  Left rotation:  Restriction level: minimal  Right rotation:  Restriction level: minimal    Strength/Myotome Testing     Left Hip   Planes of Motion   Flexion: 4+  Extension: 4  Abduction: 4  Adduction: 4+  External rotation: 4+  Internal rotation: 4+    Isolated Muscles   Gluteus sarah: 4+  Gluteus medius: 4    Right Hip   Planes of Motion   Flexion: 4+  Extension: 4  Abduction: 4  Adduction: 4+  External rotation: 4+  Internal rotation: 4+    Isolated Muscles   Gluteus maximums: 4+  Gluteus medius: 4    Left Knee   Flexion: 5  Extension: 5    Right Knee   Flexion: 5  Extension: 5    Left Ankle/Foot   Dorsiflexion: 5  Plantar flexion: 5  Great toe extension: 4+    Right Ankle/Foot   Dorsiflexion: 5  Plantar flexion: 5  Great toe extension: 4+      Flowsheet Rows      Flowsheet Row Most Recent Value   PT/OT G-Codes    Current Score 63   Projected Score 72               Precautions: RA      Re-eval Date:  by 4/6/24     Date 3/27 4/1  4/4       Visit Count 6 7  8       FOTO        THIS VISIT     Pain In 1/10 5/10  3/10        Pain Out   1/10   2/10              Manuals 3/27 4/1  4/4       Lateral hip distraction with gait belt R hip    Lateral hip distraction with gait belt R hip 5'  supine MET for leg length       LAD lumbar, SI bias B   LAD lumbar, SI bias B 5'                                     Neuro Re-Ed             TA contract             TA with BKFO  x20 ea X20 ea   x20 ea       TA with SLR  2x10 ea 1# 2x10 ea   1# 2x10       TA with marches  x30 ea 1# X30   1# scissor kicks x30       TA with alt UE/LE seated             Multifidus hip hike             Palloff press   2x10 blue tb                         Ther Ex             Nustep  L5  10'  L3 10'   L3 10'        bridges X20 with blue with hip abd  X20 with blue with hip abd   X20 with blue with hip abd        clamshells             Hamstring stretch             Piriformis stretch             Side steps  x3 laps blue tb X3 laps rtb   x3 laps blue       Monster walks  x3 laps blue tb X3 laps rtb   x3 laps blue       Leg press              Squat matrix             Step ups with knee drive              Ther Activity                                         Gait Training                                         Modalities

## 2024-04-04 NOTE — PROGRESS NOTES
"Daily Note     Today's date: 2024  Patient name: Beryl Cleary  : 1975  MRN: [id number]  Referring provider: Sonia Waldron MD  Dx:   Encounter Diagnosis     ICD-10-CM    1. Left elbow pain  M25.522       2. Acute pain of left shoulder  M25.512                      Subjective: \"Whatever she did last time has helped.\"      Objective: See treatment diary below      Assessment: Tolerated treatment well. Patient exhibited good technique with therapeutic exercises and would benefit from continued OT. Patient tolerated additional progressive exercises this date to decrease symptoms. Patient reports a decrease in symptoms since last session.       Plan: Continue per plan of care.  Progress treatment as tolerated.       Precautions Lateral Epicondylitis, Left shoulder impingement  Initial Evaluation completed on: 2024  Re-Evaluation needs to be completed before: 2024  Insurance: [employer]    FOTO COUNT 2024         RE-EVAL 2024           Manuals 2024  3/27/2024  2024  2024     IASTM Extensor Mass GT #4  10'  rockblade 5'  Cupping 5'  GT#3 3'  Cupping 3'  GT#3 3'  Cupping 3'     Wrist Extensor Stretch             Supinator Stretch HEP           KT Tape Tennis Elbow Supinator  lateral elbow          manual massage    10'  10'  10'             Neuro Re-Ed  2024  3/27/2024  2024  2024                                 Ther Ex 2024  3/27/2024  2024  2024     Wrist Flexion Stretch HEP           Wrist  Iso    10 sec x 3  10 sec x 3  10 sec x 3     Eccentric Wrist Flexion             Digi-Flex      Red x 20  Red x 20     Hand Power Pro      Red/Blue  X 20       Wrist Maze             Hammer Stretch      20 sec x 5  20 sec x 5     UBE Machine      2F/2B L 120  2F/2B L 60     Flex Bar  Twists  Bends      thin yellow  X 20  X 20  thin yellow  X 20  X 20     Sleeper Stretch HEP           ER Stretch along Door HEP           Sidelying ER     "         Elbow isometrics    10 sec x 3  10 sec x 3                                   Ther Activity 03/21/2024  3/27/2024  4/1/2024  04/04/2024                                                                           Modalities 03/21/2024  3/27/2024  4/1/2024  04/04/2024     Ultrasound    3MHz  50%  .8  3 MHz  50%  8'  3 MHz  50%  8'     E-STIM w/ MH             CP             MH  Left Elbow  Left Shoulder 10' end of session    MH/stim post tx 10'  MH/stim post tx 10'

## 2024-04-08 ENCOUNTER — OFFICE VISIT (OUTPATIENT)
Dept: INTERNAL MEDICINE CLINIC | Facility: CLINIC | Age: 49
End: 2024-04-08
Payer: COMMERCIAL

## 2024-04-08 ENCOUNTER — TELEPHONE (OUTPATIENT)
Dept: INTERNAL MEDICINE CLINIC | Facility: CLINIC | Age: 49
End: 2024-04-08

## 2024-04-08 VITALS
WEIGHT: 210.8 LBS | SYSTOLIC BLOOD PRESSURE: 112 MMHG | HEIGHT: 64 IN | OXYGEN SATURATION: 96 % | HEART RATE: 91 BPM | TEMPERATURE: 98.2 F | DIASTOLIC BLOOD PRESSURE: 78 MMHG | BODY MASS INDEX: 35.99 KG/M2

## 2024-04-08 DIAGNOSIS — K44.9 HIATAL HERNIA: ICD-10-CM

## 2024-04-08 DIAGNOSIS — E66.9 CLASS 1 OBESITY: Primary | ICD-10-CM

## 2024-04-08 PROBLEM — Z12.31 ENCOUNTER FOR SCREENING MAMMOGRAM FOR MALIGNANT NEOPLASM OF BREAST: Status: RESOLVED | Noted: 2023-08-15 | Resolved: 2024-04-08

## 2024-04-08 PROCEDURE — 99214 OFFICE O/P EST MOD 30 MIN: CPT | Performed by: NURSE PRACTITIONER

## 2024-04-08 RX ORDER — TIRZEPATIDE 2.5 MG/.5ML
2.5 INJECTION, SOLUTION SUBCUTANEOUS WEEKLY
Qty: 2 ML | Refills: 0 | Status: SHIPPED | OUTPATIENT
Start: 2024-04-08 | End: 2024-04-08 | Stop reason: ALTCHOICE

## 2024-04-08 NOTE — PROGRESS NOTES
Name: Beryl Cleary      : 1975      MRN: [id number]  Encounter Provider: JAMSHID Crespo  Encounter Date: 2024   Encounter department: [facility]    Assessment & Plan Did discuss risks with weight loss medications and her stomach issues. She still would like to try something for weight loss. Will notify this is approved.     1. Class 1 obesity    2. Hiatal hernia        Depression Screening and Follow-up Plan: Patient was screened for depression during today's encounter. They screened negative with a PHQ-9 score of 0.        Subjective      Beryl is for an acute visit. She has been doing a weight loss  since November and did loose 10 lbs. She states she is dieting and walking everyday. She does have a hiatal hernia and this does need to be fixed but her surgeon wants her to loose weight first. She does have gastritis and was on Saxenda in the past but was not losing weight. She does take Protonix 20 mg BID.      Review of Systems   All other systems reviewed and are negative.      Current Outpatient Medications on File Prior to Visit   Medication Sig    acetaminophen (TYLENOL) 500 mg tablet Take 1,000 mg by mouth every 6 (six) hours as needed for mild pain    albuterol (2.5 mg/3 mL) 0.083 % nebulizer solution Take 3 mL (2.5 mg total) by nebulization every 6 (six) hours as needed for wheezing or shortness of breath    celecoxib (CeleBREX) 100 mg capsule Take 1 capsule (100 mg total) by mouth 2 (two) times a day    Diclofenac Sodium (VOLTAREN) 1 % Apply 2 g topically 4 (four) times a day    fluticasone (FLONASE) 50 mcg/act nasal spray 1 spray into each nostril 2 (two) times a day    gabapentin (Neurontin) 100 mg capsule Take 1 capsule (100 mg total) by mouth 3 (three) times a day As needed for numbness    hydroxychloroquine (PLAQUENIL) 200 mg tablet Take 1 tablet (200 mg total) by mouth 2 (two) times a day    Levocetirizine Dihydrochloride (XYZAL PO) Take by  "mouth    levonorgestrel (MIRENA) 20 MCG/24HR IUD 1 each by Intrauterine route once    montelukast (SINGULAIR) 10 mg tablet Take 1 tablet (10 mg total) by mouth daily at bedtime    Olopatadine HCl 0.6 % SOLN 2 actuation into each nostril daily    omeprazole (PriLOSEC) 20 mg delayed release capsule Take 1 capsule (20 mg total) by mouth 2 (two) times a day    valACYclovir (VALTREX) 1,000 mg tablet Take 2 tablets by mouth every 12 hours x 1 days    predniSONE 10 mg tablet Five pills a day for 2 days, 4 pills a day for 2 days, 3 pills a day for 2 days, 2 pills a day for 2 days, 1 pill a day for 2 days (Patient not taking: Reported on 2/26/2024)       Objective     /78   Pulse 91   Temp 98.2 °F (36.8 °C) (Temporal)   Ht 5' 4\" (1.626 m)   Wt 95.6 kg (210 lb 12.8 oz)   SpO2 96%   BMI 36.18 kg/m²     Physical Exam  Vitals reviewed.   Constitutional:       Appearance: Normal appearance. She is obese.   Cardiovascular:      Rate and Rhythm: Normal rate and regular rhythm.      Pulses: Normal pulses.      Heart sounds: Normal heart sounds.   Pulmonary:      Effort: Pulmonary effort is normal.      Breath sounds: Normal breath sounds.   Skin:     General: Skin is warm and dry.      Capillary Refill: Capillary refill takes less than 2 seconds.   Neurological:      General: No focal deficit present.      Mental Status: She is alert and oriented to person, place, and time. Mental status is at baseline.   Psychiatric:         Mood and Affect: Mood normal.         Behavior: Behavior normal.         Thought Content: Thought content normal.         Judgment: Judgment normal.       JAMSHID Crespo    "

## 2024-04-10 ENCOUNTER — OFFICE VISIT (OUTPATIENT)
Dept: PHYSICAL THERAPY | Facility: CLINIC | Age: 49
End: 2024-04-10
Payer: COMMERCIAL

## 2024-04-10 DIAGNOSIS — M25.522 LEFT ELBOW PAIN: ICD-10-CM

## 2024-04-10 DIAGNOSIS — M54.32 SCIATICA OF LEFT SIDE: Primary | ICD-10-CM

## 2024-04-10 DIAGNOSIS — E66.9 CLASS 1 OBESITY: Primary | ICD-10-CM

## 2024-04-10 DIAGNOSIS — M25.551 RIGHT HIP PAIN: ICD-10-CM

## 2024-04-10 PROCEDURE — 97112 NEUROMUSCULAR REEDUCATION: CPT

## 2024-04-10 PROCEDURE — 97110 THERAPEUTIC EXERCISES: CPT

## 2024-04-10 RX ORDER — TIRZEPATIDE 2.5 MG/.5ML
2.5 INJECTION, SOLUTION SUBCUTANEOUS WEEKLY
Qty: 2 ML | Refills: 0 | Status: SHIPPED | OUTPATIENT
Start: 2024-04-10 | End: 2024-05-08

## 2024-04-10 NOTE — PROGRESS NOTES
"Daily Note     Today's date: 4/10/2024  Patient name: Beryl Cleary  : 1975  MRN: [id number]  Referring provider: Sonia Waldron MD  Dx:   Encounter Diagnosis     ICD-10-CM    1. Sciatica of left side  M54.32       2. Right hip pain  M25.551       3. Left elbow pain  M25.522           Start Time: 1530  Stop Time: 1625  Total time in clinic (min): 55 minutes    Subjective: \"I feel great. I had no pain standing or walking since last session. I worked all weekend too and had no pain. I only have pain now when I cross my leg when sitting, but that is the only thing that seems to still cause me pain. \"       Objective: See treatment diary below      Assessment: Tolerated treatment well. Able to complete POC without incident. No increase in baseline symptoms throughout session. Good TA contract this session; appropriate sustained TA activation with dynamic LE movements. Appropriate fatigue noted with progression of resistance with strengthening exercises; slight discomfort felt with bridges today in R side SI joint region which diminished once standing. Pt progressing very well; will cont to progress as tolerated. Patient demonstrated fatigue post treatment and would benefit from continued PT      Plan: Continue per plan of care.  Progress treatment as tolerated.       Precautions: RA        Re-eval Date:  by 24     Date 3/27 4/1  4/4  4/10      Visit Count 6 7  8  9      FOTO      Closed      Pain In 1/10 5/10  3/10   0/10      Pain Out   1/10   2/10   0/10           Manuals 3/27 4/1  4/4  4/10      Lateral hip distraction with gait belt R hip    Lateral hip distraction with gait belt R hip 5'  supine MET for leg length       LAD lumbar, SI bias B   LAD lumbar, SI bias B 5'                                     Neuro Re-Ed             TA contract             TA with BKFO  x20 ea X20 ea   x20 ea  x20 ea     TA with SLR  2x10 ea 1# 2x10 ea   1# 2x10  1# 2x10      TA with edgardo  x30 ea 1# X30   1# scissor " kicks x30  1# scissor kicks x30      Weight hold with march BUE        5# x20 ea     Ta seated alt UE/LE on stability ball    x30    Multifidus hip hike             Palloff press   2x10 blue tb      x15 blue tb                   Ther Ex             Nustep  L5 10'  L3 10'   L3 10'   L4 10'     bridges X20 with blue with hip abd  X20 with blue with hip abd   X20 with blue with hip abd   3x10 with black tb with hip abd     clamshells             Hamstring stretch             Piriformis stretch             Side steps  x3 laps blue tb X3 laps rtb   x3 laps blue  x3 laps black tb     Monster walks  x3 laps blue tb X3 laps rtb   x3 laps blue  x3 laps black tb     Leg press         #80 2x10      Squat matrix             Step ups with knee drive              Ther Activity                                         Gait Training                                         Modalities

## 2024-04-17 ENCOUNTER — OFFICE VISIT (OUTPATIENT)
Dept: PHYSICAL THERAPY | Facility: CLINIC | Age: 49
End: 2024-04-17
Payer: COMMERCIAL

## 2024-04-17 ENCOUNTER — OFFICE VISIT (OUTPATIENT)
Dept: OCCUPATIONAL THERAPY | Facility: CLINIC | Age: 49
End: 2024-04-17
Payer: COMMERCIAL

## 2024-04-17 DIAGNOSIS — M25.551 RIGHT HIP PAIN: ICD-10-CM

## 2024-04-17 DIAGNOSIS — M54.32 SCIATICA OF LEFT SIDE: Primary | ICD-10-CM

## 2024-04-17 DIAGNOSIS — M25.522 LEFT ELBOW PAIN: Primary | ICD-10-CM

## 2024-04-17 DIAGNOSIS — M25.522 LEFT ELBOW PAIN: ICD-10-CM

## 2024-04-17 PROCEDURE — 97035 APP MDLTY 1+ULTRASOUND EA 15: CPT

## 2024-04-17 PROCEDURE — 97110 THERAPEUTIC EXERCISES: CPT

## 2024-04-17 PROCEDURE — 97014 ELECTRIC STIMULATION THERAPY: CPT

## 2024-04-17 PROCEDURE — 97140 MANUAL THERAPY 1/> REGIONS: CPT

## 2024-04-17 PROCEDURE — 97112 NEUROMUSCULAR REEDUCATION: CPT

## 2024-04-17 NOTE — PROGRESS NOTES
Daily Note     Today's date: 2024  Patient name: Beryl Cleary  : 1975  MRN: [id number]  Referring provider: Sonia Waldron MD  Dx:   Encounter Diagnosis     ICD-10-CM    1. Left elbow pain  M25.522                      Subjective: It was feeling good until I went to  a kid and it's being hurting ever since.      Objective: See treatment diary below      Assessment: Tolerated treatment well. Patient's treatment primarily consisted of IASTM, manual massage and stretching and light strengthening. Pt demonstrated decrease  on L 30#(was 45#) and lat pinch L 6#, right 12#. Pain primarily at bicep insertion and thumb CMC noted this dated. Increase pain noted with elbow flexion isometric, pt reports she has not returned to gym yet, she is going to start Saturday.      Plan: Continue per plan of care.  Progress treatment as tolerated.        FOTO COUNT 2024         RE-EVAL 2024           Manuals 2024  3/27/2024  2024  2024  2024   IASTM Extensor Mass GT #4  10'  rockblade 5'  Cupping 5'  GT#3 3'  Cupping 3'  GT#3 3'  Cupping 3'  GT #3  Cupping 3'   Wrist Extensor Stretch             Supinator Stretch HEP           KT Tape Tennis Elbow Supinator  lateral elbow          manual massage    10'  10'  10'  10'           Neuro Re-Ed  2024  3/27/2024  2024  2024  2024                               Ther Ex 2024  3/27/2024  2024  2024  2024   Wrist Flexion Stretch HEP           Wrist  Iso    10 sec x 3  10 sec x 3  10 sec x 3  10 sec x 3   Eccentric Wrist Flexion             Digi-Flex      Red x 20  Red x 20  Blue x 20   Hand Power Pro      Red/Blue  X 20       Wrist Maze             Hammer Stretch      20 sec x 5  20 sec x 5     UBE Machine      2F/2B L 120  2F/2B L 60     Flex Bar  Twists  Bends      thin yellow  X 20  X 20  thin yellow  X 20  X 20  thin yellow  X 20  X 20   Sleeper Stretch HEP           ER Stretch  along Door HEP           Sidelying ER             Elbow isometrics    10 sec x 3  10 sec x 3    10 sec x 3                               Ther Activity 03/21/2024  3/27/2024  4/1/2024  04/04/2024  4/17/2024                                                                         Modalities 03/21/2024  3/27/2024  4/1/2024  04/04/2024  4/17/2024   Ultrasound    3MHz  50%  .8  3 MHz  50%  8'  3 MHz  50%  8'  3 MHz  50%  8'   E-STIM w/ MH             CP             MH  Left Elbow  Left Shoulder 10' end of session    MH/stim post tx 10'  MH/stim post tx 10'  MH/stim post tx 10'

## 2024-04-17 NOTE — PROGRESS NOTES
"Daily Note/Discharge     Today's date: 2024  Patient name: Beryl Cleary  : 1975  MRN: [id number]  Referring provider: Sonia Waldron MD  Dx:   Encounter Diagnosis     ICD-10-CM    1. Sciatica of left side  M54.32       2. Right hip pain  M25.551       3. Left elbow pain  M25.522           Start Time: 1530  Stop Time: 1613  Total time in clinic (min): 43 minutes    Subjective: \"I have been feeling really good. I have not had any pain with walking/standing no matter how long I do it for. I have no pain at work. I have been having less pain sitting and have been careful how I am sitting. I am ready to transition to HEP\"       Objective: See treatment diary below  STGs to be achieved in 4 weeks:  1. Pt to demonstrate reduced subjective pain rating \"at worst\" by at least 2-3 points from Initial Eval in order to allow for reduced pain with ADLs and improved functional activity tolerance. MET  2. Pt to report at least 25% improvement in symptoms with ADLs/IADLs to demonstrate improving function. MET  3. Pt to demonstrate increased MMT of B hip flexion/hip abduction by at least 1/2-1 grade in order to improve safety and stability with ADLs and functional mobility. MET  4. Pt to demonstrate increased MMT of B gluteus sarah and gluteus medius by at least 1/2-1 grade in order to improve safety and stability with ADLs and functional mobility.  MET     LTGs to be achieved by discharge:  1. Pt will be I with HEP in order to continue to improve quality of life and independence and reduce risk for re-injury. MET  2. Pt to demonstrate return to walking 3-5 miles with no more than 1-2/10 pt reported R LB/hip pain without limitations or restrictions.  MET  3. Pt to demonstrate improved function as noted by achieving or exceeding predicted score on FOTO outcomes assessment tool.   MET  4. Pt to report ability to stand over 10 minutes at standing desk for work with no more than 1-2/10 pt reported LLE pain to " demonstrate improved function. MET         Assessment: Tolerated treatment well. Able to complete POC without incident. Pt has made significant progress with skilled PT services. Good improvements in hip, core, gluteal strength. Improved  TA activation with dynamic LE movements demonstrating improved spine stabilization. Pt now able to work, walk long distances/longer period, stand longer duration, and perform ADLs/IADLs and hobbies with little to no pain. If pain is experienced, pain is much less severe. Pt can not sit without pain as well, but is careful with the position she is sitting in. Pt has achieved all STG/LTG established. Plan to d/c and transition to HEP. Pt provided updated HEP and educated on importance of compliance. All questions answered to pt satisfaction and pt educated to contact clinic with any questions or concerns.      Plan: Continue per plan of care.  Progress treatment as tolerated.       Precautions: RA        Re-eval Date:  by 4/6/24     Date 3/27 4/1  4/4  4/10   4/17   Visit Count 6 7  8  9   10   FOTO      Closed      Pain In 1/10 5/10  3/10   0/10   0/10   Pain Out   1/10   2/10   0/10  0/10         Manuals 3/27 4/1  4/4  4/10   4/17   Lateral hip distraction with gait belt R hip    Lateral hip distraction with gait belt R hip 5'  supine MET for leg length       LAD lumbar, SI bias B   LAD lumbar, SI bias B 5'                                     Neuro Re-Ed             TA contract             TA with BKFO  x20 ea X20 ea   x20 ea  x20 ea  x20 ea   TA with SLR  2x10 ea 1# 2x10 ea   1# 2x10  1# 2x10   2x10x   TA with marches  x30 ea 1# X30   1# scissor kicks x30  1# scissor kicks x30   x30 scissor kick   Weight hold with march BUE        5# x20 ea  x20 ea   Ta seated alt UE/LE on stability ball    x30 x30   Multifidus hip hike             Palloff press   2x10 blue tb      x15 blue tb  x15 blue tb                 Ther Ex             Nustep  L5 10'  L3 10'   L3 10'   L4 10'  L4 10'     bridges X20 with blue with hip abd  X20 with blue with hip abd   X20 with blue with hip abd   3x10 with black tb with hip abd  3x10 with black tb with hip abd   clamshells             Hamstring stretch             Piriformis stretch             Side steps  x3 laps blue tb X3 laps rtb   x3 laps blue  x3 laps black tb  x3 laps black tb   Monster walks  x3 laps blue tb X3 laps rtb   x3 laps blue  x3 laps black tb  x3 laps black tb   Leg press         #80 2x10   100# 3x10   Squat matrix             Step ups with knee drive              Ther Activity                                         Gait Training                                         Modalities

## 2024-04-23 NOTE — PROGRESS NOTES
Daily Note     Today's date: 2024  Patient name: Beryl Cleary  : 1975  MRN: [id number]  Referring provider: Sonia Waldron MD  Dx:   Encounter Diagnosis     ICD-10-CM    1. Left elbow pain  M25.522       2. Acute pain of left shoulder  M25.512                      Subjective: It felt better but still hurts off and on.      Objective: See treatment diary below      Assessment: Tolerated treatment well. Patient demonstrated fatigue post treatment, exhibited good technique with therapeutic exercises, and would benefit from continued OT. Tenderness and hypersensitivity over bicep noted, decreased post cupping and manual massage. Tolerated additional stretches. Pt would benefit from home stim unit to decrease pain,and tightness of LUE.      Plan: Continue per plan of care.  Progress treatment as tolerated.        FOTO COUNT 2024         RE-EVAL 2024           Manuals 2024   IASTM Extensor Mass GT #3  Cupping     GT#3 3'  Cupping 3'  GT #3  Cupping 3'   Wrist Extensor Stretch          Supinator Stretch          KT Tape Tennis Elbow           manual massage 10'    10'  10'           Neuro Re-Ed  2024                         Ther Ex 2024   Wrist Flexion Stretch          Wrist  Iso     10 sec x 3  10 sec x 3   Eccentric Wrist Flexion          Digi-Flex Blue x    Red x 20  Blue x 20   Hand Power Pro          Wrist Maze          Hammer Stretch     20 sec x 5     UBE Machine 3F/3B L 2.0    2F/2B L 60    Flex Bar  Twists  Bends Thin yellow  X 20  X 20    thin yellow  X 20  X 20  thin yellow  X 20  X 20   Sleeper Stretch          ER Stretch along Door 20 sec x 3         Sidelying ER          Elbow isometrics   10 sec x 3      10 sec x 3    behind back stretch 20 sec x 3          thumb up and down Front x 10  Side x 10         Ther Activity 2024                                                           Modalities 4/24/2024 04/04/2024 4/17/2024   Ultrasound     3 MHz  50%  8'  3 MHz  50%  8'   E-STIM w/ MH          CP          MH  Left Elbow  Left Shoulder     MH/stim post tx 10'  MH/stim post tx 10'

## 2024-04-24 ENCOUNTER — OFFICE VISIT (OUTPATIENT)
Dept: OCCUPATIONAL THERAPY | Facility: CLINIC | Age: 49
End: 2024-04-24
Payer: COMMERCIAL

## 2024-04-24 ENCOUNTER — APPOINTMENT (OUTPATIENT)
Dept: PHYSICAL THERAPY | Facility: CLINIC | Age: 49
End: 2024-04-24
Payer: COMMERCIAL

## 2024-04-24 DIAGNOSIS — M25.512 ACUTE PAIN OF LEFT SHOULDER: ICD-10-CM

## 2024-04-24 DIAGNOSIS — M25.522 LEFT ELBOW PAIN: Primary | ICD-10-CM

## 2024-04-24 PROCEDURE — 97110 THERAPEUTIC EXERCISES: CPT

## 2024-04-24 PROCEDURE — 97014 ELECTRIC STIMULATION THERAPY: CPT

## 2024-04-24 PROCEDURE — 97140 MANUAL THERAPY 1/> REGIONS: CPT

## 2024-07-19 ENCOUNTER — TELEPHONE (OUTPATIENT)
Age: 49
End: 2024-07-19

## 2024-07-19 NOTE — TELEPHONE ENCOUNTER
Contacted patient off of NON-REFERRAL to verify needs of services in attempts to offer patient an appointment. LVM for patient to contact intake dept  in regards to wait list

## 2024-07-19 NOTE — TELEPHONE ENCOUNTER
Beryl Cleary called the office and requested a call back to discuss scheduling..    They can be reached at P# [phone].       Thank you.

## 2024-07-19 NOTE — TELEPHONE ENCOUNTER
"Behavioral Health Outpatient Intake Questions    Referred By   : self    Please advise interviewee that they need to answer all questions truthfully to allow for best care, and any misrepresentations of information may affect their ability to be seen at this clinic   => Was this discussed? Yes     If Minor Child (under age 18)    Who is/are the legal guardian(s) of the child?     Is there a custody agreement?      If \"YES\"- Custody orders must be obtained prior to scheduling the first appointment  In addition, Consent to Treatment must be signed by all legal guardians prior to scheduling the first appointment    If \"NO\"- Consent to Treatment must be signed by all legal guardians prior to scheduling the first appointment    Behavioral Health Outpatient Intake History -     Presenting Problem (in patient's own words): anxiety, depression    Are there any communication barriers for this patient?     No                                               If yes, please describe barriers:   If there is a unique situation, please refer to Tarik Gardner/Jade Iniguez for final determination.    Are you taking any psychiatric medications? No     If \"YES\" -What are they      If \"YES\" -Who prescribes?     Has the Patient previously received outpatient Talk Therapy or Medication Management from [facility]  No        If \"YES\"- When, Where and with Whom?         If \"NO\" -Has Patient received these services elsewhere?       If \"YES\" -When, Where, and with Whom?  2018   [facility]    Has the Patient abused alcohol or other substances in the last 6 months ? No  No concerns of substance abuse are reported.     If \"YES\" -What substance, How much, How often?     If illegal substance: Refer to [facility] (for [facility]AR[facility]) or [facility].   If Alcohol in excess of 10 drinks per week:  Refer to [facility] (for [facility]AR[facility]) or [facility]    Legal History-     Is this treatment court ordered? No   If \"yes \"send to :  Talk Therapy : Send to " "Tarik Gardner/Jade Iniguez for final determination   Med Management: Send to Dr aVil for final determination     Has the Patient been convicted of a felony?  No   If \"Yes\" send to -When, What?  Talk Therapy: Send to Tarik Iniguez for final determination   Med Management: Send to Dr Vail for final determination     ACCEPTED as a patient Yes  If \"Yes\" Appointment Date:    Emily Marcelo 8/5 @ 10a    Layo Gagnon 8/9 @ 930a    Referred Elsewhere? No  If “Yes” - (Where? Ex: [facility], [facility], [facility], [facility], etc.)       Name of Insurance Co:[employer]  Insurance ID#[id number]   Insurance Phone #  If ins is primary or secondary?  If patient is a minor, parents information such as Name, D.O.B of guarantor.  "

## 2024-07-26 ENCOUNTER — TELEPHONE (OUTPATIENT)
Dept: PSYCHIATRY | Facility: CLINIC | Age: 49
End: 2024-07-26

## 2024-07-26 NOTE — TELEPHONE ENCOUNTER
One week follow up call for New Patient appointment with Layo Gagnon [[id number]] on 8/9  was made on 7/19. Writer informed patient of New Patient paperwork needing to be completed 5 days prior to the appointment. Writer confirmed paperwork has been sent via My Chart.

## 2024-07-30 ENCOUNTER — ANNUAL EXAM (OUTPATIENT)
Dept: OBGYN CLINIC | Facility: CLINIC | Age: 49
End: 2024-07-30
Payer: COMMERCIAL

## 2024-07-30 VITALS
SYSTOLIC BLOOD PRESSURE: 120 MMHG | HEIGHT: 64 IN | WEIGHT: 216 LBS | DIASTOLIC BLOOD PRESSURE: 74 MMHG | BODY MASS INDEX: 36.88 KG/M2

## 2024-07-30 DIAGNOSIS — Z01.419 WELL WOMAN EXAM: Primary | ICD-10-CM

## 2024-07-30 DIAGNOSIS — N95.1 SYMPTOMS, SUCH AS FLUSHING, SLEEPLESSNESS, HEADACHE, LACK OF CONCENTRATION, ASSOCIATED WITH THE MENOPAUSE: ICD-10-CM

## 2024-07-30 PROCEDURE — 99213 OFFICE O/P EST LOW 20 MIN: CPT | Performed by: STUDENT IN AN ORGANIZED HEALTH CARE EDUCATION/TRAINING PROGRAM

## 2024-07-30 PROCEDURE — G0145 SCR C/V CYTO,THINLAYER,RESCR: HCPCS | Performed by: STUDENT IN AN ORGANIZED HEALTH CARE EDUCATION/TRAINING PROGRAM

## 2024-07-30 PROCEDURE — S0612 ANNUAL GYNECOLOGICAL EXAMINA: HCPCS | Performed by: STUDENT IN AN ORGANIZED HEALTH CARE EDUCATION/TRAINING PROGRAM

## 2024-07-30 PROCEDURE — G0476 HPV COMBO ASSAY CA SCREEN: HCPCS | Performed by: STUDENT IN AN ORGANIZED HEALTH CARE EDUCATION/TRAINING PROGRAM

## 2024-07-30 RX ORDER — ESTRADIOL 0.04 MG/D
1 PATCH, EXTENDED RELEASE TRANSDERMAL 2 TIMES WEEKLY
Qty: 24 PATCH | Refills: 3 | Status: SHIPPED | OUTPATIENT
Start: 2024-08-01 | End: 2025-08-01

## 2024-07-30 NOTE — PATIENT INSTRUCTIONS
- In women, weight gain and increased abdominal fat distribution often occurs in the early menopause years, in part due to changes in hormones such as increased FSH and decreased estradiol.  Unfortunately simply supplementing with hormone replacement therapy doesn't protect against this weight gain.   - Some lifestyle and dietary approaches suggested by menopause experts include:  (1) Increasing dietary fiber intake through plant-based foods to 30 grams per day  (2) Get adequate protein, approximately 100 grams of protein per day  (3) Increase overall strength and muscle mass by performing weight bearing exercise or walking with a weighted vest  - Two books you may find helpful for menopause are:  The New Menopause - Dr. Sarah Wright  The Menopause Manifesto - Dr. Debby Godoy

## 2024-07-30 NOTE — PROGRESS NOTES
[facility] [address] [place]    ASSESSMENT/PLAN: Beryl Cleary is a 49 y.o.  who presents for annual gynecologic exam.    Encounter for routine gynecologic examination  - Routine well woman exam completed today.  - Cervical Cancer Screening: Current ASCCP Guidelines reviewed. Last Pap: 2024. Co-testing done today.  - HPV Vaccination status: Not immunized  - STI screening offered including HIV: Declines.  - Contraceptive counseling discussed.  Current contraception: Mirena IUD since 2018.  - Breast Cancer Screening: Last Mammogram 2023, ordered  - requests HRT for menopause symptoms- given endometrial protection with Mirena ok to start just estradiol transdermally; she has no contraindictions; discussed risks benefits and alternatives.    Additional problems addressed at this visit:  1. Well woman exam  -     Liquid-based pap, screening  -     Mammo screening bilateral w 3d & cad; Future  2. Symptoms, such as flushing, sleeplessness, headache, lack of concentration, associated with the menopause  -     estradiol (Vivelle-Dot) 0.0375 MG/24HR; Place 1 patch on the skin 2 (two) times a week        CC:  Annual Gynecologic Examination    HPI: Beryl Cleary is a 49 y.o.  who presents for annual gynecologic examination.  She has had a Mirena IUD since 2018.  She reports hot flashes, hair loss, and unexplained weight gain.  She reports no breast concerns. She gets no periods on Mirena. She has no vaginal discharge, vulvar or vaginal lesions, pelvic pain, or abnormal bleeding.  She has no sexual health concerns and is currently sexually active with one male partner. She has no symptoms of pelvic organ prolapse, urinary, or fecal incontinence.  She denies intimate partner violence.          The following portions of the patient's history were reviewed and updated as appropriate: allergies, current medications, past family history, past medical history,  "obstetric history, gynecologic history, past social history, past surgical history and problem list.    Review of Systems   Constitutional:  Negative for chills and fever.   Respiratory:  Negative for cough and shortness of breath.    Cardiovascular:  Negative for chest pain and leg swelling.   Gastrointestinal:  Negative for abdominal pain, nausea and vomiting.   Genitourinary:  Negative for dysuria, frequency and urgency.   Neurological:  Negative for dizziness, light-headedness and headaches.         Objective:  /74   Ht 5' 4\" (1.626 m)   Wt 98 kg (216 lb)   BMI 37.08 kg/m²    Physical Exam  Exam conducted with a chaperone present.   Constitutional:       Appearance: Normal appearance.   HENT:      Head: Normocephalic and atraumatic.   Cardiovascular:      Rate and Rhythm: Normal rate.   Pulmonary:      Effort: Pulmonary effort is normal.   Chest:   Breasts:     Breasts are symmetrical.      Right: Normal. No swelling, bleeding, inverted nipple, mass, nipple discharge, skin change or tenderness.      Left: Normal. No swelling, bleeding, inverted nipple, mass, nipple discharge, skin change or tenderness.   Abdominal:      General: There is no distension.      Tenderness: There is no abdominal tenderness. There is no guarding.   Genitourinary:     Exam position: Lithotomy position.      Pubic Area: No rash.       Labia:         Right: No rash, tenderness or lesion.         Left: No rash, tenderness or lesion.       Urethra: No prolapse, urethral swelling or urethral lesion.      Vagina: Normal. No vaginal discharge, erythema, tenderness, bleeding or lesions.      Cervix: No cervical motion tenderness, discharge, friability or erythema.      Uterus: Not enlarged, not tender and no uterine prolapse.       Adnexa:         Right: No mass, tenderness or fullness.          Left: No mass, tenderness or fullness.        Comments: IUD strings visualized  Lymphadenopathy:      Upper Body:      Right upper body: No " axillary adenopathy.      Left upper body: No axillary adenopathy.      Lower Body: No right inguinal adenopathy. No left inguinal adenopathy.   Neurological:      Mental Status: She is alert.             Stefany Ramirez  [facility]  07/30/24 2:37 PM

## 2024-08-09 ENCOUNTER — TELEMEDICINE (OUTPATIENT)
Dept: PSYCHIATRY | Facility: CLINIC | Age: 49
End: 2024-08-09
Payer: COMMERCIAL

## 2024-08-09 ENCOUNTER — TELEPHONE (OUTPATIENT)
Dept: PSYCHIATRY | Facility: CLINIC | Age: 49
End: 2024-08-09

## 2024-08-09 DIAGNOSIS — F41.1 GAD (GENERALIZED ANXIETY DISORDER): Primary | ICD-10-CM

## 2024-08-09 LAB
LAB AP GYN PRIMARY INTERPRETATION: NORMAL
Lab: NORMAL

## 2024-08-09 PROCEDURE — 90792 PSYCH DIAG EVAL W/MED SRVCS: CPT

## 2024-08-09 RX ORDER — HYDROXYZINE PAMOATE 25 MG/1
25 CAPSULE ORAL
Qty: 30 CAPSULE | Refills: 1 | Status: SHIPPED | OUTPATIENT
Start: 2024-08-09

## 2024-08-09 RX ORDER — FLUOXETINE HYDROCHLORIDE 20 MG/1
20 CAPSULE ORAL DAILY
Qty: 30 CAPSULE | Refills: 1 | Status: SHIPPED | OUTPATIENT
Start: 2024-08-09

## 2024-08-09 RX ORDER — FLUOXETINE 10 MG/1
10 CAPSULE ORAL DAILY
Qty: 7 CAPSULE | Refills: 0 | Status: SHIPPED | OUTPATIENT
Start: 2024-08-09

## 2024-08-09 NOTE — PSYCH
"This note was not shared with the patient due to reasonable likelihood of causing patient harm      Psychiatric Evaluation - Behavioral Health   Beryl Cleary 49 y.o. female MRN: [id number]    Chief Complaint: Psychiatric Evaluation    HPI   Patient is a 49 year old  female who present to [facility] outpatient for a psychiatric evaluation for symptoms of anxiety and depression. Patient reports a hx of \"lifelong anxiety\" and currently is \"through the roof\" and depression is \"usually none but recently 5/10.\" She saw a psychiatrist 20 years ago and most recently 6 years ago after her divorce. She states her anxiety has progressively gotten worse the past few years. Patient has tried Prozac, Lexapro and most recently Celexa, which were all successful, however currently she isn't prescribed any psychotropics. Patient states she worries about \"everything\" and constant racing thoughts. 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 She reports mood swings and states \"one minute I'm good then I'm anxious.\" She constantly feels overwhelmed. Patient feels confused as \"everything in my life is actually okay and yet I worry and I have no control over it.\" She denies daytime panic attacks but experiences them after waking up in the middle of the night. She states she is a social person but lately she doesn't want to leave the house. She also has problems with large crowds like [email or web address] and [employer]. She endorses self-doubt and frequent negative self-talk. 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 No issues falling asleep but increase in awakenings. Appetite is increased and patient reports \"stress eating\" which she gained 25 over a past 2 years months.        No ah/vh   No paranoia   No delusions   No hx of serina   No hx of eating disorders  No hx of ocd symptoms       Put " on estradial last Saturday - last night was in complete panic     No energy and motivation     No psych hospitalization   No hx of SA  Currently on no psychotropics     Taken: prozac (worked great - had to change due to insurance), than lexapro, then celexa     Currently denies si/hi                             Review Of Systems:     Constitutional Negative   ENT Negative   Cardiovascular Negative   Respiratory Negative   Gastrointestinal Negative   Genitourinary Perimenopausal   Musculoskeletal Rheumatoid Arthritis    Integumentary Negative   Neurological Negative   Endocrine Negative     Past Medical History:  Patient Active Problem List   Diagnosis    Acute adrenal insufficiency (HCC)    Allergic rhinitis    Anemia    Anxiety    Depressed mood    Inflammatory polyarthropathy (HCC)    Leucocytosis    RA (rheumatoid arthritis) (HCC)    Secondary adrenal insufficiency (HCC)    Thyroid nodule, uninodular    Varicose vein    Sjogren's syndrome (HCC)    Tendonitis    Chronic pansinusitis    Deviated nasal septum    Hypertrophy of both inferior nasal turbinates    Severe frontal headaches    Herpes labialis    Chronic frontal sinusitis    Class 1 obesity    Anxiety and depression    Right sided sciatica    Hiatal hernia    Chronic seasonal allergic rhinitis due to pollen    Allergic rhinitis due to house dust mite    PONV (postoperative nausea and vomiting)       Current Outpatient Medications on File Prior to Visit   Medication Sig Dispense Refill    acetaminophen (TYLENOL) 500 mg tablet Take 1,000 mg by mouth every 6 (six) hours as needed for mild pain      albuterol (2.5 mg/3 mL) 0.083 % nebulizer solution Take 3 mL (2.5 mg total) by nebulization every 6 (six) hours as needed for wheezing or shortness of breath 180 mL 5    celecoxib (CeleBREX) 100 mg capsule Take 1 capsule (100 mg total) by mouth 2 (two) times a day 60 capsule 0    Cholecalciferol (Vitamin D3) 25 MCG TABS Take by mouth      estradiol (Vivelle-Dot)  0.0375 MG/24HR Place 1 patch on the skin 2 (two) times a week 24 patch 3    fluticasone (FLONASE) 50 mcg/act nasal spray 1 spray into each nostril 2 (two) times a day 16 g 1    gabapentin (Neurontin) 100 mg capsule Take 1 capsule (100 mg total) by mouth 3 (three) times a day As needed for numbness 90 capsule 6    hydroxychloroquine (PLAQUENIL) 200 mg tablet Take 1 tablet (200 mg total) by mouth 2 (two) times a day 180 tablet 1    Levocetirizine Dihydrochloride (XYZAL PO) Take by mouth      levonorgestrel (MIRENA) 20 MCG/24HR IUD 1 each by Intrauterine route once      MAGNESIUM CITRATE PO Take by mouth      montelukast (SINGULAIR) 10 mg tablet Take 1 tablet (10 mg total) by mouth daily at bedtime 30 tablet 4    Olopatadine HCl 0.6 % SOLN 2 actuation into each nostril daily 30.5 g 11    omeprazole (PriLOSEC) 20 mg delayed release capsule Take 1 capsule (20 mg total) by mouth 2 (two) times a day 180 capsule 3    predniSONE 10 mg tablet Five pills a day for 2 days, 4 pills a day for 2 days, 3 pills a day for 2 days, 2 pills a day for 2 days, 1 pill a day for 2 days (Patient not taking: Reported on 2/26/2024) 30 tablet 0    valACYclovir (VALTREX) 1,000 mg tablet Take 2 tablets by mouth every 12 hours x 1 days 4 tablet 0     No current facility-administered medications on file prior to visit.       Allergies:  Allergies   Allergen Reactions    Doxycycline Hives    Nsaids Wheezing and Nasal Congestion     ASPIRIN and NSAID exacerbated respiratory disease (AERD) see allergist note on 2/28/24.  Typically COX2 are tolerated.  Tylenol is tolerated.  See CELECOXIB challenge on 8/8/24 she tolerated it without triggering AERD.         Past Surgical History:  Past Surgical History:   Procedure Laterality Date    CHOLECYSTECTOMY      EGD  10/2023    NASAL/SINUS ENDOSCOPY N/A 06/30/2020    Procedure: IMAGED GUIDED F.E.S.S.;  Surgeon: Baljit Cassidy DO;  [facility];  Service: ENT    IL SEPTOPLASTY/SUBMUCOUS RESECJ W/WO  CARTILAGE GRF N/A 06/30/2020    Procedure: SEPTOPLASTY;  Surgeon: Baljit Cassidy DO;  Location[facility];  Service: ENT    US GUIDED THYROID BIOPSY  07/31/2017    VENOUS ABLATION Left     lower extremity    WISDOM TOOTH EXTRACTION       Rheumatoid arthritis - plaquenil   Started estradial patch     Past Psychiatric History:    No psych hospitalization   No hx of SA  Currently on no psychotropics      Past Medication Trials: Celexa, Lexapro, Prozac  Current Psychiatric Medications: none    Family Psychiatric History:   Mom - undiagnosed anxiety and depression, hx of alcoholism,   Father - some sort of psychiatric history but was never talked about   Brother - drug addict   Younger brother - anxiety attacks     Social History:   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     Objective:  There were no vitals filed for this visit.      Weight (last 2 days)       None            Mental status:  Appearance sitting comfortably in chair   Mood Anxious   Affect Appears dysphoric   Speech Normal rate, rhythm, and volume   Thought Processes Linear and goal directed   Associations intact associations   Hallucinations Denies any auditory or visual hallucinations   Thought Content No passive or active suicidal or homicidal ideation, intent, or plan.   Orientation Oriented to person, place, time, and situation   Recent and Remote Memory Grossly intact   Attention Span and Concentration Concentration intact   Intellect Appears to be of Average  Intelligence   Insight Insight intact   Judgement judgment was intact   Muscle Strength Muscle strength and tone were normal   Language Within normal limits   Fund of Knowledge Age appropriate   Pain None     PHQ-A Screening              OZZIE-7 Flowsheet Screening      Flowsheet Row Most Recent Value   Over the last two weeks, how often have you been bothered by the following problems?     Feeling nervous, anxious, or on edge 3    Not being able to stop or control worrying 3    Worrying too much about different things 3    Trouble relaxing  3    Being so restless that it's hard to sit still 2    Becoming easily annoyed or irritable  2    Feeling afraid as if something awful might happen 3    How difficult have these problems made it for you to do your work, take care of things at home, or get along with other people?  Somewhat difficult    OZZIE Score  19                Assessment/Plan:   START Prozac 10mg po daily x7 days then START Prozac 20mg po daily  START Hydroxyzine 25mg po hs prn   Follow up in 1 month    Diagnosis:   Generalized anxiety disorder      Risks, Benefits And Possible Side Effects Of Medications:  Risks, benefits, and possible side effects of medications explained to patient and family, they verbalize understanding    Controlled Medication Discussion: No records found for controlled prescriptions according to [place] Prescription Drug Monitoring Program.     Visit Time    Visit Start Time: 9:30 AM  Visit Stop Time: 10:30 AM  Total Visit Duration:  60 minutes

## 2024-08-12 ENCOUNTER — TELEMEDICINE (OUTPATIENT)
Dept: PSYCHIATRY | Facility: CLINIC | Age: 49
End: 2024-08-12
Payer: COMMERCIAL

## 2024-08-12 DIAGNOSIS — F41.1 GAD (GENERALIZED ANXIETY DISORDER): Primary | ICD-10-CM

## 2024-08-12 PROCEDURE — 90791 PSYCH DIAGNOSTIC EVALUATION: CPT | Performed by: COUNSELOR

## 2024-08-12 NOTE — PSYCH
Virtual Regular Visit    Verification of patient location:    Patient is located at Home in the following state in which I hold an active license [place]      Assessment/Plan:    Problem List Items Addressed This Visit    None  Visit Diagnoses     OZZIE (generalized anxiety disorder)    -  Primary          Goals addressed in session: Goal 1          Reason for visit is No chief complaint on file.       Encounter provider MING Maloney      Recent Visits  Date Type Provider Dept   08/12/24 Telemedicine [facility] Mal[facility]   Showing recent visits within past 7 days and meeting all other requirements  Future Appointments  No visits were found meeting these conditions.  Showing future appointments within next 150 days and meeting all other requirements       The patient was identified by name and date of birth. Beryl Cleary was informed that this is a telemedicine visit and that the visit is being conducted throughthe [employer] platform. She agrees to proceed..  My office door was closed. No one else was in the room.  She acknowledged consent and understanding of privacy and security of the video platform. The patient has agreed to participate and understands they can discontinue the visit at any time.    Patient is aware this is a billable service.     Subjective  Beryl Cleary is a 49 y.o. female  .      HPI     Past Medical History:   Diagnosis Date   • Anxiety    • Depression    • Hiatal hernia 10/2023   • History of chickenpox    • Nasal polyps    • Rheumatoid arthritis (HCC)    • Sinusitis    • Sjoegren syndrome    • Thyroid nodule    • Wears glasses        Past Surgical History:   Procedure Laterality Date   • CHOLECYSTECTOMY     • EGD  10/2023   • NASAL/SINUS ENDOSCOPY N/A 06/30/2020    Procedure: IMAGED GUIDED F.E.S.S.;  Surgeon: Baljit Cassidy DO;  [facility];  Service: ENT   • DE SEPTOPLASTY/SUBMUCOUS RESECJ W/WO CARTILAGE GRF N/A 06/30/2020    Procedure:  SEPTOPLASTY;  Surgeon: Baljit Cassidy DO;  [facility];  Service: ENT   • US GUIDED THYROID BIOPSY  07/31/2017   • VENOUS ABLATION Left     lower extremity   • WISDOM TOOTH EXTRACTION         Current Outpatient Medications   Medication Sig Dispense Refill   • acetaminophen (TYLENOL) 500 mg tablet Take 1,000 mg by mouth every 6 (six) hours as needed for mild pain     • albuterol (2.5 mg/3 mL) 0.083 % nebulizer solution Take 3 mL (2.5 mg total) by nebulization every 6 (six) hours as needed for wheezing or shortness of breath 180 mL 5   • celecoxib (CeleBREX) 100 mg capsule Take 1 capsule (100 mg total) by mouth 2 (two) times a day 60 capsule 0   • Cholecalciferol (Vitamin D3) 25 MCG TABS Take by mouth     • estradiol (Vivelle-Dot) 0.0375 MG/24HR Place 1 patch on the skin 2 (two) times a week 24 patch 3   • FLUoxetine (PROzac) 10 mg capsule Take 1 capsule (10 mg total) by mouth daily X7 days then START Prozac 20mg po daily 7 capsule 0   • FLUoxetine (PROzac) 20 mg capsule Take 1 capsule (20 mg total) by mouth daily After finishing prozac 10mg x7 days 30 capsule 1   • fluticasone (FLONASE) 50 mcg/act nasal spray 1 spray into each nostril 2 (two) times a day 16 g 1   • gabapentin (Neurontin) 100 mg capsule Take 1 capsule (100 mg total) by mouth 3 (three) times a day As needed for numbness 90 capsule 6   • hydroxychloroquine (PLAQUENIL) 200 mg tablet Take 1 tablet (200 mg total) by mouth 2 (two) times a day 180 tablet 1   • hydrOXYzine pamoate (VISTARIL) 25 mg capsule Take 1 capsule (25 mg total) by mouth daily at bedtime as needed for anxiety 30 capsule 1   • Levocetirizine Dihydrochloride (XYZAL PO) Take by mouth     • levonorgestrel (MIRENA) 20 MCG/24HR IUD 1 each by Intrauterine route once     • MAGNESIUM CITRATE PO Take by mouth     • montelukast (SINGULAIR) 10 mg tablet Take 1 tablet (10 mg total) by mouth daily at bedtime 30 tablet 4   • Olopatadine HCl 0.6 % SOLN 2 actuation into each nostril daily 30.5 g  11   • omeprazole (PriLOSEC) 20 mg delayed release capsule Take 1 capsule (20 mg total) by mouth 2 (two) times a day 180 capsule 3   • predniSONE 10 mg tablet Five pills a day for 2 days, 4 pills a day for 2 days, 3 pills a day for 2 days, 2 pills a day for 2 days, 1 pill a day for 2 days (Patient not taking: Reported on 2/26/2024) 30 tablet 0   • valACYclovir (VALTREX) 1,000 mg tablet Take 2 tablets by mouth every 12 hours x 1 days 4 tablet 0     No current facility-administered medications for this visit.        Allergies   Allergen Reactions   • Doxycycline Hives   • Nsaids Wheezing and Nasal Congestion     ASPIRIN and NSAID exacerbated respiratory disease (AERD) see allergist note on 2/28/24.  Typically COX2 are tolerated.  Tylenol is tolerated.  See CELECOXIB challenge on 8/8/24 she tolerated it without triggering AERD.         Review of Systems    Video Exam    There were no vitals filed for this visit.    Physical Exam      Behavioral Health Psychotherapy Assessment    Date of Initial Psychotherapy Assessment: 08/13/24  Referral Source: self  Has a release of information been signed for the referral source? NA    Preferred Name: Beryl Cleary  Preferred Pronouns: She/her  YOB: 1975 Age: 49 y.o.  Sex assigned at birth: female   Gender Identity: female  Race:   Preferred Language: English    Emergency Contact:  Full Name: Rosa  Relationship to Client: daughter  Contact information: [phone]    Primary Care Physician:  JAMSHID Crespo  [address] [place] [address]  [phone]  Has a release of information been signed? No    Physical Health History:  Past surgical procedures: gallbladder removed  Do you have a history of any of the following: other RA   Do you have any mobility issues? No    Relevant Family History:  Brother (anxiety), children (anxiety), mother (depression, hx of alcoholism-later life onset), father (anxiety and undiagnosed mood  "disorder-\"scary\")  Strained relationship with mother     Presenting Problem (What brings you in?)  CT has worsening anxiety that interferes with sleep, focus during the day and having difficulty completing work (work time spreading into personal time)  CT saw psych and starting prozac and aterax. Also started HRT and able to sleep a little better in the past week.   CT reported that she will be getting  (elopement) in Sept.  CT reported that she has a good life with good things happening and every reason to be happy. CT feels as if she can not get on track.    Mental Health Advance Directive:  Do you currently have a Mental Health Advance Directive?no    Diagnosis:   Diagnosis ICD-10-CM Associated Orders   1. OZZIE (generalized anxiety disorder)  F41.1           Psych Initial Assessment    TH explained that she will not continue working with [facility] past this session. CT understood and desired starting her therapy process and will continue with next TH.    Visit start and stop times:    08/12/24  Start Time: 1006  Stop Time: 1100  Total Visit Time: 54 minutes    "

## 2024-08-19 ENCOUNTER — TELEMEDICINE (OUTPATIENT)
Dept: BEHAVIORAL/MENTAL HEALTH CLINIC | Facility: CLINIC | Age: 49
End: 2024-08-19
Payer: COMMERCIAL

## 2024-08-19 DIAGNOSIS — R45.89 DEPRESSED MOOD: ICD-10-CM

## 2024-08-19 DIAGNOSIS — F32.A ANXIETY AND DEPRESSION: ICD-10-CM

## 2024-08-19 DIAGNOSIS — F41.9 ANXIETY: Primary | ICD-10-CM

## 2024-08-19 DIAGNOSIS — F41.9 ANXIETY AND DEPRESSION: ICD-10-CM

## 2024-08-19 PROCEDURE — 90791 PSYCH DIAGNOSTIC EVALUATION: CPT | Performed by: COUNSELOR

## 2024-08-19 NOTE — BH CRISIS PLAN
Client Name: Beryl Cleary       Client YOB: 1975    JoseJarred Safety Plan      Creation Date: 8/19/24 Update Date: 8/19/24   Created By: Cindy Ballesteros Last Updated By: Cindy Ballesteros      Step 1: Warning Signs:   Warning Signs   I will go radio silent and withdraw.   I will not ask for help or show that I am having a hard time.            Step 2: Internal Coping Strategies:   Internal Coping Strategies   Go on my phone and watch videos   Go for a walk in nature   Call my daughter   Talk to my delfinae            Step 3: People and social settings that provide distraction:   Name Contact Information   Rosa Pierre [phone]   Reese/ Ken     Places   [place] in [place], [place]           Step 4: People whom I can ask for help during a crisis:      Name Contact Information    Rosa Pierre [phone]      Step 5: Professionals or agencies I can contact during a crisis:      Clinican/Agency Name Phone Emergency Contact    [facility] [place] [phone]       Local Emergency Department Emergency Department Phone Emergency Department Address    [facility] Emergency Dept [phone] [facility] [address] [place] [id number]        Crisis Phone Numbers:   Suicide Prevention Lifeline: Call or Text  628 Crisis Text Line: Text HOME to 800-347   Please note: Some [facility] do not have a separate number for Child/Adolescent specific crisis. If your county is not listed under Child/Adolescent, please call the adult number for your county      Adult Crisis Numbers: Child/Adolescent Crisis Numbers   [facility]: [phone] [facility]: [phone]   [facility]: [phone] [facility]: [phone]   [facility]: [phone] [place]: [phone]   [facility]: [phone] [place] [facility]: [phone]   [place] [facility]: [phone]   [facility]: [phone]   [facility]: [phone]   [facility]  Services: [phone] (daytime) [phone] (after hours, weekends, holidays)      Step 6: Making the environment safer (plan for lethal means safety):   Plan: Patient reported that she owns a firearm but that it is locked in a safe at her home.       Optional: What is most important to me and worth living for?   Patient reported that the most important people to her are her grandkids, children and fiancee.      Juani Safety Plan. Adelina Garcia and Jag Stern. Used with permission of the authors.

## 2024-08-19 NOTE — BH TREATMENT PLAN
Outpatient Behavioral Health Psychotherapy Treatment Plan    Beryl Pinedasupa  1975     Date of Initial Psychotherapy Assessment: 08/19/24   Date of Current Treatment Plan: 08/19/24  Treatment Plan Target Date: 08/19/24  Treatment Plan Expiration Date: 01/09/24    Diagnosis:   1. Anxiety        2. Depressed mood        3. Anxiety and depression            Area(s) of Need: Anxiety, depression, panic attacks    Long Term Goal 1 (in the client's own words): I would like to not have this overwhelming anxiety and learn how to focus so I can get things I need to do done.     Stage of Change: Contemplation    Target Date for completion: 08/19/24     Anticipated therapeutic modalities: CBT, MI, ACT     People identified to complete this goal: Beryl Chen, Cindy Ballesteros, PRAFUL      Objective 1: (identify the means of measuring success in meeting the objective): I would like to learn better coping skills for my anxiety and use them effectively to get past my anxiety/depression and panic attacks.      Objective 2: (identify the means of measuring success in meeting the objective): I would like to finish setting up my room by fixing the cushion and setting up the furniture.     Objective 3: (identify the means of measuring success in meeting the objective): I would like to learn how to focus my thoughts better so that I can get chores done.           I am currently under the care of a [facility] psychiatric provider: yes    My [facility] psychiatric provider is: Layo Gagnon     I am currently taking psychiatric medications: Yes, as prescribed    I feel that I will be ready for discharge from mental health care when I reach the following (measurable goal/objective): When my anxiety is more manageable and I am able to get out of the house without panic attacks.     For children and adults who have a legal guardian:   Has there been any change to custody orders and/or guardianship status? NA. If yes, attach updated  documentation.    I have created my Crisis Plan and have been offered a copy of this plan    Behavioral Health Treatment Plan [facility]: Diagnosis and Treatment Plan explained to Beryl Cleary acknowledges an understanding of their diagnosis. Beryl Cleary agrees to this treatment plan.    I have been offered a copy of this Treatment Plan. yes

## 2024-08-19 NOTE — PSYCH
" Behavioral Health Psychotherapy Assessment    Date of Initial Psychotherapy Assessment: 08/19/24  Referral Source: Self  Has a release of information been signed for the referral source? Yes    Preferred Name: Beryl Cleary  Preferred Pronouns: She/her  YOB: 1975 Age: 49 y.o.  Sex assigned at birth: female   Gender Identity: Female   Race:   Preferred Language: English    Emergency Contact:  Full Name: Rosa Pierre (Daughter)  Relationship to Client: Daughter  Contact information: [phone] (Mobile)       Primary Care Physician:  JAMSHID Crespo  [address] [facility] [address]  [phone]  Has a release of information been signed? Yes    Physical Health History:  Past surgical procedures: N/A  Do you have a history of any of the following: other Rheumatoid Arthritis and hiatal hernia  Do you have any mobility issues? No    Relevant Family History:  Patient describes a \"dysfunctional\" upbringing but denies trauma.  Patient reports that there is some substance use within her immediate family.  Patient reports that she was  for 24 years and .  Patient has 3 kids from this marriage.     Presenting Problem (What brings you in?)  My anxiety is so disabling that I cannot get out of the house to do things I want to do like go to the gym. I am having panic attacks.     Mental Health Advance Directive:  Do you currently have a Mental Health Advance Directive?no    Diagnosis:   Diagnosis ICD-10-CM Associated Orders   1. Anxiety  F41.9       2. Depressed mood  R45.89       3. Anxiety and depression  F41.9     F32.A           Psych Initial Assessment    Visit start and stop times:    08/19/24  Start Time: 1400  Stop Time: 1500  Total Visit Time: 60 minutes  "

## 2024-08-22 ENCOUNTER — TELEPHONE (OUTPATIENT)
Dept: PSYCHIATRY | Facility: CLINIC | Age: 49
End: 2024-08-22

## 2024-08-22 ENCOUNTER — OFFICE VISIT (OUTPATIENT)
Dept: INTERNAL MEDICINE CLINIC | Facility: CLINIC | Age: 49
End: 2024-08-22
Payer: COMMERCIAL

## 2024-08-22 VITALS
HEIGHT: 64 IN | DIASTOLIC BLOOD PRESSURE: 62 MMHG | HEART RATE: 74 BPM | SYSTOLIC BLOOD PRESSURE: 130 MMHG | WEIGHT: 216.2 LBS | BODY MASS INDEX: 36.91 KG/M2 | TEMPERATURE: 97.9 F | OXYGEN SATURATION: 95 %

## 2024-08-22 DIAGNOSIS — M35.00 SJOGREN'S SYNDROME, WITH UNSPECIFIED ORGAN INVOLVEMENT (HCC): ICD-10-CM

## 2024-08-22 DIAGNOSIS — J32.1 CHRONIC FRONTAL SINUSITIS: ICD-10-CM

## 2024-08-22 DIAGNOSIS — Z12.11 SCREENING FOR COLON CANCER: ICD-10-CM

## 2024-08-22 DIAGNOSIS — M06.4 INFLAMMATORY POLYARTHROPATHY (HCC): ICD-10-CM

## 2024-08-22 DIAGNOSIS — Z13.6 SCREENING FOR CARDIOVASCULAR CONDITION: ICD-10-CM

## 2024-08-22 DIAGNOSIS — R00.2 PALPITATIONS: ICD-10-CM

## 2024-08-22 DIAGNOSIS — E27.49 SECONDARY ADRENAL INSUFFICIENCY (HCC): ICD-10-CM

## 2024-08-22 DIAGNOSIS — Z13.1 SCREENING FOR DIABETES MELLITUS: ICD-10-CM

## 2024-08-22 DIAGNOSIS — Z00.00 ROUTINE ADULT HEALTH MAINTENANCE: Primary | ICD-10-CM

## 2024-08-22 PROCEDURE — 99396 PREV VISIT EST AGE 40-64: CPT | Performed by: NURSE PRACTITIONER

## 2024-08-22 NOTE — PROGRESS NOTES
Ambulatory Visit  Name: Beryl Cleary      : 1975      MRN: [id number]  Encounter Provider: JAMSHID Crespo  Encounter Date: 2024   Encounter department: [facility]    Assessment & Plan   1. Routine adult health maintenance  2. Secondary adrenal insufficiency (HCC)  -     Comprehensive metabolic panel; Future  -     CBC and differential; Future  -     TSH, 3rd generation with Free T4 reflex; Future  3. Chronic frontal sinusitis  -     Comprehensive metabolic panel; Future  -     CBC and differential; Future  -     TSH, 3rd generation with Free T4 reflex; Future  4. Inflammatory polyarthropathy (HCC)  -     Comprehensive metabolic panel; Future  -     CBC and differential; Future  -     TSH, 3rd generation with Free T4 reflex; Future  5. Sjogren's syndrome, with unspecified organ involvement (HCC)  -     Comprehensive metabolic panel; Future  -     CBC and differential; Future  -     TSH, 3rd generation with Free T4 reflex; Future  6. Screening for diabetes mellitus  -     Hemoglobin A1C  7. Screening for cardiovascular condition  -     Lipid panel; Future  8. Screening for colon cancer  -     Cologuard  9. Palpitations  -     Holter monitor; Future; Expected date: 2024    Will repeat fasting labs. Will send for a holter monitor and may need cardiology referral. Up to date on other screenings. Continues to see Rheumatology and ENT as needed. Will follow up after imaging.     Depression Screening and Follow-up Plan: Patient's depression screening was positive with a PHQ-9 score of 5. Patient declines further evaluation by mental health professional and/or medications. Brief counseling provided. Will re-evaluate at next office visit.       History of Present Illness     Miriam is for a wellness. She is doing well but is having episodes of her heart racing and palpitations. She states this happened around 2-3 times and she does have an Apple watch and her HR is  "going up into the 140s. She does bear down and it will drop but then goes back up. She did cut out caffeine. She is seeing GYN and was put on an estrogen patch and feels so much better on this. She states her anxiety has decreased and just over all feels better. She will need her labs done. She denies any chest pain or SOB. She is up to date on her dental and eye exams. She offers no other issues.        Review of Systems   Cardiovascular:  Positive for palpitations.   All other systems reviewed and are negative.      Objective     /62   Pulse 74   Temp 97.9 °F (36.6 °C)   Ht 5' 4\" (1.626 m)   Wt 98.1 kg (216 lb 3.2 oz)   SpO2 95%   BMI 37.11 kg/m²     Physical Exam  Vitals reviewed.   Constitutional:       Appearance: Normal appearance. She is obese.   HENT:      Head: Normocephalic and atraumatic.      Right Ear: Tympanic membrane, ear canal and external ear normal.      Left Ear: Tympanic membrane, ear canal and external ear normal.      Nose: Nose normal.      Mouth/Throat:      Mouth: Mucous membranes are moist.      Pharynx: Oropharynx is clear.   Eyes:      Extraocular Movements: Extraocular movements intact.      Conjunctiva/sclera: Conjunctivae normal.      Pupils: Pupils are equal, round, and reactive to light.   Cardiovascular:      Rate and Rhythm: Normal rate and regular rhythm.      Pulses: Normal pulses.      Heart sounds: Normal heart sounds.   Pulmonary:      Effort: Pulmonary effort is normal.      Breath sounds: Normal breath sounds.   Abdominal:      General: Abdomen is flat. Bowel sounds are normal.      Palpations: Abdomen is soft.   Musculoskeletal:         General: Normal range of motion.      Cervical back: Normal range of motion and neck supple.   Skin:     General: Skin is warm and dry.      Capillary Refill: Capillary refill takes less than 2 seconds.   Neurological:      General: No focal deficit present.      Mental Status: She is alert and oriented to person, place, and " time. Mental status is at baseline.   Psychiatric:         Mood and Affect: Mood normal.         Behavior: Behavior normal.         Thought Content: Thought content normal.         Judgment: Judgment normal.       Administrative Statements

## 2024-08-22 NOTE — TELEPHONE ENCOUNTER
Writer left voicemail that he decided to call even though appointment 8/26/24 would NOT have to be rescheduled.     Writer let client know she will be seen virtually on Monday and then the provider and her will discuss rescheduling the follow ups at a new time.

## 2024-08-25 ENCOUNTER — OFFICE VISIT (OUTPATIENT)
Dept: URGENT CARE | Facility: CLINIC | Age: 49
End: 2024-08-25
Payer: COMMERCIAL

## 2024-08-25 VITALS
DIASTOLIC BLOOD PRESSURE: 66 MMHG | OXYGEN SATURATION: 95 % | TEMPERATURE: 97.9 F | HEART RATE: 91 BPM | SYSTOLIC BLOOD PRESSURE: 115 MMHG | BODY MASS INDEX: 36.88 KG/M2 | WEIGHT: 216 LBS | HEIGHT: 64 IN | RESPIRATION RATE: 16 BRPM

## 2024-08-25 DIAGNOSIS — H18.822 CORNEAL ABRASION DUE TO CONTACT LENS, LEFT: Primary | ICD-10-CM

## 2024-08-25 PROCEDURE — 99213 OFFICE O/P EST LOW 20 MIN: CPT | Performed by: STUDENT IN AN ORGANIZED HEALTH CARE EDUCATION/TRAINING PROGRAM

## 2024-08-25 RX ORDER — CIPROFLOXACIN HYDROCHLORIDE 3.5 MG/ML
2 SOLUTION/ DROPS TOPICAL 4 TIMES DAILY
Qty: 5 ML | Refills: 0 | Status: SHIPPED | OUTPATIENT
Start: 2024-08-25 | End: 2024-08-30

## 2024-08-25 NOTE — PROGRESS NOTES
[facility]        NAME: Beryl Cleary is a 49 y.o. adult  : 1975    MRN: [id number]  DATE: 2024  TIME: 10:29 AM    Assessment and Plan   Corneal abrasion due to contact lens, left [H18.822]  1. Corneal abrasion due to contact lens, left  ciprofloxacin (CILOXAN) 0.3 % ophthalmic solution            Patient Instructions     Follow up with eye care provider to have your eye rechecked.     DO NOT wear your contact lenses until your eye is healed (approximately 2 weeks) or until your eye care provider permits  Follow up with PCP in 3-5 days.  Proceed to  ER if symptoms worsen.    If tests are performed, our office will contact you with results only if changes need to made to the care plan discussed with you at the visit. You can review your full results on [facility].    Chief Complaint     Chief Complaint   Patient presents with    Eye Pain     Per patient last night while removing her contact and makeup she irritated her left eye. Itgot worse overnight.          History of Present Illness       49-year-old female with complex medical history presents with complaint of left eye pain.  She states while removing her contact lenses and make up last night she developed left eye irritation.  The irritation got worse overnight.        Review of Systems   Review of Systems   Eyes:  Positive for pain and redness. Negative for photophobia and visual disturbance.         Current Medications       Current Outpatient Medications:     acetaminophen (TYLENOL) 500 mg tablet, Take 1,000 mg by mouth every 6 (six) hours as needed for mild pain, Disp: , Rfl:     albuterol (2.5 mg/3 mL) 0.083 % nebulizer solution, Take 3 mL (2.5 mg total) by nebulization every 6 (six) hours as needed for wheezing or shortness of breath, Disp: 180 mL, Rfl: 5    celecoxib (CeleBREX) 100 mg capsule, Take 1 capsule (100 mg total) by mouth 2 (two) times a day, Disp: 60 capsule, Rfl: 0    Cholecalciferol (Vitamin D3) 25  MCG TABS, Take by mouth, Disp: , Rfl:     ciprofloxacin (CILOXAN) 0.3 % ophthalmic solution, Administer 2 drops into the left eye 4 (four) times a day for 5 days, Disp: 5 mL, Rfl: 0    estradiol (Vivelle-Dot) 0.0375 MG/24HR, Place 1 patch on the skin 2 (two) times a week, Disp: 24 patch, Rfl: 3    FLUoxetine (PROzac) 20 mg capsule, Take 1 capsule (20 mg total) by mouth daily After finishing prozac 10mg x7 days, Disp: 30 capsule, Rfl: 1    fluticasone (FLONASE) 50 mcg/act nasal spray, 1 spray into each nostril 2 (two) times a day, Disp: 16 g, Rfl: 1    gabapentin (Neurontin) 100 mg capsule, Take 1 capsule (100 mg total) by mouth 3 (three) times a day As needed for numbness, Disp: 90 capsule, Rfl: 6    hydrOXYzine pamoate (VISTARIL) 25 mg capsule, Take 1 capsule (25 mg total) by mouth daily at bedtime as needed for anxiety, Disp: 30 capsule, Rfl: 1    Levocetirizine Dihydrochloride (XYZAL PO), Take by mouth, Disp: , Rfl:     levonorgestrel (MIRENA) 20 MCG/24HR IUD, 1 each by Intrauterine route once, Disp: , Rfl:     MAGNESIUM CITRATE PO, Take by mouth, Disp: , Rfl:     montelukast (SINGULAIR) 10 mg tablet, Take 1 tablet (10 mg total) by mouth daily at bedtime, Disp: 30 tablet, Rfl: 4    omeprazole (PriLOSEC) 20 mg delayed release capsule, Take 1 capsule (20 mg total) by mouth 2 (two) times a day, Disp: 180 capsule, Rfl: 3    valACYclovir (VALTREX) 1,000 mg tablet, Take 2 tablets by mouth every 12 hours x 1 days, Disp: 4 tablet, Rfl: 0    hydroxychloroquine (PLAQUENIL) 200 mg tablet, Take 1 tablet (200 mg total) by mouth 2 (two) times a day, Disp: 180 tablet, Rfl: 1    Olopatadine HCl 0.6 % SOLN, 2 actuation into each nostril daily, Disp: 30.5 g, Rfl: 11    Current Allergies     Allergies as of 08/25/2024 - Reviewed 08/25/2024   Allergen Reaction Noted    Doxycycline Hives 06/06/2022    Nsaids Wheezing and Nasal Congestion 02/29/2024            The following portions of the patient's history were reviewed and updated  "as appropriate: allergies, current medications, past family history, past medical history, past social history, past surgical history and problem list.     Past Medical History:   Diagnosis Date    Anxiety     Depression     Hiatal hernia 10/2023    History of chickenpox     Nasal polyps     Rheumatoid arthritis (HCC)     Sinusitis     Sjoegren syndrome     Thyroid nodule     Wears glasses        Past Surgical History:   Procedure Laterality Date    CHOLECYSTECTOMY      EGD  10/2023    NASAL/SINUS ENDOSCOPY N/A 06/30/2020    Procedure: IMAGED GUIDED F.E.S.S.;  Surgeon: Baljit Cassidy DO;  Location: [facility];  Service: ENT    NH SEPTOPLASTY/SUBMUCOUS RESECJ W/WO CARTILAGE GRF N/A 06/30/2020    Procedure: SEPTOPLASTY;  Surgeon: Baljit Cassidy DO;  Location: [facility];  Service: ENT    US GUIDED THYROID BIOPSY  07/31/2017    VENOUS ABLATION Left     lower extremity    WISDOM TOOTH EXTRACTION         Family History   Problem Relation Age of Onset    Breast cancer Mother 61    Rheum arthritis Mother     Sjogren's syndrome Mother     Liver cancer Mother     Esophageal cancer Father     Diabetes Father     No Known Problems Brother     No Known Problems Brother     No Known Problems Maternal Grandmother     No Known Problems Paternal Grandmother     No Known Problems Daughter     No Known Problems Son     No Known Problems Son     No Known Problems Maternal Aunt     No Known Problems Maternal Aunt     No Known Problems Maternal Aunt     Stroke Other     Heart disease Neg Hx          Medications have been verified.        Objective   /66 (BP Location: Left arm, Patient Position: Sitting)   Pulse 91   Temp 97.9 °F (36.6 °C) (Skin)   Resp 16   Ht 5' 4\" (1.626 m)   Wt 98 kg (216 lb)   SpO2 95%   BMI 37.08 kg/m²        Physical Exam     Physical Exam  Vitals and nursing note reviewed.   Constitutional:       Appearance: Normal appearance.   HENT:      Head: Normocephalic and atraumatic.      Right Ear: External " ear normal.      Left Ear: External ear normal.   Eyes:      General: Lids are normal. Lids are everted, no foreign bodies appreciated. Vision grossly intact. Gaze aligned appropriately. No visual field deficit.     Extraocular Movements: Extraocular movements intact.      Conjunctiva/sclera:      Left eye: Left conjunctiva is injected. No exudate.     Pupils: Pupils are equal, round, and reactive to light.      Left eye: Corneal abrasion present.        Comments: Corneal abrasion noted below pupil in a horizontal line presentation   Cardiovascular:      Rate and Rhythm: Normal rate and regular rhythm.      Pulses: Normal pulses.      Heart sounds: Normal heart sounds.   Pulmonary:      Effort: Pulmonary effort is normal.      Breath sounds: Normal breath sounds.   Skin:     General: Skin is warm and dry.      Capillary Refill: Capillary refill takes less than 2 seconds.   Neurological:      General: No focal deficit present.      Mental Status: She is alert and oriented to person, place, and time.         After patient consent was obtained, 1 drop of tetracaine and fluorescein stain was administered into L eye. Direct visualization was achieved with UV light with noted abrasion. L eyelids were everted without evidence of foreign body. L eye was irrigated with saline solution. Patient tolerated procedure without incident.

## 2024-08-25 NOTE — PATIENT INSTRUCTIONS
Follow up with eye care provider to have your eye rechecked.     DO NOT wear your contact lenses until your eye is healed (approximately 2 weeks) or until your eye care provider permits   no

## 2024-08-26 ENCOUNTER — TELEMEDICINE (OUTPATIENT)
Dept: BEHAVIORAL/MENTAL HEALTH CLINIC | Facility: CLINIC | Age: 49
End: 2024-08-26
Payer: COMMERCIAL

## 2024-08-26 DIAGNOSIS — F41.9 ANXIETY AND DEPRESSION: Primary | ICD-10-CM

## 2024-08-26 DIAGNOSIS — F32.A ANXIETY AND DEPRESSION: Primary | ICD-10-CM

## 2024-08-26 PROCEDURE — 90837 PSYTX W PT 60 MINUTES: CPT | Performed by: COUNSELOR

## 2024-08-26 NOTE — PSYCH
"Behavioral Health Psychotherapy Progress Note    Psychotherapy Provided: Individual Psychotherapy     1. Anxiety and depression            Goals addressed in session: Goal 1     DATA: Patient met with this therapist for an individual therapy session virtually.  Patient reported to have some success with fixing up the room in her house.  Patient and therapist continue to develop rapport and patient shared more family dynamics and boundary challenges.  Patients daughter is having surgery and this is causing her anxiety and she was able to process on these thoughts.   During this session, this clinician used the following therapeutic modalities: Cognitive Behavioral Therapy and Supportive Psychotherapy    Substance Abuse was not addressed during this session. If the client is diagnosed with a co-occurring substance use disorder, please indicate any changes in the frequency or amount of use: N/A. Stage of change for addressing substance use diagnoses: No substance use/Not applicable    ASSESSMENT:  Beryl Cleary presents with a Euthymic/ normal mood.     her affect is Normal range and intensity, which is congruent, with her mood and the content of the session. The client has made progress on their goals.    Patient reported that she was able to clean and set up her spare room. Patient felt really good to be able to use room now.  Beryl Cleary presents with a none risk of suicide, none risk of self-harm, and none risk of harm to others.    For any risk assessment that surpasses a \"low\" rating, a safety plan must be developed.    A safety plan was indicated: no  If yes, describe in detail N/A    PLAN: Between sessions, Beryl Cleary will consider what it would be like to set boundaries around helping her family financially. At the next session, the therapist will use Cognitive Behavioral Therapy and Supportive Psychotherapy to address anxiety and depressive symptoms.    Behavioral Health Treatment Plan and " Discharge Planning: Beryl Cleary is aware of and agrees to continue to work on their treatment plan. They have identified and are working toward their discharge goals. yes    Visit start and stop times:    08/26/24  Start Time: 1502  Stop Time: 1558  Total Visit Time: 56 minutes

## 2024-08-27 ENCOUNTER — APPOINTMENT (OUTPATIENT)
Dept: LAB | Facility: CLINIC | Age: 49
End: 2024-08-27
Payer: COMMERCIAL

## 2024-08-27 DIAGNOSIS — M35.00 SJOGREN'S SYNDROME, WITH UNSPECIFIED ORGAN INVOLVEMENT (HCC): ICD-10-CM

## 2024-08-27 DIAGNOSIS — J32.1 CHRONIC FRONTAL SINUSITIS: ICD-10-CM

## 2024-08-27 DIAGNOSIS — M06.4 INFLAMMATORY POLYARTHROPATHY (HCC): ICD-10-CM

## 2024-08-27 DIAGNOSIS — Z00.8 HEALTH EXAMINATION IN POPULATION SURVEY: ICD-10-CM

## 2024-08-27 DIAGNOSIS — R00.2 PALPITATIONS: ICD-10-CM

## 2024-08-27 DIAGNOSIS — E27.49 SECONDARY ADRENAL INSUFFICIENCY (HCC): ICD-10-CM

## 2024-08-27 LAB
ALBUMIN SERPL BCG-MCNC: 4.1 G/DL (ref 3.5–5)
ALP SERPL-CCNC: 67 U/L (ref 34–104)
ALT SERPL W P-5'-P-CCNC: 15 U/L (ref 7–52)
ANION GAP SERPL CALCULATED.3IONS-SCNC: 11 MMOL/L (ref 4–13)
AST SERPL W P-5'-P-CCNC: 14 U/L (ref 5–45)
BASOPHILS # BLD AUTO: 0.06 THOUSANDS/ÂΜL (ref 0–0.1)
BASOPHILS NFR BLD AUTO: 1 % (ref 0–1)
BILIRUB SERPL-MCNC: 0.53 MG/DL (ref 0.2–1)
BUN SERPL-MCNC: 15 MG/DL (ref 5–25)
CALCIUM SERPL-MCNC: 9 MG/DL (ref 8.4–10.2)
CHLORIDE SERPL-SCNC: 102 MMOL/L (ref 96–108)
CHOLEST SERPL-MCNC: 186 MG/DL
CO2 SERPL-SCNC: 24 MMOL/L (ref 21–32)
CREAT SERPL-MCNC: 0.7 MG/DL (ref 0.6–1.3)
CRP SERPL QL: 1.5 MG/L
EOSINOPHIL # BLD AUTO: 0.24 THOUSAND/ÂΜL (ref 0–0.61)
EOSINOPHIL NFR BLD AUTO: 2 % (ref 0–6)
ERYTHROCYTE [DISTWIDTH] IN BLOOD BY AUTOMATED COUNT: 12.1 % (ref 11.6–15.1)
ERYTHROCYTE [SEDIMENTATION RATE] IN BLOOD: 25 MM/HOUR (ref 0–14)
EST. AVERAGE GLUCOSE BLD GHB EST-MCNC: 114 MG/DL
FERRITIN SERPL-MCNC: 29 NG/ML (ref 24–307)
GLUCOSE SERPL-MCNC: 89 MG/DL (ref 65–140)
HBA1C MFR BLD: 5.6 %
HCT VFR BLD AUTO: 42.3 % (ref 36.5–46.1)
HDLC SERPL-MCNC: 41 MG/DL
HGB BLD-MCNC: 13.6 G/DL (ref 12–15.4)
IMM GRANULOCYTES # BLD AUTO: 0.03 THOUSAND/UL (ref 0–0.2)
IMM GRANULOCYTES NFR BLD AUTO: 0 % (ref 0–2)
IRON SATN MFR SERPL: 27 % (ref 15–50)
IRON SERPL-MCNC: 107 UG/DL (ref 50–212)
LDLC SERPL CALC-MCNC: 111 MG/DL (ref 0–100)
LYMPHOCYTES # BLD AUTO: 2.31 THOUSANDS/ÂΜL (ref 0.6–4.47)
LYMPHOCYTES NFR BLD AUTO: 23 % (ref 14–44)
MCH RBC QN AUTO: 30.2 PG (ref 26.8–34.3)
MCHC RBC AUTO-ENTMCNC: 32.2 G/DL (ref 31.4–37.4)
MCV RBC AUTO: 94 FL (ref 82–98)
MONOCYTES # BLD AUTO: 0.77 THOUSAND/ÂΜL (ref 0.17–1.22)
MONOCYTES NFR BLD AUTO: 8 % (ref 4–12)
NEUTROPHILS # BLD AUTO: 6.64 THOUSANDS/ÂΜL (ref 1.85–7.62)
NEUTS SEG NFR BLD AUTO: 66 % (ref 43–75)
NONHDLC SERPL-MCNC: 145 MG/DL
NRBC BLD AUTO-RTO: 0 /100 WBCS
PLATELET # BLD AUTO: 359 THOUSANDS/UL (ref 149–390)
PMV BLD AUTO: 12 FL (ref 8.9–12.7)
POTASSIUM SERPL-SCNC: 3.9 MMOL/L (ref 3.5–5.3)
PROT SERPL-MCNC: 7.6 G/DL (ref 6.4–8.4)
RBC # BLD AUTO: 4.51 MILLION/UL (ref 3.88–5.12)
SODIUM SERPL-SCNC: 137 MMOL/L (ref 135–147)
TIBC SERPL-MCNC: 397 UG/DL (ref 250–450)
TRIGL SERPL-MCNC: 169 MG/DL
TSH SERPL DL<=0.05 MIU/L-ACNC: 2.1 UIU/ML (ref 0.45–4.5)
UIBC SERPL-MCNC: 290 UG/DL (ref 155–355)
WBC # BLD AUTO: 10.05 THOUSAND/UL (ref 4.31–10.16)

## 2024-08-27 PROCEDURE — 83036 HEMOGLOBIN GLYCOSYLATED A1C: CPT | Performed by: NURSE PRACTITIONER

## 2024-08-27 PROCEDURE — 80061 LIPID PANEL: CPT

## 2024-08-27 PROCEDURE — 83550 IRON BINDING TEST: CPT

## 2024-08-27 PROCEDURE — 82728 ASSAY OF FERRITIN: CPT

## 2024-08-27 PROCEDURE — 80053 COMPREHEN METABOLIC PANEL: CPT

## 2024-08-27 PROCEDURE — 85025 COMPLETE CBC W/AUTO DIFF WBC: CPT

## 2024-08-27 PROCEDURE — 84443 ASSAY THYROID STIM HORMONE: CPT

## 2024-08-27 PROCEDURE — 83540 ASSAY OF IRON: CPT

## 2024-08-28 LAB — TOTAL IGE SMQN RAST: 258 KU/L (ref 0–113)

## 2024-08-30 ENCOUNTER — DOCUMENTATION (OUTPATIENT)
Dept: PSYCHIATRY | Facility: CLINIC | Age: 49
End: 2024-08-30

## 2024-08-30 NOTE — PROGRESS NOTES
TRANSFER SUMMARY    [facility]    Patient Name Beryl Cleary     Date of Birth: 49 y.o. 1975      MRN: [id number]    Admission Date:  08/12/24    Date of Transfer: August 30, 2024    Admission Diagnosis:     Generalized Anxiety Disorder    Current Diagnosis:     No diagnosis found.    Reason for Admission: Beryl presented for treatment due to anxiety symptoms. Primary complaints included ANXIETY SYMPTOMS: daily anxiety symptoms.     Progress in Treatment: Beryl was seen for Medication Management with Psychotherapy. During the course of treatment she participated in therapy.    Episodes of Higher Level of Care: No    Transfer request Initiated by: Psychiatrist: None Therapist:  Emily Courtney    Reason for Transfer Request: clinician request due to clinician leaving practice    Does this individual need a clinician with specialized training/expertise?: No    Is this client working with any other [facility] Providers/Therapists? Psychiatrist:  Layo Gagnon  Therapist: None    Other pertinent issues: None    Are there any specific individuals who would be a “best fit” or who have already agreed to accept this transfer request?      Psychiatrist: None   Therapist: None  Rationale: Not Applicable    Attempts to maintain the current therapeutic relationship: Yes, clinician leaving practice    Transfer request routed to Clinical Coordinator for input and/or approval.     Comments from other involved providers and/or clinical coordinator : None    Emily Courtney, MING08/30/24

## 2024-09-04 ENCOUNTER — OFFICE VISIT (OUTPATIENT)
Dept: RHEUMATOLOGY | Facility: CLINIC | Age: 49
End: 2024-09-04
Payer: COMMERCIAL

## 2024-09-04 VITALS
HEART RATE: 78 BPM | OXYGEN SATURATION: 95 % | WEIGHT: 217 LBS | DIASTOLIC BLOOD PRESSURE: 70 MMHG | HEIGHT: 64 IN | BODY MASS INDEX: 37.05 KG/M2 | SYSTOLIC BLOOD PRESSURE: 112 MMHG

## 2024-09-04 DIAGNOSIS — M05.79 RHEUMATOID ARTHRITIS INVOLVING MULTIPLE SITES WITH POSITIVE RHEUMATOID FACTOR (HCC): Primary | ICD-10-CM

## 2024-09-04 DIAGNOSIS — Z79.899 HIGH RISK MEDICATION USE: ICD-10-CM

## 2024-09-04 PROCEDURE — 99214 OFFICE O/P EST MOD 30 MIN: CPT | Performed by: INTERNAL MEDICINE

## 2024-09-04 RX ORDER — HYDROXYCHLOROQUINE SULFATE 200 MG/1
200 TABLET, FILM COATED ORAL 2 TIMES DAILY
Qty: 180 TABLET | Refills: 2 | Status: SHIPPED | OUTPATIENT
Start: 2024-09-04 | End: 2024-09-04

## 2024-09-04 RX ORDER — PREDNISONE 5 MG/1
TABLET ORAL
Qty: 21 TABLET | Refills: 0 | Status: SHIPPED | OUTPATIENT
Start: 2024-09-04 | End: 2024-09-18

## 2024-09-04 RX ORDER — HYDROXYCHLOROQUINE SULFATE 200 MG/1
200 TABLET, FILM COATED ORAL 2 TIMES DAILY
Qty: 180 TABLET | Refills: 2 | Status: SHIPPED | OUTPATIENT
Start: 2024-09-04 | End: 2025-06-01

## 2024-09-04 RX ORDER — PREDNISONE 5 MG/1
TABLET ORAL
Qty: 21 TABLET | Refills: 0 | Status: SHIPPED | OUTPATIENT
Start: 2024-09-04 | End: 2024-09-04

## 2024-09-04 NOTE — PATIENT INSTRUCTIONS
Continue hydroxychloroquine twice a day; continue regular eye exams  Continue over the counter tylenol as needed  Can continue gabapentin up to 3 times a day as needed for sciatica pain  Prednisone taper prescribed     Return to clinic in 6 months

## 2024-09-04 NOTE — PROGRESS NOTES
RHEUMATOLOGY FOLLOW-UP NOTE    Assessment and Plan:   Beryl Cleary is a 49 y.o.  adult who presents for follow-up of seropositive Rheumatoid arthritis, which is overall stable. Is in a flare currently. Patient's rheumatologic disease(s) threaten long-term function if not appropriately treated.    Assessment & Plan    1. Rheumatoid arthritis.  Symptoms suggest a mild arthritis flare-up, with pain primarily in the left hand and neck. Her inflammatory markers are not significantly elevated. A prednisone taper has been prescribed, starting with 10 mg for one week, followed by 5 mg for the subsequent week. She is advised to continue her gabapentin regimen as needed. A refill of hydroxychloroquine has also been provided. If the prednisone taper proves ineffective, a stronger dose may be considered.    2. Left radial wrist tenderness.  Examination revealed tenderness in the left radial wrist. This is likely related to her arthritis flare-up. She is advised to use Tylenol as needed for pain relief.    3. Left second DIP tenderness.  Tenderness was noted in the left second DIP joint, likely due to arthritis. She is advised to continue using Tylenol for pain management.    4. Allergic asthma.  She manages her allergic asthma with Singulair and albuterol as needed. She reports no recent respiratory issues since stopping ibuprofen. No changes to her current asthma management plan are necessary.    5. Perimenopausal symptoms.  She reports significant improvement in perimenopausal symptoms, including severe fatigue and mood swings, since starting estradiol. She is advised to continue with the estradiol patch.      Continue hydroxychloroquine twice a day; continue regular eye exams  Continue over the counter tylenol as needed  Can continue gabapentin up to 3 times a day as needed for sciatica pain  Prednisone taper prescribed     Return to clinic in 6 months      Plan:  Diagnoses and all orders for this visit:    Rheumatoid  arthritis involving multiple sites with positive rheumatoid factor (HCC)  -     Discontinue: hydroxychloroquine (PLAQUENIL) 200 mg tablet; Take 1 tablet (200 mg total) by mouth 2 (two) times a day  -     Discontinue: predniSONE 5 mg tablet; Take 2 tablets (10 mg total) by mouth daily for 7 days, THEN 1 tablet (5 mg total) daily for 7 days. 4 tabs x7 days, then 3 tabs x7 days, then 2 tabs x7 days, then 1 tab x7 days, then stop..  -     predniSONE 5 mg tablet; Take 2 tablets (10 mg total) by mouth daily for 7 days, THEN 1 tablet (5 mg total) daily for 7 days.  -     hydroxychloroquine (PLAQUENIL) 200 mg tablet; Take 1 tablet (200 mg total) by mouth 2 (two) times a day    High risk medication use    High risk medication use - Benefits and risks of hydroxychloroquine, including but not limited to retinal toxicity, corneal deposits, gastrointestinal side effects, and headaches were discussed with the patient. The need for a regular eye exam to monitor for ocular toxicity while on this medication was also explained to the patient.     Follow-up plan: RTC in 6 months         Rheumatic Disease Summary      Chief Complaint  No chief complaint on file.      NIEVES Cleary is a 49 y.o.  adult who presents for follow-up of seropositive Rheumatoid arthritis.    History of Present Illness  The patient is a 49-year-old female who presents for a follow-up visit.    Two weeks ago, she experienced sudden itching in her hands and neck while walking in a store. The neck discomfort has since resolved, but she continues to experience pain in her left hand, particularly in the small joints. The pain, which she rates as a 5 out of 10, was more severe last week and is triggered when she picks up objects. She also reports tenderness in her fingers. She is seeking a prednisone taper due to an upcoming trip.    She underwent physical therapy from February to May 2024 for right elbow and shoulder impingement, left arm issues, and biceps  tendinitis. She also had left sciatica and right hip pain, which have improved with gabapentin and physical therapy. She has not used Voltaren gel as advised by her allergist, Dr. Barfield, due to an allergy to NSAIDs. She was prescribed Celebrex but experienced severe nausea and dizziness after taking it.    She has allergic asthma, which is exacerbated by dust, smoke, and colds, and causes sinus issues and coughing. She takes Singulair for this and uses an albuterol nebulizer for severe symptoms. She has not had respiratory issues since stopping ibuprofen. She had surgery for a deviated septum and polyps, which have since recurred.    She occasionally experiences neck pain, which she manages with Tylenol before bed. She reports no rashes.    She is currently perimenopausal and has been prescribed estradiol patches by her gynecologist, which have improved her sleep and anxiety.    ALLERGIES  She is allergic to NSAIDs.    The following portions of the patient's history were reviewed and updated as appropriate: allergies, current medications, past family history, past medical history, past social history, past surgical history and problem list.    Review of Systems:   See HPI    Home Medications:    Current Outpatient Medications:     hydroxychloroquine (PLAQUENIL) 200 mg tablet, Take 1 tablet (200 mg total) by mouth 2 (two) times a day, Disp: 180 tablet, Rfl: 2    predniSONE 5 mg tablet, Take 2 tablets (10 mg total) by mouth daily for 7 days, THEN 1 tablet (5 mg total) daily for 7 days., Disp: 21 tablet, Rfl: 0    acetaminophen (TYLENOL) 500 mg tablet, Take 1,000 mg by mouth every 6 (six) hours as needed for mild pain, Disp: , Rfl:     albuterol (2.5 mg/3 mL) 0.083 % nebulizer solution, Take 3 mL (2.5 mg total) by nebulization every 6 (six) hours as needed for wheezing or shortness of breath, Disp: 180 mL, Rfl: 5    celecoxib (CeleBREX) 100 mg capsule, Take 1 capsule (100 mg total) by mouth 2 (two) times a day, Disp:  "60 capsule, Rfl: 0    Cholecalciferol (Vitamin D3) 25 MCG TABS, Take by mouth, Disp: , Rfl:     estradiol (Vivelle-Dot) 0.0375 MG/24HR, Place 1 patch on the skin 2 (two) times a week, Disp: 24 patch, Rfl: 3    FLUoxetine (PROzac) 20 mg capsule, Take 1 capsule (20 mg total) by mouth daily After finishing prozac 10mg x7 days, Disp: 30 capsule, Rfl: 1    fluticasone (FLONASE) 50 mcg/act nasal spray, 1 spray into each nostril 2 (two) times a day, Disp: 16 g, Rfl: 1    gabapentin (Neurontin) 100 mg capsule, Take 1 capsule (100 mg total) by mouth 3 (three) times a day As needed for numbness, Disp: 90 capsule, Rfl: 6    hydrOXYzine pamoate (VISTARIL) 25 mg capsule, Take 1 capsule (25 mg total) by mouth daily at bedtime as needed for anxiety, Disp: 30 capsule, Rfl: 1    Levocetirizine Dihydrochloride (XYZAL PO), Take by mouth, Disp: , Rfl:     levonorgestrel (MIRENA) 20 MCG/24HR IUD, 1 each by Intrauterine route once, Disp: , Rfl:     MAGNESIUM CITRATE PO, Take by mouth, Disp: , Rfl:     montelukast (SINGULAIR) 10 mg tablet, Take 1 tablet (10 mg total) by mouth daily at bedtime, Disp: 30 tablet, Rfl: 4    Olopatadine HCl 0.6 % SOLN, 2 actuation into each nostril daily, Disp: 30.5 g, Rfl: 11    omeprazole (PriLOSEC) 20 mg delayed release capsule, Take 1 capsule (20 mg total) by mouth 2 (two) times a day, Disp: 180 capsule, Rfl: 3    valACYclovir (VALTREX) 1,000 mg tablet, Take 2 tablets by mouth every 12 hours x 1 days, Disp: 4 tablet, Rfl: 0    Objective:    Vitals:    09/04/24 1558   BP: 112/70   Pulse: 78   SpO2: 95%   Weight: 98.4 kg (217 lb)   Height: 5' 4\" (1.626 m)       Physical Exam  Constitutional:       General: She is not in acute distress.  HENT:      Head: Normocephalic and atraumatic.   Eyes:      Conjunctiva/sclera: Conjunctivae normal.   Cardiovascular:      Rate and Rhythm: Normal rate and regular rhythm.      Heart sounds: S1 normal and S2 normal.      No friction rub.   Pulmonary:      Effort: Pulmonary " effort is normal. No respiratory distress.      Breath sounds: Normal breath sounds. No wheezing, rhonchi or rales.   Musculoskeletal:         General: Tenderness present.      Cervical back: Neck supple.   Skin:     Coloration: Skin is not pale.   Neurological:      Mental Status: She is alert. Mental status is at baseline.   Psychiatric:         Mood and Affect: Mood normal.         Behavior: Behavior normal.       Physical Exam  Tenderness noted in the left radial wrist and left second DIP.    Reviewed labs and imaging.    Imaging:   No results found.    Labs:   Appointment on 08/27/2024   Component Date Value Ref Range Status    Cholesterol 08/27/2024 186  See Comment mg/dL Final    Cholesterol:         Pediatric <18 Years        Desirable          <170 mg/dL      Borderline High    170-199 mg/dL      High               >=200 mg/dL        Adult >=18 Years            Desirable         <200 mg/dL      Borderline High   200-239 mg/dL      High              >239 mg/dL      Triglycerides 08/27/2024 169 (H)  See Comment mg/dL Final    Triglyceride:     0-9Y            <75mg/dL     10Y-17Y         <90 mg/dL       >=18Y     Normal          <150 mg/dL     Borderline High 150-199 mg/dL     High            200-499 mg/dL        Very High       >499 mg/dL    Specimen collection should occur prior to Metamizole administration due to the potential for falsely depressed results.    HDL, Direct 08/27/2024 41  >=40 mg/dL Final    LDL Calculated 08/27/2024 111 (H)  0 - 100 mg/dL Final    LDL Cholesterol:     Optimal           <100 mg/dl     Near Optimal      100-129 mg/dl     Above Optimal       Borderline High 130-159 mg/dl       High            160-189 mg/dl       Very High       >189 mg/dl         This screening LDL is a calculated result.   It does not have the accuracy of the Direct Measured LDL in the monitoring of patients with hyperlipidemia and/or statin therapy.   Direct Measure LDL (MLA548) must be ordered separately in  these patients.    Non-HDL-Chol (CHOL-HDL) 08/27/2024 145  mg/dl Final   Appointment on 08/27/2024   Component Date Value Ref Range Status    Sodium 08/27/2024 137  135 - 147 mmol/L Final    Potassium 08/27/2024 3.9  3.5 - 5.3 mmol/L Final    Chloride 08/27/2024 102  96 - 108 mmol/L Final    CO2 08/27/2024 24  21 - 32 mmol/L Final    ANION GAP 08/27/2024 11  4 - 13 mmol/L Final    BUN 08/27/2024 15  5 - 25 mg/dL Final    Creatinine 08/27/2024 0.70  0.60 - 1.30 mg/dL Final    Standardized to IDMS reference method    Glucose 08/27/2024 89  65 - 140 mg/dL Final    If the patient is fasting, the ADA then defines impaired fasting glucose as > 100 mg/dL and diabetes as > or equal to 123 mg/dL.    Calcium 08/27/2024 9.0  8.4 - 10.2 mg/dL Final    AST 08/27/2024 14  5 - 45 U/L Final    ALT 08/27/2024 15  7 - 52 U/L Final    Specimen collection should occur prior to Sulfasalazine administration due to the potential for falsely depressed results.     Alkaline Phosphatase 08/27/2024 67  34 - 104 U/L Final    Total Protein 08/27/2024 7.6  6.4 - 8.4 g/dL Final    Albumin 08/27/2024 4.1  3.5 - 5.0 g/dL Final    Total Bilirubin 08/27/2024 0.53  0.20 - 1.00 mg/dL Final    Use of this assay is not recommended for patients undergoing treatment with eltrombopag due to the potential for falsely elevated results.  N-acetyl-p-benzoquinone imine (metabolite of Acetaminophen) will generate erroneously low results in samples for patients that have taken an overdose of Acetaminophen.    WBC 08/27/2024 10.05  4.31 - 10.16 Thousand/uL Final    RBC 08/27/2024 4.51  3.88 - 5.12 Million/uL Final    Hemoglobin 08/27/2024 13.6  12.0 - 15.4 g/dL Final    Hematocrit 08/27/2024 42.3  36.5 - 46.1 % Final    MCV 08/27/2024 94  82 - 98 fL Final    MCH 08/27/2024 30.2  26.8 - 34.3 pg Final    MCHC 08/27/2024 32.2  31.4 - 37.4 g/dL Final    RDW 08/27/2024 12.1  11.6 - 15.1 % Final    MPV 08/27/2024 12.0  8.9 - 12.7 fL Final    Platelets 08/27/2024 359  149  - 390 Thousands/uL Final    nRBC 08/27/2024 0  /100 WBCs Final    Segmented % 08/27/2024 66  43 - 75 % Final    Immature Grans % 08/27/2024 0  0 - 2 % Final    Lymphocytes % 08/27/2024 23  14 - 44 % Final    Monocytes % 08/27/2024 8  4 - 12 % Final    Eosinophils Relative 08/27/2024 2  0 - 6 % Final    Basophils Relative 08/27/2024 1  0 - 1 % Final    Absolute Neutrophils 08/27/2024 6.64  1.85 - 7.62 Thousands/µL Final    Absolute Immature Grans 08/27/2024 0.03  0.00 - 0.20 Thousand/uL Final    Absolute Lymphocytes 08/27/2024 2.31  0.60 - 4.47 Thousands/µL Final    Absolute Monocytes 08/27/2024 0.77  0.17 - 1.22 Thousand/µL Final    Eosinophils Absolute 08/27/2024 0.24  0.00 - 0.61 Thousand/µL Final    Basophils Absolute 08/27/2024 0.06  0.00 - 0.10 Thousands/µL Final    TSH 3RD GENERATON 08/27/2024 2.104  0.450 - 4.500 uIU/mL Final    The recommended reference ranges for TSH during pregnancy are as follows:   First trimester 0.100 to 2.500 uIU/mL   Second trimester  0.200 to 3.000 uIU/mL   Third trimester 0.300 to 3.000 uIU/m    Note: Normal ranges may not apply to patients who are transgender, non-binary, or whose legal sex, sex at birth, and gender identity differ.  Adult TSH (3rd generation) reference range follows the recommended guidelines of the American Thy[employer] Association, January, 2020.    Iron Saturation 08/27/2024 27  15 - 50 % Final    TIBC 08/27/2024 397  250 - 450 ug/dL Final    Iron 08/27/2024 107  50 - 212 ug/dL Final    Patients treated with metal-binding drugs (ie. Deferoxamine) may have depressed iron values.    UIBC 08/27/2024 290  155 - 355 ug/dL Final    Ferritin 08/27/2024 29  24 - 307 ng/mL Final   Office Visit on 08/22/2024   Component Date Value Ref Range Status    Hemoglobin A1C 08/27/2024 5.6  Normal 4.0-5.6%; PreDiabetic 5.7-6.4%; Diabetic >=6.5%; Glycemic control for adults with diabetes <7.0% % Final    EAG 08/27/2024 114  mg/dl Final   Annual Exam on 07/30/2024   Component Date Value  Ref Range Status    Case Report 07/30/2024    Final                    Value:Gynecologic Cytology Report                       Case: [id number]                                  Authorizing Provider:  Stefany Ramirez MD    Collected:           07/30/2024 1103              Ordering Location:     [facility]      Received:            07/30/2024 1103                                     [facility]                                                         First Screen:          Paula Hdz                                                            Specimen:    LIQUID-BASED PAP, SCREENING, Cervix, Endocervical                                          Primary Interpretation 07/30/2024 Negative for intraepithelial lesion or malignancy   Final    Specimen Adequacy 07/30/2024 Satisfactory for evaluation. Absence of endocervical/transformation zone component.   Final    Additional Information 07/30/2024    Final                    Value:[employer]'s FDA approved ,  and ThinPrep Imaging Duo System are utilized with strict adherence to the 's instruction manual to prepare gynecologic and non-gynecologic cytology specimens for the production of ThinPrep slides as well as for gynecologic ThinPrep imaging. These processes have been validated by our laboratory and/or by the .  The Pap test is not a diagnostic procedure and should not be used as the sole means to detect cervical cancer. It is only a screening procedure to aid in the detection of cervical cancer and its precursors. Both false-negative and false-positive results have been experienced. Your patient's test result should be interpreted in this context together with the history and clinical findings.      Gross Description 07/30/2024    Final                    Value:20 ml , colorless, cloudy received in a ThinPrep vial.      HPV Other HR 07/30/2024 Negative  Negative Final    HPV types:  31,33,35,39,45,51,52,56,58,59,66 and 68 DNA are undetectable or below the pre-set threshold.    HPV16 07/30/2024 Negative  Negative Final    HPV18 07/30/2024 Negative  Negative Final

## 2024-09-05 ENCOUNTER — TELEPHONE (OUTPATIENT)
Dept: PSYCHIATRY | Facility: CLINIC | Age: 49
End: 2024-09-05

## 2024-09-09 ENCOUNTER — TELEMEDICINE (OUTPATIENT)
Dept: BEHAVIORAL/MENTAL HEALTH CLINIC | Facility: CLINIC | Age: 49
End: 2024-09-09
Payer: COMMERCIAL

## 2024-09-09 DIAGNOSIS — F41.9 ANXIETY AND DEPRESSION: ICD-10-CM

## 2024-09-09 DIAGNOSIS — F32.A ANXIETY AND DEPRESSION: ICD-10-CM

## 2024-09-09 DIAGNOSIS — F41.9 ANXIETY: Primary | ICD-10-CM

## 2024-09-09 PROCEDURE — 90837 PSYTX W PT 60 MINUTES: CPT | Performed by: COUNSELOR

## 2024-09-09 NOTE — PSYCH
Behavioral Health Psychotherapy Assessment    Date of Initial Psychotherapy Assessment: 09/09/24  Referral Source: ***  Has a release of information been signed for the referral source? {Yes/No/NA:34464}    Preferred Name: Beryl Cleary  Preferred Pronouns: {Preferred Pronouns:05335}  YOB: 1975 Age: 49 y.o.  Sex assigned at birth: {SL Sex Assigned at Birth:0975997170}   Gender Identity: ***  Race: {Race/ethnicity:21592}  Preferred Language: {Misc; languages:00813}    Emergency Contact:  Full Name: ***  Relationship to Client: ***  Contact information: ***    Primary Care Physician:  JAMSHID Crespo  [address] [place] [address]  [phone]  Has a release of information been signed? {Yes/No/NA:52596}    Physical Health History:  Past surgical procedures: ***  Do you have a history of any of the following: {SL Amb Psych Physical Health:69873}  Do you have any mobility issues? {YES-DESCRIBE/NO:29123}    Relevant Family History:  ***    Presenting Problem (What brings you in?)  ***    Mental Health Advance Directive:  Do you currently have a Mental Health Advance Directive?{YES/NO:20200}    Diagnosis:   Diagnosis ICD-10-CM Associated Orders   1. Anxiety  F41.9       2. Anxiety and depression  F41.9     F32.A           Psych Initial Assessment    Visit start and stop times:    09/09/24  Start Time: 1700  Stop Time: 1757  Total Visit Time: 57 minutes
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
## 2024-09-15 DIAGNOSIS — B00.1 HERPES LABIALIS: ICD-10-CM

## 2024-09-17 RX ORDER — VALACYCLOVIR HYDROCHLORIDE 1 G/1
TABLET, FILM COATED ORAL
Qty: 4 TABLET | Refills: 0 | Status: SHIPPED | OUTPATIENT
Start: 2024-09-17 | End: 2026-10-09

## 2024-09-18 ENCOUNTER — TELEPHONE (OUTPATIENT)
Dept: PSYCHIATRY | Facility: CLINIC | Age: 49
End: 2024-09-18

## 2024-09-20 ENCOUNTER — TELEPHONE (OUTPATIENT)
Dept: PSYCHIATRY | Facility: CLINIC | Age: 49
End: 2024-09-20

## 2024-09-20 ENCOUNTER — TELEMEDICINE (OUTPATIENT)
Dept: PSYCHIATRY | Facility: CLINIC | Age: 49
End: 2024-09-20
Payer: COMMERCIAL

## 2024-09-20 DIAGNOSIS — F41.1 GAD (GENERALIZED ANXIETY DISORDER): ICD-10-CM

## 2024-09-20 PROCEDURE — 99214 OFFICE O/P EST MOD 30 MIN: CPT

## 2024-09-20 RX ORDER — BUSPIRONE HYDROCHLORIDE 5 MG/1
5 TABLET ORAL 2 TIMES DAILY PRN
Qty: 60 TABLET | Refills: 1 | Status: SHIPPED | OUTPATIENT
Start: 2024-09-20

## 2024-09-20 NOTE — TELEPHONE ENCOUNTER
Writer called to schedule 6 week follow up, left voicemail.    Please see if client can schedule on another Friday if continuing virtual, if not that's ok.     Thanks.

## 2024-09-20 NOTE — PSYCH
"This note was not shared with the patient due to reasonable likelihood of causing patient harm    Virtual Regular Visit    Problem List Items Addressed This Visit    None  Visit Diagnoses       OZZIE (generalized anxiety disorder)        Relevant Medications    FLUoxetine (PROzac) 20 mg capsule    busPIRone (BUSPAR) 5 mg tablet          Reason for visit is No chief complaint on file.    Encounter provider JAMSHID Stoddard    Provider located at [facility] M[facility]PAT[facility]  [address] [id number] [phone]    Assessment/Plan:   INCREASE Prozac to 40mg po daily  STOP Hydroxyzine  START Buspar 5mg po bid prn  Follow up in 1 month    Diagnosis:   Generalized anxiety disorder       After connecting through [employer], the patient was identified by name and date of birth. Beryl Cleary was informed that this is a telemedicine visit and that the visit is being conducted through the Epic Embedded platform. She agrees to proceed. My office door was closed. No one else was in the room.  She acknowledged consent and understanding of privacy and security of the video platform. The patient has agreed to participate and understands they can discontinue the visit at any time.    Chief Complaint: Medication management    HPI   Patient is being treated for OZZIE. She is observed on prozac 20mg po daily and hydroxyzine 25mg po hs prn. Patient tolerates prozac well, has been compliant and denies any side effects. Patient reports she tried hydroxyzine however the next day she was \"dragging the whole day\" and felt exhausted. Patient would like to discontinue the medication. Patient states \"I'm still having moments of anxiety but its not as bad.\" She is getting  in [place] this coming Tuesday and states things have been stressful. She reports panic attacks have been \"cut in half\" which has improved her sleep. Patient continues to struggle leaving the house but able " to push herself when necessary. Appetite remains elevated. Slight improvement in energy and motivation. She denies si/hi.       Review Of Systems:     Constitutional Negative   ENT Negative   Cardiovascular Negative   Respiratory Negative   Gastrointestinal Negative   Genitourinary Perimenopausal   Musculoskeletal Rheumatoid Arthritis    Integumentary Negative   Neurological Negative   Endocrine Negative     Past Medical History:  Patient Active Problem List   Diagnosis    Acute adrenal insufficiency (HCC)    Allergic rhinitis    Anemia    Anxiety    Depressed mood    Inflammatory polyarthropathy (HCC)    Leucocytosis    RA (rheumatoid arthritis) (HCC)    Secondary adrenal insufficiency (HCC)    Thyroid nodule, uninodular    Varicose vein    Sjogren's syndrome (HCC)    Tendonitis    Chronic pansinusitis    Deviated nasal septum    Hypertrophy of both inferior nasal turbinates    Severe frontal headaches    Herpes labialis    Chronic frontal sinusitis    Class 1 obesity    Anxiety and depression    Right sided sciatica    Hiatal hernia    Chronic seasonal allergic rhinitis due to pollen    Allergic rhinitis due to house dust mite    PONV (postoperative nausea and vomiting)    Screening for diabetes mellitus    Screening for cardiovascular condition    Routine adult health maintenance    Screening for colon cancer    Palpitations       Current Outpatient Medications on File Prior to Visit   Medication Sig Dispense Refill    acetaminophen (TYLENOL) 500 mg tablet Take 1,000 mg by mouth every 6 (six) hours as needed for mild pain      albuterol (2.5 mg/3 mL) 0.083 % nebulizer solution Take 3 mL (2.5 mg total) by nebulization every 6 (six) hours as needed for wheezing or shortness of breath 180 mL 5    celecoxib (CeleBREX) 100 mg capsule Take 1 capsule (100 mg total) by mouth 2 (two) times a day 60 capsule 0    Cholecalciferol (Vitamin D3) 25 MCG TABS Take by mouth      estradiol (Vivelle-Dot) 0.0375 MG/24HR Place 1 patch  on the skin 2 (two) times a week 24 patch 3    FLUoxetine (PROzac) 20 mg capsule Take 1 capsule (20 mg total) by mouth daily After finishing prozac 10mg x7 days 30 capsule 1    fluticasone (FLONASE) 50 mcg/act nasal spray 1 spray into each nostril 2 (two) times a day 16 g 1    gabapentin (Neurontin) 100 mg capsule Take 1 capsule (100 mg total) by mouth 3 (three) times a day As needed for numbness 90 capsule 6    hydroxychloroquine (PLAQUENIL) 200 mg tablet Take 1 tablet (200 mg total) by mouth 2 (two) times a day 180 tablet 2    hydrOXYzine pamoate (VISTARIL) 25 mg capsule Take 1 capsule (25 mg total) by mouth daily at bedtime as needed for anxiety 30 capsule 1    Levocetirizine Dihydrochloride (XYZAL PO) Take by mouth      levonorgestrel (MIRENA) 20 MCG/24HR IUD 1 each by Intrauterine route once      MAGNESIUM CITRATE PO Take by mouth      montelukast (SINGULAIR) 10 mg tablet Take 1 tablet (10 mg total) by mouth daily at bedtime 30 tablet 4    Olopatadine HCl 0.6 % SOLN 2 actuation into each nostril daily 30.5 g 11    omeprazole (PriLOSEC) 20 mg delayed release capsule Take 1 capsule (20 mg total) by mouth 2 (two) times a day 180 capsule 3    valACYclovir (VALTREX) 1,000 mg tablet Take 2 tablets by mouth every 12 hours x 1 days 4 tablet 0     No current facility-administered medications on file prior to visit.       Allergies:  Allergies   Allergen Reactions    Doxycycline Hives    Nsaids Wheezing and Nasal Congestion     ASPIRIN and NSAID exacerbated respiratory disease (AERD) see allergist note on 2/28/24.  Typically COX2 are tolerated.  Tylenol is tolerated.  See CELECOXIB challenge on 8/8/24 she tolerated it without triggering AERD.         Past Surgical History:  Past Surgical History:   Procedure Laterality Date    CHOLECYSTECTOMY      EGD  10/2023    NASAL/SINUS ENDOSCOPY N/A 06/30/2020    Procedure: IMAGED GUIDED F.E.S.S.;  Surgeon: Baljit Cassidy DO;  Location: [facility];  Service: ENT    [facility]  SEPTOPLASTY/SUBMUCOUS RESECJ W/WO CARTILAGE GRF N/A 06/30/2020    Procedure: SEPTOPLASTY;  Surgeon: Baljit Cassidy DO;  Location: [facility];  Service: ENT    US GUIDED THYROID BIOPSY  07/31/2017    VENOUS ABLATION Left     lower extremity    WISDOM TOOTH EXTRACTION       Rheumatoid arthritis - plaquenil   Started estradial patch     Past Psychiatric History:    No psych hospitalization   No hx of SA  Currently on no psychotropics      Past Medication Trials: Celexa, Lexapro, Prozac  Current Psychiatric Medications: none    Family Psychiatric History:   Mom - undiagnosed anxiety and depression, hx of alcoholism,   Father - some sort of psychiatric history but was never talked about   Brother - drug addict   Younger brother - anxiety attacks     Social History:   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     Objective:  There were no vitals filed for this visit.      Weight (last 2 days)       None            Mental status:  Appearance sitting comfortably in chair   Mood Less Anxious   Affect Mood-congruent   Speech Normal rate, rhythm, and volume   Thought Processes Linear and goal directed   Associations intact associations   Hallucinations Denies any auditory or visual hallucinations   Thought Content No passive or active suicidal or homicidal ideation, intent, or plan.   Orientation Oriented to person, place, time, and situation   Recent and Remote Memory Grossly intact   Attention Span and Concentration Concentration intact    Intellect Appears to be of Average Intelligence   Insight Insight intact   Judgement judgment was intact   Muscle Strength Muscle strength and tone were normal   Language Within normal limits   Fund of Knowledge Age appropriate   Pain None     PHQ-A Screening                       Risks, Benefits And Possible Side Effects Of Medications:  Risks, benefits, and possible side effects of medications explained to patient and family, they verbalize understanding    Controlled Medication Discussion: No records found for controlled prescriptions according to [place] Prescription Drug Monitoring Program.     Visit Time    Visit Start Time: 8:30 AM  Visit Stop Time: 9:00 AM  Total Visit Duration: 30 minutes

## 2024-09-21 PROBLEM — Z00.00 ROUTINE ADULT HEALTH MAINTENANCE: Status: RESOLVED | Noted: 2024-08-22 | Resolved: 2024-09-21

## 2024-09-21 PROBLEM — Z13.6 SCREENING FOR CARDIOVASCULAR CONDITION: Status: RESOLVED | Noted: 2024-08-22 | Resolved: 2024-09-21

## 2024-09-21 PROBLEM — Z12.11 SCREENING FOR COLON CANCER: Status: RESOLVED | Noted: 2024-08-22 | Resolved: 2024-09-21

## 2024-09-21 PROBLEM — Z13.1 SCREENING FOR DIABETES MELLITUS: Status: RESOLVED | Noted: 2024-08-22 | Resolved: 2024-09-21

## 2024-10-07 ENCOUNTER — TELEMEDICINE (OUTPATIENT)
Dept: BEHAVIORAL/MENTAL HEALTH CLINIC | Facility: CLINIC | Age: 49
End: 2024-10-07
Payer: COMMERCIAL

## 2024-10-07 DIAGNOSIS — F32.A ANXIETY AND DEPRESSION: ICD-10-CM

## 2024-10-07 DIAGNOSIS — F41.9 ANXIETY AND DEPRESSION: ICD-10-CM

## 2024-10-07 DIAGNOSIS — F41.9 ANXIETY: Primary | ICD-10-CM

## 2024-10-07 PROCEDURE — 90837 PSYTX W PT 60 MINUTES: CPT | Performed by: COUNSELOR

## 2024-10-07 NOTE — PSYCH
"Behavioral Health Psychotherapy Progress Note    Psychotherapy Provided: Individual Psychotherapy     1. Anxiety        2. Anxiety and depression            Goals addressed in session: Goal 1     DATA: Client and therapist met for a virtual session. Client shared around her wedding in [place]. Clients  helped her walk back her anxiety while traveling. Client was able to \"leave her anxiety\" in [place] at the airport.  Client feels her  is empathetic towards her anxious thoughts and helps her to calm down.  Client pushed herself to do a bigger hike even though she was nervous.  Client used the same mantra she used in the airport. Client processed on a predicament her friend is currently dealing with and how she should proceed to have healthy boundaries. Client processed on her relationship with sister aleah and her expectations that her  needs to defend her.  Client was able to set that boundary with  and process her emotions on the situation.   During this session, this clinician used the following therapeutic modalities: Cognitive Behavioral Therapy    Substance Abuse was not addressed during this session. If the client is diagnosed with a co-occurring substance use disorder, please indicate any changes in the frequency or amount of use: N/A. Stage of change for addressing substance use diagnoses: No substance use/Not applicable    ASSESSMENT:  Beryl Cleary presents with a Euthymic/ normal mood.     her affect is Normal range and intensity, which is congruent, with her mood and the content of the session. The client has made progress on their goals.    Client able to self regulate during stressful travel and push herself utilizing mantras.  Beryl Cleary presents with a none risk of suicide, none risk of self-harm, and none risk of harm to others.    For any risk assessment that surpasses a \"low\" rating, a safety plan must be developed.    A safety plan was indicated: no  If yes, " describe in detail N/A    PLAN: Between sessions, Beryl Cleary will continue processing emotions with  and using mantras. At the next session, the therapist will use Cognitive Behavioral Therapy to address anxiety symptoms.    Behavioral Health Treatment Plan and Discharge Planning: Beryl Cleary is aware of and agrees to continue to work on their treatment plan. They have identified and are working toward their discharge goals. yes    Visit start and stop times:    10/07/24  Start Time: 1600  Stop Time: 1658  Total Visit Time: 58 minutes

## 2024-10-21 ENCOUNTER — TELEMEDICINE (OUTPATIENT)
Dept: BEHAVIORAL/MENTAL HEALTH CLINIC | Facility: CLINIC | Age: 49
End: 2024-10-21
Payer: COMMERCIAL

## 2024-10-21 DIAGNOSIS — F41.9 ANXIETY: Primary | ICD-10-CM

## 2024-10-21 DIAGNOSIS — R45.89 DEPRESSED MOOD: ICD-10-CM

## 2024-10-21 PROCEDURE — 90837 PSYTX W PT 60 MINUTES: CPT | Performed by: COUNSELOR

## 2024-10-21 NOTE — PSYCH
Behavioral Health Psychotherapy Progress Note    Psychotherapy Provided: Individual Psychotherapy     1. Anxiety        2. Depressed mood            Goals addressed in session: Goal 1     DATA: Client and therapist met for a virtual session. Client endorsed that her symptoms are better and feels it is bc her medications seem to be working.  Client advised that currently her family is doing well and not manipulating her for money. Client discussed taking her prozac at night now that the increased dose makes her tired. Client is receptive to advising psych about her side effects and timing of taking meds. Client processed on thoughts about weight loss and endorsing negative beliefs about her inability to control her eating habits. Client is receptive to the idea that she is coping with carbs and sugar.  Client has seen med professionals and nutritionists but continues doing something she feels is harmful towards her health. Client is willing to challenge cravings and to process on whether or not she is coping with emotional dysregulation in that moment.  Client reported to set strong boundaries with friend and that it is working so that he doesn't involve her in his relationships.   During this session, this clinician used the following therapeutic modalities: Cognitive Behavioral Therapy and Mindfulness-based Strategies    Substance Abuse was not addressed during this session. If the client is diagnosed with a co-occurring substance use disorder, please indicate any changes in the frequency or amount of use: N/A. Stage of change for addressing substance use diagnoses: No substance use/Not applicable    ASSESSMENT:  Beryl Cleary presents with a Euthymic/ normal mood.     her affect is Normal range and intensity, which is congruent, with her mood and the content of the session. The client has made progress on their goals.    Client beginning to set boundaries around how people treat her.  Beryl Cleary presents  "with a none risk of suicide, none risk of self-harm, and none risk of harm to others.    For any risk assessment that surpasses a \"low\" rating, a safety plan must be developed.    A safety plan was indicated: no  If yes, describe in detail N/a    PLAN: Between sessions, Beryl Cleary will challenge negative thoughts. At the next session, the therapist will use Cognitive Behavioral Therapy and Mindfulness-based Strategies to address anxiety symptoms.    Behavioral Health Treatment Plan and Discharge Planning: Beryl Cleary is aware of and agrees to continue to work on their treatment plan. They have identified and are working toward their discharge goals. yes    Visit start and stop times:    10/21/24  Start Time: 1600  Stop Time: 1656  Total Visit Time: 56 minutes  "

## 2024-11-04 ENCOUNTER — TELEMEDICINE (OUTPATIENT)
Dept: BEHAVIORAL/MENTAL HEALTH CLINIC | Facility: CLINIC | Age: 49
End: 2024-11-04
Payer: COMMERCIAL

## 2024-11-04 DIAGNOSIS — F41.9 ANXIETY: Primary | ICD-10-CM

## 2024-11-04 DIAGNOSIS — R45.89 DEPRESSED MOOD: ICD-10-CM

## 2024-11-04 PROCEDURE — 90837 PSYTX W PT 60 MINUTES: CPT | Performed by: COUNSELOR

## 2024-11-04 NOTE — PSYCH
"Behavioral Health Psychotherapy Progress Note    Psychotherapy Provided: Individual Psychotherapy     1. Anxiety        2. Depressed mood            Goals addressed in session: Goal 1     DATA: Client and therapist met for a virtual session. Client processed on relationship with children and their respective futures.  Client is worried about her children success in life and figuring out how to motivate them. Client is receptive to education around spectrum disorder symptoms. Client processed on recent wedding event and treatment by her new husbands family.  Client is able to reframe some incident wherein she was taking things personally.  Client is beginning to set boundaries for others and see how this treatment effects her anxiety and depression.  Therapist used open ended questions, reframing and reflection.  Therapist provided resources to learn more about spectrum related disorders.   During this session, this clinician used the following therapeutic modalities: Client-centered Therapy and Cognitive Behavioral Therapy    Substance Abuse was not addressed during this session. If the client is diagnosed with a co-occurring substance use disorder, please indicate any changes in the frequency or amount of use: N/A. Stage of change for addressing substance use diagnoses: No substance use/Not applicable    ASSESSMENT:  Beryl Cleary presents with a Euthymic/ normal mood.     her affect is Normal range and intensity, which is congruent, with her mood and the content of the session. The client has made progress on their goals.    Client beginning to set boundaries.  Beryl Cleary presents with a none risk of suicide, none risk of self-harm, and none risk of harm to others.    For any risk assessment that surpasses a \"low\" rating, a safety plan must be developed.    A safety plan was indicated: no  If yes, describe in detail N/a    PLAN: Between sessions, Beryl Cleary will research spectrum related symptoms. " At the next session, the therapist will use Cognitive Behavioral Therapy to address anxiety and depression.    Behavioral Health Treatment Plan and Discharge Planning: Beryl Cleary is aware of and agrees to continue to work on their treatment plan. They have identified and are working toward their discharge goals. yes    Visit start and stop times:    11/04/24  Start Time: 1600  Stop Time: 1655  Total Visit Time: 55 minutes

## 2024-11-18 ENCOUNTER — TELEMEDICINE (OUTPATIENT)
Dept: BEHAVIORAL/MENTAL HEALTH CLINIC | Facility: CLINIC | Age: 49
End: 2024-11-18
Payer: COMMERCIAL

## 2024-11-18 DIAGNOSIS — R45.89 DEPRESSED MOOD: ICD-10-CM

## 2024-11-18 DIAGNOSIS — F41.9 ANXIETY: Primary | ICD-10-CM

## 2024-11-18 PROCEDURE — 90837 PSYTX W PT 60 MINUTES: CPT | Performed by: COUNSELOR

## 2024-11-18 NOTE — PSYCH
Behavioral Health Psychotherapy Progress Note    Psychotherapy Provided: Individual Psychotherapy     1. Anxiety        2. Depressed mood            Goals addressed in session: Goal 1     DATA: Client and therapist met for a virtual session.  Client shared around situation with in laws behavior at her wedding reception.  Client is concerned that she cuts people off out of her life and is questioning why people don't stay.  Client details the toxic dysfunction of her own family and shared around their substance use issues.  Client grew up in a home wherein her mother and stepfather drank excessively and ended up baring the consequences later in life medically.  Client shared her brother has a substance use issue and she had to cut him off.  Client also cut mother off for her toxicity and so have her children.  Client is open to pursuing further investigation of her statement that she keeps running people out of her life.  Client can reframe this thought when she is able to look closer at those she has cut off. Client endorses not taking anything off of people and being very protective of her children.  Client is concerned about the way the election went and this is also increasing her anxiety due to her child's needs.  Therapist used open ended questions, reflection and reframing to assist client in examining her thoughts and beliefs which in turn affect anxiety.   During this session, this clinician used the following therapeutic modalities: Cognitive Behavioral Therapy    Substance Abuse was not addressed during this session. If the client is diagnosed with a co-occurring substance use disorder, please indicate any changes in the frequency or amount of use: N/A. Stage of change for addressing substance use diagnoses: No substance use/Not applicable    ASSESSMENT:  Beryl Cleary presents with a Euthymic/ normal mood.     her affect is Normal range and intensity, which is congruent, with her mood and the content  "of the session. The client has made progress on their goals.    Client beginning to identify ways in which she thinks negatively about self.  Beryl Cleary presents with a none risk of suicide, none risk of self-harm, and none risk of harm to others.    For any risk assessment that surpasses a \"low\" rating, a safety plan must be developed.    A safety plan was indicated: no  If yes, describe in detail N/A    PLAN: Between sessions, Beryl Cleary will continue with strong boundaries. . At the next session, the therapist will use Cognitive Behavioral Therapy, Mindfulness-based Strategies, and Solution-Focused Therapy to address anxiety and depression.    Behavioral Health Treatment Plan and Discharge Planning: Beryl Cleary is aware of and agrees to continue to work on their treatment plan. They have identified and are working toward their discharge goals. yes    Visit start and stop times:    11/18/24  Start Time: 1600  Stop Time: 1654  Total Visit Time: 54 minutes  "

## 2024-12-02 ENCOUNTER — TELEMEDICINE (OUTPATIENT)
Dept: BEHAVIORAL/MENTAL HEALTH CLINIC | Facility: CLINIC | Age: 49
End: 2024-12-02
Payer: COMMERCIAL

## 2024-12-02 DIAGNOSIS — F32.A ANXIETY AND DEPRESSION: Primary | ICD-10-CM

## 2024-12-02 DIAGNOSIS — F41.9 ANXIETY AND DEPRESSION: Primary | ICD-10-CM

## 2024-12-02 PROCEDURE — 90837 PSYTX W PT 60 MINUTES: CPT | Performed by: COUNSELOR

## 2024-12-02 NOTE — PSYCH
"Behavioral Health Psychotherapy Progress Note    Psychotherapy Provided: Individual Psychotherapy     1. Anxiety and depression            Goals addressed in session: Goal 1     DATA: Client and therapist met for a virtual visit. Client processed on current situations at work.  Client focused on relationships and how to communicate with a difficult supervisor.  Client is beginning to have stronger boundaries and is endorsing feeling tired of people walking on her.  Client communicated effectively to  about not attending holidays with in laws who treat her poorly.  Client is able to see how these interactions would increase her anxiety and depression.  Client is struggling with relationship with daughter and wants to continue processing on.  Therapist used open ended questions, reflection and validation to assist client in examining thoughts/beliefs that affect anxiety.   During this session, this clinician used the following therapeutic modalities: Cognitive Behavioral Therapy    Substance Abuse was not addressed during this session. If the client is diagnosed with a co-occurring substance use disorder, please indicate any changes in the frequency or amount of use: N/A. Stage of change for addressing substance use diagnoses: No substance use/Not applicable    ASSESSMENT:  Beryl Cleary presents with a Euthymic/ normal mood.     her affect is Normal range and intensity, which is congruent, with her mood and the content of the session. The client has made progress on their goals.    Client is building stronger boundaries.  Beryl Cleary presents with a none risk of suicide, none risk of self-harm, and none risk of harm to others.    For any risk assessment that surpasses a \"low\" rating, a safety plan must be developed.    A safety plan was indicated: no  If yes, describe in detail N/A    PLAN: Between sessions, Beryl Cleary will continue to work on creating healthy coping skills. . At the next " session, the therapist will use Cognitive Behavioral Therapy to address anxiety and depression.    Behavioral Health Treatment Plan and Discharge Planning: Beryl Cleary is aware of and agrees to continue to work on their treatment plan. They have identified and are working toward their discharge goals. yes    Visit start and stop times:    12/02/24  Start Time: 1600  Stop Time: 1656  Total Visit Time: 56 minutes

## 2024-12-10 DIAGNOSIS — F41.1 GAD (GENERALIZED ANXIETY DISORDER): ICD-10-CM

## 2024-12-10 NOTE — TELEPHONE ENCOUNTER
Reason for call:   [x] Refill   [] Prior Auth  [x] Other: Patient is requesting a 90 day supply    Office:   [] PCP/Provider -   [x] Specialty/Provider - [facility]     Medication: FLUoxetine (PROzac) 20 mg capsule     Dose/Frequency:  Take 2 capsules (40 mg total) by mouth daily,     Quantity: 180    Pharmacy: [address] [phone]    Does the patient have enough for 3 days?   [] Yes   [x] No - Send as HP to POD

## 2024-12-16 ENCOUNTER — TELEMEDICINE (OUTPATIENT)
Dept: BEHAVIORAL/MENTAL HEALTH CLINIC | Facility: CLINIC | Age: 49
End: 2024-12-16
Payer: COMMERCIAL

## 2024-12-16 DIAGNOSIS — F32.A ANXIETY AND DEPRESSION: Primary | ICD-10-CM

## 2024-12-16 DIAGNOSIS — F41.9 ANXIETY AND DEPRESSION: Primary | ICD-10-CM

## 2024-12-16 PROCEDURE — 90837 PSYTX W PT 60 MINUTES: CPT | Performed by: COUNSELOR
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
## 2025-01-09 DIAGNOSIS — B00.1 HERPES LABIALIS: ICD-10-CM

## 2025-01-10 RX ORDER — VALACYCLOVIR HYDROCHLORIDE 1 G/1
TABLET, FILM COATED ORAL
Qty: 4 TABLET | Refills: 0 | Status: SHIPPED | OUTPATIENT
Start: 2025-01-10 | End: 2027-01-31

## 2025-01-13 ENCOUNTER — TELEMEDICINE (OUTPATIENT)
Dept: BEHAVIORAL/MENTAL HEALTH CLINIC | Facility: CLINIC | Age: 50
End: 2025-01-13
Payer: COMMERCIAL

## 2025-01-13 DIAGNOSIS — F41.9 ANXIETY AND DEPRESSION: Primary | ICD-10-CM

## 2025-01-13 DIAGNOSIS — F32.A ANXIETY AND DEPRESSION: Primary | ICD-10-CM

## 2025-01-13 PROCEDURE — 90837 PSYTX W PT 60 MINUTES: CPT | Performed by: COUNSELOR
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
## 2025-01-27 ENCOUNTER — TELEMEDICINE (OUTPATIENT)
Dept: BEHAVIORAL/MENTAL HEALTH CLINIC | Facility: CLINIC | Age: 50
End: 2025-01-27
Payer: COMMERCIAL

## 2025-01-27 DIAGNOSIS — F41.9 ANXIETY AND DEPRESSION: Primary | ICD-10-CM

## 2025-01-27 DIAGNOSIS — F32.A ANXIETY AND DEPRESSION: Primary | ICD-10-CM

## 2025-01-27 DIAGNOSIS — B00.1 HERPES LABIALIS: ICD-10-CM

## 2025-01-27 PROCEDURE — 90834 PSYTX W PT 45 MINUTES: CPT | Performed by: COUNSELOR

## 2025-01-27 NOTE — PSYCH
"Behavioral Health Psychotherapy Progress Note    Psychotherapy Provided: Individual Psychotherapy     1. Anxiety and depression            Goals addressed in session: Goal 1     DATA: Client and therapist met for a virtual session. Client processed on her fears around daughter.  Client related how these fears have been causing her increased anxiety/depression bc she feels helpless to protect. Client is able to process further and release some of these emotions.  Client is able to process around creating community and taking time to have fun.  Client is going to try some activities with daughter to get her engaged.  Client is able to update her treatment plan. Client ended session early due to work. Therapist used active listening, open ended questions, reflection and validation  During this session, this clinician used the following therapeutic modalities: Cognitive Behavioral Therapy    Substance Abuse was not addressed during this session. If the client is diagnosed with a co-occurring substance use disorder, please indicate any changes in the frequency or amount of use: N/A. Stage of change for addressing substance use diagnoses: No substance use/Not applicable    ASSESSMENT:  Beryl Cleary presents with a Euthymic/ normal mood.     her affect is Normal range and intensity, which is congruent, with her mood and the content of the session. The client has made progress on their goals.    Client able to process emotions in session.  Beryl Cleary presents with a none risk of suicide, none risk of self-harm, and none risk of harm to others.    For any risk assessment that surpasses a \"low\" rating, a safety plan must be developed.    A safety plan was indicated: no  If yes, describe in detail N/A    PLAN: Between sessions, Beryl Cleary will attempt to engage daughter in activities. At the next session, the therapist will use Cognitive Behavioral Therapy and Mindfulness-based Strategies to address " anxiety/depression.    Behavioral Health Treatment Plan and Discharge Planning: Beryl Cleary is aware of and agrees to continue to work on their treatment plan. They have identified and are working toward their discharge goals. yes    Depression Follow-up Plan Completed: Not applicable    Visit start and stop times:    01/27/25  Start Time: 1600  Stop Time: 1648  Total Visit Time: 48 minutes

## 2025-01-27 NOTE — BH TREATMENT PLAN
Outpatient Behavioral Health Psychotherapy Treatment Plan    Beryl Pinedasupa  1975     Date of Initial Psychotherapy Assessment: 08/19/24   Date of Current Treatment Plan: 01/27/25  Treatment Plan Target Date: TBD  Treatment Plan Expiration Date: 7/27/25  Diagnosis:   1. Anxiety and depression            Area(s) of Need: Anxiety, depression, panic attacks    Long Term Goal 1 (in the client's own words): I would like to not have this overwhelming anxiety and learn how to focus so I can get things I need to do done.     Stage of Change: Contemplation    Target Date for completion: 08/19/24     Anticipated therapeutic modalities: CBT, MI, ACT     People identified to complete this goal: Beryl Chen, Cindy Ballesteros, CARMELW      Objective 1: (identify the means of measuring success in meeting the objective): I would like to learn better coping skills for my anxiety and use them effectively to get past my anxiety/depression and panic attacks. 1/27/25 Update: Client has been able to set boundaries with others and is still working on learning effective coping skills.       Objective 2: (identify the means of measuring success in meeting the objective): I would like to finish setting up my room by fixing the cushion and setting up the furniture. 1/27/25 Update: Client was able to complete this goal.      Objective 3: (identify the means of measuring success in meeting the objective): I would like to learn how to focus my thoughts better so that I can get chores done.  1/27/25 Update: Client has been able to get better with focus but still working on coping skills.           I am currently under the care of a [facility] psychiatric provider: yes    My . [facility] psychiatric provider is: Layo Gagnon     I am currently taking psychiatric medications: Yes, as prescribed    I feel that I will be ready for discharge from mental health care when I reach the following (measurable goal/objective): When my anxiety is more  manageable and I am able to get out of the house without panic attacks.     For children and adults who have a legal guardian:   Has there been any change to custody orders and/or guardianship status? NA. If yes, attach updated documentation.    I have created my Crisis Plan and have been offered a copy of this plan    Behavioral Health Treatment Plan [facility]: Diagnosis and Treatment Plan explained to Beryl Cleary acknowledges an understanding of their diagnosis. Beryl Cleary agrees to this treatment plan.    I have been offered a copy of this Treatment Plan. yes

## 2025-01-28 RX ORDER — VALACYCLOVIR HYDROCHLORIDE 1 G/1
TABLET, FILM COATED ORAL
Qty: 4 TABLET | Refills: 3 | Status: SHIPPED | OUTPATIENT
Start: 2025-01-28 | End: 2027-02-17

## 2025-02-10 ENCOUNTER — TELEMEDICINE (OUTPATIENT)
Dept: BEHAVIORAL/MENTAL HEALTH CLINIC | Facility: CLINIC | Age: 50
End: 2025-02-10
Payer: COMMERCIAL

## 2025-02-10 DIAGNOSIS — R45.89 DEPRESSED MOOD: ICD-10-CM

## 2025-02-10 DIAGNOSIS — F41.9 ANXIETY: Primary | ICD-10-CM

## 2025-02-10 PROCEDURE — 90837 PSYTX W PT 60 MINUTES: CPT | Performed by: COUNSELOR

## 2025-02-10 NOTE — PSYCH
Behavioral Health Psychotherapy Progress Note    Psychotherapy Provided: Individual Psychotherapy     1. Anxiety        2. Depressed mood            Goals addressed in session: Goal 1     DATA: Client and therapist met for a virtual session. Client processed on relationships with children and discusses the individual trajectories of each. Client identifies that two younger children are having more challenges with launching away from parents home. Client processed on how these two children are overly reliant on family and how this affects the family dynamics. Client fears for her children if something were to happen to either parent.  Client is educated on ADHD and endorses that one child was dx with spectrum related and that she herself was tested and assumed to have ADHD as a child.  Client is receptive to learning more about how to deal with ADHD regarding communication, motivating and enabling.   Client endorses that the fears she has for her children cause her to have panic and anxiety.  Client becomes tearful when she considers how hard of a time she has motivating herself to make change.  Therapist used active listening, open ended questions, reflection, education on ADHD and validation.    During this session, this clinician used the following therapeutic modalities: Cognitive Behavioral Therapy    Substance Abuse was not addressed during this session. If the client is diagnosed with a co-occurring substance use disorder, please indicate any changes in the frequency or amount of use: N/A. Stage of change for addressing substance use diagnoses: No substance use/Not applicable    ASSESSMENT:  Beryl Cleary presents with a Euthymic/ normal mood.     her affect is Normal range and intensity, which is congruent, with her mood and the content of the session. The client has made progress on their goals.    Client sharing more about her fears for family.  Beryl Cleary presents with a none risk of suicide, none  "risk of self-harm, and none risk of harm to others.    For any risk assessment that surpasses a \"low\" rating, a safety plan must be developed.    A safety plan was indicated: no  If yes, describe in detail NA    PLAN: Between sessions, Beryl Cleary will research ADHD in adults. At the next session, the therapist will use Cognitive Behavioral Therapy to address anxiety, depression.    Behavioral Health Treatment Plan and Discharge Planning: Beryl Cleary is aware of and agrees to continue to work on their treatment plan. They have identified and are working toward their discharge goals. yes    Depression Follow-up Plan Completed: Not applicable    Visit start and stop times:    02/10/25  Start Time: 1600  Stop Time: 1656  Total Visit Time: 56 minutes  "

## 2025-02-27 ENCOUNTER — TELEMEDICINE (OUTPATIENT)
Dept: BEHAVIORAL/MENTAL HEALTH CLINIC | Facility: CLINIC | Age: 50
End: 2025-02-27
Payer: COMMERCIAL

## 2025-02-27 DIAGNOSIS — F32.A ANXIETY AND DEPRESSION: Primary | ICD-10-CM

## 2025-02-27 DIAGNOSIS — F41.9 ANXIETY AND DEPRESSION: Primary | ICD-10-CM

## 2025-02-27 PROCEDURE — 90837 PSYTX W PT 60 MINUTES: CPT | Performed by: COUNSELOR

## 2025-02-27 NOTE — PSYCH
Behavioral Health Psychotherapy Progress Note    Psychotherapy Provided: Individual Psychotherapy     1. Anxiety and depression            Goals addressed in session: Goal 1     DATA: Client and therapist met for a virtual session. Client processed on weightloss and how she is having problems focusing or motivating on these goals.  Client is going to weight management and can understand the logic behind what they are telling her but is lacking motivation to confront when the cravings come up.  Client is able to explore how her feelings can cause her to emotionally eat. Client endorses that she has used food as a coping skill since she was young. Client has some negative thoughts on herself and blames herself for not loving herself more. Client is open to discussing how sometimes the reward system in our brains can be hijacked.  Client is willing to observe herself when she is emotionally eating to gather more evidence about it. Client processed around using her phone at night and how she must put blocks on social media during the day to stop herself from using.  Client is receptive to considering other possibilities around addiction versus personal failure. Therapist used education, active listening, open ended questions, reflection and validation.      During this session, this clinician used the following therapeutic modalities: Cognitive Behavioral Therapy and Mindfulness-based Strategies    Substance Abuse was not addressed during this session. If the client is diagnosed with a co-occurring substance use disorder, please indicate any changes in the frequency or amount of use: N/A. Stage of change for addressing substance use diagnoses: No substance use/Not applicable    ASSESSMENT:  Beryl Cleary presents with a Euthymic/ normal mood.     her affect is Normal range and intensity, which is congruent, with her mood and the content of the session. The client has made progress on their goals.    Client is  "observing her anxiety to be lower than it was when she started.  Beryl Cleary presents with a none risk of suicide, none risk of self-harm, and none risk of harm to others.    For any risk assessment that surpasses a \"low\" rating, a safety plan must be developed.    A safety plan was indicated: no  If yes, describe in detail N/A    PLAN: Between sessions, Beryl Cleary will continue therapy. At the next session, the therapist will use Cognitive Behavioral Therapy, Mindfulness-based Strategies, and Motivational Interviewing to address anxiety.    Behavioral Health Treatment Plan and Discharge Planning: Beryl Cleary is aware of and agrees to continue to work on their treatment plan. They have identified and are working toward their discharge goals. yes    Depression Follow-up Plan Completed: Not applicable    Visit start and stop times:    02/27/25  Start Time: 1000  Stop Time: 1053  Total Visit Time: 53 minutes  "

## 2025-02-28 ENCOUNTER — TELEPHONE (OUTPATIENT)
Dept: RHEUMATOLOGY | Facility: CLINIC | Age: 50
End: 2025-02-28

## 2025-02-28 NOTE — TELEPHONE ENCOUNTER
Left voicemail for patient explaining the provider is going through a schedule change and their appointment will need to be rescheduled, apologized for the short notice and advised the patient to call the office back as soon as possible to reschedule appointment. Offered may 6th at 8:30 in the morning which was available at time of call.

## 2025-02-28 NOTE — TELEPHONE ENCOUNTER
Pt returning call, relayed above message. Apt offered does not work for her, and opted for a cb to schedule. She can only do Monday, Tuesdays, or Thursdays after 3pm.

## 2025-03-04 ENCOUNTER — OFFICE VISIT (OUTPATIENT)
Dept: BARIATRICS | Facility: CLINIC | Age: 50
End: 2025-03-04
Payer: COMMERCIAL

## 2025-03-04 VITALS
BODY MASS INDEX: 37.63 KG/M2 | HEART RATE: 103 BPM | RESPIRATION RATE: 16 BRPM | WEIGHT: 220.4 LBS | HEIGHT: 64 IN | SYSTOLIC BLOOD PRESSURE: 108 MMHG | DIASTOLIC BLOOD PRESSURE: 72 MMHG

## 2025-03-04 DIAGNOSIS — E66.812 CLASS 2 OBESITY WITHOUT SERIOUS COMORBIDITY WITH BODY MASS INDEX (BMI) OF 38.0 TO 38.9 IN ADULT: Primary | ICD-10-CM

## 2025-03-04 DIAGNOSIS — K44.9 HIATAL HERNIA: ICD-10-CM

## 2025-03-04 DIAGNOSIS — F41.9 ANXIETY: ICD-10-CM

## 2025-03-04 PROCEDURE — 99204 OFFICE O/P NEW MOD 45 MIN: CPT | Performed by: PHYSICIAN ASSISTANT

## 2025-03-04 RX ORDER — TIRZEPATIDE 2.5 MG/.5ML
2.5 INJECTION, SOLUTION SUBCUTANEOUS WEEKLY
Qty: 2 ML | Refills: 0 | Status: SHIPPED | OUTPATIENT
Start: 2025-03-04 | End: 2025-04-01

## 2025-03-04 NOTE — PROGRESS NOTES
Assessment/Plan:    Class 2 obesity without serious comorbidity with body mass index (BMI) of 38.0 to 38.9 in adult  -Discussed options of HealthyCORE-Intensive Lifestyle Intervention Program, Very Low Calorie Diet-VLCD, and Conservative Program and the role of weight loss medications.Explained the importance of making lifestyle changes if utilizing medication to aid in weight loss  -was here prior in 2021 and did do part of healthycore  -Initial weight loss goal of 5-10% weight loss for improved health  -Screening labs and records reviewed from prior      -Patient is interested in pursuing conservative program with RD meal planning    Goals:  - To try to increase water intake  -recommend to have a protein with breakfast  -discussed restarting exercise with a goal of 2 x week    To start on zepbound. To start on initial dose of medication and then to titrate as tolerated.  They have tried more than 6 months of lifestyle modifications including diet and activity changes and has had insignificant weight loss of less than 1 lb a week. Patient denies personal and family history of MCT and MEN2 tumors. Patient denies personal history of pancreatitis. Side effects discussed but not limited to diarrhea, bloating, constipation, GI upset, heartburn, increased heart rate, headache, low blood sugar, fatigue and dizziness. Titration and medication administration discussed.  Medication agreement signed    Initial Weight:220.4        Anxiety  Doing well with meds and therapist.  Recommend to avoid phentermine    Hiatal hernia  Symptoms currently controlled.  Not interested in surgery at this time      Return in about 3 months (around 6/4/2025), or RD visit.    Diagnoses and all orders for this visit:    Class 2 obesity without serious comorbidity with body mass index (BMI) of 38.0 to 38.9 in adult  -     tirzepatide (Zepbound) 2.5 mg/0.5 mL auto-injector; Inject 0.5 mL (2.5 mg total) under the skin once a week for 28  days    Anxiety    Hiatal hernia          Subjective:   Chief Complaint   Patient presents with    Consult     MWM Consult; Waist 43in; Stop Bang 3/8       Patient ID: Beryl Cleary  is a 50 y.o. female with excess weight/obesity here to pursue weight management.    Past Medical History:   Diagnosis Date    Anxiety     Depression     Hiatal hernia 10/2023    History of chickenpox     Nasal polyps     Rheumatoid arthritis (HCC)     Sinusitis     Sjoegren syndrome     Thyroid nodule     Wears glasses        HPI: Here for MWM consult  She was seen prior for MWM. 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 She was feeling more anxiety with covid and about her weight    Works from home and gained 20lb.  She does eat if anxious.      She had seen Dr. Vineet Melton for hiatal hernia as well.  She does get nausea with it but it is controlled.  Not interested in surgery.  Feels she needed to change her thought process. She was working with [employer] and then started w/therapist. Anxiety is better now.  They have been working on emotional eating.      She did healthycore and also was on saxenda.  She was not taking it consist though.    Obesity/Excess Weight:  Severity:  BMI 38  Onset:  years    Modifiers: Diet and Exercise, Physician Supervised Weight Loss Program, Commercial Weight Loss Programs-ie. 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    Sleep:6   Cravings sweets      Weight History  Highest adult weight:: (Patient-Rptd) (P) 224 lbs  Lowest adult weight:: (Patient-Rptd) (P) 120 lbs  What is your goal weight?: (Patient-Rptd) (P) 150 lbs  How long have you struggled with maintaining  a healthy weight?: (Patient-Rptd) (P) Adult  What weight loss attempts have you had in the past?: (Patient-Rptd) (P) Diet, Exercise, Commercial Programs (Weight Watchers, Health Essentials, Etc), Prescription Medications  Which commercial programs have you tried?: (Patient-Rptd) (P) Weight Watchers    Food Behaviors  Do you have any of the following food related behaviors?: (Patient-Rptd) (P) Emotional Eating, Boredom Eating  Is there a trouble area of the day when these behaviors are more prominent?: (Patient-Rptd) (P) Yes  When:: (Patient-Rptd) (P) Evenings    Food History  Provide the time that you typically eat and common foods you eat for each meal/snack: : (Patient-Rptd) (P) Breakfast, Lunch, Dinner, Snack(s)  Provide the time and common foods you eat for breakfast:: (Patient-Rptd) (P) 8 am Cereal, pancakes, eggs  Provide the time and common foods you eat for lunch:: (Patient-Rptd) (P) 12 pm Sandwiches  Provide the time and common foods you eat for : (Patient-Rptd) (P) 6 pm  Provide the time(s) and common foods you eat for a snack:: (Patient-Rptd) (P) 10 am, 2pm, 8 pm  How often do you go out to eat or have take out?: (Patient-Rptd) (P) 2 days a week  Daily beverage intake, please select the beverages you consume daily and the amount(s):: (Patient-Rptd) (P) Water, Juice, Coffee  How many cups of water?: (Patient-Rptd) (P) 2  How many cups of juice?: (Patient-Rptd) (P) 2  How many cups of coffee?: (Patient-Rptd) (P) 3  Do you consume alcohol?: (Patient-Rptd) (P) Yes    Physical Activity   How often do you exercise?: (Patient-Rptd) (P) Never    Sleep  How many hours of sleep are you getting per night?: (Patient-Rptd) (P) 6  How would you rate your quality of sleep?: (Patient-Rptd) (P) Fair  Do you have a history of sleep apnea?: (Patient-Rptd) (P) No    Family/Social History  Do you have a family history of obesity?: (Patient-Rptd) (P) Yes  Who in your family?: (Patient-Rptd) (P) Parents    Gynecological History  If  of childbearing age, are you using birth control?: (Patient-Rptd) (P) Yes  What type of birth control?: (Patient-Rptd) (P) Iud  Menopausal status?: (Patient-Rptd) (P) Premenopause      The following portions of the patient's history were reviewed and updated as appropriate: She  has a past medical history of Anxiety, Depression, Hiatal hernia (10/2023), History of chickenpox, Nasal polyps, Rheumatoid arthritis (HCC), Sinusitis, Sjoegren syndrome, Thyroid nodule, and Wears glasses.  She   Patient Active Problem List    Diagnosis Date Noted    Palpitations 08/22/2024    PONV (postoperative nausea and vomiting) 11/08/2023    Chronic seasonal allergic rhinitis due to pollen 08/17/2023    Allergic rhinitis due to house dust mite 08/17/2023    Hiatal hernia 08/15/2023    Right sided sciatica 06/30/2022    Anxiety and depression 12/16/2021    Class 2 obesity without serious comorbidity with body mass index (BMI) of 38.0 to 38.9 in adult 07/29/2021    Chronic frontal sinusitis 03/24/2021    Herpes labialis 04/27/2020    Severe frontal headaches 02/13/2020    Chronic pansinusitis 01/15/2020    Deviated nasal septum 01/15/2020    Hypertrophy of both inferior nasal turbinates 01/15/2020    Tendonitis 06/25/2019    Allergic rhinitis 06/29/2018    Anxiety 06/29/2018    Inflammatory polyarthropathy (HCC) 06/29/2018    RA (rheumatoid arthritis) (HCC) 06/29/2018    Depressed mood 02/14/2017    Secondary adrenal insufficiency (HCC) 02/14/2017    Thyroid nodule, uninodular 02/14/2017    Sjogren's syndrome (HCC) 12/31/2016    Acute adrenal insufficiency (HCC) 12/30/2016    Anemia 12/19/2016    Leucocytosis 12/17/2016    Varicose vein 03/10/2015     She  has a past surgical history that includes Cholecystectomy; Venous ablation (Left); pr septoplasty/submucous resecj w/wo cartilage grf (N/A, 06/30/2020); Nasal/sinus endoscopy (N/A, 06/30/2020); [place] tooth extraction; US guided thyroid biopsy (07/31/2017); and EGD (10/2023).  Her  family history includes Breast cancer (age of onset: 61) in her mother; Diabetes in her father; Esophageal cancer in her father; Liver cancer in her mother; No Known Problems in her brother, brother, daughter, maternal aunt, maternal aunt, maternal aunt, maternal grandmother, paternal grandmother, son, and son; Rheum arthritis in her mother; Sjogren's syndrome in her mother; Stroke in an other family member.  She  reports that she quit smoking about 19 years ago. Her smoking use included cigarettes. She started smoking about 39 years ago. She has a 20 pack-year smoking history. She has never used smokeless tobacco. She reports current alcohol use. She reports that she does not use drugs.  Current Outpatient Medications   Medication Sig Dispense Refill    acetaminophen (TYLENOL) 500 mg tablet Take 1,000 mg by mouth every 6 (six) hours as needed for mild pain      albuterol (2.5 mg/3 mL) 0.083 % nebulizer solution Take 3 mL (2.5 mg total) by nebulization every 6 (six) hours as needed for wheezing or shortness of breath 180 mL 5    busPIRone (BUSPAR) 5 mg tablet Take 1 tablet (5 mg total) by mouth 2 (two) times a day as needed (anxiety) 60 tablet 1    celecoxib (CeleBREX) 100 mg capsule Take 1 capsule (100 mg total) by mouth 2 (two) times a day (Patient taking differently: Take 100 mg by mouth if needed) 60 capsule 0    Cholecalciferol (Vitamin D3) 25 MCG TABS Take by mouth      estradiol (Vivelle-Dot) 0.0375 MG/24HR Place 1 patch on the skin 2 (two) times a week 24 patch 3    FLUoxetine (PROzac) 20 mg capsule Take 2 capsules (40 mg total) by mouth daily 180 capsule 1    fluticasone (FLONASE) 50 mcg/act nasal spray 1 spray into each nostril 2 (two) times a day (Patient taking differently: 1 spray into each nostril if needed) 16 g 1    gabapentin (Neurontin) 100 mg capsule Take 1 capsule (100 mg total) by mouth 3 (three) times a day As needed for numbness (Patient taking differently: Take 100 mg by mouth if needed As  needed for numbness) 90 capsule 6    hydroxychloroquine (PLAQUENIL) 200 mg tablet Take 1 tablet (200 mg total) by mouth 2 (two) times a day 180 tablet 2    hydrOXYzine pamoate (VISTARIL) 25 mg capsule Take 1 capsule (25 mg total) by mouth daily at bedtime as needed for anxiety 30 capsule 1    levonorgestrel (MIRENA) 20 MCG/24HR IUD 1 each by Intrauterine route once      MAGNESIUM CITRATE PO Take by mouth      montelukast (SINGULAIR) 10 mg tablet Take 1 tablet (10 mg total) by mouth daily at bedtime (Patient taking differently: Take 10 mg by mouth if needed) 30 tablet 4    omeprazole (PriLOSEC) 20 mg delayed release capsule Take 1 capsule (20 mg total) by mouth 2 (two) times a day 180 capsule 3    tirzepatide (Zepbound) 2.5 mg/0.5 mL auto-injector Inject 0.5 mL (2.5 mg total) under the skin once a week for 28 days 2 mL 0    valACYclovir (VALTREX) 1,000 mg tablet Take 2 tablets by mouth every 12 hours x 1 days (Patient taking differently: if needed Take 2 tablets by mouth every 12 hours x 1 days) 4 tablet 3    Levocetirizine Dihydrochloride (XYZAL PO) Take by mouth (Patient not taking: Reported on 3/4/2025)      Olopatadine HCl 0.6 % SOLN 2 actuation into each nostril daily (Patient not taking: Reported on 3/4/2025) 30.5 g 11     No current facility-administered medications for this visit.     Current Outpatient Medications on File Prior to Visit   Medication Sig    acetaminophen (TYLENOL) 500 mg tablet Take 1,000 mg by mouth every 6 (six) hours as needed for mild pain    albuterol (2.5 mg/3 mL) 0.083 % nebulizer solution Take 3 mL (2.5 mg total) by nebulization every 6 (six) hours as needed for wheezing or shortness of breath    busPIRone (BUSPAR) 5 mg tablet Take 1 tablet (5 mg total) by mouth 2 (two) times a day as needed (anxiety)    celecoxib (CeleBREX) 100 mg capsule Take 1 capsule (100 mg total) by mouth 2 (two) times a day (Patient taking differently: Take 100 mg by mouth if needed)    Cholecalciferol (Vitamin  D3) 25 MCG TABS Take by mouth    estradiol (Vivelle-Dot) 0.0375 MG/24HR Place 1 patch on the skin 2 (two) times a week    FLUoxetine (PROzac) 20 mg capsule Take 2 capsules (40 mg total) by mouth daily    fluticasone (FLONASE) 50 mcg/act nasal spray 1 spray into each nostril 2 (two) times a day (Patient taking differently: 1 spray into each nostril if needed)    gabapentin (Neurontin) 100 mg capsule Take 1 capsule (100 mg total) by mouth 3 (three) times a day As needed for numbness (Patient taking differently: Take 100 mg by mouth if needed As needed for numbness)    hydroxychloroquine (PLAQUENIL) 200 mg tablet Take 1 tablet (200 mg total) by mouth 2 (two) times a day    hydrOXYzine pamoate (VISTARIL) 25 mg capsule Take 1 capsule (25 mg total) by mouth daily at bedtime as needed for anxiety    levonorgestrel (MIRENA) 20 MCG/24HR IUD 1 each by Intrauterine route once    MAGNESIUM CITRATE PO Take by mouth    montelukast (SINGULAIR) 10 mg tablet Take 1 tablet (10 mg total) by mouth daily at bedtime (Patient taking differently: Take 10 mg by mouth if needed)    omeprazole (PriLOSEC) 20 mg delayed release capsule Take 1 capsule (20 mg total) by mouth 2 (two) times a day    valACYclovir (VALTREX) 1,000 mg tablet Take 2 tablets by mouth every 12 hours x 1 days (Patient taking differently: if needed Take 2 tablets by mouth every 12 hours x 1 days)    Levocetirizine Dihydrochloride (XYZAL PO) Take by mouth (Patient not taking: Reported on 3/4/2025)    Olopatadine HCl 0.6 % SOLN 2 actuation into each nostril daily (Patient not taking: Reported on 3/4/2025)     No current facility-administered medications on file prior to visit.     She is allergic to doxycycline and nsaids..    Review of Systems   Constitutional:  Negative for fever.   Respiratory:  Negative for shortness of breath.    Cardiovascular:  Negative for chest pain and palpitations.   Gastrointestinal:  Negative for abdominal pain, constipation, diarrhea and  "vomiting.   Genitourinary:  Negative for difficulty urinating.   Skin:  Negative for rash.   Neurological:  Negative for headaches.   Psychiatric/Behavioral:  Negative for dysphoric mood. The patient is nervous/anxious.        Objective:    /72 (BP Location: Left arm, Patient Position: Sitting)   Pulse 103   Resp 16   Ht 5' 3.5\" (1.613 m)   Wt 100 kg (220 lb 6.4 oz)   BMI 38.43 kg/m²     Physical Exam  Vitals and nursing note reviewed.   Constitutional:       General: She is not in acute distress.     Appearance: She is well-developed. She is obese.   HENT:      Head: Normocephalic and atraumatic.   Eyes:      Conjunctiva/sclera: Conjunctivae normal.   Neck:      Thyroid: No thyromegaly.   Pulmonary:      Effort: Pulmonary effort is normal. No respiratory distress.   Skin:     Findings: No rash (visible).   Neurological:      Mental Status: She is alert and oriented to person, place, and time.   Psychiatric:         Mood and Affect: Mood normal.         Behavior: Behavior normal.         "

## 2025-03-04 NOTE — ASSESSMENT & PLAN NOTE
-Discussed options of HealthyCORE-Intensive Lifestyle Intervention Program, Very Low Calorie Diet-VLCD, and Conservative Program and the role of weight loss medications.Explained the importance of making lifestyle changes if utilizing medication to aid in weight loss  -was here prior in 2021 and did do part of healthycore  -Initial weight loss goal of 5-10% weight loss for improved health  -Screening labs and records reviewed from prior      -Patient is interested in pursuing conservative program with RD meal planning    Goals:  - To try to increase water intake  -recommend to have a protein with breakfast  -discussed restarting exercise with a goal of 2 x week    To start on zepbound. To start on initial dose of medication and then to titrate as tolerated.  They have tried more than 6 months of lifestyle modifications including diet and activity changes and has had insignificant weight loss of less than 1 lb a week. Patient denies personal and family history of MCT and MEN2 tumors. Patient denies personal history of pancreatitis. Side effects discussed but not limited to diarrhea, bloating, constipation, GI upset, heartburn, increased heart rate, headache, low blood sugar, fatigue and dizziness. Titration and medication administration discussed.  Medication agreement signed    Initial Weight:220.4

## 2025-03-04 NOTE — TELEPHONE ENCOUNTER
Called patient was able to schedule them for October and put them on the cancel list for tuesdays and Thursday after 3pm,. Patient also assured there will be non issues with medication refills

## 2025-03-07 ENCOUNTER — TELEPHONE (OUTPATIENT)
Dept: BARIATRICS | Facility: CLINIC | Age: 50
End: 2025-03-07

## 2025-03-07 NOTE — TELEPHONE ENCOUNTER
PA for Zepbound 2.5mg SUBMITTED to [employer]    via    [x]CMM-KEY: [id number]  []Surescripts-Case ID #   []Availity-Auth ID # NDC #   []Faxed to plan   []Other website   []Phone call Case ID #     []PA sent as URGENT    All office notes, labs and other pertaining documents and studies sent. Clinical questions answered. Awaiting determination from insurance company.     Turnaround time for your insurance to make a decision on your Prior Authorization can take 7-21 business days.

## 2025-03-07 NOTE — TELEPHONE ENCOUNTER
PA for Zepbound 2.5mg APPROVED     Date(s) approved 3/7/2025 - 3/7/2026    Case #    Patient advised by          [x]MyChart Message  []Phone call   []LMOM  []L/M to call office as no active Communication consent on file  []Unable to leave detailed message as VM not approved on Communication consent       Pharmacy advised by    [x]Fax  []Phone call  []Secure Chat    Specialty Pharmacy    []     Approval letter scanned into Media Yes

## 2025-03-10 ENCOUNTER — TELEMEDICINE (OUTPATIENT)
Dept: BEHAVIORAL/MENTAL HEALTH CLINIC | Facility: CLINIC | Age: 50
End: 2025-03-10
Payer: COMMERCIAL

## 2025-03-10 DIAGNOSIS — F41.9 ANXIETY AND DEPRESSION: Primary | ICD-10-CM

## 2025-03-10 DIAGNOSIS — F32.A ANXIETY AND DEPRESSION: Primary | ICD-10-CM

## 2025-03-10 PROCEDURE — 90837 PSYTX W PT 60 MINUTES: CPT | Performed by: COUNSELOR

## 2025-03-10 NOTE — PSYCH
Virtual Regular Visit    Verification of patient location:    Patient is located at Home in the following state in which I hold an active license [place]      Assessment/Plan:    Problem List Items Addressed This Visit       Anxiety and depression - Primary       Goals addressed in session: Goal 1     Depression Follow-up Plan Completed: No    Reason for visit is No chief complaint on file.       Encounter provider Cindy Ballesteros      Recent Visits  Date Type Provider Dept   03/10/25 Telemedicine Cindy Ballesteros [facility] Therapist [facility]   Showing recent visits within past 7 days and meeting all other requirements  Future Appointments  No visits were found meeting these conditions.  Showing future appointments within next 150 days and meeting all other requirements       The patient was identified by name and date of birth. Beryl Cleary was informed that this is a telemedicine visit and that the visit is being conducted throughthe Epic Embedded platform. She agrees to proceed..  My office door was closed. No one else was in the room.  She acknowledged consent and understanding of privacy and security of the video platform. The patient has agreed to participate and understands they can discontinue the visit at any time.    Patient is aware this is a billable service.     Subjective  Beryl Cleary is a 50 y.o. female  .      HPI     Past Medical History:   Diagnosis Date    Anxiety     Depression     Hiatal hernia 10/2023    History of chickenpox     Nasal polyps     Rheumatoid arthritis (HCC)     Sinusitis     Sjoegren syndrome     Thyroid nodule     Wears glasses        Past Surgical History:   Procedure Laterality Date    CHOLECYSTECTOMY      EGD  10/2023    NASAL/SINUS ENDOSCOPY N/A 06/30/2020    Procedure: IMAGED GUIDED F.E.S.S.;  Surgeon: Baljit Cassidy DO;  [facility];  Service: ENT    HI SEPTOPLASTY/SUBMUCOUS RESECJ W/WO CARTILAGE GRF N/A 06/30/2020    Procedure: SEPTOPLASTY;  Surgeon: Baljit  DO Khanh;  Location: [facility];  Service: ENT    US GUIDED THYROID BIOPSY  07/31/2017    VENOUS ABLATION Left     lower extremity    WISDOM TOOTH EXTRACTION         Current Outpatient Medications   Medication Sig Dispense Refill    acetaminophen (TYLENOL) 500 mg tablet Take 1,000 mg by mouth every 6 (six) hours as needed for mild pain      albuterol (2.5 mg/3 mL) 0.083 % nebulizer solution Take 3 mL (2.5 mg total) by nebulization every 6 (six) hours as needed for wheezing or shortness of breath 180 mL 5    busPIRone (BUSPAR) 5 mg tablet Take 1 tablet (5 mg total) by mouth 2 (two) times a day as needed (anxiety) 60 tablet 1    celecoxib (CeleBREX) 100 mg capsule Take 1 capsule (100 mg total) by mouth 2 (two) times a day (Patient taking differently: Take 100 mg by mouth if needed) 60 capsule 0    Cholecalciferol (Vitamin D3) 25 MCG TABS Take by mouth      estradiol (Vivelle-Dot) 0.0375 MG/24HR Place 1 patch on the skin 2 (two) times a week 24 patch 3    FLUoxetine (PROzac) 20 mg capsule Take 2 capsules (40 mg total) by mouth daily 180 capsule 1    fluticasone (FLONASE) 50 mcg/act nasal spray 1 spray into each nostril 2 (two) times a day (Patient taking differently: 1 spray into each nostril if needed) 16 g 1    gabapentin (Neurontin) 100 mg capsule Take 1 capsule (100 mg total) by mouth 3 (three) times a day As needed for numbness (Patient taking differently: Take 100 mg by mouth if needed As needed for numbness) 90 capsule 6    hydroxychloroquine (PLAQUENIL) 200 mg tablet Take 1 tablet (200 mg total) by mouth 2 (two) times a day 180 tablet 2    hydrOXYzine pamoate (VISTARIL) 25 mg capsule Take 1 capsule (25 mg total) by mouth daily at bedtime as needed for anxiety 30 capsule 1    Levocetirizine Dihydrochloride (XYZAL PO) Take by mouth (Patient not taking: Reported on 3/4/2025)      levonorgestrel (MIRENA) 20 MCG/24HR IUD 1 each by Intrauterine route once      MAGNESIUM CITRATE PO Take by mouth      montelukast  (SINGULAIR) 10 mg tablet Take 1 tablet (10 mg total) by mouth daily at bedtime (Patient taking differently: Take 10 mg by mouth if needed) 30 tablet 4    Olopatadine HCl 0.6 % SOLN 2 actuation into each nostril daily (Patient not taking: Reported on 3/4/2025) 30.5 g 11    omeprazole (PriLOSEC) 20 mg delayed release capsule Take 1 capsule (20 mg total) by mouth 2 (two) times a day 180 capsule 3    tirzepatide (Zepbound) 2.5 mg/0.5 mL auto-injector Inject 0.5 mL (2.5 mg total) under the skin once a week for 28 days 2 mL 0    valACYclovir (VALTREX) 1,000 mg tablet Take 2 tablets by mouth every 12 hours x 1 days (Patient taking differently: if needed Take 2 tablets by mouth every 12 hours x 1 days) 4 tablet 3     No current facility-administered medications for this visit.        Allergies   Allergen Reactions    Doxycycline Hives    Nsaids Wheezing and Nasal Congestion     ASPIRIN and NSAID exacerbated respiratory disease (AERD) see allergist note on 2/28/24.  Typically COX2 are tolerated.  Tylenol is tolerated.  See CELECOXIB challenge on 8/8/24 she tolerated it without triggering AERD.         Review of Systems    Video Exam    There were no vitals filed for this visit.    Physical Exam       Behavioral Health Psychotherapy Progress Note    Psychotherapy Provided: Individual Psychotherapy     1. Anxiety and depression            Goals addressed in session: Goal 1     DATA: Client and therapist met for a virtual session. Client processed around weight loss goals and her new outlook on this. Client was able to explore pod casts around immediate gratification and addictions to phones. Client learned a lot and has changed her perspective on her weight loss goals. Client can see that she wanted immediate gratification and is now looking differently at her goals. Client wants to make real life changes and realizes that this takes commitment. Client endorses that last time she had not seen therapist and was still struggling  "with anxiety. Client feels more secure in her self and has less anxious thoughts due to meds and therapy. Client endorses that this will make this time different.  Therapist used active listening, open ended questions, reflection and validation.    During this session, this clinician used the following therapeutic modalities: Cognitive Behavioral Therapy    Substance Abuse was not addressed during this session. If the client is diagnosed with a co-occurring substance use disorder, please indicate any changes in the frequency or amount of use: N/A. Stage of change for addressing substance use diagnoses: No substance use/Not applicable    ASSESSMENT:  Beryl Cleary presents with a Euthymic/ normal mood.     her affect is Normal range and intensity, which is congruent, with her mood and the content of the session. The client has made progress on their goals.    Client is using coping skills.  Beryl Cleary presents with a none risk of suicide, none risk of self-harm, and none risk of harm to others.    For any risk assessment that surpasses a \"low\" rating, a safety plan must be developed.    A safety plan was indicated: no  If yes, describe in detail N/A    PLAN: Between sessions, Beryl Cleary will continue therapy. At the next session, the therapist will use Cognitive Behavioral Therapy and Mindfulness-based Strategies to address anxiety, depression.    Behavioral Health Treatment Plan and Discharge Planning: Beryl Cleary is aware of and agrees to continue to work on their treatment plan. They have identified and are working toward their discharge goals. yes    Depression Follow-up Plan Completed: Not applicable    Visit start and stop times:    03/10/25  Start Time: 1600  Stop Time: 1653  Total Visit Time: 53 minutes  "

## 2025-03-11 ENCOUNTER — CLINICAL SUPPORT (OUTPATIENT)
Dept: BARIATRICS | Facility: CLINIC | Age: 50
End: 2025-03-11
Payer: COMMERCIAL

## 2025-03-11 VITALS — WEIGHT: 222.8 LBS | HEIGHT: 64 IN | BODY MASS INDEX: 38.04 KG/M2

## 2025-03-11 DIAGNOSIS — E66.812 CLASS 2 OBESITY DUE TO EXCESS CALORIES WITHOUT SERIOUS COMORBIDITY WITH BODY MASS INDEX (BMI) OF 38.0 TO 38.9 IN ADULT: Primary | ICD-10-CM

## 2025-03-11 DIAGNOSIS — E66.09 CLASS 2 OBESITY DUE TO EXCESS CALORIES WITHOUT SERIOUS COMORBIDITY WITH BODY MASS INDEX (BMI) OF 38.0 TO 38.9 IN ADULT: Primary | ICD-10-CM

## 2025-03-11 PROCEDURE — S9470 NUTRITIONAL COUNSELING, DIET: HCPCS | Performed by: DIETITIAN, REGISTERED

## 2025-03-11 PROCEDURE — RECHECK: Performed by: DIETITIAN, REGISTERED

## 2025-03-11 NOTE — PROGRESS NOTES
Weight Management Medical Nutrition Assessment  Beryl presented today for meal-planning session. Today she weighed 222.8#; she weighed  220.4# 3/4/25 at visit with provider in [place]. Pertinent documented medical hx includes anxiety, hiatal hernia, RA, and  Sjogren's syndrome, which she endorsed being mostly a non-issue at this point. Stated she took first dose of Zepbound approximately 30 minutes prior to today's appointment. 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 Dislikes food-logging and found it detrimental to her mindset. Diet recall reveals inadequate intake of fruits, vegetable, and protein with non-nutritive snacking. Provided Beryl with and reviewed meal-planning session materials including calorie-controlled, balanced meal plan. Beryl with good understanding. RD follow-up scheduled for 4/8/25.       Patient seen by Medical Provider in past 6 months:  Yes   Requested to schedule appointment with Medical Provider: No        Anthropometric Measurements  Start Weight (#): 194.6 6/3/21 at new start Healthy Core visit  Current Weight (#): 222.8#  TBW % Change from start weight: 14% gain  Ideal Body Weight (#):117.5  Goal Weight (#):150 - 160#     Weight Loss History  Previous weight loss attempts: Self Created Diets (Portion Control, Healthy Food Choices, etc.), Healthy Core program at [facility] (unable to complete program d/t being busy during COVID-19 pandemic, medications     Food and Nutrition Related History  Wake up: 5-6:30 am  Bed Time: 10:00 pm; sleeps well; no overnight eating; no dx sleep apnea      Food Recall  Breakfast: 8-8:30 AM- bowl of cereal (plain Cheerios or Raisin Bran with 2% milk) eggs/toast (2 slices with butter); if out for breakfast on a Sunday will have willett as well or pancakes or oatmeal; stated hungry in the morning, but that she has a lot of food inversions in the morning  Snack: 10-10:30 AM- chips or cookies or yogurt   Lunch: 12:30-1 PM- soup (can- chicken  "noodle) or leftovers or sandwich (PB and J) OR usually leftover   Snack:3pm- see above; stated usually \"junk\" such as cake or cookies  Dinner: 6-7 PM- fried chicken cutlets or flounder, starch, not usually a vegetable; not always dessert   Snack: hot chocolate or cookies or popcorn or chips (feels like she needs a bowl of something to snack on)         Beverages: coffee- 20 oz with Equal and sf vanilla creamer; water- no more than 16 oz, lemonade or ice tea (not sf)- no amount specified; ETOH- maybe 1/4 glass of wine maybe one monthly   Volume of beverage intake:  >36 oz     Weekends: Same  Cravings: sweet or salty  Trouble area of day: 10 AM- 12 PM  and 1-3 or 4 PM ; looks for food when bored     Frequency of Eating out: at least twice weekly   Food restrictions: avoids scallops d/t having had an allergic reaction, but stated she was told she is not allergic  Cooking:   Food Shopping:       Physical Activity Intake  Activity walking since weather improved, but not consistently   Frequency:infrequently  Physical limitations/barriers to exercise: none     Estimated Needs  Energy     [place] [employer] Energy Needs: BMR  1618   1-2# loss weekly sedentary: 941-1441        1-2# loss weekly lightly active: 0055-8528  Maintenance calories for sedentary activity level: 1941  Protein:64 - 80 g    (1.2-1.5g/kg IBW)  Total Fluid: 104 oz     (Ara-Segar Method)  Free Fluid: 83 oz ([place]-Segar Method - 20%)        Nutrition Diagnosis  Yes;    Overweight/obesity related to excess energy intake as evidenced by BMI more than normative standard for age and sex (obesity-grade II 35-39.9)      Nutrition Intervention     Nutrition Prescription  Calories: 7082-7508  Protein: ~65-80 g  Fluid:  oz        Nutrition Education:    Calorie controlled menu  Lean protein food choices  Healthy snack options  Food journaling tips        Nutrition Counseling:  Strategies: meal planning, portion sizes, healthy snack " choices, hydration, fiber intake, protein intake, exercise, food journal        Monitoring and Evaluation:  Evaluation criteria:  Energy Intake  Meet protein needs  Maintain adequate hydration  Monitor weekly weight  Meal planning/preparation  Food journal   Decreased portions at mealtimes and snacks  Physical activity      Barriers to learning:none  Readiness to change: getting ready to change behavior  Comprehension: good  Expected Compliance: good

## 2025-03-26 ENCOUNTER — TELEPHONE (OUTPATIENT)
Dept: PSYCHIATRY | Facility: CLINIC | Age: 50
End: 2025-03-26

## 2025-03-26 NOTE — TELEPHONE ENCOUNTER
Writer called to see if client wants to reschedule with Layo Gagnon as she has not been seen in some time. Client confirmed and would like a eve back tomorrow.    Writer will call back.

## 2025-03-31 ENCOUNTER — TELEMEDICINE (OUTPATIENT)
Dept: BEHAVIORAL/MENTAL HEALTH CLINIC | Facility: CLINIC | Age: 50
End: 2025-03-31
Payer: COMMERCIAL

## 2025-03-31 DIAGNOSIS — F41.9 ANXIETY AND DEPRESSION: Primary | ICD-10-CM

## 2025-03-31 DIAGNOSIS — F32.A ANXIETY AND DEPRESSION: Primary | ICD-10-CM

## 2025-03-31 PROCEDURE — 90837 PSYTX W PT 60 MINUTES: CPT | Performed by: COUNSELOR

## 2025-03-31 NOTE — PSYCH
"Virtual Regular VisitName: Beryl Cleary      : 1975      MRN: [id number]  Encounter Provider: Cindy Ballesteros  Encounter Date: 3/31/2025   Encounter department: [facility] THERAPIST MENTAL HEALTH OUTPATIENT  :  Assessment & Plan  Anxiety and depression             Goals addressed in session: Goal 1     DATA: Client processed around relationships with children and how parents enabled them.  Client explores what kind of changes she would like to see in her kids and how to get them there. Client examines how she and  raised kids and what happened to their trajectory after divorce.  Client is going to make a list of expectations for kids and confront about what needs to change. Client is feeling anxious due to ex's recent surgery and renovations at home. Client is going to do something nice for self tonight. Therapist used active listening, open ended questions, reflection and validation.    During this session, this clinician used the following therapeutic modalities: Cognitive Behavioral Therapy    Substance Abuse was not addressed during this session. If the client is diagnosed with a co-occurring substance use disorder, please indicate any changes in the frequency or amount of use: NA. Stage of change for addressing substance use diagnoses: No substance use/Not applicable    ASSESSMENT:  Beryl presents with a Euthymic/ normal mood. Emilianas affect is Normal range and intensity, which is congruent, with their mood and the content of the session. The client has made progress on their goals as evidenced by Creating insight into changes needed.    Beryl presents with a none risk of suicide, none risk of self-harm, and none risk of harm to others.    For any risk assessment that surpasses a \"low\" rating, a safety plan must be developed.    A safety plan was indicated: no  If yes, describe in detail NA    PLAN: Between sessions, Beryl will make a list of expectations for kids. At the " next session, the therapist will use Cognitive Behavioral Therapy, Mindfulness-based Strategies, and Motivational Interviewing to address anxiety and depression.    Behavioral Health Treatment Plan [facility]: Diagnosis and Treatment Plan explained to Beryl, Beryl relates understanding diagnosis and is agreeable to Treatment Plan. Yes     Depression Follow-up Plan Completed: Not applicable     Reason for visit is No chief complaint on file.     Recent Visits  No visits were found meeting these conditions.  Showing recent visits within past 7 days and meeting all other requirements  Today's Visits  Date Type Provider Dept   03/31/25 Telemedicine Cindy Ballesteros [facility] Therapist op   Showing today's visits and meeting all other requirements  Future Appointments  No visits were found meeting these conditions.  Showing future appointments within next 150 days and meeting all other requirements     History of Present Illness     HPI    Past Medical History   Past Medical History:   Diagnosis Date    Anxiety     Depression     Hiatal hernia 10/2023    History of chickenpox     Nasal polyps     Rheumatoid arthritis (HCC)     Sinusitis     Sjoegren syndrome     Thyroid nodule     Wears glasses      Past Surgical History:   Procedure Laterality Date    CHOLECYSTECTOMY      EGD  10/2023    NASAL/SINUS ENDOSCOPY N/A 06/30/2020    Procedure: IMAGED GUIDED F.E.S.S.;  Surgeon: Baljit Cassidy DO;  [facility];  Service: ENT    ND SEPTOPLASTY/SUBMUCOUS RESECJ W/WO CARTILAGE GRF N/A 06/30/2020    Procedure: SEPTOPLASTY;  Surgeon: Baljit Cassidy DO;  [facility];  Service: ENT    US GUIDED THYROID BIOPSY  07/31/2017    VENOUS ABLATION Left     lower extremity    WISDOM TOOTH EXTRACTION       Current Outpatient Medications   Medication Instructions    acetaminophen (TYLENOL) 1,000 mg, Every 6 hours PRN    albuterol 2.5 mg, Nebulization, Every 6 hours PRN    busPIRone (BUSPAR) 5 mg, Oral, 2 times daily PRN     celecoxib (CELEBREX) 100 mg, Oral, 2 times daily    Cholecalciferol (Vitamin D3) 25 MCG TABS Take by mouth    estradiol (Vivelle-Dot) 0.0375 MG/24HR 1 patch, Transdermal, 2 times weekly    FLUoxetine (PROZAC) 40 mg, Oral, Daily    fluticasone (FLONASE) 50 mcg/act nasal spray 1 spray, Nasal, 2 times daily    gabapentin (NEURONTIN) 100 mg, Oral, 3 times daily, As needed for numbness    hydroxychloroquine (PLAQUENIL) 200 mg, Oral, 2 times daily    hydrOXYzine pamoate (VISTARIL) 25 mg, Oral, Daily at bedtime PRN    Levocetirizine Dihydrochloride (XYZAL PO) Take by mouth    levonorgestrel (MIRENA) 20 MCG/24HR IUD 1 each, Once    MAGNESIUM CITRATE PO Take by mouth    montelukast (SINGULAIR) 10 mg, Oral, Daily at bedtime    Olopatadine HCl 0.6 % SOLN 2 actuation, Nasal, Daily    omeprazole (PRILOSEC) 20 mg, Oral, 2 times daily    valACYclovir (VALTREX) 1,000 mg tablet Take 2 tablets by mouth every 12 hours x 1 days    Zepbound 2.5 mg, Subcutaneous, Weekly     Allergies   Allergen Reactions    Doxycycline Hives    Nsaids Wheezing and Nasal Congestion     ASPIRIN and NSAID exacerbated respiratory disease (AERD) see allergist note on 2/28/24.  Typically COX2 are tolerated.  Tylenol is tolerated.  See CELECOXIB challenge on 8/8/24 she tolerated it without triggering AERD.         Objective   There were no vitals taken for this visit.    Video Exam  Physical Exam     Administrative Statements   Encounter provider Cindy Ballesteros    The Patient is located at Home and in the following state in which I hold an active license [place].    The patient was identified by name and date of birth. Beryl Cleary was informed that this is a telemedicine visit and that the visit is being conducted through the Epic Embedded platform. She agrees to proceed..  My office door was closed. No one else was in the room.  She acknowledged consent and understanding of privacy and security of the video platform. The patient has agreed to participate and  understands they can discontinue the visit at any time.    I have spent a total time of 57 minutes in caring for this patient on the day of the visit/encounter including Counseling / Coordination of care, not including the time spent for establishing the audio/video connection.    Visit Time  Start Time: 1600  Stop Time: 1657  Total Visit Time: 57 minutes

## 2025-04-01 ENCOUNTER — TELEPHONE (OUTPATIENT)
Dept: BARIATRICS | Facility: CLINIC | Age: 50
End: 2025-04-01

## 2025-04-01 DIAGNOSIS — E66.812 CLASS 2 OBESITY WITHOUT SERIOUS COMORBIDITY WITH BODY MASS INDEX (BMI) OF 38.0 TO 38.9 IN ADULT: Primary | ICD-10-CM

## 2025-04-01 RX ORDER — TIRZEPATIDE 5 MG/.5ML
5 INJECTION, SOLUTION SUBCUTANEOUS WEEKLY
Qty: 2 ML | Refills: 1 | Status: SHIPPED | OUTPATIENT
Start: 2025-04-01 | End: 2025-05-27

## 2025-04-01 NOTE — TELEPHONE ENCOUNTER
How are you tolerating the medication?   [] Nausea  [] Vomiting  [] Diarrhea  [x] Asymptomatic  [] Other:    Last visit weight:unsure    Current weight:212    Date of last injection:04/01/20250    How many injections do you have left: 0    Pt is currently on zepbound 2.5mg and is interested in moving up in dose, if appropriate, please send script to [facility]

## 2025-04-04 ENCOUNTER — TELEPHONE (OUTPATIENT)
Dept: PSYCHIATRY | Facility: CLINIC | Age: 50
End: 2025-04-04

## 2025-04-04 ENCOUNTER — TELEMEDICINE (OUTPATIENT)
Dept: PSYCHIATRY | Facility: CLINIC | Age: 50
End: 2025-04-04
Payer: COMMERCIAL

## 2025-04-04 DIAGNOSIS — F41.1 GAD (GENERALIZED ANXIETY DISORDER): ICD-10-CM

## 2025-04-04 PROCEDURE — 99214 OFFICE O/P EST MOD 30 MIN: CPT

## 2025-04-04 RX ORDER — FLUOXETINE HYDROCHLORIDE 40 MG/1
40 CAPSULE ORAL DAILY
Qty: 90 CAPSULE | Refills: 1 | Status: SHIPPED | OUTPATIENT
Start: 2025-04-04

## 2025-04-04 NOTE — PSYCH
This note was not shared with the patient due to reasonable likelihood of causing patient harm    Virtual Regular Visit    Problem List Items Addressed This Visit    None      Reason for visit is No chief complaint on file.    Encounter provider JAMSHID Stoddard    Provider located at [facility]  [address] [place] [address]  [phone]    Administrative Statements   Encounter provider JAMSHID Stoddard    The Patient is located at Home and in the following state in which I hold an active license [place].    The patient was identified by name and date of birth. Beryl Cleary was informed that this is a telemedicine visit and that the visit is being conducted through the Epic Embedded platform. She agrees to proceed..  My office door was closed. No one else was in the room.  She acknowledged consent and understanding of privacy and security of the video platform. The patient has agreed to participate and understands they can discontinue the visit at any time.    I have spent a total time of 15 minutes in caring for this patient on the day of the visit/encounter including Risks and benefits of tx options, Instructions for management, Patient and family education, and Importance of tx compliance, not including the time spent for establishing the audio/video connection.    Assessment & Plan  OZZIE (generalized anxiety disorder)  Continue Prozac to 40mg po daily  Continue Buspar 5mg po bid prn  Follow up in 6 months          Diagnosis:   Generalized anxiety disorder       Chief Complaint: Medication management    HPI   Patient is being treated for OZZIE. She is observed on prozac 40mg po daily and buspar 5mg po bid prn. Patient tolerates the medications well, has been compliant and denies any side effects. Patient reports she has been doing well since the last appointment in September 2024. Mood has been stable. Patient reports her depression is  "\"practically none\" and anxiety 5/10 on most days. No recently panic attacks. Patient reports her anxiety has improved and hasn't needed to take buspar. Sleep is adequate. Appetite less with Zepbound, also losing weight gradually. Feeling more tired lately but good motivation. She denies si/hi.      Review Of Systems:     Constitutional Negative   ENT Negative   Cardiovascular Negative   Respiratory Negative   Gastrointestinal Negative   Genitourinary Perimenopausal   Musculoskeletal Rheumatoid Arthritis    Integumentary Negative   Neurological Negative   Endocrine Negative     Past Medical History:  Patient Active Problem List   Diagnosis    Acute adrenal insufficiency (HCC)    Allergic rhinitis    Anemia    Anxiety    Depressed mood    Inflammatory polyarthropathy (HCC)    Leucocytosis    RA (rheumatoid arthritis) (HCC)    Secondary adrenal insufficiency (HCC)    Thyroid nodule, uninodular    Varicose vein    Sjogren's syndrome (HCC)    Tendonitis    Chronic pansinusitis    Deviated nasal septum    Hypertrophy of both inferior nasal turbinates    Severe frontal headaches    Herpes labialis    Chronic frontal sinusitis    Class 1 obesity    Anxiety and depression    Right sided sciatica    Hiatal hernia    Chronic seasonal allergic rhinitis due to pollen    Allergic rhinitis due to house dust mite    PONV (postoperative nausea and vomiting)    Screening for diabetes mellitus    Screening for cardiovascular condition    Routine adult health maintenance    Screening for colon cancer    Palpitations       Current Outpatient Medications on File Prior to Visit   Medication Sig Dispense Refill    acetaminophen (TYLENOL) 500 mg tablet Take 1,000 mg by mouth every 6 (six) hours as needed for mild pain      albuterol (2.5 mg/3 mL) 0.083 % nebulizer solution Take 3 mL (2.5 mg total) by nebulization every 6 (six) hours as needed for wheezing or shortness of breath 180 mL 5    celecoxib (CeleBREX) 100 mg capsule Take 1 capsule " (100 mg total) by mouth 2 (two) times a day 60 capsule 0    Cholecalciferol (Vitamin D3) 25 MCG TABS Take by mouth      estradiol (Vivelle-Dot) 0.0375 MG/24HR Place 1 patch on the skin 2 (two) times a week 24 patch 3    FLUoxetine (PROzac) 20 mg capsule Take 1 capsule (20 mg total) by mouth daily After finishing prozac 10mg x7 days 30 capsule 1    fluticasone (FLONASE) 50 mcg/act nasal spray 1 spray into each nostril 2 (two) times a day 16 g 1    gabapentin (Neurontin) 100 mg capsule Take 1 capsule (100 mg total) by mouth 3 (three) times a day As needed for numbness 90 capsule 6    hydroxychloroquine (PLAQUENIL) 200 mg tablet Take 1 tablet (200 mg total) by mouth 2 (two) times a day 180 tablet 2    hydrOXYzine pamoate (VISTARIL) 25 mg capsule Take 1 capsule (25 mg total) by mouth daily at bedtime as needed for anxiety 30 capsule 1    Levocetirizine Dihydrochloride (XYZAL PO) Take by mouth      levonorgestrel (MIRENA) 20 MCG/24HR IUD 1 each by Intrauterine route once      MAGNESIUM CITRATE PO Take by mouth      montelukast (SINGULAIR) 10 mg tablet Take 1 tablet (10 mg total) by mouth daily at bedtime 30 tablet 4    Olopatadine HCl 0.6 % SOLN 2 actuation into each nostril daily 30.5 g 11    omeprazole (PriLOSEC) 20 mg delayed release capsule Take 1 capsule (20 mg total) by mouth 2 (two) times a day 180 capsule 3    valACYclovir (VALTREX) 1,000 mg tablet Take 2 tablets by mouth every 12 hours x 1 days 4 tablet 0     No current facility-administered medications on file prior to visit.       Allergies:  Allergies   Allergen Reactions    Doxycycline Hives    Nsaids Wheezing and Nasal Congestion     ASPIRIN and NSAID exacerbated respiratory disease (AERD) see allergist note on 2/28/24.  Typically COX2 are tolerated.  Tylenol is tolerated.  See CELECOXIB challenge on 8/8/24 she tolerated it without triggering AERD.         Past Surgical History:  Past Surgical History:   Procedure Laterality Date    CHOLECYSTECTOMY      EGD   10/2023    NASAL/SINUS ENDOSCOPY N/A 06/30/2020    Procedure: IMAGED GUIDED F.E.S.S.;  Surgeon: Baljit Cassidy DO;  Location: [facility];  Service: ENT    RI SEPTOPLASTY/SUBMUCOUS RESECJ W/WO CARTILAGE GRF N/A 06/30/2020    Procedure: SEPTOPLASTY;  Surgeon: Baljit Cassidy DO;  Location: [facility];  Service: ENT    US GUIDED THYROID BIOPSY  07/31/2017    VENOUS ABLATION Left     lower extremity    WISDOM TOOTH EXTRACTION       Rheumatoid arthritis - plaquenil   Started estradial patch     Past Psychiatric History:    No psych hospitalization   No hx of SA  Currently on no psychotropics      Past Medication Trials: Celexa, Lexapro, Prozac  Current Psychiatric Medications: none    Family Psychiatric History:   Mom - undiagnosed anxiety and depression, hx of alcoholism,   Father - some sort of psychiatric history but was never talked about   Brother - drug addict   Younger brother - anxiety attacks     Social History:   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     Objective:  There were no vitals filed for this visit.      Weight (last 2 days)       None            Mental status:  Appearance sitting comfortably in chair   Mood Euthymic   Affect Mood-congruent   Speech Normal rate, rhythm, and volume   Thought Processes Linear and goal directed   Associations intact associations   Hallucinations Denies any auditory or visual hallucinations   Thought Content No passive or active suicidal or homicidal ideation, intent, or plan.    Orientation Oriented to person, place, time, and situation   Recent and Remote Memory Grossly intact   Attention Span and Concentration Concentration intact   Intellect Appears to be of Average Intelligence   Insight Insight intact   Judgement judgment was intact   Muscle Strength Muscle strength and tone were normal   Language Within normal limits   Fund of Knowledge Age appropriate   Pain None     PHQ-A Screening                       Risks, Benefits And Possible Side Effects Of Medications:  Risks, benefits, and possible side effects of medications explained to patient and family, they verbalize understanding    Controlled Medication Discussion: No records found for controlled prescriptions according to [place] Prescription Drug Monitoring Program.     Visit Time    Visit Start Time: 8:30 AM  Visit Stop Time: 8:45 AM  Total Visit Duration: 15 minutes

## 2025-04-07 DIAGNOSIS — K44.9 HIATAL HERNIA: ICD-10-CM

## 2025-04-08 ENCOUNTER — CLINICAL SUPPORT (OUTPATIENT)
Dept: BARIATRICS | Facility: CLINIC | Age: 50
End: 2025-04-08
Payer: COMMERCIAL

## 2025-04-08 VITALS — BODY MASS INDEX: 36.88 KG/M2 | WEIGHT: 216 LBS | HEIGHT: 64 IN

## 2025-04-08 DIAGNOSIS — E66.812 CLASS 2 OBESITY DUE TO EXCESS CALORIES WITH BODY MASS INDEX (BMI) OF 37.0 TO 37.9 IN ADULT, UNSPECIFIED WHETHER SERIOUS COMORBIDITY PRESENT: Primary | ICD-10-CM

## 2025-04-08 DIAGNOSIS — E66.09 CLASS 2 OBESITY DUE TO EXCESS CALORIES WITH BODY MASS INDEX (BMI) OF 37.0 TO 37.9 IN ADULT, UNSPECIFIED WHETHER SERIOUS COMORBIDITY PRESENT: Primary | ICD-10-CM

## 2025-04-08 PROCEDURE — RECHECK: Performed by: DIETITIAN, REGISTERED

## 2025-04-08 PROCEDURE — S9470 NUTRITIONAL COUNSELING, DIET: HCPCS | Performed by: DIETITIAN, REGISTERED

## 2025-04-08 RX ORDER — OMEPRAZOLE 20 MG/1
20 CAPSULE, DELAYED RELEASE ORAL 2 TIMES DAILY
Qty: 180 CAPSULE | Refills: 1 | Status: SHIPPED | OUTPATIENT
Start: 2025-04-08 | End: 2025-10-05

## 2025-04-08 NOTE — PROGRESS NOTES
Weight Management Medical Nutrition Assessment  Beryl presented today for follow-up meal-planning session. Today she weighed 216# which reflects a loss of 6.8# (3.1%) since 3/11/25. Started 5 mg Zepbound today. Stated experiencing no food noise which she finds is amazing. Noted certain foods no longer taste the same. Has slight food aversion. Feels she now has control over food b/c it is not nagging her. Trying to use plate method. For the most part, trying to prioritize protein. Dislikes Premier protein shakes.Feels she is not drinking enough water and is not exercising.  Provided her with Key Things to Know While Taking GLP-1 Injections as she noted she sometime forgets to eat. Suggested she set a reminder to do so. Her goal is to take a 10 minutes walk most days.       Patient seen by Medical Provider in past 6 months:  Yes   Requested to schedule appointment with Medical Provider: No        Anthropometric Measurements  Start Weight (#): 194.6 6/3/21 at new start Healthy Core visit  Current Weight (#): 216#  TBW % Change from start weight:3.1%  Ideal Body Weight (#):117.5  Goal Weight (#):150 - 160#     Weight Loss History  Previous weight loss attempts: Self Created Diets (Portion Control, Healthy Food Choices, etc.), Healthy Core program at [facility] (unable to complete program d/t being busy during COVID-19 pandemic, medications     Food and Nutrition Related History  Wake up: 5-6:30 am  Bed Time: 10:00 pm; sleeps well; no overnight eating; no dx sleep apnea      Food Recall  Breakfast: 8-10 AM- yogurt or cottage cheese or oatmeal, may also have fruit  Snack: 11 AM- Core Power protein shake  Lunch: 1 PM- salad with chicken OR turkey sandwich OR shrimp and veg    Snack:3pm-may have fruit or popcorn  Dinner: 6-7 PM- rotisserie chicken and veg or salad or alone  Snack: rarely     Beverages: coffee- 20 oz with Equal and sf vanilla creamer; water- no more than 16 oz with flavoring;  lemonade or ice tea (not sf)-  no amount specified; Powerade sf or Propel- 16 oz,  ETOH- maybe 1/4 glass of wine maybe one monthly   Volume of beverage intake: 52 oz     Weekends: Same  Cravings: sweet or salty  Trouble area of day: 10 AM- 12 PM  and 1-3 or 4 PM ; looks for food when bored     Frequency of Eating out: at least twice weekly   Food restrictions: avoids scallops d/t having had an allergic reaction, but stated she was told she is not allergic  Cooking:   Food Shopping:       Physical Activity Intake  Activity: walking since weather improved, but not consistently   Frequency:infrequently  Physical limitations/barriers to exercise: none     Estimated Needs  Energy     Norberto Huff Energy Needs: BMR  1618   1-2# loss weekly sedentary: 941-1441        1-2# loss weekly lightly active: 8669-6178  Maintenance calories for sedentary activity level: 1941  Protein:64 - 80 g    (1.2-1.5g/kg IBW)  Total Fluid: 104 oz     ([place]-Segar Method)  Free Fluid: 83 oz ([place]-Segar Method - 20%)        Nutrition Diagnosis  Yes;    Overweight/obesity related to excess energy intake as evidenced by BMI more than normative standard for age and sex (obesity-grade II 35-39.9)      Nutrition Intervention     Nutrition Prescription  Calories: 6367-7208  Protein: ~65-80 g  Fluid:  oz    Meal Plan   Previously provided Beryl with [phone] kcal sample meal plan which remains appropriate.      Nutrition Education:    Calorie controlled menu  Lean protein food choices  Healthy snack options  Food journaling tips        Nutrition Counseling:  Strategies: meal planning, portion sizes, healthy snack choices, hydration, fiber intake, protein intake, exercise, food journal        Monitoring and Evaluation:  Evaluation criteria:  Energy Intake  Meet protein needs  Maintain adequate hydration  Monitor weekly weight  Meal planning/preparation  Food journal   Decreased portions at mealtimes and snacks  Physical activity      Barriers to  learning:none  Readiness to change: changing behavior  Comprehension: good  Expected Compliance: good

## 2025-04-14 ENCOUNTER — TELEMEDICINE (OUTPATIENT)
Dept: BEHAVIORAL/MENTAL HEALTH CLINIC | Facility: CLINIC | Age: 50
End: 2025-04-14
Payer: COMMERCIAL

## 2025-04-14 DIAGNOSIS — F32.A ANXIETY AND DEPRESSION: Primary | ICD-10-CM

## 2025-04-14 DIAGNOSIS — F41.9 ANXIETY AND DEPRESSION: Primary | ICD-10-CM

## 2025-04-14 PROCEDURE — 90837 PSYTX W PT 60 MINUTES: CPT | Performed by: COUNSELOR
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
## 2025-04-25 ENCOUNTER — TELEPHONE (OUTPATIENT)
Dept: BARIATRICS | Facility: CLINIC | Age: 50
End: 2025-04-25

## 2025-04-25 DIAGNOSIS — B00.1 HERPES LABIALIS: ICD-10-CM

## 2025-04-25 DIAGNOSIS — K44.9 HIATAL HERNIA: ICD-10-CM

## 2025-04-25 RX ORDER — OMEPRAZOLE 20 MG/1
20 CAPSULE, DELAYED RELEASE ORAL 2 TIMES DAILY
Qty: 180 CAPSULE | Refills: 0 | Status: SHIPPED | OUTPATIENT
Start: 2025-04-25 | End: 2025-10-22

## 2025-04-25 RX ORDER — VALACYCLOVIR HYDROCHLORIDE 1 G/1
TABLET, FILM COATED ORAL
Qty: 4 TABLET | Refills: 0 | Status: SHIPPED | OUTPATIENT
Start: 2025-04-25 | End: 2025-04-25

## 2025-04-25 NOTE — TELEPHONE ENCOUNTER
Patient called the RX Refill Line. Message is being forwarded to the office.     Patient is requesting the next dose of zepbound to be sent to \[facility]\"" pharmacy     Please contact patient at [phone]

## 2025-04-28 ENCOUNTER — TELEMEDICINE (OUTPATIENT)
Dept: BEHAVIORAL/MENTAL HEALTH CLINIC | Facility: CLINIC | Age: 50
End: 2025-04-28
Payer: COMMERCIAL

## 2025-04-28 DIAGNOSIS — F32.A ANXIETY AND DEPRESSION: Primary | ICD-10-CM

## 2025-04-28 DIAGNOSIS — F41.9 ANXIETY AND DEPRESSION: Primary | ICD-10-CM

## 2025-04-28 PROCEDURE — 90837 PSYTX W PT 60 MINUTES: CPT | Performed by: COUNSELOR

## 2025-04-28 NOTE — PSYCH
"Virtual Regular VisitName: Beryl Cleary      : 1975      MRN: [id number]  Encounter Provider: Cindy Ballesteros  Encounter Date: 2025   Encounter department: [facility] THERAPIST MENTAL HEALTH OUTPATIENT  :  Assessment & Plan  Anxiety and depression             Goals addressed in session: Goal 1     DATA: Client processed around relationships with her son and x . Client examined some of her ex's behaviors around money and identified things that he has done that she feels are manipulative.  Client processed on how her son is similar and only calls her for money. Client examined the ways in which she has contributed to son's behavior and how the divorce affected him. Client explored ways to have stronger boundaries with kids and ways to extricate them from her ex' financial grasp.    During this session, this clinician used the following therapeutic modalities: Cognitive Behavioral Therapy    Substance Abuse was not addressed during this session. If the client is diagnosed with a co-occurring substance use disorder, please indicate any changes in the frequency or amount of use: NA. Stage of change for addressing substance use diagnoses: No substance use/Not applicable    ASSESSMENT:  Beryl presents with a Euthymic/ normal mood. Emilianas affect is Normal range and intensity, which is congruent, with their mood and the content of the session. The client has made progress on their goals as evidenced by Client having stronger boundaries with kids.    Beryl presents with a none risk of suicide, none risk of self-harm, and none risk of harm to others.    For any risk assessment that surpasses a \"low\" rating, a safety plan must be developed.    A safety plan was indicated: no  If yes, describe in detail NA    PLAN: Between sessions, Beryl will continue therapy. At the next session, the therapist will use Cognitive Behavioral Therapy and Mindfulness-based Strategies to address anxiety, " depression.    Behavioral Health Treatment Plan [facility]: Diagnosis and Treatment Plan explained to Beryl, Beryl relates understanding diagnosis and is agreeable to Treatment Plan. Yes     Depression Follow-up Plan Completed: Not applicable     Reason for visit is No chief complaint on file.     Recent Visits  No visits were found meeting these conditions.  Showing recent visits within past 7 days and meeting all other requirements  Today's Visits  Date Type Provider Dept   04/28/25 Telemedicine Cindy Ballesteros [facility] Therapist [facility]   Showing today's visits and meeting all other requirements  Future Appointments  No visits were found meeting these conditions.  Showing future appointments within next 150 days and meeting all other requirements     History of Present Illness     HPI    Past Medical History   Past Medical History:   Diagnosis Date    Anxiety     Depression     Hiatal hernia 10/2023    History of chickenpox     Nasal polyps     Rheumatoid arthritis (HCC)     Sinusitis     Sjoegren syndrome     Thyroid nodule     Wears glasses      Past Surgical History:   Procedure Laterality Date    CHOLECYSTECTOMY      EGD  10/2023    NASAL/SINUS ENDOSCOPY N/A 06/30/2020    Procedure: IMAGED GUIDED F.E.S.S.;  Surgeon: Baljit Cassidy DO;  Location: [facility];  Service: ENT    AL SEPTOPLASTY/SUBMUCOUS RESECJ W/WO CARTILAGE GRF N/A 06/30/2020    Procedure: SEPTOPLASTY;  Surgeon: Baljit Cassidy DO;  Location: [facility];  Service: ENT    US GUIDED THYROID BIOPSY  07/31/2017    VENOUS ABLATION Left     lower extremity    WISDOM TOOTH EXTRACTION       Current Outpatient Medications   Medication Instructions    acetaminophen (TYLENOL) 1,000 mg, Every 6 hours PRN    albuterol 2.5 mg, Nebulization, Every 6 hours PRN    busPIRone (BUSPAR) 5 mg, Oral, 2 times daily PRN    celecoxib (CELEBREX) 100 mg, Oral, 2 times daily    Cholecalciferol (Vitamin D3) 25 MCG TABS Take by mouth    estradiol (Vivelle-Dot) 0.0375 MG/24HR  1 patch, Transdermal, 2 times weekly    FLUoxetine (PROZAC) 40 mg, Oral, Daily    fluticasone (FLONASE) 50 mcg/act nasal spray 1 spray, Nasal, 2 times daily    gabapentin (NEURONTIN) 100 mg, Oral, 3 times daily, As needed for numbness    hydroxychloroquine (PLAQUENIL) 200 mg, Oral, 2 times daily    hydrOXYzine pamoate (VISTARIL) 25 mg, Oral, Daily at bedtime PRN    Levocetirizine Dihydrochloride (XYZAL PO) Take by mouth    levonorgestrel (MIRENA) 20 MCG/24HR IUD 1 each, Once    MAGNESIUM CITRATE PO Take by mouth    montelukast (SINGULAIR) 10 mg, Oral, Daily at bedtime    Olopatadine HCl 0.6 % SOLN 2 actuation, Nasal, Daily    omeprazole (PRILOSEC) 20 mg, Oral, 2 times daily    valACYclovir (VALTREX) 1,000 mg tablet TAKE TWO TABLETS BY MOUTH EVERY 12 HOURS FOR 1 DAY    Zepbound 5 mg, Subcutaneous, Weekly     Allergies   Allergen Reactions    Doxycycline Hives    Nsaids Wheezing and Nasal Congestion     ASPIRIN and NSAID exacerbated respiratory disease (AERD) see allergist note on 2/28/24.  Typically COX2 are tolerated.  Tylenol is tolerated.  See CELECOXIB challenge on 8/8/24 she tolerated it without triggering AERD.         Objective   There were no vitals taken for this visit.    Video Exam  Physical Exam     Administrative Statements   Encounter provider Cindy Ballesteros    The Patient is located at Home and in the following state in which I hold an active license [place].    The patient was identified by name and date of birth. Beryl Masoodjennifersupa was informed that this is a telemedicine visit and that the visit is being conducted through the Epic Embedded platform. She agrees to proceed..  My office door was closed. No one else was in the room.  She acknowledged consent and understanding of privacy and security of the video platform. The patient has agreed to participate and understands they can discontinue the visit at any time.    I have spent a total time of 55 minutes in caring for this patient on the day of the  visit/encounter including Counseling / Coordination of care, not including the time spent for establishing the audio/video connection.    Visit Time  Start Time: 1600  Stop Time: 1655  Total Visit Time: 55 minutes

## 2025-04-29 ENCOUNTER — HOSPITAL ENCOUNTER (EMERGENCY)
Facility: HOSPITAL | Age: 50
Discharge: HOME | End: 2025-04-29
Attending: EMERGENCY MEDICINE
Payer: COMMERCIAL

## 2025-04-29 ENCOUNTER — APPOINTMENT (EMERGENCY)
Dept: CT IMAGING | Facility: HOSPITAL | Age: 50
End: 2025-04-29
Payer: COMMERCIAL

## 2025-04-29 VITALS
RESPIRATION RATE: 19 BRPM | OXYGEN SATURATION: 96 % | TEMPERATURE: 97.3 F | HEART RATE: 93 BPM | DIASTOLIC BLOOD PRESSURE: 52 MMHG | SYSTOLIC BLOOD PRESSURE: 107 MMHG

## 2025-04-29 DIAGNOSIS — R10.13 EPIGASTRIC PAIN: Primary | ICD-10-CM

## 2025-04-29 DIAGNOSIS — R10.9 ABDOMINAL PAIN: ICD-10-CM

## 2025-04-29 LAB
ALBUMIN SERPL BCG-MCNC: 4.9 G/DL (ref 3.5–5)
ALP SERPL-CCNC: 52 U/L (ref 34–104)
ALT SERPL W P-5'-P-CCNC: 14 U/L (ref 7–52)
ANION GAP SERPL CALCULATED.3IONS-SCNC: 12 MMOL/L (ref 4–13)
AST SERPL W P-5'-P-CCNC: 16 U/L (ref 13–39)
B-HCG SERPL-ACNC: <0.6 MIU/ML (ref 0–5)
BASOPHILS # BLD AUTO: 0.06 THOUSANDS/ÂΜL (ref 0–0.1)
BASOPHILS NFR BLD AUTO: 1 % (ref 0–1)
BILIRUB SERPL-MCNC: 0.75 MG/DL (ref 0.2–1)
BUN SERPL-MCNC: 13 MG/DL (ref 5–25)
CALCIUM SERPL-MCNC: 9.9 MG/DL (ref 8.4–10.2)
CHLORIDE SERPL-SCNC: 106 MMOL/L (ref 96–108)
CO2 SERPL-SCNC: 21 MMOL/L (ref 21–32)
CREAT SERPL-MCNC: 0.86 MG/DL (ref 0.6–1.3)
EOSINOPHIL # BLD AUTO: 0.22 THOUSAND/ÂΜL (ref 0–0.61)
EOSINOPHIL NFR BLD AUTO: 2 % (ref 0–6)
ERYTHROCYTE [DISTWIDTH] IN BLOOD BY AUTOMATED COUNT: 12.1 % (ref 11.6–15.1)
GFR SERPL CREATININE-BSD FRML MDRD: 80 ML/MIN/1.73SQ M
GLUCOSE SERPL-MCNC: 103 MG/DL (ref 65–140)
HCT VFR BLD AUTO: 47.9 % (ref 34.8–46.1)
HGB BLD-MCNC: 15.9 G/DL (ref 11.5–15.4)
IMM GRANULOCYTES # BLD AUTO: 0.04 THOUSAND/UL (ref 0–0.2)
IMM GRANULOCYTES NFR BLD AUTO: 0 % (ref 0–2)
LIPASE SERPL-CCNC: 40 U/L (ref 11–82)
LYMPHOCYTES # BLD AUTO: 2.6 THOUSANDS/ÂΜL (ref 0.6–4.47)
LYMPHOCYTES NFR BLD AUTO: 20 % (ref 14–44)
MCH RBC QN AUTO: 30.4 PG (ref 26.8–34.3)
MCHC RBC AUTO-ENTMCNC: 33.2 G/DL (ref 31.4–37.4)
MCV RBC AUTO: 92 FL (ref 82–98)
MONOCYTES # BLD AUTO: 1.05 THOUSAND/ÂΜL (ref 0.17–1.22)
MONOCYTES NFR BLD AUTO: 8 % (ref 4–12)
NEUTROPHILS # BLD AUTO: 9 THOUSANDS/ÂΜL (ref 1.85–7.62)
NEUTS SEG NFR BLD AUTO: 69 % (ref 43–75)
NRBC BLD AUTO-RTO: 0 /100 WBCS
PLATELET # BLD AUTO: 383 THOUSANDS/UL (ref 149–390)
PMV BLD AUTO: 11.3 FL (ref 8.9–12.7)
POTASSIUM SERPL-SCNC: 3.5 MMOL/L (ref 3.5–5.3)
PROT SERPL-MCNC: 8.3 G/DL (ref 6.4–8.4)
RBC # BLD AUTO: 5.23 MILLION/UL (ref 3.81–5.12)
SODIUM SERPL-SCNC: 139 MMOL/L (ref 135–147)
WBC # BLD AUTO: 12.97 THOUSAND/UL (ref 4.31–10.16)

## 2025-04-29 PROCEDURE — 99285 EMERGENCY DEPT VISIT HI MDM: CPT | Performed by: EMERGENCY MEDICINE

## 2025-04-29 PROCEDURE — 83690 ASSAY OF LIPASE: CPT

## 2025-04-29 PROCEDURE — 74176 CT ABD & PELVIS W/O CONTRAST: CPT

## 2025-04-29 PROCEDURE — 99284 EMERGENCY DEPT VISIT MOD MDM: CPT

## 2025-04-29 PROCEDURE — 96375 TX/PRO/DX INJ NEW DRUG ADDON: CPT

## 2025-04-29 PROCEDURE — 96374 THER/PROPH/DIAG INJ IV PUSH: CPT

## 2025-04-29 PROCEDURE — 85025 COMPLETE CBC W/AUTO DIFF WBC: CPT

## 2025-04-29 PROCEDURE — 36415 COLL VENOUS BLD VENIPUNCTURE: CPT

## 2025-04-29 PROCEDURE — 80053 COMPREHEN METABOLIC PANEL: CPT

## 2025-04-29 PROCEDURE — 96361 HYDRATE IV INFUSION ADD-ON: CPT

## 2025-04-29 PROCEDURE — 84702 CHORIONIC GONADOTROPIN TEST: CPT

## 2025-04-29 RX ORDER — SIMETHICONE 180 MG
180 CAPSULE ORAL EVERY 6 HOURS PRN
Qty: 20 CAPSULE | Refills: 0 | Status: SHIPPED | OUTPATIENT
Start: 2025-04-29

## 2025-04-29 RX ORDER — ONDANSETRON 2 MG/ML
4 INJECTION INTRAMUSCULAR; INTRAVENOUS ONCE
Status: COMPLETED | OUTPATIENT
Start: 2025-04-29 | End: 2025-04-29

## 2025-04-29 RX ORDER — FENTANYL CITRATE 50 UG/ML
25 INJECTION, SOLUTION INTRAMUSCULAR; INTRAVENOUS ONCE
Refills: 0 | Status: COMPLETED | OUTPATIENT
Start: 2025-04-29 | End: 2025-04-29

## 2025-04-29 RX ORDER — ONDANSETRON 2 MG/ML
4 INJECTION INTRAMUSCULAR; INTRAVENOUS ONCE
Status: DISCONTINUED | OUTPATIENT
Start: 2025-04-29 | End: 2025-04-29

## 2025-04-29 RX ORDER — OXYCODONE HYDROCHLORIDE 5 MG/1
5 TABLET ORAL EVERY 4 HOURS PRN
Qty: 12 TABLET | Refills: 0 | Status: SHIPPED | OUTPATIENT
Start: 2025-04-29 | End: 2025-05-09

## 2025-04-29 RX ORDER — MAGNESIUM HYDROXIDE/ALUMINUM HYDROXICE/SIMETHICONE 120; 1200; 1200 MG/30ML; MG/30ML; MG/30ML
30 SUSPENSION ORAL ONCE
Status: COMPLETED | OUTPATIENT
Start: 2025-04-29 | End: 2025-04-29

## 2025-04-29 RX ORDER — PANTOPRAZOLE SODIUM 40 MG/10ML
40 INJECTION, POWDER, LYOPHILIZED, FOR SOLUTION INTRAVENOUS ONCE
Status: COMPLETED | OUTPATIENT
Start: 2025-04-29 | End: 2025-04-29

## 2025-04-29 RX ORDER — ONDANSETRON 4 MG/1
4 TABLET, FILM COATED ORAL EVERY 6 HOURS
Qty: 12 TABLET | Refills: 0 | Status: SHIPPED | OUTPATIENT
Start: 2025-04-29

## 2025-04-29 RX ORDER — HYOSCYAMINE SULFATE 0.12 MG/1
0.12 TABLET SUBLINGUAL ONCE
Status: COMPLETED | OUTPATIENT
Start: 2025-04-29 | End: 2025-04-29

## 2025-04-29 RX ADMIN — HYOSCYAMINE SULFATE 0.12 MG: 0.12 TABLET SUBLINGUAL at 04:39

## 2025-04-29 RX ADMIN — ALUMINUM HYDROXIDE, MAGNESIUM HYDROXIDE, AND DIMETHICONE 30 ML: 200; 20; 200 SUSPENSION ORAL at 03:32

## 2025-04-29 RX ADMIN — ONDANSETRON 4 MG: 2 INJECTION INTRAMUSCULAR; INTRAVENOUS at 03:27

## 2025-04-29 RX ADMIN — PANTOPRAZOLE SODIUM 40 MG: 40 INJECTION, POWDER, FOR SOLUTION INTRAVENOUS at 03:31

## 2025-04-29 RX ADMIN — FENTANYL CITRATE 25 MCG: 50 INJECTION INTRAMUSCULAR; INTRAVENOUS at 04:38

## 2025-04-29 RX ADMIN — MORPHINE SULFATE 2 MG: 2 INJECTION, SOLUTION INTRAMUSCULAR; INTRAVENOUS at 03:35

## 2025-04-29 RX ADMIN — SODIUM CHLORIDE 1000 ML: 0.9 INJECTION, SOLUTION INTRAVENOUS at 03:27

## 2025-04-29 NOTE — ED PROVIDER NOTES
Time reflects when diagnosis was documented in both MDM as applicable and the Disposition within this note       Time User Action Codes Description Comment    4/29/2025  5:19 AM Merrill Watts Add [R10.13] Epigastric pain     4/29/2025  5:21 AM Merrill Watts [R10.9] Abdominal pain           ED Disposition       ED Disposition   Discharge    Condition   Stable    Date/Time   Tue Apr 29, 2025  5:19 AM    Comment   Beryl Pinedasupa discharge to home/self care.                   Assessment & Plan       Medical Decision Making  I have considered a broad diagnosis for abdominal pain that includes: Appendicitis, diverticulitis, colitis, cholecystitis, biliary colic, volvulus, small bowel obstruction, ileus, UTI, gastritis/PUD and other abdominal pathology.    Given the patient's physical exam and work-up today, there is low although not zero suspicion for these diagnoses.  Patient was advised and given appropriate return precautions, including continued or new fever, persistent vomiting, worsening abdominal pain, or any other concerning signs or symptoms especially if there are new.    Re-checked on patient - still upper abd pain and discomfort     Problems Addressed:  Abdominal pain: acute illness or injury    Amount and/or Complexity of Data Reviewed  Labs:  Decision-making details documented in ED Course.  Radiology: ordered.  Discussion of management or test interpretation with external provider(s): Pt much improved, passed gas (burp) and symptoms improved     Risk  OTC drugs.  Prescription drug management.        ED Course as of 04/29/25 0550   Tue Apr 29, 2025   0340 WBC(!): 12.97   0340 Hemoglobin(!): 15.9   0340 Both wbc and HH are elevated which means that patient has hemoconcentration.    0438 Re-check: still hurting. Was starting to feel better, but then it came back when started walking around.        Medications   sodium chloride 0.9 % bolus 1,000 mL (has no administration in time range)   ondansetron  (ZOFRAN) injection 4 mg (4 mg Intravenous Given 4/29/25 0327)   sodium chloride 0.9 % bolus 1,000 mL (0 mL Intravenous Stopped 4/29/25 0525)   aluminum-magnesium hydroxide-simethicone (MAALOX) oral suspension 30 mL (30 mL Oral Given 4/29/25 0332)   morphine injection 2 mg (2 mg Intravenous Given 4/29/25 0335)   pantoprazole (PROTONIX) injection 40 mg (40 mg Intravenous Given 4/29/25 0331)   hyoscyamine (LEVSIN/SL) SL tablet 0.125 mg (0.125 mg Sublingual Given 4/29/25 0439)   fentaNYL injection 25 mcg (25 mcg Intravenous Given 4/29/25 0438)       ED Risk Strat Scores                    No data recorded                            History of Present Illness       Chief Complaint   Patient presents with    Abdominal Pain     Upper abdominal pain started around 1700 last night after eating dinner, belching, nausea with no vomiting, gastritis       Past Medical History:   Diagnosis Date    Anxiety     Depression     Hiatal hernia 10/2023    History of chickenpox     Nasal polyps     Rheumatoid arthritis (HCC)     Sinusitis     Sjoegren syndrome     Thyroid nodule     Wears glasses       Past Surgical History:   Procedure Laterality Date    CHOLECYSTECTOMY      EGD  10/2023    NASAL/SINUS ENDOSCOPY N/A 06/30/2020    Procedure: IMAGED GUIDED F.E.S.S.;  Surgeon: Baljit Cassidy DO;  [facility];  Service: ENT    IA SEPTOPLASTY/SUBMUCOUS RESECJ W/WO CARTILAGE GRF N/A 06/30/2020    Procedure: SEPTOPLASTY;  Surgeon: Baljit Cassidy DO;  [facility];  Service: ENT    US GUIDED THYROID BIOPSY  07/31/2017    VENOUS ABLATION Left     lower extremity    WISDOM TOOTH EXTRACTION        Family History   Problem Relation Age of Onset    Breast cancer Mother 61    Rheum arthritis Mother     Sjogren's syndrome Mother     Liver cancer Mother     Esophageal cancer Father     Diabetes Father     No Known Problems Brother     No Known Problems Brother     No Known Problems Maternal Grandmother     No Known Problems Paternal  Grandmother     No Known Problems Daughter     No Known Problems Son     No Known Problems Son     No Known Problems Maternal Aunt     No Known Problems Maternal Aunt     No Known Problems Maternal Aunt     Stroke Other     Heart disease Neg Hx       Social History     Tobacco Use    Smoking status: Former     Current packs/day: 0.00     Average packs/day: 1 pack/day for 20.0 years (20.0 ttl pk-yrs)     Types: Cigarettes     Start date:      Quit date: 2006     Years since quittin.3    Smokeless tobacco: Never   Vaping Use    Vaping status: Never Used   Substance Use Topics    Alcohol use: Yes     Comment: occasional    Drug use: No      E-Cigarette/Vaping    E-Cigarette Use Never User       E-Cigarette/Vaping Substances    Nicotine No     THC No     CBD No     Flavoring No     Other No     Unknown No       I have reviewed and agree with the history as documented.     Beryl Cleary is a 50 y.o.  year old female  Past Medical History:  No date: Anxiety  No date: Depression  10/2023: Hiatal hernia  No date: History of chickenpox  No date: Nasal polyps  No date: Rheumatoid arthritis (HCC)  No date: Sinusitis  No date: Sjoegren syndrome  No date: Thyroid nodule  No date: Wears glasses  Social History    Tobacco Use      Smoking status: Former        Packs/day: 0.00        Years: 1 pack/day for 20.0 years (20.0 ttl pk-yrs)        Types: Cigarettes        Start date:         Quit date: 2006        Years since quittin.3      Smokeless tobacco: Never    Vaping Use      Vaping status: Never Used    Alcohol use: Yes      Comment: occasional    Drug use: No    Patient presents with:  Abdominal Pain: Upper abdominal pain started around 1700 last night after eating dinner, belching, nausea with no vomiting, gastritis  Pain onset soon after eating [employer] meal  Patient on 1.5 months of Zepbound and until today had not had any issues  Patient's GB is out, no hx of pancreatitis                    History  provided by:  Patient   used: No    Abdominal Pain  Pain location:  Epigastric  Associated symptoms: nausea    Associated symptoms: no chest pain, no chills, no cough, no dysuria, no fever, no hematuria, no shortness of breath, no sore throat and no vomiting        Review of Systems   Constitutional:  Negative for chills and fever.   HENT:  Negative for ear pain and sore throat.    Eyes:  Negative for pain and visual disturbance.   Respiratory:  Negative for cough and shortness of breath.    Cardiovascular:  Negative for chest pain and palpitations.   Gastrointestinal:  Positive for abdominal pain and nausea. Negative for vomiting.   Genitourinary:  Negative for dysuria and hematuria.   Musculoskeletal:  Negative for arthralgias and back pain.   Skin:  Negative for color change and rash.   Neurological:  Negative for seizures and syncope.   All other systems reviewed and are negative.          Objective       ED Triage Vitals   Temperature Pulse Blood Pressure Respirations SpO2 Patient Position - Orthostatic VS   04/29/25 0316 04/29/25 0317 04/29/25 0317 04/29/25 0316 04/29/25 0317 --   (!) 97.3 °F (36.3 °C) (!) 116 99/51 (!) 24 98 %       Temp src Heart Rate Source BP Location FiO2 (%) Pain Score    -- 04/29/25 0316 -- -- 04/29/25 0316     Monitor   5      Vitals      Date and Time Temp Pulse SpO2 Resp BP Pain Score FACES Pain Rating User   04/29/25 0500 -- 93 96 % 19 107/52 4 -- AF   04/29/25 0402 -- 85 97 % 20 106/58 -- -- AF   04/29/25 0347 -- 88 97 % 19 101/73 -- -- AF   04/29/25 0317 -- 116 98 % -- 99/51 -- -- AF   04/29/25 0316 97.3 °F (36.3 °C) -- -- 24 -- 5 -- AF            Physical Exam  Vitals and nursing note reviewed.   Constitutional:       General: She is not in acute distress.     Appearance: She is well-developed.   HENT:      Head: Normocephalic and atraumatic.   Eyes:      Extraocular Movements: Extraocular movements intact.      Conjunctiva/sclera: Conjunctivae normal.    Cardiovascular:      Rate and Rhythm: Normal rate and regular rhythm.      Heart sounds: No murmur heard.  Pulmonary:      Effort: Pulmonary effort is normal. No respiratory distress.      Breath sounds: Normal breath sounds.   Abdominal:      Palpations: Abdomen is soft.      Tenderness: There is abdominal tenderness in the epigastric area. There is no guarding or rebound. Negative signs include Cole's sign and Rovsing's sign.      Hernia: No hernia is present.   Musculoskeletal:         General: No swelling.      Cervical back: Neck supple.   Skin:     General: Skin is warm and dry.      Capillary Refill: Capillary refill takes less than 2 seconds.   Neurological:      Mental Status: She is alert.   Psychiatric:         Mood and Affect: Mood normal.         Results Reviewed       Procedure Component Value Units Date/Time    hCG, quantitative, pregnancy [[id number]]  (Normal) Collected: 04/29/25 0328    Lab Status: Final result Specimen: Blood from Arm, Right Updated: 04/29/25 0354     HCG, Quant <0.6 mIU/mL     Narrative:       Expected Ranges:    HCG results between 5.0 and 25.0 mIU/mL may be indicative of early pregnancy but should be interpreted in light of the total clinical presentation.    HCG can rise to detectable levels in ayad and post menopausal women (0-11.6 mIU/mL).     Approximate               Approximate HCG  Gestation age          Concentration ( mIU/mL)  _____________          ______________________   Weeks                      HCG values  0.2-1                       5-50  1-2                           2-3                         100-5000  3-4                         500-16910  4-5                         1000-72152  5-6                         [id number]  6-8                         26005-623904  8-12                        [id number]      Comprehensive metabolic panel [[id number]] Collected: 04/29/25 0328    Lab Status: Final result Specimen: Blood from Arm, Right Updated: 04/29/25  0350     Sodium 139 mmol/L      Potassium 3.5 mmol/L      Chloride 106 mmol/L      CO2 21 mmol/L      ANION GAP 12 mmol/L      BUN 13 mg/dL      Creatinine 0.86 mg/dL      Glucose 103 mg/dL      Calcium 9.9 mg/dL      AST 16 U/L      ALT 14 U/L      Alkaline Phosphatase 52 U/L      Total Protein 8.3 g/dL      Albumin 4.9 g/dL      Total Bilirubin 0.75 mg/dL      eGFR 80 ml/min/1.73sq m     Narrative:      National Kidney Disease Foundation guidelines for Chronic Kidney Disease (CKD):     Stage 1 with normal or high GFR (GFR > 90 mL/min/1.73 square meters)    Stage 2 Mild CKD (GFR = 60-89 mL/min/1.73 square meters)    Stage 3A Moderate CKD (GFR = 45-59 mL/min/1.73 square meters)    Stage 3B Moderate CKD (GFR = 30-44 mL/min/1.73 square meters)    Stage 4 Severe CKD (GFR = 15-29 mL/min/1.73 square meters)    Stage 5 End Stage CKD (GFR <15 mL/min/1.73 square meters)  Note: GFR calculation is accurate only with a steady state creatinine    Lipase [[id number]]  (Normal) Collected: 04/29/25 0328    Lab Status: Final result Specimen: Blood from Arm, Right Updated: 04/29/25 0350     Lipase 40 u/L     CBC and differential [[id number]]  (Abnormal) Collected: 04/29/25 0328    Lab Status: Final result Specimen: Blood from Arm, Right Updated: 04/29/25 0332     WBC 12.97 Thousand/uL      RBC 5.23 Million/uL      Hemoglobin 15.9 g/dL      Hematocrit 47.9 %      MCV 92 fL      MCH 30.4 pg      MCHC 33.2 g/dL      RDW 12.1 %      MPV 11.3 fL      Platelets 383 Thousands/uL      nRBC 0 /100 WBCs      Segmented % 69 %      Immature Grans % 0 %      Lymphocytes % 20 %      Monocytes % 8 %      Eosinophils Relative 2 %      Basophils Relative 1 %      Absolute Neutrophils 9.00 Thousands/µL      Absolute Immature Grans 0.04 Thousand/uL      Absolute Lymphocytes 2.60 Thousands/µL      Absolute Monocytes 1.05 Thousand/µL      Eosinophils Absolute 0.22 Thousand/µL      Basophils Absolute 0.06 Thousands/µL             CT abdomen pelvis wo  contrast   Final Interpretation by Minesh Tai MD ( [id number])      1.  Multiple fluid-filled loops of small bowel which are top normal in caliber, possibly on the basis of enteritis.   2.  Incidentally noted intestinal malrotation without volvulus.   3.  Moderate hiatal hernia.      Workstation performed: [id number]             Procedures    ED Medication and Procedure Management   Prior to Admission Medications   Prescriptions Last Dose Informant Patient Reported? Taking?   Cholecalciferol (Vitamin D3) 25 MCG TABS   Yes No   Sig: Take by mouth   FLUoxetine (PROzac) 40 MG capsule   No No   Sig: Take 1 capsule (40 mg total) by mouth daily   Levocetirizine Dihydrochloride (XYZAL PO)  Self Yes No   Sig: Take by mouth   Patient not taking: Reported on 3/4/2025   MAGNESIUM CITRATE PO   Yes No   Sig: Take by mouth   Olopatadine HCl 0.6 % SOLN  Self No No   Si actuation into each nostril daily   Patient not taking: Reported on 3/4/2025   acetaminophen (TYLENOL) 500 mg tablet  Self Yes No   Sig: Take 1,000 mg by mouth every 6 (six) hours as needed for mild pain   albuterol (2.5 mg/3 mL) 0.083 % nebulizer solution  Self No No   Sig: Take 3 mL (2.5 mg total) by nebulization every 6 (six) hours as needed for wheezing or shortness of breath   busPIRone (BUSPAR) 5 mg tablet   No No   Sig: Take 1 tablet (5 mg total) by mouth 2 (two) times a day as needed (anxiety)   celecoxib (CeleBREX) 100 mg capsule  Self No No   Sig: Take 1 capsule (100 mg total) by mouth 2 (two) times a day   Patient taking differently: Take 100 mg by mouth if needed   estradiol (Vivelle-Dot) 0.0375 MG/24HR   No No   Sig: Place 1 patch on the skin 2 (two) times a week   fluticasone (FLONASE) 50 mcg/act nasal spray  Self No No   Si spray into each nostril 2 (two) times a day   Patient taking differently: 1 spray into each nostril if needed   gabapentin (Neurontin) 100 mg capsule  Self No No   Sig: Take 1 capsule (100 mg total) by mouth 3 (three) times  a day As needed for numbness   Patient taking differently: Take 100 mg by mouth if needed As needed for numbness   hydrOXYzine pamoate (VISTARIL) 25 mg capsule   No No   Sig: Take 1 capsule (25 mg total) by mouth daily at bedtime as needed for anxiety   hydroxychloroquine (PLAQUENIL) 200 mg tablet   No No   Sig: Take 1 tablet (200 mg total) by mouth 2 (two) times a day   levonorgestrel (MIRENA) 20 MCG/24HR IUD  Self Yes No   Si each by Intrauterine route once   montelukast (SINGULAIR) 10 mg tablet  Self No No   Sig: Take 1 tablet (10 mg total) by mouth daily at bedtime   Patient taking differently: Take 10 mg by mouth if needed   omeprazole (PriLOSEC) 20 mg delayed release capsule   No No   Sig: Take 1 capsule (20 mg total) by mouth 2 (two) times a day   tirzepatide (Zepbound) 5 mg/0.5 mL auto-injector   No No   Sig: Inject 0.5 mL (5 mg total) under the skin once a week   valACYclovir (VALTREX) 1,000 mg tablet   No No   Sig: TAKE TWO TABLETS BY MOUTH EVERY 12 HOURS FOR 1 DAY      Facility-Administered Medications: None     Patient's Medications   Discharge Prescriptions    ONDANSETRON (ZOFRAN) 4 MG TABLET    Take 1 tablet (4 mg total) by mouth every 6 (six) hours       Start Date: 2025 End Date: --       Order Dose: 4 mg       Quantity: 12 tablet    Refills: 0    OXYCODONE (ROXICODONE) 5 IMMEDIATE RELEASE TABLET    Take 1 tablet (5 mg total) by mouth every 4 (four) hours as needed for moderate pain or severe pain for up to 10 days Max Daily Amount: 30 mg       Start Date: 2025 End Date: 2025       Order Dose: 5 mg       Quantity: 12 tablet    Refills: 0    SIMETHICONE (MYLICON,GAS-X) 180 MG CAPSULE    Take 1 capsule (180 mg total) by mouth every 6 (six) hours as needed for flatulence       Start Date: 2025 End Date: --       Order Dose: 180 mg       Quantity: 20 capsule    Refills: 0     No discharge procedures on file.  ED SEPSIS DOCUMENTATION   Time reflects when diagnosis was documented in  both MDM as applicable and the Disposition within this note       Time User Action Codes Description Comment    4/29/2025  5:19 AM Merrill Watts [R10.13] Epigastric pain     4/29/2025  5:21 AM Merrill Watts [R10.9] Abdominal pain                  Merrill Watts MD  04/29/25 0539

## 2025-04-29 NOTE — DISCHARGE INSTRUCTIONS
A  personal message from Dr. Merrill Watts,  Thank you so much for allowing me to care for you today.    I pride myself in the care and attention I give all my patients.  I hope you were a witness to this tonight.   If for any reason your condition does not improve or worsens, or you have a question that was not answered during your visit you can feel free to text me on my personal phone #  # [phone].   I will answer to your message and continue your care past your emergency room visit.     Please understand that although you are being discharged because your condition has been deemed stable and able to be managed on an outpatient setting. However your condition may worsen as part of the natural progression of the illness/condition, if this occurs please come back to the emergency department for a repeat evaluation.

## 2025-05-12 ENCOUNTER — TELEMEDICINE (OUTPATIENT)
Dept: BEHAVIORAL/MENTAL HEALTH CLINIC | Facility: CLINIC | Age: 50
End: 2025-05-12
Payer: COMMERCIAL

## 2025-05-12 DIAGNOSIS — F41.9 ANXIETY AND DEPRESSION: Primary | ICD-10-CM

## 2025-05-12 DIAGNOSIS — F32.A ANXIETY AND DEPRESSION: Primary | ICD-10-CM

## 2025-05-12 PROCEDURE — 90837 PSYTX W PT 60 MINUTES: CPT | Performed by: COUNSELOR

## 2025-05-12 NOTE — PSYCH
"Virtual Regular VisitName: Beryl Cleary      : 1975      MRN: [id number]  Encounter Provider: Cindy Ballesteros  Encounter Date: 2025   Encounter department: [facility] THERAPIST MENTAL HEALTH OUTPATIENT  :  Assessment & Plan  Anxiety and depression             Goals addressed in session: Goal 1     DATA: Client processed on relationship with son and daughter and learning how to say no to financial requests. Client examined how her relationship with son is changing and how he is asking more questions about her life before she had him. Client is finding that daughter is using manipulative tactics and how this affects her relationship with . Client examined how clutter in the home increases stress and anxiety.  Client examined how she feels when there is order and strategized on ways to create less chaos. Client examined how her relationships at work affect her daily.   Therapist used active listening, open ended questions, reflection and validation.    During this session, this clinician used the following therapeutic modalities: Cognitive Behavioral Therapy    Substance Abuse was not addressed during this session. If the client is diagnosed with a co-occurring substance use disorder, please indicate any changes in the frequency or amount of use: NA. Stage of change for addressing substance use diagnoses: No substance use/Not applicable    ASSESSMENT:  Beryl presents with a Euthymic/ normal mood. Emilianas affect is Normal range and intensity, which is congruent, with their mood and the content of the session. The client has made progress on their goals as evidenced by .    Beryl presents with a none risk of suicide, none risk of self-harm, and none risk of harm to others.    For any risk assessment that surpasses a \"low\" rating, a safety plan must be developed.    A safety plan was indicated: no  If yes, describe in detail NA    PLAN: Between sessions, Beryl will challenge " herself by creating a boundary with daughter. At the next session, the therapist will use Cognitive Behavioral Therapy and Mindfulness-based Strategies to address anxiety, depression.    Behavioral Health Treatment Plan  Luke: Diagnosis and Treatment Plan explained to Beryl, Beryl relates understanding diagnosis and is agreeable to Treatment Plan. Yes     Depression Follow-up Plan Completed: Not applicable     Reason for visit is No chief complaint on file.     Recent Visits  No visits were found meeting these conditions.  Showing recent visits within past 7 days and meeting all other requirements  Today's Visits  Date Type Provider Dept   05/12/25 Telemedicine Cindy Ballesteros [facility] Therapist op   Showing today's visits and meeting all other requirements  Future Appointments  No visits were found meeting these conditions.  Showing future appointments within next 150 days and meeting all other requirements     History of Present Illness     HPI    Past Medical History   Past Medical History:   Diagnosis Date    Anxiety     Depression     Hiatal hernia 10/2023    History of chickenpox     Nasal polyps     Rheumatoid arthritis (HCC)     Sinusitis     Sjoegren syndrome     Thyroid nodule     Wears glasses      Past Surgical History:   Procedure Laterality Date    CHOLECYSTECTOMY      EGD  10/2023    NASAL/SINUS ENDOSCOPY N/A 06/30/2020    Procedure: IMAGED GUIDED F.E.S.S.;  Surgeon: Baljit Cassidy DO;  Location: [facility];  Service: ENT    NY SEPTOPLASTY/SUBMUCOUS RESECJ W/WO CARTILAGE GRF N/A 06/30/2020    Procedure: SEPTOPLASTY;  Surgeon: Baljit Cassidy DO;  Location: [facility];  Service: ENT    US GUIDED THYROID BIOPSY  07/31/2017    VENOUS ABLATION Left     lower extremity    WISDOM TOOTH EXTRACTION       Current Outpatient Medications   Medication Instructions    acetaminophen (TYLENOL) 1,000 mg, Every 6 hours PRN    albuterol 2.5 mg, Nebulization, Every 6 hours PRN    busPIRone (BUSPAR) 5 mg,  Oral, 2 times daily PRN    celecoxib (CELEBREX) 100 mg, Oral, 2 times daily    Cholecalciferol (Vitamin D3) 25 MCG TABS Take by mouth    estradiol (Vivelle-Dot) 0.0375 MG/24HR 1 patch, Transdermal, 2 times weekly    FLUoxetine (PROZAC) 40 mg, Oral, Daily    fluticasone (FLONASE) 50 mcg/act nasal spray 1 spray, Nasal, 2 times daily    gabapentin (NEURONTIN) 100 mg, Oral, 3 times daily, As needed for numbness    hydroxychloroquine (PLAQUENIL) 200 mg, Oral, 2 times daily    hydrOXYzine pamoate (VISTARIL) 25 mg, Oral, Daily at bedtime PRN    Levocetirizine Dihydrochloride (XYZAL PO) Take by mouth    levonorgestrel (MIRENA) 20 MCG/24HR IUD 1 each, Once    MAGNESIUM CITRATE PO Take by mouth    montelukast (SINGULAIR) 10 mg, Oral, Daily at bedtime    Olopatadine HCl 0.6 % SOLN 2 actuation, Nasal, Daily    omeprazole (PRILOSEC) 20 mg, Oral, 2 times daily    ondansetron (ZOFRAN) 4 mg, Oral, Every 6 hours    simethicone (MYLICON,GAS-X) 180 mg, Oral, Every 6 hours PRN    valACYclovir (VALTREX) 1,000 mg tablet TAKE TWO TABLETS BY MOUTH EVERY 12 HOURS FOR 1 DAY    Zepbound 5 mg, Subcutaneous, Weekly     Allergies   Allergen Reactions    Doxycycline Hives    Nsaids Wheezing and Nasal Congestion     ASPIRIN and NSAID exacerbated respiratory disease (AERD) see allergist note on 2/28/24.  Typically COX2 are tolerated.  Tylenol is tolerated.  See CELECOXIB challenge on 8/8/24 she tolerated it without triggering AERD.         Objective   There were no vitals taken for this visit.    Video Exam  Physical Exam     Administrative Statements   Encounter provider Cindy Ballesteros    The Patient is located at Home and in the following state in which I hold an active license [place].    The patient was identified by name and date of birth. Beryl Cleary was informed that this is a telemedicine visit and that the visit is being conducted through the Epic Embedded platform. She agrees to proceed..  My office door was closed. No one else was in the  room.  She acknowledged consent and understanding of privacy and security of the video platform. The patient has agreed to participate and understands they can discontinue the visit at any time.    I have spent a total time of 55 minutes in caring for this patient on the day of the visit/encounter including Counseling / Coordination of care, not including the time spent for establishing the audio/video connection.    Visit Time  Start Time: 1100  Stop Time: 1155  Total Visit Time: 55 minutes

## 2025-05-15 ENCOUNTER — HOSPITAL ENCOUNTER (EMERGENCY)
Facility: HOSPITAL | Age: 50
Discharge: HOME | End: 2025-05-15
Attending: EMERGENCY MEDICINE
Payer: COMMERCIAL

## 2025-05-15 ENCOUNTER — APPOINTMENT (EMERGENCY)
Dept: CT IMAGING | Facility: HOSPITAL | Age: 50
End: 2025-05-15
Payer: COMMERCIAL

## 2025-05-15 VITALS
DIASTOLIC BLOOD PRESSURE: 60 MMHG | SYSTOLIC BLOOD PRESSURE: 125 MMHG | TEMPERATURE: 97.9 F | RESPIRATION RATE: 20 BRPM | HEART RATE: 82 BPM | OXYGEN SATURATION: 97 %

## 2025-05-15 DIAGNOSIS — R11.2 NAUSEA VOMITING AND DIARRHEA: Primary | ICD-10-CM

## 2025-05-15 DIAGNOSIS — R19.7 NAUSEA VOMITING AND DIARRHEA: Primary | ICD-10-CM

## 2025-05-15 LAB
ALBUMIN SERPL BCG-MCNC: 4.7 G/DL (ref 3.5–5)
ALP SERPL-CCNC: 59 U/L (ref 34–104)
ALT SERPL W P-5'-P-CCNC: 11 U/L (ref 7–52)
ANION GAP SERPL CALCULATED.3IONS-SCNC: 12 MMOL/L (ref 4–13)
APTT PPP: 32 SECONDS (ref 23–34)
AST SERPL W P-5'-P-CCNC: 12 U/L (ref 13–39)
BASOPHILS # BLD AUTO: 0.02 THOUSANDS/ÂΜL (ref 0–0.1)
BASOPHILS NFR BLD AUTO: 0 % (ref 0–1)
BILIRUB SERPL-MCNC: 1.17 MG/DL (ref 0.2–1)
BUN SERPL-MCNC: 17 MG/DL (ref 5–25)
CALCIUM SERPL-MCNC: 10 MG/DL (ref 8.4–10.2)
CHLORIDE SERPL-SCNC: 101 MMOL/L (ref 96–108)
CO2 SERPL-SCNC: 23 MMOL/L (ref 21–32)
CREAT SERPL-MCNC: 0.88 MG/DL (ref 0.6–1.3)
EOSINOPHIL # BLD AUTO: 0.31 THOUSAND/ÂΜL (ref 0–0.61)
EOSINOPHIL NFR BLD AUTO: 2 % (ref 0–6)
ERYTHROCYTE [DISTWIDTH] IN BLOOD BY AUTOMATED COUNT: 12.2 % (ref 11.6–15.1)
GFR SERPL CREATININE-BSD FRML MDRD: 77 ML/MIN/1.73SQ M
GLUCOSE SERPL-MCNC: 103 MG/DL (ref 65–140)
HCT VFR BLD AUTO: 47.3 % (ref 34.8–46.1)
HGB BLD-MCNC: 15.5 G/DL (ref 11.5–15.4)
IMM GRANULOCYTES # BLD AUTO: 0.04 THOUSAND/UL (ref 0–0.2)
IMM GRANULOCYTES NFR BLD AUTO: 0 % (ref 0–2)
INR PPP: 1.2 (ref 0.85–1.19)
LIPASE SERPL-CCNC: 22 U/L (ref 11–82)
LYMPHOCYTES # BLD AUTO: 2 THOUSANDS/ÂΜL (ref 0.6–4.47)
LYMPHOCYTES NFR BLD AUTO: 15 % (ref 14–44)
MAGNESIUM SERPL-MCNC: 2 MG/DL (ref 1.9–2.7)
MCH RBC QN AUTO: 30.1 PG (ref 26.8–34.3)
MCHC RBC AUTO-ENTMCNC: 32.8 G/DL (ref 31.4–37.4)
MCV RBC AUTO: 92 FL (ref 82–98)
MONOCYTES # BLD AUTO: 1.01 THOUSAND/ÂΜL (ref 0.17–1.22)
MONOCYTES NFR BLD AUTO: 7 % (ref 4–12)
NEUTROPHILS # BLD AUTO: 10.25 THOUSANDS/ÂΜL (ref 1.85–7.62)
NEUTS SEG NFR BLD AUTO: 76 % (ref 43–75)
NRBC BLD AUTO-RTO: 0 /100 WBCS
PLATELET # BLD AUTO: 432 THOUSANDS/UL (ref 149–390)
PMV BLD AUTO: 11.4 FL (ref 8.9–12.7)
POTASSIUM SERPL-SCNC: 3.7 MMOL/L (ref 3.5–5.3)
PROT SERPL-MCNC: 8.1 G/DL (ref 6.4–8.4)
PROTHROMBIN TIME: 15.7 SECONDS (ref 12.3–15)
RBC # BLD AUTO: 5.15 MILLION/UL (ref 3.81–5.12)
SODIUM SERPL-SCNC: 136 MMOL/L (ref 135–147)
WBC # BLD AUTO: 13.63 THOUSAND/UL (ref 4.31–10.16)

## 2025-05-15 PROCEDURE — 96361 HYDRATE IV INFUSION ADD-ON: CPT

## 2025-05-15 PROCEDURE — 93005 ELECTROCARDIOGRAM TRACING: CPT

## 2025-05-15 PROCEDURE — 85730 THROMBOPLASTIN TIME PARTIAL: CPT | Performed by: EMERGENCY MEDICINE

## 2025-05-15 PROCEDURE — 74177 CT ABD & PELVIS W/CONTRAST: CPT

## 2025-05-15 PROCEDURE — 99284 EMERGENCY DEPT VISIT MOD MDM: CPT

## 2025-05-15 PROCEDURE — 36415 COLL VENOUS BLD VENIPUNCTURE: CPT | Performed by: EMERGENCY MEDICINE

## 2025-05-15 PROCEDURE — 85025 COMPLETE CBC W/AUTO DIFF WBC: CPT | Performed by: EMERGENCY MEDICINE

## 2025-05-15 PROCEDURE — 83690 ASSAY OF LIPASE: CPT | Performed by: EMERGENCY MEDICINE

## 2025-05-15 PROCEDURE — 85610 PROTHROMBIN TIME: CPT | Performed by: EMERGENCY MEDICINE

## 2025-05-15 PROCEDURE — 83735 ASSAY OF MAGNESIUM: CPT | Performed by: EMERGENCY MEDICINE

## 2025-05-15 PROCEDURE — 96374 THER/PROPH/DIAG INJ IV PUSH: CPT

## 2025-05-15 PROCEDURE — 80053 COMPREHEN METABOLIC PANEL: CPT | Performed by: EMERGENCY MEDICINE

## 2025-05-15 PROCEDURE — 99285 EMERGENCY DEPT VISIT HI MDM: CPT | Performed by: EMERGENCY MEDICINE

## 2025-05-15 RX ORDER — ONDANSETRON 2 MG/ML
4 INJECTION INTRAMUSCULAR; INTRAVENOUS ONCE
Status: COMPLETED | OUTPATIENT
Start: 2025-05-15 | End: 2025-05-15

## 2025-05-15 RX ORDER — ONDANSETRON 4 MG/1
4 TABLET, FILM COATED ORAL EVERY 6 HOURS
Qty: 12 TABLET | Refills: 0 | Status: SHIPPED | OUTPATIENT
Start: 2025-05-15

## 2025-05-15 RX ADMIN — ONDANSETRON 4 MG: 2 INJECTION INTRAMUSCULAR; INTRAVENOUS at 03:41

## 2025-05-15 RX ADMIN — SODIUM CHLORIDE 1000 ML: 0.9 INJECTION, SOLUTION INTRAVENOUS at 03:39

## 2025-05-15 RX ADMIN — IOHEXOL 100 ML: 350 INJECTION, SOLUTION INTRAVENOUS at 04:29

## 2025-05-15 RX ADMIN — SODIUM CHLORIDE 1000 ML: 0.9 INJECTION, SOLUTION INTRAVENOUS at 04:24

## 2025-05-15 NOTE — ED PROVIDER NOTES
"Time reflects when diagnosis was documented in both MDM as applicable and the Disposition within this note       Time User Action Codes Description Comment    5/15/2025  5:39 AM Valle Austin Add [R11.2,  R19.7] Nausea vomiting and diarrhea           ED Disposition       ED Disposition   Discharge    Condition   Stable    Date/Time   Thu May 15, 2025  5:20 AM    Comment   Berylalisia Cleary discharge to home/self care.                   Assessment & Plan       Medical Decision Making  Amount and/or Complexity of Data Reviewed  Labs: ordered.  Radiology: ordered.    Risk  Prescription drug management.    Broad diff diagnosis for abdominal pain that includes: Appendicitis, diverticulitis, colitis,  volvulus, small bowel obstruction, ileus, UTI, gastritis/PUD and other abdominal pathology.  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     All Incidental findings reviewed with the patient and significant other at the time of discharge: She is aware of a moderate to large hiatal hernia which is distant with the patient's prior EGD performed in November 2023, patient is aware also of a tiny fat-containing umbilical hernia, the small focus of gas within the subcutaneous fat of the right side of the abdomen relates with the patient's injection of Zepbound w/ in last 36 hours.    Patient was advised and given appropriate return precautions, including continued or new fever, persistent vomiting, worsening abdominal pain, or any other concerning signs or symptoms especially if there are new.    Portions of the record may have been created with voice recognition software. Occasional wrong word or \"sound a like\" substitutions may have occurred due to the inherent limitations of voice recognition software. Read the chart carefully and recognize, using context, where substitutions have occurred.     ED Course as of 05/15/25 " 0606   Thu May 15, 2025   0338 Patient seen, evaluated, examined, chart reviewed, prior history of cholecystectomy, nausea vomiting diarrheal illness, no melena, no hematochezia, no recent antibiotic exposure, recently seen last month in the emergency department for generalized abdominal pain nausea vomiting had malrotation without volvulus on CT imaging.    Brief focused differential diagnosis in this patient is as follows: Gastroenteritis versus bowel obstruction/volvulus.  Patient appears to be hyperventilating, concern for clinical dehydration and MANOJ.   0437 CT pending, labs resulted, no significant abnormalities.   0449 Abdominal pain is resolved, nausea is still present but decreased after Zofran, EKG within normal limits, patient received 1500 cc of saline has not voided yet.       Medications   sodium chloride 0.9 % bolus 1,000 mL (0 mL Intravenous Stopped 5/15/25 0416)   ondansetron (ZOFRAN) injection 4 mg (4 mg Intravenous Given 5/15/25 0341)   sodium chloride 0.9 % bolus 1,000 mL (0 mL Intravenous Stopped 5/15/25 0534)   iohexol (OMNIPAQUE) 350 MG/ML injection (MULTI-DOSE) 100 mL (100 mL Intravenous Given 5/15/25 0429)       ED Risk Strat Scores                    No data recorded                            History of Present Illness       Chief Complaint   Patient presents with    Vomiting     Vomiting and diarrhea since yesterday; took zofran around 2300 with no relief       Past Medical History:   Diagnosis Date    Anxiety     Depression     Hiatal hernia 10/2023    History of chickenpox     Nasal polyps     Rheumatoid arthritis (HCC)     Sinusitis     Sjoegren syndrome     Thyroid nodule     Wears glasses       Past Surgical History:   Procedure Laterality Date    CHOLECYSTECTOMY      EGD  10/2023    NASAL/SINUS ENDOSCOPY N/A 06/30/2020    Procedure: IMAGED GUIDED F.E.S.S.;  Surgeon: Baljit Cassidy DO;  Location: [facility];  Service: ENT    GA SEPTOPLASTY/SUBMUCOUS RESECJ W/WO CARTILAGE GRF N/A  06/30/2020    Procedure: SEPTOPLASTY;  Surgeon: Baljit Cassidy DO;  Location: [facility];  Service: ENT    US GUIDED THYROID BIOPSY  07/31/2017    VENOUS ABLATION Left     lower extremity    WISDOM TOOTH EXTRACTION        Family History   Problem Relation Age of Onset    Breast cancer Mother 61    Rheum arthritis Mother     Sjogren's syndrome Mother     Liver cancer Mother     Esophageal cancer Father     Diabetes Father     No Known Problems Brother     No Known Problems Brother     No Known Problems Maternal Grandmother     No Known Problems Paternal Grandmother     No Known Problems Daughter     No Known Problems Son     No Known Problems Son     No Known Problems Maternal Aunt     No Known Problems Maternal Aunt     No Known Problems Maternal Aunt     Stroke Other     Heart disease Neg Hx       Social History[1]   E-Cigarette/Vaping    E-Cigarette Use Never User       E-Cigarette/Vaping Substances    Nicotine No     THC No     CBD No     Flavoring No     Other No     Unknown No       I have reviewed and agree with the history as documented.     HPI    Is a very pleasant, mildly ill-appearing 50-year-old female, prior history of cholecystectomy, presents emergency department with a 18-hour history of nausea vomiting diarrheal illness, no vomit, vomit is nonbilious, no history of melena or hematochezia.  No history of fever or chills, last dose of Zofran was at 11 PM yesterday.    Patient is currently on zepbound for weight loss.   12 hours after taking zepbound.      Review of Systems   Constitutional: Negative.  Negative for chills, fatigue and fever.   HENT: Negative.     Eyes: Negative.    Respiratory: Negative.  Negative for chest tightness and shortness of breath.    Cardiovascular: Negative.  Negative for chest pain and palpitations.   Gastrointestinal:  Positive for abdominal pain.   Endocrine: Negative.    Genitourinary: Negative.    Musculoskeletal: Negative.    Skin: Negative.    Allergic/Immunologic:  Negative.    Neurological: Negative.    Hematological: Negative.    Psychiatric/Behavioral: Negative.             Objective       ED Triage Vitals   Temperature Pulse Blood Pressure Respirations SpO2 Patient Position - Orthostatic VS   05/15/25 0341 05/15/25 0341 05/15/25 0341 05/15/25 0341 05/15/25 0341 05/15/25 0341   98.1 °F (36.7 °C) 104 115/80 18 99 % Sitting      Temp Source Heart Rate Source BP Location FiO2 (%) Pain Score    05/15/25 0341 05/15/25 0341 05/15/25 0341 -- 05/15/25 0500    Temporal Monitor Left arm  No Pain      Vitals      Date and Time Temp Pulse SpO2 Resp BP Pain Score FACES Pain Rating User   05/15/25 0541 97.9 °F (36.6 °C) 82 -- 20 125/60 -- -- GC   05/15/25 0500 -- 94 97 % 18 115/59 No Pain -- SV   05/15/25 0430 -- 94 98 % 18 112/57 -- -- SV   05/15/25 0400 -- 92 99 % 18 115/55 -- -- SV   05/15/25 0341 98.1 °F (36.7 °C) 104 99 % 18 115/80 -- -- KB            Physical Exam  Vitals and nursing note reviewed.   Constitutional:       General: She is in acute distress.      Appearance: Normal appearance. She is not ill-appearing, toxic-appearing or diaphoretic.   HENT:      Head: Normocephalic and atraumatic.      Right Ear: External ear normal. There is no impacted cerumen.      Left Ear: External ear normal. There is no impacted cerumen.      Nose: Nose normal. No congestion or rhinorrhea.      Mouth/Throat:      Mouth: Mucous membranes are moist.      Pharynx: Oropharynx is clear.     Eyes:      Extraocular Movements: Extraocular movements intact.      Conjunctiva/sclera: Conjunctivae normal.      Pupils: Pupils are equal, round, and reactive to light.       Cardiovascular:      Rate and Rhythm: Regular rhythm. Tachycardia present.      Pulses: Normal pulses.      Heart sounds: Normal heart sounds.   Pulmonary:      Effort: Pulmonary effort is normal. No respiratory distress.      Breath sounds: Normal breath sounds. No stridor. No wheezing, rhonchi or rales.   Chest:      Chest wall: No  tenderness.   Abdominal:      General: Abdomen is flat. There is no distension.      Palpations: There is no mass.      Tenderness: There is abdominal tenderness.     Musculoskeletal:         General: Normal range of motion.      Cervical back: Normal range of motion.     Skin:     General: Skin is warm.      Capillary Refill: Capillary refill takes less than 2 seconds.     Neurological:      General: No focal deficit present.      Mental Status: She is alert and oriented to person, place, and time. Mental status is at baseline.     Psychiatric:         Mood and Affect: Mood normal.         Behavior: Behavior normal.         Thought Content: Thought content normal.         Judgment: Judgment normal.         Results Reviewed       Procedure Component Value Units Date/Time    Magnesium [[id number]]  (Normal) Collected: 05/15/25 0338    Lab Status: Final result Specimen: Blood from Arm, Right Updated: 05/15/25 0437     Magnesium 2.0 mg/dL     Protime-INR [[id number]]  (Abnormal) Collected: 05/15/25 0354    Lab Status: Final result Specimen: Blood from Arm, Right Updated: 05/15/25 0424     Protime 15.7 seconds      INR 1.20    Narrative:      INR Therapeutic Range    Indication                                             INR Range      Atrial Fibrillation                                               2.0-3.0  Hypercoagulable State                                    2.0.2.3  Left Ventricular Asist Device                            2.0-3.0  Mechanical Heart Valve                                  -    Aortic(with afib, MI, embolism, HF, LA enlargement,    and/or coagulopathy)                                     2.0-3.0 (2.5-3.5)     Mitral                                                             2.5-3.5  Prosthetic/Bioprosthetic Heart Valve               2.0-3.0  Venous thromboembolism (VTE: VT, PE        2.0-3.0    APTT [[id number]]  (Normal) Collected: 05/15/25 0354    Lab Status: Final result Specimen: Blood from Arm,  Right Updated: 05/15/25 0424     PTT 32 seconds     Comprehensive metabolic panel [[id number]]  (Abnormal) Collected: 05/15/25 0338    Lab Status: Final result Specimen: Blood from Arm, Right Updated: 05/15/25 0403     Sodium 136 mmol/L      Potassium 3.7 mmol/L      Chloride 101 mmol/L      CO2 23 mmol/L      ANION GAP 12 mmol/L      BUN 17 mg/dL      Creatinine 0.88 mg/dL      Glucose 103 mg/dL      Calcium 10.0 mg/dL      AST 12 U/L      ALT 11 U/L      Alkaline Phosphatase 59 U/L      Total Protein 8.1 g/dL      Albumin 4.7 g/dL      Total Bilirubin 1.17 mg/dL      eGFR 77 ml/min/1.73sq m     Narrative:      [employer] guidelines for Chronic Kidney Disease (CKD):     Stage 1 with normal or high GFR (GFR > 90 mL/min/1.73 square meters)    Stage 2 Mild CKD (GFR = 60-89 mL/min/1.73 square meters)    Stage 3A Moderate CKD (GFR = 45-59 mL/min/1.73 square meters)    Stage 3B Moderate CKD (GFR = 30-44 mL/min/1.73 square meters)    Stage 4 Severe CKD (GFR = 15-29 mL/min/1.73 square meters)    Stage 5 End Stage CKD (GFR <15 mL/min/1.73 square meters)  Note: GFR calculation is accurate only with a steady state creatinine    Lipase [[id number]]  (Normal) Collected: 05/15/25 0338    Lab Status: Final result Specimen: Blood from Arm, Right Updated: 05/15/25 0403     Lipase 22 u/L     CBC and differential [[id number]]  (Abnormal) Collected: 05/15/25 0338    Lab Status: Final result Specimen: Blood from Arm, Right Updated: 05/15/25 0346     WBC 13.63 Thousand/uL      RBC 5.15 Million/uL      Hemoglobin 15.5 g/dL      Hematocrit 47.3 %      MCV 92 fL      MCH 30.1 pg      MCHC 32.8 g/dL      RDW 12.2 %      MPV 11.4 fL      Platelets 432 Thousands/uL      nRBC 0 /100 WBCs      Segmented % 76 %      Immature Grans % 0 %      Lymphocytes % 15 %      Monocytes % 7 %      Eosinophils Relative 2 %      Basophils Relative 0 %      Absolute Neutrophils 10.25 Thousands/µL      Absolute Immature Grans 0.04  Thousand/uL      Absolute Lymphocytes 2.00 Thousands/µL      Absolute Monocytes 1.01 Thousand/µL      Eosinophils Absolute 0.31 Thousand/µL      Basophils Absolute 0.02 Thousands/µL             CT abdomen pelvis with contrast   Final Interpretation by Nasra Bland MD (05/15 0515)      Findings suggesting enteritis with diarrheal illness, as described above. Please see discussion.      There is a moderate to large hiatal hernia. The wall of the visualized distal esophagus is thickened. Clinical correlation and follow-up recommended.      Borderline splenomegaly.      Other nonemergent and chronic findings as above.         Workstation performed: [id number]             ECG 12 Lead Documentation Only    Date/Time: 5/15/2025 3:50 AM    Performed by: Austin Valle III, DO  Authorized by: Austin Valle III, DO    Indications / Diagnosis:  Abdominal pain  ECG reviewed by me, the ED Provider: no    Patient location:  ED  Comments:      I personally reviewed this EKG that was performed on the patient 9/15/2025, EKG was completed at 3:49 PM interpreted by me at 3:50 AM, normal sinus rhythm with a ventricular rate of 88 bpm, remaining portion Nevils within normal limits.    No diffuse elevations to indicate pericarditis.  No coved ST elevations greater than 2mm with negative T waves in V1-3 to indicate concern for brugada.  No biphasic T waves in V2, V3 to indicate Wellens (critical stenosis of LAD).   No elevation in aVR or deviation when compared to V1 (can be associated with ST depression in I,II, V4-6 when left main occlusion is present).       ED Medication and Procedure Management   Prior to Admission Medications   Prescriptions Last Dose Informant Patient Reported? Taking?   Cholecalciferol (Vitamin D3) 25 MCG TABS   Yes No   Sig: Take by mouth   FLUoxetine (PROzac) 40 MG capsule   No No   Sig: Take 1 capsule (40 mg total) by mouth daily   Levocetirizine Dihydrochloride (XYZAL PO)  Self Yes No    Sig: Take by mouth   Patient not taking: Reported on 3/4/2025   MAGNESIUM CITRATE PO   Yes No   Sig: Take by mouth   Olopatadine HCl 0.6 % SOLN  Self No No   Si actuation into each nostril daily   Patient not taking: Reported on 3/4/2025   acetaminophen (TYLENOL) 500 mg tablet  Self Yes No   Sig: Take 1,000 mg by mouth every 6 (six) hours as needed for mild pain   albuterol (2.5 mg/3 mL) 0.083 % nebulizer solution  Self No No   Sig: Take 3 mL (2.5 mg total) by nebulization every 6 (six) hours as needed for wheezing or shortness of breath   busPIRone (BUSPAR) 5 mg tablet   No No   Sig: Take 1 tablet (5 mg total) by mouth 2 (two) times a day as needed (anxiety)   celecoxib (CeleBREX) 100 mg capsule  Self No No   Sig: Take 1 capsule (100 mg total) by mouth 2 (two) times a day   Patient taking differently: Take 100 mg by mouth if needed   estradiol (Vivelle-Dot) 0.0375 MG/24HR   No No   Sig: Place 1 patch on the skin 2 (two) times a week   fluticasone (FLONASE) 50 mcg/act nasal spray  Self No No   Si spray into each nostril 2 (two) times a day   Patient taking differently: 1 spray into each nostril if needed   gabapentin (Neurontin) 100 mg capsule  Self No No   Sig: Take 1 capsule (100 mg total) by mouth 3 (three) times a day As needed for numbness   Patient taking differently: Take 100 mg by mouth if needed As needed for numbness   hydrOXYzine pamoate (VISTARIL) 25 mg capsule   No No   Sig: Take 1 capsule (25 mg total) by mouth daily at bedtime as needed for anxiety   hydroxychloroquine (PLAQUENIL) 200 mg tablet   No No   Sig: Take 1 tablet (200 mg total) by mouth 2 (two) times a day   levonorgestrel (MIRENA) 20 MCG/24HR IUD  Self Yes No   Si each by Intrauterine route once   montelukast (SINGULAIR) 10 mg tablet  Self No No   Sig: Take 1 tablet (10 mg total) by mouth daily at bedtime   Patient taking differently: Take 10 mg by mouth if needed   omeprazole (PriLOSEC) 20 mg delayed release capsule   No No    Sig: Take 1 capsule (20 mg total) by mouth 2 (two) times a day   ondansetron (ZOFRAN) 4 mg tablet   No No   Sig: Take 1 tablet (4 mg total) by mouth every 6 (six) hours   simethicone (MYLICON,GAS-X) 180 MG capsule   No No   Sig: Take 1 capsule (180 mg total) by mouth every 6 (six) hours as needed for flatulence   tirzepatide (Zepbound) 5 mg/0.5 mL auto-injector   No No   Sig: Inject 0.5 mL (5 mg total) under the skin once a week   valACYclovir (VALTREX) 1,000 mg tablet   No No   Sig: TAKE TWO TABLETS BY MOUTH EVERY 12 HOURS FOR 1 DAY      Facility-Administered Medications: None     Discharge Medication List as of 5/15/2025  5:46 AM        START taking these medications    Details   !! ondansetron (ZOFRAN) 4 mg tablet Take 1 tablet (4 mg total) by mouth every 6 (six) hours, Starting u 5/15/2025, Normal       !! - Potential duplicate medications found. Please discuss with provider.        CONTINUE these medications which have NOT CHANGED    Details   acetaminophen (TYLENOL) 500 mg tablet Take 1,000 mg by mouth every 6 (six) hours as needed for mild pain, Historical Med      albuterol (2.5 mg/3 mL) 0.083 % nebulizer solution Take 3 mL (2.5 mg total) by nebulization every 6 (six) hours as needed for wheezing or shortness of breath, Starting Thu 4/20/2023, Normal      busPIRone (BUSPAR) 5 mg tablet Take 1 tablet (5 mg total) by mouth 2 (two) times a day as needed (anxiety), Starting Fri 9/20/2024, Normal      celecoxib (CeleBREX) 100 mg capsule Take 1 capsule (100 mg total) by mouth 2 (two) times a day, Starting Wed 2/28/2024, Until Tue 3/4/2025, Normal      Cholecalciferol (Vitamin D3) 25 MCG TABS Take by mouth, Historical Med      estradiol (Vivelle-Dot) 0.0375 MG/24HR Place 1 patch on the skin 2 (two) times a week, Starting Thu 8/1/2024, Until Fri 8/1/2025, Normal      FLUoxetine (PROzac) 40 MG capsule Take 1 capsule (40 mg total) by mouth daily, Starting Fri 4/4/2025, Normal      fluticasone (FLONASE) 50 mcg/act  nasal spray 1 spray into each nostril 2 (two) times a day, Starting Mon 4/24/2023, Normal      gabapentin (Neurontin) 100 mg capsule Take 1 capsule (100 mg total) by mouth 3 (three) times a day As needed for numbness, Starting Mon 2/26/2024, Normal      hydroxychloroquine (PLAQUENIL) 200 mg tablet Take 1 tablet (200 mg total) by mouth 2 (two) times a day, Starting Wed 9/4/2024, Until Sun 6/1/2025, Normal      hydrOXYzine pamoate (VISTARIL) 25 mg capsule Take 1 capsule (25 mg total) by mouth daily at bedtime as needed for anxiety, Starting Fri 8/9/2024, Normal      Levocetirizine Dihydrochloride (XYZAL PO) Take by mouth, Historical Med      levonorgestrel (MIRENA) 20 MCG/24HR IUD 1 each by Intrauterine route once, Historical Med      MAGNESIUM CITRATE PO Take by mouth, Historical Med      montelukast (SINGULAIR) 10 mg tablet Take 1 tablet (10 mg total) by mouth daily at bedtime, Starting Tue 4/18/2023, Normal      Olopatadine HCl 0.6 % SOLN 2 actuation into each nostril daily, Starting Thu 8/17/2023, Until Fri 8/16/2024, Normal      omeprazole (PriLOSEC) 20 mg delayed release capsule Take 1 capsule (20 mg total) by mouth 2 (two) times a day, Starting Fri 4/25/2025, Until Wed 10/22/2025, Normal      !! ondansetron (ZOFRAN) 4 mg tablet Take 1 tablet (4 mg total) by mouth every 6 (six) hours, Starting Tue 4/29/2025, Normal      simethicone (MYLICON,GAS-X) 180 MG capsule Take 1 capsule (180 mg total) by mouth every 6 (six) hours as needed for flatulence, Starting Tue 4/29/2025, Normal      tirzepatide (Zepbound) 5 mg/0.5 mL auto-injector Inject 0.5 mL (5 mg total) under the skin once a week, Starting Tue 4/1/2025, Until Tue 5/27/2025, Normal      valACYclovir (VALTREX) 1,000 mg tablet TAKE TWO TABLETS BY MOUTH EVERY 12 HOURS FOR 1 DAY, Normal       !! - Potential duplicate medications found. Please discuss with provider.        No discharge procedures on file.  ED SEPSIS DOCUMENTATION   Time reflects when diagnosis was  documented in both MDM as applicable and the Disposition within this note       Time User Action Codes Description Comment    5/15/2025  5:39 AM Austin Valle Add [R11.2,  R19.7] Nausea vomiting and diarrhea                    [1]   Social History  Tobacco Use    Smoking status: Former     Current packs/day: 0.00     Average packs/day: 1 pack/day for 20.0 years (20.0 ttl pk-yrs)     Types: Cigarettes     Start date:      Quit date:      Years since quittin.3    Smokeless tobacco: Never   Vaping Use    Vaping status: Never Used   Substance Use Topics    Alcohol use: Yes     Comment: occasional    Drug use: No        Austin Valle III, DO  05/15/25 0606

## 2025-05-16 LAB
ATRIAL RATE: 88 BPM
P AXIS: 71 DEGREES
PR INTERVAL: 150 MS
QRS AXIS: 84 DEGREES
QRSD INTERVAL: 82 MS
QT INTERVAL: 394 MS
QTC INTERVAL: 476 MS
T WAVE AXIS: 72 DEGREES
VENTRICULAR RATE: 88 BPM

## 2025-05-16 PROCEDURE — 93010 ELECTROCARDIOGRAM REPORT: CPT | Performed by: INTERNAL MEDICINE

## 2025-05-24 DIAGNOSIS — E66.812 CLASS 2 OBESITY WITHOUT SERIOUS COMORBIDITY WITH BODY MASS INDEX (BMI) OF 38.0 TO 38.9 IN ADULT: ICD-10-CM

## 2025-05-27 RX ORDER — TIRZEPATIDE 5 MG/.5ML
5 INJECTION, SOLUTION SUBCUTANEOUS WEEKLY
Qty: 2 ML | Refills: 0 | Status: SHIPPED | OUTPATIENT
Start: 2025-05-27 | End: 2025-07-22

## 2025-06-02 ENCOUNTER — TELEMEDICINE (OUTPATIENT)
Dept: BEHAVIORAL/MENTAL HEALTH CLINIC | Facility: CLINIC | Age: 50
End: 2025-06-02
Payer: COMMERCIAL

## 2025-06-02 DIAGNOSIS — F41.9 ANXIETY AND DEPRESSION: Primary | ICD-10-CM

## 2025-06-02 DIAGNOSIS — F41.9 ANXIETY: ICD-10-CM

## 2025-06-02 DIAGNOSIS — F32.A ANXIETY AND DEPRESSION: Primary | ICD-10-CM

## 2025-06-02 DIAGNOSIS — R45.89 DEPRESSED MOOD: ICD-10-CM

## 2025-06-02 PROCEDURE — 90837 PSYTX W PT 60 MINUTES: CPT | Performed by: COUNSELOR

## 2025-06-02 NOTE — PSYCH
"Virtual Regular VisitName: Beryl Cleary      : 1975      MRN: [id number]  Encounter Provider: Cindy Ballesteros  Encounter Date: 2025   Encounter department: [facility] THERAPIST MENTAL HEALTH OUTPATIENT  :  Assessment & Plan  Anxiety and depression         Anxiety         Depressed mood             Goals addressed in session: Goal 1     DATA: Client processed on recent weight loss goals and new medication she is on. Client feels her anxiety and depression symptoms have decreased.  Client examines times has stopped taking meds only to find she spirals out of control again. Client examines ways to inspire  to clean up messy hoarded basement. Client finds this increases her anxiety and is working with .  Client has found helpful techniques to clean by watching a pod case. Client examines her daughters neediness and being able to say no to her.  Client feels that daughter needs to make better decisions when needing help and how to communicate this.   During this session, this clinician used the following therapeutic modalities: Cognitive Behavioral Therapy and Mindfulness-based Strategies    Substance Abuse was not addressed during this session. If the client is diagnosed with a co-occurring substance use disorder, please indicate any changes in the frequency or amount of use: NA. Stage of change for addressing substance use diagnoses: No substance use/Not applicable    ASSESSMENT:  Beryl presents with a Euthymic/ normal mood. Emliianas affect is Normal range and intensity, which is congruent, with their mood and the content of the session. The client has made progress on their goals as evidenced by Client has made progress on weightloss and decreasing symptoms.    Beryl presents with a none risk of suicide, none risk of self-harm, and none risk of harm to others.    For any risk assessment that surpasses a \"low\" rating, a safety plan must be developed.    A safety plan was " indicated: no  If yes, describe in detail NA    PLAN: Between sessions, Beryl will continue therapy. At the next session, the therapist will use Cognitive Behavioral Therapy, Mindfulness-based Strategies, and Motivational Interviewing to address anxiety.    Behavioral Health Treatment Plan [facility]: Diagnosis and Treatment Plan explained to Beryl, Beryl relates understanding diagnosis and is agreeable to Treatment Plan. Yes     Depression Follow-up Plan Completed: Not applicable     Reason for visit is No chief complaint on file.     Recent Visits  No visits were found meeting these conditions.  Showing recent visits within past 7 days and meeting all other requirements  Today's Visits  Date Type Provider Dept   06/02/25 Telemedicine Cindy Ballesteros [facility] Therapist [facility]   Showing today's visits and meeting all other requirements  Future Appointments  No visits were found meeting these conditions.  Showing future appointments within next 150 days and meeting all other requirements     History of Present Illness     HPI    Past Medical History   Past Medical History[1]  Past Surgical History[2]  Current Outpatient Medications   Medication Instructions    acetaminophen (TYLENOL) 1,000 mg, Every 6 hours PRN    albuterol 2.5 mg, Nebulization, Every 6 hours PRN    busPIRone (BUSPAR) 5 mg, Oral, 2 times daily PRN    celecoxib (CELEBREX) 100 mg, Oral, 2 times daily    Cholecalciferol (Vitamin D3) 25 MCG TABS Take by mouth    estradiol (Vivelle-Dot) 0.0375 MG/24HR 1 patch, Transdermal, 2 times weekly    FLUoxetine (PROZAC) 40 mg, Oral, Daily    fluticasone (FLONASE) 50 mcg/act nasal spray 1 spray, Nasal, 2 times daily    gabapentin (NEURONTIN) 100 mg, Oral, 3 times daily, As needed for numbness    hydroxychloroquine (PLAQUENIL) 200 mg, Oral, 2 times daily    hydrOXYzine pamoate (VISTARIL) 25 mg, Oral, Daily at bedtime PRN    Levocetirizine Dihydrochloride (XYZAL PO) Take by mouth    levonorgestrel (MIRENA) 20  MCG/24HR IUD 1 each, Once    MAGNESIUM CITRATE PO Take by mouth    montelukast (SINGULAIR) 10 mg, Oral, Daily at bedtime    Olopatadine HCl 0.6 % SOLN 2 actuation, Nasal, Daily    omeprazole (PRILOSEC) 20 mg, Oral, 2 times daily    ondansetron (ZOFRAN) 4 mg, Oral, Every 6 hours    ondansetron (ZOFRAN) 4 mg, Oral, Every 6 hours    simethicone (MYLICON,GAS-X) 180 mg, Oral, Every 6 hours PRN    valACYclovir (VALTREX) 1,000 mg tablet TAKE TWO TABLETS BY MOUTH EVERY 12 HOURS FOR 1 DAY    Zepbound 5 mg, Subcutaneous, Weekly     Allergies[3]    Objective   There were no vitals taken for this visit.    Video Exam  Physical Exam     Administrative Statements   Encounter provider Cindy Ballesteros    The Patient is located at Home and in the following state in which I hold an active license [place].    The patient was identified by name and date of birth. Beryl Cleary was informed that this is a telemedicine visit and that the visit is being conducted through the Epic Embedded platform. She agrees to proceed..  My office door was closed. No one else was in the room.  She acknowledged consent and understanding of privacy and security of the video platform. The patient has agreed to participate and understands they can discontinue the visit at any time.    I have spent a total time of 53 minutes in caring for this patient on the day of the visit/encounter including Counseling / Coordination of care, not including the time spent for establishing the audio/video connection.    Visit Time  Start Time: 1600  Stop Time: 1653  Total Visit Time: 53 minutes       [1]   Past Medical History:  Diagnosis Date    Anxiety     Depression     Hiatal hernia 10/2023    History of chickenpox     Nasal polyps     Rheumatoid arthritis (HCC)     Sinusitis     Sjoegren syndrome     Thyroid nodule     Wears glasses    [2]   Past Surgical History:  Procedure Laterality Date    CHOLECYSTECTOMY      EGD  10/2023    NASAL/SINUS ENDOSCOPY N/A 06/30/2020     Procedure: IMAGED GUIDED F.E.S.S.;  Surgeon: Baljit Cassidy DO;  Location: [facility];  Service: ENT    ME SEPTOPLASTY/SUBMUCOUS RESECJ W/WO CARTILAGE GRF N/A 06/30/2020    Procedure: SEPTOPLASTY;  Surgeon: Baljit Cassidy DO;  Location: [facility];  Service: ENT    US GUIDED THYROID BIOPSY  07/31/2017    VENOUS ABLATION Left     lower extremity    WISDOM TOOTH EXTRACTION     [3]   Allergies  Allergen Reactions    Doxycycline Hives    Nsaids Wheezing and Nasal Congestion     ASPIRIN and NSAID exacerbated respiratory disease (AERD) see allergist note on 2/28/24.  Typically COX2 are tolerated.  Tylenol is tolerated.  See CELECOXIB challenge on 8/8/24 she tolerated it without triggering AERD.

## 2025-06-16 ENCOUNTER — TELEMEDICINE (OUTPATIENT)
Dept: BEHAVIORAL/MENTAL HEALTH CLINIC | Facility: CLINIC | Age: 50
End: 2025-06-16
Payer: COMMERCIAL

## 2025-06-16 DIAGNOSIS — F32.A ANXIETY AND DEPRESSION: Primary | ICD-10-CM

## 2025-06-16 DIAGNOSIS — F41.9 ANXIETY AND DEPRESSION: Primary | ICD-10-CM

## 2025-06-16 PROCEDURE — 90837 PSYTX W PT 60 MINUTES: CPT | Performed by: COUNSELOR

## 2025-06-16 NOTE — PSYCH
"Virtual Regular VisitName: Beryl Cleary      : 1975      MRN: [id number]  Encounter Provider: Cindy Ballesteros  Encounter Date: 2025   Encounter department: [facility] THERAPIST MENTAL HEALTH OUTPATIENT  :  Assessment & Plan  Anxiety and depression             Goals addressed in session: Goal 1     DATA: Client processed on her upbringing and relationship with mother/siblings. Client endorses being estranged from mother and brother. Client examined the history of these relationships and why she felt it was important to end them.  Client investigated how her mother's toxicity might have contributed to her beliefs about self.  Client processed on a relationship at work and the need to advocate for self. Client examined further how her feelings about others differs than when she was younger.  Client is creating a goal for her family to get out more and make memories together.      During this session, this clinician used the following therapeutic modalities: Cognitive Behavioral Therapy    Substance Abuse was not addressed during this session. If the client is diagnosed with a co-occurring substance use disorder, please indicate any changes in the frequency or amount of use: NA. Stage of change for addressing substance use diagnoses: No substance use/Not applicable    ASSESSMENT:  Beryl presents with a Euthymic/ normal mood. Emilianas affect is Normal range and intensity, which is congruent, with their mood and the content of the session. The client has made progress on their goals as evidenced by Client sharing more in sessions. .    Beryl presents with a none risk of suicide, none risk of self-harm, and none risk of harm to others.    For any risk assessment that surpasses a \"low\" rating, a safety plan must be developed.    A safety plan was indicated: no  If yes, describe in detail NA    PLAN: Between sessions, Beryl will continue using breathing skills. At the next session, the " therapist will use Cognitive Behavioral Therapy, Mindfulness-based Strategies, and Motivational Interviewing to address anxiety, depression.    Behavioral Health Treatment Plan [facility]: Diagnosis and Treatment Plan explained to Beryl, Beryl relates understanding diagnosis and is agreeable to Treatment Plan. Yes     Depression Follow-up Plan Completed: Not applicable     Reason for visit is No chief complaint on file.     Recent Visits  No visits were found meeting these conditions.  Showing recent visits within past 7 days and meeting all other requirements  Today's Visits  Date Type Provider Dept   06/16/25 Telemedicine Cindy Ballesteros [facility] Therapist [facility]   Showing today's visits and meeting all other requirements  Future Appointments  No visits were found meeting these conditions.  Showing future appointments within next 150 days and meeting all other requirements     History of Present Illness     HPI    Past Medical History   Past Medical History[1]  Past Surgical History[2]  Current Outpatient Medications   Medication Instructions    acetaminophen (TYLENOL) 1,000 mg, Every 6 hours PRN    albuterol 2.5 mg, Nebulization, Every 6 hours PRN    busPIRone (BUSPAR) 5 mg, Oral, 2 times daily PRN    celecoxib (CELEBREX) 100 mg, Oral, 2 times daily    Cholecalciferol (Vitamin D3) 25 MCG TABS Take by mouth    estradiol (Vivelle-Dot) 0.0375 MG/24HR 1 patch, Transdermal, 2 times weekly    FLUoxetine (PROZAC) 40 mg, Oral, Daily    fluticasone (FLONASE) 50 mcg/act nasal spray 1 spray, Nasal, 2 times daily    gabapentin (NEURONTIN) 100 mg, Oral, 3 times daily, As needed for numbness    hydroxychloroquine (PLAQUENIL) 200 mg, Oral, 2 times daily    hydrOXYzine pamoate (VISTARIL) 25 mg, Oral, Daily at bedtime PRN    Levocetirizine Dihydrochloride (XYZAL PO) Take by mouth    levonorgestrel (MIRENA) 20 MCG/24HR IUD 1 each, Once    MAGNESIUM CITRATE PO Take by mouth    montelukast (SINGULAIR) 10 mg, Oral, Daily at  bedtime    Olopatadine HCl 0.6 % SOLN 2 actuation, Nasal, Daily    omeprazole (PRILOSEC) 20 mg, Oral, 2 times daily    ondansetron (ZOFRAN) 4 mg, Oral, Every 6 hours    ondansetron (ZOFRAN) 4 mg, Oral, Every 6 hours    simethicone (MYLICON,GAS-X) 180 mg, Oral, Every 6 hours PRN    valACYclovir (VALTREX) 1,000 mg tablet TAKE TWO TABLETS BY MOUTH EVERY 12 HOURS FOR 1 DAY    Zepbound 5 mg, Subcutaneous, Weekly     Allergies[3]    Objective   There were no vitals taken for this visit.    Video Exam  Physical Exam     Administrative Statements   Encounter provider Cindy Ballesteros    The Patient is located at Home and in the following state in which I hold an active license [place].    The patient was identified by name and date of birth. Beryl Cleary was informed that this is a telemedicine visit and that the visit is being conducted through the Epic Embedded platform. She agrees to proceed..  My office door was closed. No one else was in the room.  She acknowledged consent and understanding of privacy and security of the video platform. The patient has agreed to participate and understands they can discontinue the visit at any time.    I have spent a total time of 53 minutes in caring for this patient on the day of the visit/encounter including Counseling / Coordination of care, not including the time spent for establishing the audio/video connection.    Visit Time  Start Time: 1600  Stop Time: 1653  Total Visit Time: 53 minutes         [1]   Past Medical History:  Diagnosis Date    Anxiety     Depression     Hiatal hernia 10/2023    History of chickenpox     Nasal polyps     Rheumatoid arthritis (HCC)     Sinusitis     Sjoegren syndrome     Thyroid nodule     Wears glasses    [2]   Past Surgical History:  Procedure Laterality Date    CHOLECYSTECTOMY      EGD  10/2023    NASAL/SINUS ENDOSCOPY N/A 06/30/2020    Procedure: IMAGED GUIDED F.E.S.S.;  Surgeon: Baljit Cassidy DO;  Location: [facility];  Service: ENT    ME  SEPTOPLASTY/SUBMUCOUS RESECJ W/WO CARTILAGE GRF N/A 06/30/2020    Procedure: SEPTOPLASTY;  Surgeon: Baljit Cassidy DO;  Location: [facility];  Service: ENT    US GUIDED THYROID BIOPSY  07/31/2017    VENOUS ABLATION Left     lower extremity    WISDOM TOOTH EXTRACTION     [3]   Allergies  Allergen Reactions    Doxycycline Hives    Nsaids Wheezing and Nasal Congestion     ASPIRIN and NSAID exacerbated respiratory disease (AERD) see allergist note on 2/28/24.  Typically COX2 are tolerated.  Tylenol is tolerated.  See CELECOXIB challenge on 8/8/24 she tolerated it without triggering AERD.

## 2025-06-19 ENCOUNTER — OFFICE VISIT (OUTPATIENT)
Dept: BARIATRICS | Facility: CLINIC | Age: 50
End: 2025-06-19
Payer: COMMERCIAL

## 2025-06-19 VITALS
TEMPERATURE: 98.2 F | HEART RATE: 92 BPM | RESPIRATION RATE: 20 BRPM | SYSTOLIC BLOOD PRESSURE: 108 MMHG | BODY MASS INDEX: 34.28 KG/M2 | DIASTOLIC BLOOD PRESSURE: 68 MMHG | HEIGHT: 64 IN | OXYGEN SATURATION: 95 % | WEIGHT: 200.8 LBS

## 2025-06-19 DIAGNOSIS — E66.811 CLASS 1 OBESITY: Primary | ICD-10-CM

## 2025-06-19 DIAGNOSIS — F32.A ANXIETY AND DEPRESSION: ICD-10-CM

## 2025-06-19 DIAGNOSIS — F41.9 ANXIETY AND DEPRESSION: ICD-10-CM

## 2025-06-19 PROCEDURE — 99214 OFFICE O/P EST MOD 30 MIN: CPT | Performed by: PHYSICIAN ASSISTANT

## 2025-06-19 RX ORDER — TIRZEPATIDE 7.5 MG/.5ML
7.5 INJECTION, SOLUTION SUBCUTANEOUS WEEKLY
Qty: 2 ML | Refills: 1 | Status: SHIPPED | OUTPATIENT
Start: 2025-06-19

## 2025-06-19 NOTE — PROGRESS NOTES
Assessment/Plan:    Class 1 obesity  -Patient is pursuing Conservative Program  -Initial weight loss goal of 5-10% weight loss for improved health  -Screening labs: up to date  -Zepbound Start weight: 220.4 lbs  -Dietary recall reviewed. Met with RD since last visit  -Currently on Zepbound 5mg and tolerating well. Will increase to 7.5mg dose. Recommend to stay at this dose for 2 months before increasing again  -medication agreement signed  -denies personal hx pancreatitis, family hx MEN2 or MTC    Initial: 197 lbs  Highest: 220.4 lbs  Current: 200.8 lbs  Change: -19.6lbs  Goal: 150 lbs    Anxiety and depression  -improved  -On Prozac    Goals:    Food log (ie.) [email or web address],etc.   No sugary beverages. At least 64oz of water daily.  Increase physical activity by 10 minutes daily. Gradually increase physical activity to a goal of 5 days per week for 30 minutes of MODERATE intensity PLUS 2 days per week of FULL BODY resistance training  5-10 servings of fruits and vegetables per day  25-35 grams of dietary fiber per day      Follow up in approximately 4 months with Non-Surgical Physician/Advanced Practitioner.     Diagnoses and all orders for this visit:    Class 1 obesity  -     tirzepatide (Zepbound) 7.5 mg/0.5 mL auto-injector; Inject 0.5 mL (7.5 mg total) under the skin once a week    BMI 34.0-34.9,adult    Anxiety and depression          Subjective:   Chief Complaint   Patient presents with    Follow-up        Patient ID: Beryl Cleary  is a 50 y.o. female with excess weight/obesity here to pursue weight managment.  Patient is pursuing Conservative Program.     HPI Patient presents for [facility] follow up. Started on Zepbound in March. Currently on 5mg and tolerating well. She did experience severe stomachache after eating [employer] ch[employer] as well as vomiting. She is experiencing much less food noise which she really appreciates. She also feels less anxiety about eating  "and not being able to eat certain foods. She did meet with RD since visit.    Zepbound Start weight:  220.4 lbs  Exercise: walking 4 days per week for 20 min  Sleep: well  Food logging: denies  Hydration: water at least 64oz flavored water or propel, 20 oz coffee + equal + SF creamer    B: activia yogurt  S: protein shake  L: shrimp + veggie Or Salad  S: cheese stick or Cheeze it  D: protein + veggie + starch  S: skips    Wt Readings from Last 10 Encounters:   06/19/25 91.1 kg (200 lb 12.8 oz)   04/08/25 98 kg (216 lb)   03/11/25 101 kg (222 lb 12.8 oz)   03/04/25 100 kg (220 lb 6.4 oz)   09/04/24 98.4 kg (217 lb)   08/25/24 98 kg (216 lb)   08/22/24 98.1 kg (216 lb 3.2 oz)   08/08/24 97.5 kg (215 lb)   07/30/24 98 kg (216 lb)   04/08/24 95.6 kg (210 lb 12.8 oz)      The following portions of the patient's history were reviewed and updated as appropriate: allergies, current medications, past family history, past medical history, past social history, past surgical history, and problem list.    Review of Systems   Cardiovascular:  Negative for chest pain and palpitations.   Gastrointestinal:  Negative for abdominal pain, constipation, diarrhea, nausea and vomiting.        Denies GERD       Objective:    /68 (BP Location: Right arm, Patient Position: Sitting, Cuff Size: Standard)   Pulse 92   Temp 98.2 °F (36.8 °C) (Temporal)   Resp 20   Ht 5' 4\" (1.626 m)   Wt 91.1 kg (200 lb 12.8 oz)   SpO2 95%   BMI 34.47 kg/m²      Physical Exam  Vitals and nursing note reviewed.   Constitutional:       General: She is not in acute distress.     Appearance: She is obese. She is not ill-appearing or toxic-appearing.   HENT:      Head: Normocephalic and atraumatic.      Nose: Nose normal.      Mouth/Throat:      Mouth: Mucous membranes are moist.     Eyes:      General: No scleral icterus.    Pulmonary:      Effort: Pulmonary effort is normal. No respiratory distress.   Abdominal:      Comments: protuberant "     Musculoskeletal:      Cervical back: Normal range of motion.      Right lower leg: No edema.      Left lower leg: No edema.     Skin:     General: Skin is dry.      Coloration: Skin is not jaundiced.     Neurological:      General: No focal deficit present.      Mental Status: Mental status is at baseline.     Psychiatric:         Mood and Affect: Mood normal.         Behavior: Behavior normal.         Thought Content: Thought content normal.         Judgment: Judgment normal.

## 2025-06-19 NOTE — ASSESSMENT & PLAN NOTE
-Patient is pursuing Conservative Program  -Initial weight loss goal of 5-10% weight loss for improved health  -Screening labs: up to date  -Zepbound Start weight: 220.4 lbs  -Dietary recall reviewed. Met with RD since last visit  -Currently on Zepbound 5mg and tolerating well. Will increase to 7.5mg dose. Recommend to stay at this dose for 2 months before increasing again  -medication agreement signed  -denies personal hx pancreatitis, family hx MEN2 or MTC    Initial: 197 lbs  Highest: 220.4 lbs  Current: 200.8 lbs  Change: -19.6lbs  Goal: 150 lbs

## 2025-07-14 ENCOUNTER — TELEMEDICINE (OUTPATIENT)
Dept: BEHAVIORAL/MENTAL HEALTH CLINIC | Facility: CLINIC | Age: 50
End: 2025-07-14
Payer: COMMERCIAL

## 2025-07-14 DIAGNOSIS — F32.A ANXIETY AND DEPRESSION: Primary | ICD-10-CM

## 2025-07-14 DIAGNOSIS — F41.9 ANXIETY: ICD-10-CM

## 2025-07-14 DIAGNOSIS — F41.9 ANXIETY AND DEPRESSION: Primary | ICD-10-CM

## 2025-07-14 DIAGNOSIS — R45.89 DEPRESSED MOOD: ICD-10-CM

## 2025-07-14 PROCEDURE — 90837 PSYTX W PT 60 MINUTES: CPT | Performed by: COUNSELOR

## 2025-07-14 NOTE — PSYCH
Virtual Regular VisitName: Beryl Cleary      : 1975      MRN: [id number]  Encounter Provider: Cindy Ballesteros  Encounter Date: 2025   Encounter department: [facility] THERAPIST MENTAL HEALTH OUTPATIENT  :  Assessment & Plan  Anxiety and depression         Depressed mood         Anxiety             Goals addressed in session: Goal 1     DATA: Client processed on goals around decreasing anxiety. Client examined her recent move to decrease working hours and to increase time spent with family.  Client has observed that she now feels less anxious and more rested. Client processed around feeling guilty bc she cannot afford the things she used to and examined some thoughts her daughter shared bc she declined to lend her money.  Client processed on her husbands relationship with son and how the family has reacted to son's order that new  not attend any family functions.  Client processed on her son's motivations and inability to move on.  Client examined daughters current stressors and fears around political retaliation against LGBTQ folks.  Client examined ways to help and support daughter.  Client and therapist worked on updating treatment plan and client scored low to no depression on PHQ9.  Therapist used active listening, open ended questions, reflection and validation.      During this session, this clinician used the following therapeutic modalities: Cognitive Behavioral Therapy, Mindfulness-based Strategies, and Motivational Interviewing    Substance Abuse was not addressed during this session. If the client is diagnosed with a co-occurring substance use disorder, please indicate any changes in the frequency or amount of use: NA. Stage of change for addressing substance use diagnoses: No substance use/Not applicable    ASSESSMENT:  Beryl presents with a Euthymic/ normal mood. Beryl's affect is Normal range and intensity, which is congruent, with their mood and the content of  "the session. The client has made progress on their goals as evidenced by Client developing insight.    Beryl presents with a none risk of suicide, none risk of self-harm, and none risk of harm to others.    For any risk assessment that surpasses a \"low\" rating, a safety plan must be developed.    A safety plan was indicated: no  If yes, describe in detail na    PLAN: Between sessions, Beryl will continue advocating for self. At the next session, the therapist will use Cognitive Behavioral Therapy, Mindfulness-based Strategies, and Motivational Interviewing to address anxiety, depression.    Behavioral Health Treatment Plan [facility]: Diagnosis and Treatment Plan explained to Beryl, Beryl relates understanding diagnosis and is agreeable to Treatment Plan. Yes     Depression Follow-up Plan Completed: No     Reason for visit is No chief complaint on file.     Recent Visits  No visits were found meeting these conditions.  Showing recent visits within past 7 days and meeting all other requirements  Today's Visits  Date Type Provider Dept   07/14/25 Telemedicine Cindy Ballesteros [facility] Therapist [facility]   Showing today's visits and meeting all other requirements  Future Appointments  No visits were found meeting these conditions.  Showing future appointments within next 150 days and meeting all other requirements     History of Present Illness     HPI    Past Medical History   Past Medical History[1]  Past Surgical History[2]  Current Outpatient Medications   Medication Instructions    acetaminophen (TYLENOL) 1,000 mg, Every 6 hours PRN    albuterol 2.5 mg, Nebulization, Every 6 hours PRN    busPIRone (BUSPAR) 5 mg, Oral, 2 times daily PRN    celecoxib (CELEBREX) 100 mg, Oral, 2 times daily    Cholecalciferol (Vitamin D3) 25 MCG TABS Take by mouth    estradiol (Vivelle-Dot) 0.0375 MG/24HR 1 patch, Transdermal, 2 times weekly    FLUoxetine (PROZAC) 40 mg, Oral, Daily    fluticasone (FLONASE) 50 mcg/act nasal spray " 1 spray, Nasal, 2 times daily    gabapentin (NEURONTIN) 100 mg, Oral, 3 times daily, As needed for numbness    hydroxychloroquine (PLAQUENIL) 200 mg, Oral, 2 times daily    hydrOXYzine pamoate (VISTARIL) 25 mg, Oral, Daily at bedtime PRN    Levocetirizine Dihydrochloride (XYZAL PO) Take by mouth    levonorgestrel (MIRENA) 20 MCG/24HR IUD 1 each, Once    MAGNESIUM CITRATE PO Take by mouth    montelukast (SINGULAIR) 10 mg, Oral, Daily at bedtime    Olopatadine HCl 0.6 % SOLN 2 actuation, Nasal, Daily    omeprazole (PRILOSEC) 20 mg, Oral, 2 times daily    ondansetron (ZOFRAN) 4 mg, Oral, Every 6 hours    ondansetron (ZOFRAN) 4 mg, Oral, Every 6 hours    simethicone (MYLICON,GAS-X) 180 mg, Oral, Every 6 hours PRN    valACYclovir (VALTREX) 1,000 mg tablet TAKE TWO TABLETS BY MOUTH EVERY 12 HOURS FOR 1 DAY    Zepbound 7.5 mg, Subcutaneous, Weekly     Allergies[3]    Objective   There were no vitals taken for this visit.    Video Exam  Physical Exam     Administrative Statements   Encounter provider Cindy Ballesteros    The Patient is located at Home and in the following state in which I hold an active license [place].    The patient was identified by name and date of birth. Beryl Cleary was informed that this is a telemedicine visit and that the visit is being conducted through the Epic Embedded platform. She agrees to proceed..  My office door was closed. No one else was in the room.  She acknowledged consent and understanding of privacy and security of the video platform. The patient has agreed to participate and understands they can discontinue the visit at any time.    I have spent a total time of 53 minutes in caring for this patient on the day of the visit/encounter including Counseling / Coordination of care, not including the time spent for establishing the audio/video connection.    Visit Time  Start Time: 1400  Stop Time: 1453  Total Visit Time: 53 minutes         [1]   Past Medical History:  Diagnosis Date    Anxiety      Depression     Hiatal hernia 10/2023    History of chickenpox     Nasal polyps     Rheumatoid arthritis (HCC)     Sinusitis     Sjoegren syndrome     Thyroid nodule     Wears glasses    [2]   Past Surgical History:  Procedure Laterality Date    CHOLECYSTECTOMY      EGD  10/2023    NASAL/SINUS ENDOSCOPY N/A 06/30/2020    Procedure: IMAGED GUIDED F.E.S.S.;  Surgeon: Baljit Cassidy DO;  Location: [facility];  Service: ENT    AK SEPTOPLASTY/SUBMUCOUS RESECJ W/WO CARTILAGE GRF N/A 06/30/2020    Procedure: SEPTOPLASTY;  Surgeon: Baljit Cassidy DO;  Location: [facility];  Service: ENT    US GUIDED THYROID BIOPSY  07/31/2017    VENOUS ABLATION Left     lower extremity    WISDOM TOOTH EXTRACTION     [3]   Allergies  Allergen Reactions    Doxycycline Hives    Nsaids Wheezing and Nasal Congestion     ASPIRIN and NSAID exacerbated respiratory disease (AERD) see allergist note on 2/28/24.  Typically COX2 are tolerated.  Tylenol is tolerated.  See CELECOXIB challenge on 8/8/24 she tolerated it without triggering AERD.

## 2025-07-18 ENCOUNTER — OFFICE VISIT (OUTPATIENT)
Dept: INTERNAL MEDICINE CLINIC | Facility: CLINIC | Age: 50
End: 2025-07-18
Payer: COMMERCIAL

## 2025-07-18 VITALS
BODY MASS INDEX: 33.36 KG/M2 | HEIGHT: 64 IN | TEMPERATURE: 98.7 F | DIASTOLIC BLOOD PRESSURE: 68 MMHG | SYSTOLIC BLOOD PRESSURE: 98 MMHG | WEIGHT: 195.4 LBS | OXYGEN SATURATION: 96 % | HEART RATE: 84 BPM

## 2025-07-18 DIAGNOSIS — Z00.00 ANNUAL PHYSICAL EXAM: Primary | ICD-10-CM

## 2025-07-18 DIAGNOSIS — Z13.1 SCREENING FOR DIABETES MELLITUS: ICD-10-CM

## 2025-07-18 DIAGNOSIS — M06.4 INFLAMMATORY POLYARTHROPATHY (HCC): ICD-10-CM

## 2025-07-18 DIAGNOSIS — M06.9 RHEUMATOID ARTHRITIS, INVOLVING UNSPECIFIED SITE, UNSPECIFIED WHETHER RHEUMATOID FACTOR PRESENT (HCC): ICD-10-CM

## 2025-07-18 DIAGNOSIS — M35.00 SJOGREN'S SYNDROME, WITH UNSPECIFIED ORGAN INVOLVEMENT (HCC): ICD-10-CM

## 2025-07-18 DIAGNOSIS — Z12.11 SCREENING FOR COLON CANCER: ICD-10-CM

## 2025-07-18 DIAGNOSIS — M62.838 MUSCLE SPASM: ICD-10-CM

## 2025-07-18 PROBLEM — M54.31 RIGHT SIDED SCIATICA: Status: RESOLVED | Noted: 2022-06-30 | Resolved: 2025-07-18

## 2025-07-18 PROBLEM — R11.2 PONV (POSTOPERATIVE NAUSEA AND VOMITING): Status: RESOLVED | Noted: 2023-11-08 | Resolved: 2025-07-18

## 2025-07-18 PROBLEM — Z98.890 PONV (POSTOPERATIVE NAUSEA AND VOMITING): Status: RESOLVED | Noted: 2023-11-08 | Resolved: 2025-07-18

## 2025-07-18 PROBLEM — M77.9 TENDONITIS: Status: RESOLVED | Noted: 2019-06-25 | Resolved: 2025-07-18

## 2025-07-18 PROBLEM — R00.2 PALPITATIONS: Status: RESOLVED | Noted: 2024-08-22 | Resolved: 2025-07-18

## 2025-07-18 PROCEDURE — 99396 PREV VISIT EST AGE 40-64: CPT | Performed by: NURSE PRACTITIONER

## 2025-07-18 RX ORDER — METHOCARBAMOL 500 MG/1
500 TABLET, FILM COATED ORAL 3 TIMES DAILY
Qty: 60 TABLET | Refills: 0 | Status: SHIPPED | OUTPATIENT
Start: 2025-07-18

## 2025-07-18 RX ORDER — PREDNISONE 10 MG/1
TABLET ORAL
Qty: 18 TABLET | Refills: 0 | Status: SHIPPED | OUTPATIENT
Start: 2025-07-18

## 2025-07-18 NOTE — ASSESSMENT & PLAN NOTE
Orders:    predniSONE 10 mg tablet; 30 mg by mouth daily for 3 days, then 20 mg by mouth daily for 3 days, then 10 mg by mouth daily for 3 days, then stop    methocarbamol (ROBAXIN) 500 mg tablet; Take 1 tablet (500 mg total) by mouth 3 (three) times a day    Will give Robaxin and Prednisone for muscle spasms    Will have mammogram and cologuard done    Will followup in one year

## 2025-07-18 NOTE — PROGRESS NOTES
Adult Annual Physical  Name: Beryl Cleary      : 1975      MRN: [id number]  Encounter Provider: JAMSHID Crespo  Encounter Date: 2025   Encounter department: [facility]    :  Assessment & Plan  Annual physical exam    Orders:    Comprehensive metabolic panel; Future    CBC and differential; Future    TSH, 3rd generation with Free T4 reflex; Future    Lipid panel; Future    Sjogren's syndrome, with unspecified organ involvement (HCC)    Orders:    Comprehensive metabolic panel; Future    CBC and differential; Future    TSH, 3rd generation with Free T4 reflex; Future    Lipid panel; Future    Rheumatoid arthritis, involving unspecified site, unspecified whether rheumatoid factor present (HCC)    Orders:    Comprehensive metabolic panel; Future    CBC and differential; Future    TSH, 3rd generation with Free T4 reflex; Future    Lipid panel; Future    Continues to see Rheumatology doing well  Inflammatory polyarthropathy (HCC)    Orders:    Comprehensive metabolic panel; Future    CBC and differential; Future    TSH, 3rd generation with Free T4 reflex; Future    Lipid panel; Future    Screening for diabetes mellitus    Orders:    Hemoglobin A1C    Screening for colon cancer    Orders:    Cologuard    Muscle spasm    Orders:    predniSONE 10 mg tablet; 30 mg by mouth daily for 3 days, then 20 mg by mouth daily for 3 days, then 10 mg by mouth daily for 3 days, then stop    methocarbamol (ROBAXIN) 500 mg tablet; Take 1 tablet (500 mg total) by mouth 3 (three) times a day    Will give Robaxin and Prednisone for muscle spasms    Will have mammogram and cologuard done    Will followup in one year      Preventive Screenings:  - Diabetes Screening: screening up-to-date and risks/benefits discussed  - Cholesterol Screening: risks/benefits discussed   - Hepatitis C screening: screening up-to-date and risks/benefits discussed   - HIV screening: screening up-to-date and  "risks/benefits discussed   - Cervical cancer screening: screening up-to-date and risks/benefits discussed   - Breast cancer screening: risks/benefits discussed and orders placed   - Colon cancer screening: risks/benefits discussed and orders placed   - Lung cancer screening: screening not indicated     Immunizations:  - Immunizations due: Prevnar 20         History of Present Illness     Adult Annual Physical:  Patient presents for annual physical. Miriam is for a wellness. She is doing well and did loose around 30 lbs on the Zepbound. She is feeling great. She is willing to do a mammogram and cologuard. She does see GYN coming up. She does need labs done. She is having back issues and feels like she has a muscle spasm in her upper back. She is willing to try a muscle relaxer and steroid. She continues to see Rheumatology. She offers no other issues..     Diet and Physical Activity:  - Diet/Nutrition: no special diet.  - Exercise: no formal exercise.    Depression Screening:    - PHQ-9 Score: 0    General Health:  - Sleep: sleeps well and 4-6 hours of sleep on average.  - Hearing: normal hearing bilateral ears.  - Vision: wears glasses and wears contacts.  - Dental: regular dental visits.    /GYN Health:  - Follows with GYN: yes.     Advanced Care Planning:  - Has an advanced directive?: no    - Has a durable medical POA?: no      Review of Systems   Musculoskeletal:  Positive for back pain.   All other systems reviewed and are negative.        Objective   BP 98/68 (BP Location: Left arm, Patient Position: Sitting, Cuff Size: Standard)   Pulse 84   Temp 98.7 °F (37.1 °C) (Temporal)   Ht 5' 4\" (1.626 m)   Wt 88.6 kg (195 lb 6.4 oz)   SpO2 96%   BMI 33.54 kg/m²     Physical Exam  Vitals reviewed.   Constitutional:       Appearance: Normal appearance. She is obese.   HENT:      Head: Normocephalic and atraumatic.      Right Ear: Tympanic membrane, ear canal and external ear normal.      Left Ear: Tympanic " membrane, ear canal and external ear normal.      Nose: Nose normal.      Mouth/Throat:      Mouth: Mucous membranes are moist.      Pharynx: Oropharynx is clear.     Eyes:      Extraocular Movements: Extraocular movements intact.      Conjunctiva/sclera: Conjunctivae normal.      Pupils: Pupils are equal, round, and reactive to light.       Cardiovascular:      Rate and Rhythm: Normal rate and regular rhythm.      Pulses: Normal pulses.      Heart sounds: Normal heart sounds.   Pulmonary:      Effort: Pulmonary effort is normal.      Breath sounds: Normal breath sounds.   Abdominal:      General: Abdomen is flat.      Palpations: Abdomen is soft.     Musculoskeletal:         General: Normal range of motion.      Cervical back: Normal range of motion and neck supple.     Skin:     General: Skin is warm and dry.      Capillary Refill: Capillary refill takes less than 2 seconds.     Neurological:      General: No focal deficit present.      Mental Status: She is alert and oriented to person, place, and time. Mental status is at baseline.     Psychiatric:         Mood and Affect: Mood normal.         Behavior: Behavior normal.         Thought Content: Thought content normal.         Judgment: Judgment normal.

## 2025-07-18 NOTE — ASSESSMENT & PLAN NOTE
Orders:    Comprehensive metabolic panel; Future    CBC and differential; Future    TSH, 3rd generation with Free T4 reflex; Future    Lipid panel; Future    Continues to see Rheumatology doing well

## 2025-07-18 NOTE — PATIENT INSTRUCTIONS
"Patient Education     Routine physical for adults   The Basics   Written by the doctors and editors at [facility]   What is a physical? -- A physical is a routine visit, or \"check-up,\" with your doctor. You might also hear it called a \"wellness visit\" or \"preventive visit.\"  During each visit, the doctor will:   Ask about your physical and mental health   Ask about your habits, behaviors, and lifestyle   Do an exam   Give you vaccines if needed   Talk to you about any medicines you take   Give advice about your health   Answer your questions  Getting regular check-ups is an important part of taking care of your health. It can help your doctor find and treat any problems you have. But it's also important for preventing health problems.  A routine physical is different from a \"sick visit.\" A sick visit is when you see a doctor because of a health concern or problem. Since physicals are scheduled ahead of time, you can think about what you want to ask the doctor.  How often should I get a physical? -- It depends on your age and health. In general, for people age 21 years and older:   If you are younger than 50 years, you might be able to get a physical every 3 years.   If you are 50 years or older, your doctor might recommend a physical every year.  If you have an ongoing health condition, like diabetes or high blood pressure, your doctor will probably want to see you more often.  What happens during a physical? -- In general, each visit will include:   Physical exam - The doctor or nurse will check your height, weight, heart rate, and blood pressure. They will also look at your eyes and ears. They will ask about how you are feeling and whether you have any symptoms that bother you.   Medicines - It's a good idea to bring a list of all the medicines you take to each doctor visit. Your doctor will talk to you about your medicines and answer any questions. Tell them if you are having any side effects that bother you. You " "should also tell them if you are having trouble paying for any of your medicines.   Habits and behaviors - This includes:   Your diet   Your exercise habits   Whether you smoke, drink alcohol, or use drugs   Whether you are sexually active   Whether you feel safe at home  Your doctor will talk to you about things you can do to improve your health and lower your risk of health problems. They will also offer help and support. For example, if you want to quit smoking, they can give you advice and might prescribe medicines. If you want to improve your diet or get more physical activity, they can help you with this, too.   Lab tests, if needed - The tests you get will depend on your age and situation. For example, your doctor might want to check your:   Cholesterol   Blood sugar   Iron level   Vaccines - The recommended vaccines will depend on your age, health, and what vaccines you already had. Vaccines are very important because they can prevent certain serious or deadly infections.   Discussion of screening - \"Screening\" means checking for diseases or other health problems before they cause symptoms. Your doctor can recommend screening based on your age, risk, and preferences. This might include tests to check for:   Cancer, such as breast, prostate, cervical, ovarian, colorectal, prostate, lung, or skin cancer   Sexually transmitted infections, such as chlamydia and gonorrhea   Mental health conditions like depression and anxiety  Your doctor will talk to you about the different types of screening tests. They can help you decide which screenings to have. They can also explain what the results might mean.   Answering questions - The physical is a good time to ask the doctor or nurse questions about your health. If needed, they can refer you to other doctors or specialists, too.  Adults older than 65 years often need other care, too. As you get older, your doctor will talk to you about:   How to prevent falling at " home   Hearing or vision tests   Memory testing   How to take your medicines safely   Making sure that you have the help and support you need at home  All topics are updated as new evidence becomes available and our peer review process is complete.  This topic retrieved from [employer] on: May 02, 2024.  Topic 242733 Version 1.0  Release: 32.4.3 - C32.122  © 2024 UpToDate, Inc. and/or its affiliates. All rights reserved.  Consumer Information Use and Disclaimer   Disclaimer: This generalized information is a limited summary of diagnosis, treatment, and/or medication information. It is not meant to be comprehensive and should be used as a tool to help the user understand and/or assess potential diagnostic and treatment options. It does NOT include all information about conditions, treatments, medications, side effects, or risks that may apply to a specific patient. It is not intended to be medical advice or a substitute for the medical advice, diagnosis, or treatment of a health care provider based on the health care provider's examination and assessment of a patient's specific and unique circumstances. Patients must speak with a health care provider for complete information about their health, medical questions, and treatment options, including any risks or benefits regarding use of medications. This information does not endorse any treatments or medications as safe, effective, or approved for treating a specific patient. UpToDate, Inc. and its affiliates disclaim any warranty or liability relating to this information or the use thereof.The use of this information is governed by the Terms of Use, available at https://www.woltersOlympia Media Groupuwer.com/en/know/clinical-effectiveness-terms. 2024© UpToDate, Inc. and its affiliates and/or licensors. All rights reserved.  Copyright   © 2024 UpToDate, Inc. and/or its affiliates. All rights reserved.

## 2025-07-23 DIAGNOSIS — E66.811 CLASS 1 OBESITY: ICD-10-CM

## 2025-07-24 ENCOUNTER — TELEPHONE (OUTPATIENT)
Dept: PSYCHIATRY | Facility: CLINIC | Age: 50
End: 2025-07-24

## 2025-07-24 RX ORDER — TIRZEPATIDE 7.5 MG/.5ML
7.5 INJECTION, SOLUTION SUBCUTANEOUS WEEKLY
Qty: 2 ML | Refills: 0 | OUTPATIENT
Start: 2025-07-24

## 2025-07-24 NOTE — TELEPHONE ENCOUNTER
Writer left voicemail for client informing her of the [facility] New Patient forms and [facility] Behavioral Health Consent form needing to be signed for her upcoming appointment with Cindy Ballesteros.

## 2025-07-28 ENCOUNTER — TELEMEDICINE (OUTPATIENT)
Dept: BEHAVIORAL/MENTAL HEALTH CLINIC | Facility: CLINIC | Age: 50
End: 2025-07-28
Payer: COMMERCIAL

## 2025-07-28 DIAGNOSIS — F32.A ANXIETY AND DEPRESSION: Primary | ICD-10-CM

## 2025-07-28 DIAGNOSIS — F41.9 ANXIETY AND DEPRESSION: Primary | ICD-10-CM

## 2025-07-28 PROCEDURE — 90837 PSYTX W PT 60 MINUTES: CPT | Performed by: COUNSELOR

## 2025-07-28 NOTE — TELEPHONE ENCOUNTER
Writer left voicemail reminding client that her appointment is in 1 hour and where to find the forms needed for the appointment.

## 2025-07-28 NOTE — TELEPHONE ENCOUNTER
Writer called and was able to resend [facility] New Patient forms to client (did not receive first sent).

## 2025-07-28 NOTE — TELEPHONE ENCOUNTER
Writer left second voicemail reminding client of the [facility] New Patient Forms and [facility] Behavioral Health Consent form in her Document's Center to be signed for her appointment today with Cindy Ballesteros.

## 2025-08-17 PROBLEM — Z13.1 SCREENING FOR DIABETES MELLITUS: Status: RESOLVED | Noted: 2025-07-18 | Resolved: 2025-08-17

## 2025-08-17 PROBLEM — Z12.11 SCREENING FOR COLON CANCER: Status: RESOLVED | Noted: 2025-07-18 | Resolved: 2025-08-17

## 2025-08-20 DIAGNOSIS — E66.811 CLASS 1 OBESITY: Primary | ICD-10-CM

## 2025-08-20 DIAGNOSIS — E66.811 CLASS 1 OBESITY: ICD-10-CM

## 2025-08-20 RX ORDER — TIRZEPATIDE 7.5 MG/.5ML
7.5 INJECTION, SOLUTION SUBCUTANEOUS WEEKLY
Qty: 2 ML | Refills: 0 | OUTPATIENT
Start: 2025-08-20

## 2025-08-20 RX ORDER — TIRZEPATIDE 10 MG/.5ML
10 INJECTION, SOLUTION SUBCUTANEOUS WEEKLY
Qty: 2 ML | Refills: 1 | Status: SHIPPED | OUTPATIENT
Start: 2025-08-20 | End: 2025-10-15

## (undated) DEVICE — SPECIMEN SOCK - SHORT: Brand: MEDI-VAC

## (undated) DEVICE — SYRINGE 10ML LL CONTROL TOP

## (undated) DEVICE — GLOVE SRG BIOGEL ORTHOPEDIC 7

## (undated) DEVICE — BLADE 1884080EM TRICUT 4MMX13CM M4 ROHS: Brand: FUSION®

## (undated) DEVICE — GLOVE PI ULTRA TOUCH SZ.6.5

## (undated) DEVICE — SPECIMEN CONTAINER STERILE PEEL PACK

## (undated) DEVICE — 2000CC GUARDIAN II: Brand: GUARDIAN

## (undated) DEVICE — PATIENT TRACKER 9734887XOM NON-INVASIVE

## (undated) DEVICE — GLOVE INDICATOR PI UNDERGLOVE SZ 7.5 BLUE

## (undated) DEVICE — TUBING SUCTION 5MM X 12 FT

## (undated) DEVICE — GLOVE INDICATOR PI UNDERGLOVE SZ 6.5 BLUE

## (undated) DEVICE — ASTOUND STANDARD SURGICAL GOWN, XXL: Brand: CONVERTORS

## (undated) DEVICE — STANDARD SURGICAL GOWN, L: Brand: CONVERTORS

## (undated) DEVICE — INTENDED FOR TISSUE SEPARATION, AND OTHER PROCEDURES THAT REQUIRE A SHARP SURGICAL BLADE TO PUNCTURE OR CUT.: Brand: BARD-PARKER ® CARBON RIB-BACK BLADES

## (undated) DEVICE — SHEATH 1912010 5PK 4MM/30DEG STORZ XOMED: Brand: ENDO-SCRUB®

## (undated) DEVICE — NEEDLE 18 G X 1 1/2

## (undated) DEVICE — TUBING 1895522 5PK STRAIGHTSHOT TO XPS: Brand: STRAIGHTSHOT®

## (undated) DEVICE — SUT CHROMIC 4-0 PS-4 18 IN 1643G

## (undated) DEVICE — NEURO PATTIES 1/2 X 3

## (undated) DEVICE — ANTI-FOG SOLUTION WITH FOAM PAD: Brand: DEVON

## (undated) DEVICE — STERILE NASAL PACK: Brand: CARDINAL HEALTH

## (undated) DEVICE — INSTRUMENT TRACKER 9733533XOM ENT 1PK

## (undated) DEVICE — NEEDLE 25G X 1 1/2

## (undated) DEVICE — SPLINT 1524050 5PK PAIR DOYLE II AIRWAY: Brand: DOYLE II ™

## (undated) DEVICE — GLOVE SRG BIOGEL ECLIPSE 7

## (undated) DEVICE — SHEATH 1912000 5PK 4MM/0DEG STORZ XOMED: Brand: ENDO-SCRUB®

## (undated) DEVICE — SUT ETHILON 2-0 FS 18 IN 664H

## (undated) DEVICE — SCD SEQUENTIAL COMPRESSION COMFORT SLEEVE MEDIUM KNEE LENGTH: Brand: KENDALL SCD